# Patient Record
Sex: FEMALE | Race: WHITE | NOT HISPANIC OR LATINO | Employment: UNEMPLOYED | URBAN - NONMETROPOLITAN AREA
[De-identification: names, ages, dates, MRNs, and addresses within clinical notes are randomized per-mention and may not be internally consistent; named-entity substitution may affect disease eponyms.]

---

## 2021-01-08 ENCOUNTER — OFFICE VISIT (OUTPATIENT)
Dept: CARDIOLOGY | Facility: CLINIC | Age: 51
End: 2021-01-08

## 2021-01-08 VITALS
OXYGEN SATURATION: 97 % | TEMPERATURE: 96.9 F | DIASTOLIC BLOOD PRESSURE: 87 MMHG | SYSTOLIC BLOOD PRESSURE: 132 MMHG | HEIGHT: 55 IN | WEIGHT: 223 LBS | HEART RATE: 96 BPM | BODY MASS INDEX: 51.61 KG/M2

## 2021-01-08 DIAGNOSIS — I25.2 HISTORY OF MYOCARDIAL INFARCTION: ICD-10-CM

## 2021-01-08 DIAGNOSIS — R06.02 SOB (SHORTNESS OF BREATH): ICD-10-CM

## 2021-01-08 DIAGNOSIS — R07.2 PRECORDIAL PAIN: ICD-10-CM

## 2021-01-08 DIAGNOSIS — R06.02 SHORTNESS OF BREATH: ICD-10-CM

## 2021-01-08 DIAGNOSIS — I25.10 CORONARY ARTERY DISEASE DUE TO CALCIFIED CORONARY LESION: ICD-10-CM

## 2021-01-08 DIAGNOSIS — I25.84 CORONARY ARTERY DISEASE DUE TO CALCIFIED CORONARY LESION: ICD-10-CM

## 2021-01-08 PROCEDURE — 99204 OFFICE O/P NEW MOD 45 MIN: CPT | Performed by: PHYSICIAN ASSISTANT

## 2021-01-08 PROCEDURE — 93000 ELECTROCARDIOGRAM COMPLETE: CPT | Performed by: PHYSICIAN ASSISTANT

## 2021-01-08 RX ORDER — EMPAGLIFLOZIN 10 MG/1
TABLET, FILM COATED ORAL DAILY
COMMUNITY
Start: 2020-11-24 | End: 2022-04-13 | Stop reason: HOSPADM

## 2021-01-08 RX ORDER — ISOSORBIDE MONONITRATE 30 MG/1
30 TABLET, EXTENDED RELEASE ORAL DAILY
Qty: 90 TABLET | Refills: 3 | Status: SHIPPED | OUTPATIENT
Start: 2021-01-08 | End: 2022-01-12

## 2021-01-08 RX ORDER — CYCLOBENZAPRINE HCL 10 MG
TABLET ORAL
Status: ON HOLD | COMMUNITY
Start: 2020-11-24 | End: 2022-04-03

## 2021-01-08 RX ORDER — OMEPRAZOLE 20 MG/1
20 CAPSULE, DELAYED RELEASE ORAL DAILY
COMMUNITY
End: 2022-04-26 | Stop reason: SDUPTHER

## 2021-01-08 RX ORDER — ATORVASTATIN CALCIUM 80 MG/1
80 TABLET, FILM COATED ORAL NIGHTLY
COMMUNITY
Start: 2020-11-24 | End: 2022-04-26 | Stop reason: SDUPTHER

## 2021-01-08 RX ORDER — METOPROLOL TARTRATE 100 MG/1
100 TABLET ORAL
Status: ON HOLD | COMMUNITY
Start: 2020-11-24 | End: 2022-04-13 | Stop reason: SDUPTHER

## 2021-01-08 RX ORDER — PREDNISONE 20 MG/1
TABLET ORAL
Qty: 3 TABLET | Refills: 0 | Status: SHIPPED | OUTPATIENT
Start: 2021-01-08 | End: 2021-10-21

## 2021-01-08 RX ORDER — FAMOTIDINE 20 MG/1
TABLET, FILM COATED ORAL
Qty: 3 TABLET | Refills: 0 | Status: SHIPPED | OUTPATIENT
Start: 2021-01-08 | End: 2021-10-21

## 2021-01-08 RX ORDER — LISINOPRIL 2.5 MG/1
2.5 TABLET ORAL DAILY
COMMUNITY
Start: 2020-11-24 | End: 2022-04-13 | Stop reason: HOSPADM

## 2021-01-08 RX ORDER — DULOXETIN HYDROCHLORIDE 30 MG/1
30 CAPSULE, DELAYED RELEASE ORAL 2 TIMES DAILY
COMMUNITY
Start: 2020-11-24 | End: 2022-09-14 | Stop reason: SDUPTHER

## 2021-01-08 RX ORDER — SEMAGLUTIDE 1.34 MG/ML
INJECTION, SOLUTION SUBCUTANEOUS
COMMUNITY
Start: 2020-12-29 | End: 2021-10-21

## 2021-01-08 RX ORDER — ISOSORBIDE MONONITRATE 30 MG/1
30 TABLET, EXTENDED RELEASE ORAL DAILY
COMMUNITY
Start: 2020-11-24 | End: 2021-01-08 | Stop reason: SDUPTHER

## 2021-01-08 RX ORDER — ASPIRIN 81 MG/1
81 TABLET ORAL DAILY
COMMUNITY
End: 2022-06-23 | Stop reason: SDUPTHER

## 2021-01-08 RX ORDER — CETIRIZINE HYDROCHLORIDE 10 MG/1
10 TABLET ORAL 2 TIMES DAILY
Status: ON HOLD | COMMUNITY
Start: 2020-11-24 | End: 2022-04-13 | Stop reason: SDUPTHER

## 2021-01-08 RX ORDER — DIPHENHYDRAMINE HCL 25 MG
TABLET ORAL
Qty: 3 TABLET | Refills: 0 | Status: SHIPPED | OUTPATIENT
Start: 2021-01-08 | End: 2021-07-08 | Stop reason: SDUPTHER
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
## 2021-01-08 NOTE — PATIENT INSTRUCTIONS
"Fat and Cholesterol Restricted Eating Plan  Getting too much fat and cholesterol in your diet may cause health problems. Choosing the right foods helps keep your fat and cholesterol at normal levels. This can keep you from getting certain diseases.  Your doctor may recommend an eating plan that includes:  · Total fat: ______% or less of total calories a day.  · Saturated fat: ______% or less of total calories a day.  · Cholesterol: less than _________mg a day.  · Fiber: ______g a day.  What are tips for following this plan?  Meal planning  · At meals, divide your plate into four equal parts:  ? Fill one-half of your plate with vegetables and green salads.  ? Fill one-fourth of your plate with whole grains.  ? Fill one-fourth of your plate with low-fat (lean) protein foods.  · Eat fish that is high in omega-3 fats at least two times a week. This includes mackerel, tuna, sardines, and salmon.  · Eat foods that are high in fiber, such as whole grains, beans, apples, broccoli, carrots, peas, and barley.  General tips    · Work with your doctor to lose weight if you need to.  · Avoid:  ? Foods with added sugar.  ? Fried foods.  ? Foods with partially hydrogenated oils.  · Limit alcohol intake to no more than 1 drink a day for nonpregnant women and 2 drinks a day for men. One drink equals 12 oz of beer, 5 oz of wine, or 1½ oz of hard liquor.  Reading food labels  · Check food labels for:  ? Trans fats.  ? Partially hydrogenated oils.  ? Saturated fat (g) in each serving.  ? Cholesterol (mg) in each serving.  ? Fiber (g) in each serving.  · Choose foods with healthy fats, such as:  ? Monounsaturated fats.  ? Polyunsaturated fats.  ? Omega-3 fats.  · Choose grain products that have whole grains. Look for the word \"whole\" as the first word in the ingredient list.  Cooking  · Cook foods using low-fat methods. These include baking, boiling, grilling, and broiling.  · Eat more home-cooked foods. Eat at restaurants and buffets " less often.  · Avoid cooking using saturated fats, such as butter, cream, palm oil, palm kernel oil, and coconut oil.  Recommended foods    Fruits  · All fresh, canned (in natural juice), or frozen fruits.  Vegetables  · Fresh or frozen vegetables (raw, steamed, roasted, or grilled). Green salads.  Grains  · Whole grains, such as whole wheat or whole grain breads, crackers, cereals, and pasta. Unsweetened oatmeal, bulgur, barley, quinoa, or brown rice. Corn or whole wheat flour tortillas.  Meats and other protein foods  · Ground beef (85% or leaner), grass-fed beef, or beef trimmed of fat. Skinless chicken or turkey. Ground chicken or turkey. Pork trimmed of fat. All fish and seafood. Egg whites. Dried beans, peas, or lentils. Unsalted nuts or seeds. Unsalted canned beans. Nut butters without added sugar or oil.  Dairy  · Low-fat or nonfat dairy products, such as skim or 1% milk, 2% or reduced-fat cheeses, low-fat and fat-free ricotta or cottage cheese, or plain low-fat and nonfat yogurt.  Fats and oils  · Tub margarine without trans fats. Light or reduced-fat mayonnaise and salad dressings. Avocado. Olive, canola, sesame, or safflower oils.  The items listed above may not be a complete list of foods and beverages you can eat. Contact a dietitian for more information.  Foods to avoid  Fruits  · Canned fruit in heavy syrup. Fruit in cream or butter sauce. Fried fruit.  Vegetables  · Vegetables cooked in cheese, cream, or butter sauce. Fried vegetables.  Grains  · White bread. White pasta. White rice. Cornbread. Bagels, pastries, and croissants. Crackers and snack foods that contain trans fat and hydrogenated oils.  Meats and other protein foods  · Fatty cuts of meat. Ribs, chicken wings, cantu, sausage, bologna, salami, chitterlings, fatback, hot dogs, bratwurst, and packaged lunch meats. Liver and organ meats. Whole eggs and egg yolks. Chicken and turkey with skin. Fried meat.  Dairy  · Whole or 2% milk, cream,  half-and-half, and cream cheese. Whole milk cheeses. Whole-fat or sweetened yogurt. Full-fat cheeses. Nondairy creamers and whipped toppings. Processed cheese, cheese spreads, and cheese curds.  Beverages  · Alcohol. Sugar-sweetened drinks such as sodas, lemonade, and fruit drinks.  Fats and oils  · Butter, stick margarine, lard, shortening, ghee, or cantu fat. Coconut, palm kernel, and palm oils.  Sweets and desserts  · Corn syrup, sugars, honey, and molasses. Candy. Jam and jelly. Syrup. Sweetened cereals. Cookies, pies, cakes, donuts, muffins, and ice cream.  The items listed above may not be a complete list of foods and beverages you should avoid. Contact a dietitian for more information.  Summary  · Choosing the right foods helps keep your fat and cholesterol at normal levels. This can keep you from getting certain diseases.  · At meals, fill one-half of your plate with vegetables and green salads.  · Eat high-fiber foods, like whole grains, beans, apples, carrots, peas, and barley.  · Limit added sugar, saturated fats, alcohol, and fried foods.  This information is not intended to replace advice given to you by your health care provider. Make sure you discuss any questions you have with your health care provider.  Document Revised: 08/21/2019 Document Reviewed: 09/04/2018  [employer] Patient Education © 2020 [employer]  BMI for Adults  What is BMI?  Body mass index (BMI) is a number that is calculated from a person's weight and height. BMI can help estimate how much of a person's weight is composed of fat. BMI does not measure body fat directly. Rather, it is an alternative to procedures that directly measure body fat, which can be difficult and expensive.  BMI can help identify people who may be at higher risk for certain medical problems.  What are BMI measurements used for?  BMI is used as a screening tool to identify possible weight problems. It helps determine whether a person is obese, overweight, a  "healthy weight, or underweight.  BMI is useful for:  · Identifying a weight problem that may be related to a medical condition or may increase the risk for medical problems.  · Promoting changes, such as changes in diet and exercise, to help reach a healthy weight. BMI screening can be repeated to see if these changes are working.  How is BMI calculated?  BMI involves measuring your weight in relation to your height. Both height and weight are measured, and the BMI is calculated from those numbers. This can be done either in English ([place] or metric measurements. Note that charts and online BMI calculators are available to help you find your BMI quickly and easily without having to do these calculations yourself.  To calculate your BMI in English ([place] measurements:    1. Measure your weight in pounds (lb).  2. Multiply the number of pounds by 703.  ? For example, for a person who weighs 180 lb, multiply that number by 703, which equals 126,540.  3. Measure your height in inches. Then multiply that number by itself to get a measurement called \"inches squared.\"  ? For example, for a person who is 70 inches tall, the \"inches squared\" measurement is 70 inches x 70 inches, which equals 4,900 inches squared.  4. Divide the total from step 2 (number of lb x 703) by the total from step 3 (inches squared): 126,540 ÷ 4,900 = 25.8. This is your BMI.  To calculate your BMI in metric measurements:  1. Measure your weight in kilograms (kg).  2. Measure your height in meters (m). Then multiply that number by itself to get a measurement called \"meters squared.\"  ? For example, for a person who is 1.75 m tall, the \"meters squared\" measurement is 1.75 m x 1.75 m, which is equal to 3.1 meters squared.  3. Divide the number of kilograms (your weight) by the meters squared number. In this example: 70 ÷ 3.1 = 22.6. This is your BMI.  What do the results mean?  BMI charts are used to identify whether you are underweight, normal weight, " overweight, or obese. The following guidelines will be used:  · Underweight: BMI less than 18.5.  · Normal weight: BMI between 18.5 and 24.9.  · Overweight: BMI between 25 and 29.9.  · Obese: BMI of 30 or above.  Keep these notes in mind:  · Weight includes both fat and muscle, so someone with a muscular build, such as an athlete, may have a BMI that is higher than 24.9. In cases like these, BMI is not an accurate measure of body fat.  · To determine if excess body fat is the cause of a BMI of 25 or higher, further assessments may need to be done by a health care provider.  · BMI is usually interpreted in the same way for men and women.  Where to find more information  For more information about BMI, including tools to quickly calculate your BMI, go to these websites:  · Centers for Disease Control and Prevention: www.cdc.gov  · American Heart Association: www.heart.org  · National Heart, Lung, and [employer] [place]: www.nhlbi.nih.gov  Summary  · Body mass index (BMI) is a number that is calculated from a person's weight and height.  · BMI may help estimate how much of a person's weight is composed of fat. BMI can help identify those who may be at higher risk for certain medical problems.  · BMI can be measured using English measurements or metric measurements.  · BMI charts are used to identify whether you are underweight, normal weight, overweight, or obese.  This information is not intended to replace advice given to you by your health care provider. Make sure you discuss any questions you have with your health care provider.  Document Revised: 09/09/2020 Document Reviewed: 07/17/2020  [employer] Patient Education © 2020 [employer]    Coronary Angiogram With Stent  Coronary angiogram with stent placement is a procedure to widen or open a narrow blood vessel of the heart (coronary artery). Arteries may become blocked by cholesterol buildup (plaques) in the lining of the artery wall. When a coronary artery becomes  partially blocked, blood flow to that area decreases. This may lead to chest pain or a heart attack (myocardial infarction).  A stent is a small piece of metal that looks like mesh or spring. Stent placement may be done as treatment after a heart attack, or to prevent a heart attack if a blocked artery is found by a coronary angiogram.  Let your health care provider know about:  · Any allergies you have, including allergies to medicines or contrast dye.  · All medicines you are taking, including vitamins, herbs, eye drops, creams, and over-the-counter medicines.  · Any problems you or family members have had with anesthetic medicines.  · Any blood disorders you have.  · Any surgeries you have had.  · Any medical conditions you have, including kidney problems or kidney failure.  · Whether you are pregnant or may be pregnant.  · Whether you are breastfeeding.  What are the risks?  Generally, this is a safe procedure. However, serious problems may occur, including:  · Damage to nearby structures or organs, such as the heart, blood vessels, or kidneys.  · A return of blockage.  · Bleeding, infection, or bruising at the insertion site.  · A collection of blood under the skin (hematoma) at the insertion site.  · A blood clot in another part of the body.  · Allergic reaction to medicines or dyes.  · Bleeding into the abdomen (retroperitoneal bleeding).  · Stroke (rare).  · Heart attack (rare).  What happens before the procedure?  Staying hydrated  Follow instructions from your health care provider about hydration, which may include:  · Up to 2 hours before the procedure - you may continue to drink clear liquids, such as water, clear fruit juice, black coffee, and plain tea.    Eating and drinking restrictions  Follow instructions from your health care provider about eating and drinking, which may include:  · 8 hours before the procedure - stop eating heavy meals or foods, such as meat, fried foods, or fatty foods.  · 6  hours before the procedure - stop eating light meals or foods, such as toast or cereal.  · 2 hours before the procedure - stop drinking clear liquids.  Medicines  Ask your health care provider about:  · Changing or stopping your regular medicines. This is especially important if you are taking diabetes medicines or blood thinners.  · Taking medicines such as aspirin and ibuprofen. These medicines can thin your blood. Do not take these medicines unless your health care provider tells you to take them.  ? Generally, aspirin is recommended before a thin tube, called a catheter, is passed through a blood vessel and inserted into the heart (cardiac catheterization).  · Taking over-the-counter medicines, vitamins, herbs, and supplements.  General instructions  · Do not use any products that contain nicotine or tobacco for at least 4 weeks before the procedure. These products include cigarettes, e-cigarettes, and chewing tobacco. If you need help quitting, ask your health care provider.  · Plan to have someone take you home from the hospital or clinic.  · If you will be going home right after the procedure, plan to have someone with you for 24 hours.  · You may have tests and imaging procedures.  · Ask your health care provider:  ? How your insertion site will be marked. Ask which artery will be used for the procedure.  ? What steps will be taken to help prevent infection. These may include:  § Removing hair at the insertion site.  § Washing skin with a germ-killing soap.  § Taking antibiotic medicine.  What happens during the procedure?    · An IV will be inserted into one of your veins.  · Electrodes may be placed on your chest to monitor your heart rate during the procedure.  · You will be given one or more of the following:  ? A medicine to help you relax (sedative).  ? A medicine to numb the area (local anesthetic) for catheter insertion.  · A small incision will be made for catheter insertion.  · The catheter will be  inserted into an artery using a guide wire. The location may be in your groin, your wrist, or the fold of your arm (near your elbow).  · An X-ray procedure (fluoroscopy) will be used to help guide the catheter to the opening of the heart arteries.  · A dye will be injected into the catheter. X-rays will be taken. The dye helps to show where any narrowing or blockages are located in the arteries.  · Tell your health care provider if you have chest pain or trouble breathing.  · A tiny wire will be guided to the blocked spot, and a balloon will be inflated to make the artery wider.  · The stent will be expanded to crush the plaques into the wall of the vessel. The stent will hold the area open and improve the blood flow. Most stents have a drug coating to reduce the risk of the stent narrowing over time.  · The artery may be made wider using a drill, laser, or other tools that remove plaques.  · The catheter will be removed when the blood flow improves. The stent will stay where it was placed, and the lining of the artery will grow over it.  · A bandage (dressing) will be placed on the insertion site. Pressure will be applied to stop bleeding.  · The IV will be removed.  This procedure may vary among health care providers and hospitals.  What happens after the procedure?  · Your blood pressure, heart rate, breathing rate, and blood oxygen level will be monitored until you leave the hospital or clinic.  · If the procedure is done through the leg, you will lie flat in bed for a few hours or for as long as told by your health care provider. You will be instructed not to bend or cross your legs.  · The insertion site and the pulse in your foot or wrist will be checked often.  · You may have more blood tests, X-rays, and a test that records the electrical activity of your heart (electrocardiogram, or ECG).  · Do not drive for 24 hours if you were given a sedative during your procedure.  Summary  · Coronary angiogram with  stent placement is a procedure to widen or open a narrowed coronary artery. This is done to treat heart problems.  · Before the procedure, let your health care provider know about all the medical conditions and surgeries you have or have had.  · This is a safe procedure. However, some problems may occur, including damage to nearby structures or organs, bleeding, blood clots, or allergies.  · Follow your health care provider's instructions about eating, drinking, medicines, and other lifestyle changes, such as quitting tobacco use before the procedure.  This information is not intended to replace advice given to you by your health care provider. Make sure you discuss any questions you have with your health care provider.  Document Revised: 07/08/2020 Document Reviewed: 07/08/2020  Elsevier Patient Education © 2020 [employer]

## 2021-01-19 ENCOUNTER — LAB (OUTPATIENT)
Dept: LAB | Facility: HOSPITAL | Age: 51
End: 2021-01-19

## 2021-01-19 DIAGNOSIS — I25.10 CORONARY ARTERY DISEASE DUE TO CALCIFIED CORONARY LESION: Primary | ICD-10-CM

## 2021-01-19 DIAGNOSIS — R07.2 PRECORDIAL PAIN: ICD-10-CM

## 2021-01-19 DIAGNOSIS — R06.02 SOB (SHORTNESS OF BREATH): ICD-10-CM

## 2021-01-19 DIAGNOSIS — I25.84 CORONARY ARTERY DISEASE DUE TO CALCIFIED CORONARY LESION: ICD-10-CM

## 2021-01-19 DIAGNOSIS — I25.10 CORONARY ARTERY DISEASE DUE TO CALCIFIED CORONARY LESION: ICD-10-CM

## 2021-01-19 DIAGNOSIS — I25.84 CORONARY ARTERY DISEASE DUE TO CALCIFIED CORONARY LESION: Primary | ICD-10-CM

## 2021-01-19 DIAGNOSIS — I25.2 HISTORY OF MYOCARDIAL INFARCTION: ICD-10-CM

## 2021-01-19 LAB
ANION GAP SERPL CALCULATED.3IONS-SCNC: 16.4 MMOL/L (ref 5–15)
BASOPHILS # BLD AUTO: 0.05 10*3/MM3 (ref 0–0.2)
BASOPHILS NFR BLD AUTO: 0.7 % (ref 0–1.5)
BUN SERPL-MCNC: 19 MG/DL (ref 6–20)
BUN/CREAT SERPL: 20.9 (ref 7–25)
CALCIUM SPEC-SCNC: 9.7 MG/DL (ref 8.6–10.5)
CHLORIDE SERPL-SCNC: 94 MMOL/L (ref 98–107)
CO2 SERPL-SCNC: 20.6 MMOL/L (ref 22–29)
CREAT SERPL-MCNC: 0.91 MG/DL (ref 0.57–1)
DEPRECATED RDW RBC AUTO: 41.4 FL (ref 37–54)
EOSINOPHIL # BLD AUTO: 0.3 10*3/MM3 (ref 0–0.4)
EOSINOPHIL NFR BLD AUTO: 4 % (ref 0.3–6.2)
ERYTHROCYTE [DISTWIDTH] IN BLOOD BY AUTOMATED COUNT: 13.1 % (ref 12.3–15.4)
GFR SERPL CREATININE-BSD FRML MDRD: 65 ML/MIN/1.73
GLUCOSE SERPL-MCNC: 167 MG/DL (ref 65–99)
HCT VFR BLD AUTO: 47.4 % (ref 34–46.6)
HGB BLD-MCNC: 14.9 G/DL (ref 12–15.9)
IMM GRANULOCYTES # BLD AUTO: 0.02 10*3/MM3 (ref 0–0.05)
IMM GRANULOCYTES NFR BLD AUTO: 0.3 % (ref 0–0.5)
LYMPHOCYTES # BLD AUTO: 2.59 10*3/MM3 (ref 0.7–3.1)
LYMPHOCYTES NFR BLD AUTO: 34.4 % (ref 19.6–45.3)
MCH RBC QN AUTO: 27.3 PG (ref 26.6–33)
MCHC RBC AUTO-ENTMCNC: 31.4 G/DL (ref 31.5–35.7)
MCV RBC AUTO: 87 FL (ref 79–97)
MONOCYTES # BLD AUTO: 0.38 10*3/MM3 (ref 0.1–0.9)
MONOCYTES NFR BLD AUTO: 5.1 % (ref 5–12)
NEUTROPHILS NFR BLD AUTO: 4.18 10*3/MM3 (ref 1.7–7)
NEUTROPHILS NFR BLD AUTO: 55.5 % (ref 42.7–76)
NRBC BLD AUTO-RTO: 0 /100 WBC (ref 0–0.2)
PLATELET # BLD AUTO: 439 10*3/MM3 (ref 140–450)
PMV BLD AUTO: 9.6 FL (ref 6–12)
POTASSIUM SERPL-SCNC: 4.5 MMOL/L (ref 3.5–5.2)
RBC # BLD AUTO: 5.45 10*6/MM3 (ref 3.77–5.28)
SODIUM SERPL-SCNC: 131 MMOL/L (ref 136–145)
WBC # BLD AUTO: 7.52 10*3/MM3 (ref 3.4–10.8)

## 2021-01-19 PROCEDURE — 80048 BASIC METABOLIC PNL TOTAL CA: CPT

## 2021-01-19 PROCEDURE — 85025 COMPLETE CBC W/AUTO DIFF WBC: CPT

## 2021-01-19 PROCEDURE — 36415 COLL VENOUS BLD VENIPUNCTURE: CPT

## 2021-01-20 ENCOUNTER — TELEPHONE (OUTPATIENT)
Dept: CARDIOLOGY | Facility: CLINIC | Age: 51
End: 2021-01-20

## 2021-01-21 ENCOUNTER — OUTSIDE FACILITY SERVICE (OUTPATIENT)
Dept: CARDIOLOGY | Facility: CLINIC | Age: 51
End: 2021-01-21

## 2021-01-21 DIAGNOSIS — I25.84 CORONARY ARTERY DISEASE DUE TO CALCIFIED CORONARY LESION: ICD-10-CM

## 2021-01-21 DIAGNOSIS — I25.2 HISTORY OF MYOCARDIAL INFARCTION: ICD-10-CM

## 2021-01-21 DIAGNOSIS — R07.2 PRECORDIAL PAIN: ICD-10-CM

## 2021-01-21 DIAGNOSIS — R06.02 SOB (SHORTNESS OF BREATH): ICD-10-CM

## 2021-01-21 DIAGNOSIS — I25.10 CORONARY ARTERY DISEASE DUE TO CALCIFIED CORONARY LESION: ICD-10-CM

## 2021-01-21 PROCEDURE — 93459 L HRT ART/GRFT ANGIO: CPT | Performed by: INTERNAL MEDICINE

## 2021-01-21 PROCEDURE — 92978 ENDOLUMINL IVUS OCT C 1ST: CPT | Performed by: INTERNAL MEDICINE

## 2021-01-21 PROCEDURE — 92928 PRQ TCAT PLMT NTRAC ST 1 LES: CPT | Performed by: INTERNAL MEDICINE

## 2021-03-04 ENCOUNTER — CLINICAL SUPPORT (OUTPATIENT)
Dept: CARDIOLOGY | Facility: CLINIC | Age: 51
End: 2021-03-04

## 2021-03-04 ENCOUNTER — LAB (OUTPATIENT)
Dept: LAB | Facility: HOSPITAL | Age: 51
End: 2021-03-04

## 2021-03-04 VITALS
DIASTOLIC BLOOD PRESSURE: 75 MMHG | HEART RATE: 95 BPM | OXYGEN SATURATION: 99 % | WEIGHT: 227.4 LBS | BODY MASS INDEX: 40.29 KG/M2 | HEIGHT: 63 IN | SYSTOLIC BLOOD PRESSURE: 101 MMHG

## 2021-03-04 DIAGNOSIS — R06.02 SOB (SHORTNESS OF BREATH): ICD-10-CM

## 2021-03-04 DIAGNOSIS — I25.2 HISTORY OF MYOCARDIAL INFARCTION: Primary | ICD-10-CM

## 2021-03-04 DIAGNOSIS — I25.10 CORONARY ARTERY DISEASE DUE TO CALCIFIED CORONARY LESION: ICD-10-CM

## 2021-03-04 DIAGNOSIS — I25.2 HISTORY OF MYOCARDIAL INFARCTION: ICD-10-CM

## 2021-03-04 DIAGNOSIS — R07.89 OTHER CHEST PAIN: ICD-10-CM

## 2021-03-04 DIAGNOSIS — I25.84 CORONARY ARTERY DISEASE DUE TO CALCIFIED CORONARY LESION: ICD-10-CM

## 2021-03-04 LAB
ANION GAP SERPL CALCULATED.3IONS-SCNC: 16.8 MMOL/L (ref 5–15)
BASOPHILS # BLD AUTO: 0.05 10*3/MM3 (ref 0–0.2)
BASOPHILS NFR BLD AUTO: 0.5 % (ref 0–1.5)
BUN SERPL-MCNC: 16 MG/DL (ref 6–20)
BUN/CREAT SERPL: 19.3 (ref 7–25)
CALCIUM SPEC-SCNC: 10.6 MG/DL (ref 8.6–10.5)
CHLORIDE SERPL-SCNC: 102 MMOL/L (ref 98–107)
CO2 SERPL-SCNC: 22.2 MMOL/L (ref 22–29)
CREAT SERPL-MCNC: 0.83 MG/DL (ref 0.57–1)
DEPRECATED RDW RBC AUTO: 41.8 FL (ref 37–54)
EOSINOPHIL # BLD AUTO: 0.46 10*3/MM3 (ref 0–0.4)
EOSINOPHIL NFR BLD AUTO: 4.8 % (ref 0.3–6.2)
ERYTHROCYTE [DISTWIDTH] IN BLOOD BY AUTOMATED COUNT: 13.5 % (ref 12.3–15.4)
GFR SERPL CREATININE-BSD FRML MDRD: 72 ML/MIN/1.73
GLUCOSE SERPL-MCNC: 165 MG/DL (ref 65–99)
HCT VFR BLD AUTO: 47.1 % (ref 34–46.6)
HGB BLD-MCNC: 14.5 G/DL (ref 12–15.9)
IMM GRANULOCYTES # BLD AUTO: 0.04 10*3/MM3 (ref 0–0.05)
IMM GRANULOCYTES NFR BLD AUTO: 0.4 % (ref 0–0.5)
LYMPHOCYTES # BLD AUTO: 4.33 10*3/MM3 (ref 0.7–3.1)
LYMPHOCYTES NFR BLD AUTO: 45.4 % (ref 19.6–45.3)
MCH RBC QN AUTO: 25.9 PG (ref 26.6–33)
MCHC RBC AUTO-ENTMCNC: 30.8 G/DL (ref 31.5–35.7)
MCV RBC AUTO: 84.1 FL (ref 79–97)
MONOCYTES # BLD AUTO: 0.58 10*3/MM3 (ref 0.1–0.9)
MONOCYTES NFR BLD AUTO: 6.1 % (ref 5–12)
NEUTROPHILS NFR BLD AUTO: 4.08 10*3/MM3 (ref 1.7–7)
NEUTROPHILS NFR BLD AUTO: 42.8 % (ref 42.7–76)
NRBC BLD AUTO-RTO: 0 /100 WBC (ref 0–0.2)
PLATELET # BLD AUTO: 426 10*3/MM3 (ref 140–450)
PMV BLD AUTO: 10.1 FL (ref 6–12)
POTASSIUM SERPL-SCNC: 4.8 MMOL/L (ref 3.5–5.2)
RBC # BLD AUTO: 5.6 10*6/MM3 (ref 3.77–5.28)
SODIUM SERPL-SCNC: 141 MMOL/L (ref 136–145)
TROPONIN T SERPL-MCNC: <0.01 NG/ML (ref 0–0.03)
WBC # BLD AUTO: 9.54 10*3/MM3 (ref 3.4–10.8)

## 2021-03-04 PROCEDURE — 84484 ASSAY OF TROPONIN QUANT: CPT

## 2021-03-04 PROCEDURE — 93000 ELECTROCARDIOGRAM COMPLETE: CPT | Performed by: PHYSICIAN ASSISTANT

## 2021-03-04 PROCEDURE — 80048 BASIC METABOLIC PNL TOTAL CA: CPT

## 2021-03-04 PROCEDURE — 85025 COMPLETE CBC W/AUTO DIFF WBC: CPT

## 2021-03-04 PROCEDURE — 99211 OFF/OP EST MAY X REQ PHY/QHP: CPT | Performed by: PHYSICIAN ASSISTANT

## 2021-03-04 PROCEDURE — 36415 COLL VENOUS BLD VENIPUNCTURE: CPT

## 2021-03-04 NOTE — PROGRESS NOTES
Lissa Eisenberg  1970  3/4/2021   ?   Chief Complaint   Patient presents with   • Nurse visit     EKG      ?   HPI:   Patient called into the office complaining of chest pain that radiates into her neck. Stated her BP has been running low and HR was running high. Patient stated she had no complaints of jaw pain. Feels really weak today. 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 Had the episode 4 times yesterday and after the 4th time, she took two Nitroglycerins. Pain was relieved. States this is the same pain she felt before previous heart attack.   ?     Current Outpatient Medications:   •  aspirin (aspirin) 81 MG EC tablet, Take 81 mg by mouth Daily., Disp: , Rfl:   •  atorvastatin (LIPITOR) 80 MG tablet, Take 80 mg by mouth Every Night., Disp: , Rfl:   •  cetirizine (zyrTEC) 10 MG tablet, Take 10 mg by mouth 2 (two) times a day., Disp: , Rfl:   •  cyclobenzaprine (FLEXERIL) 10 MG tablet, , Disp: , Rfl:   •  diphenhydrAMINE (BENADRYL) 25 MG tablet, Take BID day before cath and the AM of cath., Disp: 3 tablet, Rfl: 0  •  DULoxetine (CYMBALTA) 30 MG capsule, Take 30 mg by mouth 2 (Two) Times a Day., Disp: , Rfl:   •  famotidine (PEPCID) 20 MG tablet, Take BID day before cath and the AM of cath., Disp: 3 tablet, Rfl: 0  •  Flaxseed, Linseed, (FLAXSEED OIL PO), Take  by mouth Daily., Disp: , Rfl:   •  isosorbide mononitrate (IMDUR) 30 MG 24 hr tablet, Take 1 tablet by mouth Daily., Disp: 90 tablet, Rfl: 3  •  Jardiance 10 MG tablet, Daily., Disp: , Rfl:   •  lisinopril (PRINIVIL,ZESTRIL) 2.5 MG tablet, Take 2.5 mg by mouth Daily., Disp: , Rfl:   •  metFORMIN (GLUCOPHAGE) 1000 MG tablet, Take 1,000 mg by mouth 2 (Two) Times a Day With Meals., Disp: , Rfl:   •  metoprolol tartrate (LOPRESSOR) 100 MG tablet, Take 100 mg by mouth. 1 1/2 tabs at hs, Disp: , Rfl:   •  omeprazole (priLOSEC) 20 MG capsule, Take 20 mg by mouth Daily., Disp: , Rfl:   •  Ozempic, 0.25 or 0.5 MG/DOSE, 2 MG/1.5ML  solution pen-injector, , Disp: , Rfl:   •  predniSONE (DELTASONE) 20 MG tablet, Take BID day before cath and the AM of cath., Disp: 3 tablet, Rfl: 0  •  ticagrelor (BRILINTA) 90 MG tablet tablet, Take 1 tablet by mouth 2 (Two) Times a Day., Disp: 180 tablet, Rfl: 3   ?   ?   Contrast dye, Ciprofloxacin, and Sulfa antibiotics         ECG 12 Lead    Date/Time: 3/4/2021 10:52 AM  Performed by: Max Márquez PA  Authorized by: Max Márquez PA   Comparison: compared with previous ECG from 1/8/2021               ?   Assessment/Plan    ?  1. Max presented to the office and spoke with patient extensively about previous grafting and occlusions.   2. Reviewed EKG. Changes noted since last EKG so we will proceed with further workup.  3. Patient will go to lab downstairs today to check troponin, CBC, and BMP.  4. URGENT lexiscan stress test ordered for patient.    Patient verbalized understanding and has no further questions at this time. Will call us with any new or worsening symptoms.   ?

## 2021-03-18 ENCOUNTER — CLINICAL SUPPORT (OUTPATIENT)
Dept: CARDIOLOGY | Facility: CLINIC | Age: 51
End: 2021-03-18

## 2021-03-18 VITALS
WEIGHT: 228.2 LBS | SYSTOLIC BLOOD PRESSURE: 109 MMHG | BODY MASS INDEX: 40.43 KG/M2 | OXYGEN SATURATION: 97 % | DIASTOLIC BLOOD PRESSURE: 76 MMHG | HEART RATE: 92 BPM | HEIGHT: 63 IN

## 2021-03-18 DIAGNOSIS — R00.2 PALPITATIONS: ICD-10-CM

## 2021-03-18 DIAGNOSIS — R06.02 SOB (SHORTNESS OF BREATH): Primary | ICD-10-CM

## 2021-03-18 DIAGNOSIS — R07.89 OTHER CHEST PAIN: ICD-10-CM

## 2021-03-18 DIAGNOSIS — R53.83 FATIGUE, UNSPECIFIED TYPE: ICD-10-CM

## 2021-03-18 DIAGNOSIS — I25.2 HISTORY OF MYOCARDIAL INFARCTION: ICD-10-CM

## 2021-03-18 DIAGNOSIS — R06.02 SHORTNESS OF BREATH: ICD-10-CM

## 2021-03-18 DIAGNOSIS — I25.84 CORONARY ARTERY DISEASE DUE TO CALCIFIED CORONARY LESION: ICD-10-CM

## 2021-03-18 DIAGNOSIS — R07.2 PRECORDIAL PAIN: ICD-10-CM

## 2021-03-18 DIAGNOSIS — I25.10 CORONARY ARTERY DISEASE DUE TO CALCIFIED CORONARY LESION: ICD-10-CM

## 2021-03-18 PROCEDURE — 93000 ELECTROCARDIOGRAM COMPLETE: CPT | Performed by: PHYSICIAN ASSISTANT

## 2021-03-18 RX ORDER — RANOLAZINE 500 MG/1
500 TABLET, EXTENDED RELEASE ORAL 2 TIMES DAILY
Qty: 60 TABLET | Refills: 11 | Status: SHIPPED | OUTPATIENT
Start: 2021-03-18 | End: 2021-10-21

## 2021-03-18 NOTE — PROGRESS NOTES
Lissa Eisenberg  1970  3/18/2021   ?   Chief Complaint   Patient presents with   • Chest Pain     presents as nurse visit for EKG   • Palpitations   • Shortness of Breath   • Fatigue      ?   HPI:   ?   ? Patient presents as nurse visit for EKG. She was last seen on 3/4/21 for chest pain with STAT stress test ordered. She states she still experiences chest pain, palpitations, shortness of breath and fatigue. EKG done as ordered and to be reviewed by Max Márquez PA-C. Vanita Bush MA    ?     Current Outpatient Medications:   •  aspirin (aspirin) 81 MG EC tablet, Take 81 mg by mouth Daily., Disp: , Rfl:   •  atorvastatin (LIPITOR) 80 MG tablet, Take 80 mg by mouth Every Night., Disp: , Rfl:   •  cetirizine (zyrTEC) 10 MG tablet, Take 10 mg by mouth 2 (two) times a day., Disp: , Rfl:   •  cyclobenzaprine (FLEXERIL) 10 MG tablet, , Disp: , Rfl:   •  diphenhydrAMINE (BENADRYL) 25 MG tablet, Take BID day before cath and the AM of cath., Disp: 3 tablet, Rfl: 0  •  DULoxetine (CYMBALTA) 30 MG capsule, Take 30 mg by mouth 2 (Two) Times a Day., Disp: , Rfl:   •  famotidine (PEPCID) 20 MG tablet, Take BID day before cath and the AM of cath., Disp: 3 tablet, Rfl: 0  •  Flaxseed, Linseed, (FLAXSEED OIL PO), Take  by mouth Daily., Disp: , Rfl:   •  isosorbide mononitrate (IMDUR) 30 MG 24 hr tablet, Take 1 tablet by mouth Daily., Disp: 90 tablet, Rfl: 3  •  Jardiance 10 MG tablet, Daily., Disp: , Rfl:   •  lisinopril (PRINIVIL,ZESTRIL) 2.5 MG tablet, Take 2.5 mg by mouth Daily., Disp: , Rfl:   •  metFORMIN (GLUCOPHAGE) 1000 MG tablet, Take 1,000 mg by mouth 2 (Two) Times a Day With Meals., Disp: , Rfl:   •  metoprolol tartrate (LOPRESSOR) 100 MG tablet, Take 100 mg by mouth. 1 1/2 tabs at hs, Disp: , Rfl:   •  omeprazole (priLOSEC) 20 MG capsule, Take 20 mg by mouth Daily., Disp: , Rfl:   •  Ozempic, 0.25 or 0.5 MG/DOSE, 2 MG/1.5ML solution pen-injector, , Disp: , Rfl:   •  predniSONE (DELTASONE) 20 MG tablet, Take  BID day before cath and the AM of cath., Disp: 3 tablet, Rfl: 0  •  ticagrelor (BRILINTA) 90 MG tablet tablet, Take 1 tablet by mouth 2 (Two) Times a Day., Disp: 180 tablet, Rfl: 3   ?   ?   Contrast dye, Ciprofloxacin, and Sulfa antibiotics         ECG 12 Lead    Date/Time: 3/18/2021 8:38 AM  Performed by: Max Márquez PA  Authorized by: Max Márquez PA   Comparison: compared with previous ECG from 3/4/2021               ?   Assessment/Plan    ?   ?   ?  1. Chest Pain   2. Palpitations   3. Shortness of Breath   4. Fatigue    EKG, vitals and symptoms reviewed by Max Márquez PA-C with verbal order for Ranexa 500 mg BID. EKG and stress test unremarkable. Patient to call in 1 week with symptoms and keep follow up as scheduled. Vanita Bush MA

## 2021-03-23 ENCOUNTER — BULK ORDERING (OUTPATIENT)
Dept: CASE MANAGEMENT | Facility: OTHER | Age: 51
End: 2021-03-23

## 2021-03-23 DIAGNOSIS — Z23 IMMUNIZATION DUE: ICD-10-CM

## 2021-07-08 ENCOUNTER — OFFICE VISIT (OUTPATIENT)
Dept: CARDIOLOGY | Facility: CLINIC | Age: 51
End: 2021-07-08

## 2021-07-08 VITALS
HEART RATE: 97 BPM | SYSTOLIC BLOOD PRESSURE: 128 MMHG | DIASTOLIC BLOOD PRESSURE: 90 MMHG | OXYGEN SATURATION: 99 % | HEIGHT: 63 IN | WEIGHT: 224.4 LBS | BODY MASS INDEX: 39.76 KG/M2

## 2021-07-08 DIAGNOSIS — R00.2 PALPITATIONS: ICD-10-CM

## 2021-07-08 DIAGNOSIS — R06.02 SHORTNESS OF BREATH: ICD-10-CM

## 2021-07-08 DIAGNOSIS — I25.10 CORONARY ARTERY DISEASE DUE TO CALCIFIED CORONARY LESION: ICD-10-CM

## 2021-07-08 DIAGNOSIS — I25.84 CORONARY ARTERY DISEASE DUE TO CALCIFIED CORONARY LESION: ICD-10-CM

## 2021-07-08 DIAGNOSIS — R07.2 PRECORDIAL PAIN: Primary | ICD-10-CM

## 2021-07-08 PROCEDURE — 99214 OFFICE O/P EST MOD 30 MIN: CPT | Performed by: PHYSICIAN ASSISTANT

## 2021-07-08 RX ORDER — PREDNISONE 20 MG/1
TABLET ORAL
Qty: 3 TABLET | Refills: 0 | Status: SHIPPED | OUTPATIENT
Start: 2021-07-08 | End: 2021-10-21

## 2021-07-08 RX ORDER — NAPROXEN 500 MG/1
500 TABLET ORAL 2 TIMES DAILY WITH MEALS
Status: ON HOLD | COMMUNITY
End: 2022-04-03

## 2021-07-08 RX ORDER — FAMOTIDINE 20 MG/1
TABLET, FILM COATED ORAL
Qty: 3 TABLET | Refills: 0 | Status: SHIPPED | OUTPATIENT
Start: 2021-07-08 | End: 2021-10-21

## 2021-07-08 RX ORDER — DIPHENHYDRAMINE HCL 25 MG
TABLET ORAL
Qty: 3 TABLET | Refills: 0 | Status: SHIPPED | OUTPATIENT
Start: 2021-07-08 | End: 2021-10-21

## 2021-07-08 NOTE — PATIENT INSTRUCTIONS

## 2021-07-08 NOTE — PROGRESS NOTES
Problem list     Subjective   Lissa Eisenberg is a 50 y.o. female     Chief Complaint   Patient presents with   • Follow-up     6 month    Problem list:  1.  Coronary artery disease with history of bypass per report, and multivessel stenting otherwise.  1.1.  Repeat catheterization for low level symptoms, 1/2021.  The patient had stenting of the distal RCA for high-grade stenosis.  Also noted was a patent LIMA to LAD, occluded vein graft, nonobstructive disease in the circumflex, and diffuse but nonobstructive disease otherwise.  2.  Preserved systolic function.  3.  Hypertension  4.  Dyslipidemia  5.  Diabetes mellitus.    HPI  The patient presents into the clinic today for follow-up.  She has not done well.  She did well initially post stenting and then within a couple of weeks to a month, started noticing recurrent angina.  I did do a stress test to evaluate the stented segment.  She had no evidence of ischemia.  I slowly augmented antianginal medications.  She was started on Ranexa in addition to all of her antianginal therapy otherwise.  Despite starting the Ranexa, she reports that she has remained symptomatic.  Symptoms have progressively worsened now and are very much problematic for the patient.  She reports that this is the exact angina that she has had prior to all stenting procedures and prior to bypass.  She feels that at times this could even be worse.  She reports ongoing dyspnea and fatigue which are now severe and marked.  Activity and exertion is very much limited by symptoms.  She presents today to discuss consideration of further evaluation.  The patient reports that she feels poorly enough at this time that she would prefer to proceed on with catheterization.  Current Outpatient Medications on File Prior to Visit   Medication Sig Dispense Refill   • aspirin (aspirin) 81 MG EC tablet Take 81 mg by mouth Daily.     • atorvastatin (LIPITOR) 80 MG tablet Take 80 mg by mouth Every Night.     •  cetirizine (zyrTEC) 10 MG tablet Take 10 mg by mouth 2 (two) times a day.     • cyclobenzaprine (FLEXERIL) 10 MG tablet      • diphenhydrAMINE (BENADRYL) 25 MG tablet Take BID day before cath and the AM of cath. 3 tablet 0   • Dulaglutide (TRULICITY SC) Inject  under the skin into the appropriate area as directed.     • DULoxetine (CYMBALTA) 30 MG capsule Take 30 mg by mouth 2 (Two) Times a Day.     • famotidine (PEPCID) 20 MG tablet Take BID day before cath and the AM of cath. 3 tablet 0   • Flaxseed, Linseed, (FLAXSEED OIL PO) Take  by mouth Daily.     • isosorbide mononitrate (IMDUR) 30 MG 24 hr tablet Take 1 tablet by mouth Daily. 90 tablet 3   • Jardiance 10 MG tablet Daily.     • lisinopril (PRINIVIL,ZESTRIL) 2.5 MG tablet Take 2.5 mg by mouth Daily.     • metFORMIN (GLUCOPHAGE) 1000 MG tablet Take 1,000 mg by mouth 2 (Two) Times a Day With Meals.     • metoprolol tartrate (LOPRESSOR) 100 MG tablet Take 100 mg by mouth. 1 1/2 tabs at hs     • naproxen (NAPROSYN) 500 MG tablet Take 500 mg by mouth 2 (Two) Times a Day With Meals.     • omeprazole (priLOSEC) 20 MG capsule Take 20 mg by mouth Daily.     • ranolazine (Ranexa) 500 MG 12 hr tablet Take 1 tablet by mouth 2 (Two) Times a Day. 60 tablet 11   • ticagrelor (BRILINTA) 90 MG tablet tablet Take 1 tablet by mouth 2 (Two) Times a Day. 180 tablet 3   • Ozempic, 0.25 or 0.5 MG/DOSE, 2 MG/1.5ML solution pen-injector      • predniSONE (DELTASONE) 20 MG tablet Take BID day before cath and the AM of cath. 3 tablet 0     No current facility-administered medications on file prior to visit.       Contrast dye, Ciprofloxacin, and Sulfa antibiotics    Past Medical History:   Diagnosis Date   • Diabetes mellitus (CMS/HCC)    • Hyperlipidemia    • Hypertension    • Myocardial infarct (CMS/HCC)    • PCOS (polycystic ovarian syndrome)        Social History     Socioeconomic History   • Marital status:      Spouse name: Not on file   • Number of children: Not on file  "  • Years of education: Not on file   • Highest education level: Not on file   Tobacco Use   • Smoking status: Never Smoker   • Smokeless tobacco: Never Used   Substance and Sexual Activity   • Alcohol use: Yes   • Drug use: Never   • Sexual activity: Defer       Family History   Problem Relation Age of Onset   • Lung cancer Father    • Thyroid cancer Brother    • Heart attack Maternal Grandfather    • Lung cancer Maternal Grandfather    • Heart attack Paternal Grandmother    • Emphysema Paternal Grandfather        Review of Systems   Constitutional: Positive for fatigue. Negative for chills and fever.   HENT: Negative for congestion, rhinorrhea and sore throat.    Eyes: Negative.  Negative for visual disturbance.   Respiratory: Positive for chest tightness and shortness of breath. Negative for wheezing.    Cardiovascular: Negative for chest pain and palpitations.   Gastrointestinal: Negative.    Endocrine: Negative.    Genitourinary: Negative.    Musculoskeletal: Positive for arthralgias. Negative for back pain and neck pain.   Skin: Negative.  Negative for rash and wound.   Allergic/Immunologic: Positive for environmental allergies.   Neurological: Positive for numbness (arm / face/ ear - related to headache ) and headaches. Negative for dizziness and weakness.   Hematological: Bruises/bleeds easily (bruises).   Psychiatric/Behavioral: Negative.  Negative for sleep disturbance.       Objective   Vitals:    07/08/21 1349   BP: 128/90   BP Location: Left arm   Patient Position: Sitting   Pulse: 97   SpO2: 99%   Weight: 102 kg (224 lb 6.4 oz)   Height: 160 cm (63\")      /90 (BP Location: Left arm, Patient Position: Sitting)   Pulse 97   Ht 160 cm (63\")   Wt 102 kg (224 lb 6.4 oz)   SpO2 99%   BMI 39.75 kg/m²    Lab Results (most recent)     None        Physical Exam  Vitals reviewed.   Constitutional:       General: She is not in acute distress.     Appearance: She is well-developed.   HENT:      Head: " Normocephalic and atraumatic.   Eyes:      Conjunctiva/sclera: Conjunctivae normal.      Pupils: Pupils are equal, round, and reactive to light.   Neck:      Vascular: No JVD.      Trachea: No tracheal deviation.   Cardiovascular:      Rate and Rhythm: Normal rate and regular rhythm.      Pulses:           Dorsalis pedis pulses are 1+ on the right side and 1+ on the left side.        Posterior tibial pulses are 1+ on the right side and 1+ on the left side.      Heart sounds: Normal heart sounds.   Pulmonary:      Effort: Pulmonary effort is normal.      Breath sounds: Normal breath sounds.   Abdominal:      General: Bowel sounds are normal. There is no distension.      Palpations: Abdomen is soft. There is no mass.      Tenderness: There is no abdominal tenderness. There is no guarding or rebound.   Musculoskeletal:         General: No tenderness or deformity. Normal range of motion.      Cervical back: Normal range of motion and neck supple.      Right lower le+ Edema present.      Left lower le+ Edema present.   Skin:     General: Skin is warm and dry.      Coloration: Skin is not pale.      Findings: No erythema or rash.   Neurological:      Mental Status: She is alert and oriented to person, place, and time.   Psychiatric:         Behavior: Behavior normal.         Thought Content: Thought content normal.         Judgment: Judgment normal.           Procedure   Procedures       Assessment/Plan      Diagnosis Plan   1. Precordial pain  [facility]    Basic Metabolic Panel    CBC & Differential   2. Shortness of breath  [facility]    Basic Metabolic Panel    CBC & Differential   3. Palpitations  [facility]    Basic Metabolic Panel    CBC & Differential   4. Coronary artery disease due to calcified coronary lesion  [facility]     1.  The patient has progressive low level symptoms compatible with angina.  Symptoms have become severe.  Previous stress test was without  evidence of ischemia and I have progressively titrated up antianginal therapies.  Despite increasing antianginal therapies including Ranexa, continuing long-acting nitrate therapy, and regimen otherwise as above, her symptoms like continued and now progressed.  This is exactly what she had prior to previous bypass and stenting procedures.  I do feel we need to repeat catheterization to ensure patency of her recent stenting RCA and stable anatomy otherwise.    2.  We can see her back as soon as we know results of catheterization and recommend her further at that time.    3.  The patient has an allergy to contrast and will be premedicated appropriately.    4.  I will continue dual antiplatelet therapy, statin therapy, and regimen otherwise without change.    5.  Further pending all the above.                 Electronically signed by:

## 2021-07-28 ENCOUNTER — LAB (OUTPATIENT)
Dept: LAB | Facility: HOSPITAL | Age: 51
End: 2021-07-28

## 2021-07-28 PROCEDURE — 85025 COMPLETE CBC W/AUTO DIFF WBC: CPT | Performed by: PHYSICIAN ASSISTANT

## 2021-07-28 PROCEDURE — 80048 BASIC METABOLIC PNL TOTAL CA: CPT | Performed by: PHYSICIAN ASSISTANT

## 2021-08-09 ENCOUNTER — TELEPHONE (OUTPATIENT)
Dept: CARDIOLOGY | Facility: CLINIC | Age: 51
End: 2021-08-09

## 2021-08-09 NOTE — TELEPHONE ENCOUNTER
Patient called to check on pre- medication for her heart cath.       Spoke with pharmacy they are getting medication ready.       Patient was notified about pharmacy working on prescription.       AT [facility]

## 2021-08-12 ENCOUNTER — OUTSIDE FACILITY SERVICE (OUTPATIENT)
Dept: CARDIOLOGY | Facility: CLINIC | Age: 51
End: 2021-08-12

## 2021-08-12 PROCEDURE — 93459 L HRT ART/GRFT ANGIO: CPT | Performed by: INTERNAL MEDICINE

## 2021-08-12 PROCEDURE — 92928 PRQ TCAT PLMT NTRAC ST 1 LES: CPT | Performed by: INTERNAL MEDICINE

## 2021-09-02 ENCOUNTER — OFFICE VISIT (OUTPATIENT)
Dept: CARDIOLOGY | Facility: CLINIC | Age: 51
End: 2021-09-02

## 2021-09-02 VITALS
BODY MASS INDEX: 40.26 KG/M2 | HEART RATE: 97 BPM | DIASTOLIC BLOOD PRESSURE: 61 MMHG | OXYGEN SATURATION: 98 % | WEIGHT: 227.2 LBS | SYSTOLIC BLOOD PRESSURE: 101 MMHG | HEIGHT: 63 IN

## 2021-09-02 DIAGNOSIS — I25.118 CORONARY ARTERY DISEASE OF NATIVE ARTERY OF NATIVE HEART WITH STABLE ANGINA PECTORIS (HCC): Primary | ICD-10-CM

## 2021-09-02 DIAGNOSIS — R06.02 SHORTNESS OF BREATH: ICD-10-CM

## 2021-09-02 DIAGNOSIS — I10 ESSENTIAL HYPERTENSION: ICD-10-CM

## 2021-09-02 PROCEDURE — 99213 OFFICE O/P EST LOW 20 MIN: CPT | Performed by: PHYSICIAN ASSISTANT

## 2021-09-02 NOTE — PROGRESS NOTES
Problem list     Subjective   Lissa Eisenberg is a 50 y.o. female     Chief Complaint   Patient presents with   • Follow-up     cath 8/12/21   Problem list:  1.  Coronary artery disease with history of bypass per report, and multivessel stenting otherwise.  1.1.  Repeat catheterization for low level symptoms, 1/2021.  The patient had stenting of the distal RCA for high-grade stenosis.  Also noted was a patent LIMA to LAD, occluded vein graft, nonobstructive disease in the circumflex, and diffuse but nonobstructive disease otherwise.  1.2.  Repeat left heart catheterization, 8/12/2021, in the setting of low level, progressive symptoms felt compatible with angina.  The patient had subsequent stenting of the RCA for progressive disease noted from time of previous catheterization as above.  She had stable anatomy otherwise.  Ongoing medical management was recommended otherwise.  2.  Preserved systolic function.  3.  Hypertension  4.  Dyslipidemia  5.  Diabetes mellitus.    HPI  The patient presents back for follow-up post catheterization.  She had catheterization performed on August 12, 2021.  Findings were felt mostly stable however there was progressive disease noted in the RCA when compared to her previous catheterization in January of this year.  As it was felt that that was the only the substrate for ischemia, she had stenting of the same.  She now feels improved.  She still has some degree of chest discomfort and dyspnea which she relates to anginal equivalent symptoms.  Symptoms have improved and have minimized post stenting.  She still reports exercise intolerance because of symptoms.  She feels that this could be a degree of deconditioning however.  She currently has no failure or dysrhythmic symptoms.  Blood pressures appear to be very well controlled.  She has no further complaints.    Current Outpatient Medications on File Prior to Visit   Medication Sig Dispense Refill   • aspirin (aspirin) 81 MG EC tablet  Take 81 mg by mouth Daily.     • atorvastatin (LIPITOR) 80 MG tablet Take 80 mg by mouth Every Night.     • cetirizine (zyrTEC) 10 MG tablet Take 10 mg by mouth 2 (two) times a day.     • cyclobenzaprine (FLEXERIL) 10 MG tablet      • Dulaglutide (TRULICITY SC) Inject  under the skin into the appropriate area as directed.     • DULoxetine (CYMBALTA) 30 MG capsule Take 30 mg by mouth 2 (Two) Times a Day.     • Flaxseed, Linseed, (FLAXSEED OIL PO) Take  by mouth Daily.     • isosorbide mononitrate (IMDUR) 30 MG 24 hr tablet Take 1 tablet by mouth Daily. 90 tablet 3   • Jardiance 10 MG tablet Daily.     • lisinopril (PRINIVIL,ZESTRIL) 2.5 MG tablet Take 2.5 mg by mouth Daily.     • metFORMIN (GLUCOPHAGE) 1000 MG tablet Take 1,000 mg by mouth 2 (Two) Times a Day With Meals.     • metoprolol tartrate (LOPRESSOR) 100 MG tablet Take 100 mg by mouth. 1 1/2 tabs at hs     • naproxen (NAPROSYN) 500 MG tablet Take 500 mg by mouth 2 (Two) Times a Day With Meals.     • omeprazole (priLOSEC) 20 MG capsule Take 20 mg by mouth Daily.     • Ozempic, 0.25 or 0.5 MG/DOSE, 2 MG/1.5ML solution pen-injector      • ranolazine (Ranexa) 500 MG 12 hr tablet Take 1 tablet by mouth 2 (Two) Times a Day. 60 tablet 11   • ticagrelor (BRILINTA) 90 MG tablet tablet Take 1 tablet by mouth 2 (Two) Times a Day. 180 tablet 3   • diphenhydrAMINE (BENADRYL) 25 MG tablet Take BID day before cath and the AM of cath. 3 tablet 0   • famotidine (PEPCID) 20 MG tablet Take BID day before cath and the AM of cath. 3 tablet 0   • famotidine (Pepcid) 20 MG tablet Take BID day before cath and the AM of cath. 3 tablet 0   • predniSONE (DELTASONE) 20 MG tablet Take BID day before cath and the AM of cath. 3 tablet 0   • predniSONE (DELTASONE) 20 MG tablet One tablet twice a day the day before cath. One tablet morning of cath. 3 tablet 0     No current facility-administered medications on file prior to visit.       Contrast dye, Ciprofloxacin, and Sulfa  "antibiotics    Past Medical History:   Diagnosis Date   • Diabetes mellitus (CMS/HCC)    • Hyperlipidemia    • Hypertension    • Myocardial infarct (CMS/HCC)    • PCOS (polycystic ovarian syndrome)        Social History     Socioeconomic History   • Marital status:      Spouse name: Not on file   • Number of children: Not on file   • Years of education: Not on file   • Highest education level: Not on file   Tobacco Use   • Smoking status: Never Smoker   • Smokeless tobacco: Never Used   Substance and Sexual Activity   • Alcohol use: Yes   • Drug use: Never   • Sexual activity: Defer       Family History   Problem Relation Age of Onset   • Lung cancer Father    • Thyroid cancer Brother    • Heart attack Maternal Grandfather    • Lung cancer Maternal Grandfather    • Heart attack Paternal Grandmother    • Emphysema Paternal Grandfather        Review of Systems   Constitutional: Positive for fatigue. Negative for chills and fever.   HENT: Positive for rhinorrhea. Negative for congestion and sore throat.    Eyes: Negative.  Negative for visual disturbance.   Respiratory: Positive for shortness of breath. Negative for chest tightness and wheezing.    Cardiovascular: Positive for chest pain. Negative for palpitations and leg swelling.   Gastrointestinal: Negative.    Musculoskeletal: Positive for arthralgias, back pain and neck pain.   Skin: Negative.  Negative for rash and wound.   Allergic/Immunologic: Positive for environmental allergies.   Neurological: Positive for dizziness, numbness (arms/ hands) and headaches. Negative for weakness.   Hematological: Bruises/bleeds easily (bruises/ bleeds).   Psychiatric/Behavioral: Positive for sleep disturbance (staying asleep ).       Objective   Vitals:    09/02/21 1559   BP: 101/61   BP Location: Left arm   Patient Position: Sitting   Pulse: 97   SpO2: 98%   Weight: 103 kg (227 lb 3.2 oz)   Height: 160 cm (63\")      /61 (BP Location: Left arm, Patient Position: " "Sitting)   Pulse 97   Ht 160 cm (63\")   Wt 103 kg (227 lb 3.2 oz)   SpO2 98%   BMI 40.25 kg/m²    Lab Results (most recent)     None        Physical Exam  Vitals and nursing note reviewed.   Constitutional:       General: She is not in acute distress.     Appearance: She is well-developed.   HENT:      Head: Normocephalic and atraumatic.   Eyes:      Conjunctiva/sclera: Conjunctivae normal.      Pupils: Pupils are equal, round, and reactive to light.   Neck:      Vascular: No JVD.      Trachea: No tracheal deviation.   Cardiovascular:      Rate and Rhythm: Normal rate and regular rhythm.      Pulses:           Dorsalis pedis pulses are 1+ on the right side and 1+ on the left side.        Posterior tibial pulses are 1+ on the right side and 1+ on the left side.      Heart sounds: Normal heart sounds.   Pulmonary:      Effort: Pulmonary effort is normal.      Breath sounds: Normal breath sounds.   Abdominal:      General: Bowel sounds are normal. There is no distension.      Palpations: Abdomen is soft. There is no mass.      Tenderness: There is no abdominal tenderness. There is no guarding or rebound.   Musculoskeletal:         General: No tenderness or deformity. Normal range of motion.      Cervical back: Normal range of motion and neck supple.      Right lower le+ Edema present.      Left lower le+ Edema present.   Skin:     General: Skin is warm and dry.      Coloration: Skin is not pale.      Findings: No erythema or rash.   Neurological:      Mental Status: She is alert and oriented to person, place, and time.   Psychiatric:         Behavior: Behavior normal.         Thought Content: Thought content normal.         Judgment: Judgment normal.           Procedure   Procedures       Assessment/Plan      Diagnosis Plan   1. Coronary artery disease of native artery of native heart with stable angina pectoris (CMS/HCC)     2. Shortness of breath     3. Essential hypertension       1.  The patient " overall is improved status post recent stenting of the RCA just a couple of weeks ago.  She has stable anatomy otherwise.  She still has stable angina and symptoms otherwise as above, but overall feels improved.  As the patient is improved clinically, we will continue to treat her medically at this time.  I would continue cardioprotective/antianginal medications for now.  We will follow her clinically.  For breakthrough symptoms, she will call immediately.    2.  Nothing further for now.  We will see her routinely through the clinic.  She will call immediately for any issues.    3.  She will continue routine follow-up with laboratories with her primary care provider.  We will await results of laboratories, in particular lipid parameters, and recommend further at that time as well.                    Electronically signed by:       Spine appears normal, range of motion is not limited, no muscle or joint tenderness

## 2021-09-02 NOTE — PATIENT INSTRUCTIONS

## 2021-09-13 ENCOUNTER — TELEPHONE (OUTPATIENT)
Dept: CARDIOLOGY | Facility: CLINIC | Age: 51
End: 2021-09-13

## 2021-09-13 NOTE — TELEPHONE ENCOUNTER
Pt LVM on the triage line reporting she has COVID and is experiencing tachy HR, chest pain, & SOA.  She wanted to clarify if she need to go to the ED.    The PM returned the pt's call.  She reports she is presumed COVID positive due to her  being positive and her displaying symptoms.  Due to the symptoms the pt reported and inability to bring her in office for a cardiac work-up, pt was instructed to go to the ED to be assessed.  Pt verbalized an understanding.

## 2021-10-19 ENCOUNTER — TELEPHONE (OUTPATIENT)
Dept: CARDIOLOGY | Facility: CLINIC | Age: 51
End: 2021-10-19

## 2021-10-19 ENCOUNTER — TRANSCRIBE ORDERS (OUTPATIENT)
Dept: ADMINISTRATIVE | Facility: HOSPITAL | Age: 51
End: 2021-10-19

## 2021-10-19 DIAGNOSIS — Z12.31 ENCOUNTER FOR SCREENING MAMMOGRAM FOR MALIGNANT NEOPLASM OF BREAST: Primary | ICD-10-CM

## 2021-10-19 NOTE — TELEPHONE ENCOUNTER
"Last pm- Tightness in chest/chest pain/sob/sweating/pn down lt arm  (Had to take x2 nitro before sx went away)    Chest pain x2-3x a day post covid September 2021-everyday (\"None besides last night has been severe enough for nitro\")    Wants to know if sx could be due to New Dx of Branch Bundle Blockage ( in [facility])  "

## 2021-10-21 ENCOUNTER — CLINICAL SUPPORT (OUTPATIENT)
Dept: CARDIOLOGY | Facility: CLINIC | Age: 51
End: 2021-10-21

## 2021-10-21 VITALS
SYSTOLIC BLOOD PRESSURE: 148 MMHG | HEART RATE: 102 BPM | BODY MASS INDEX: 38.97 KG/M2 | DIASTOLIC BLOOD PRESSURE: 92 MMHG | OXYGEN SATURATION: 97 % | WEIGHT: 220 LBS

## 2021-10-21 DIAGNOSIS — R07.2 PRECORDIAL PAIN: Primary | ICD-10-CM

## 2021-10-21 PROCEDURE — 93000 ELECTROCARDIOGRAM COMPLETE: CPT | Performed by: PHYSICIAN ASSISTANT

## 2021-10-21 NOTE — PROGRESS NOTES
Lissa Eisenberg  1970  10/21/2021   ?   Chief Complaint   Patient presents with   • nurse visit      ?   HPI:   ?   ? patient presented to the office today for an Ekg / symptom check . Upon arrival she complained of SOB / Dizziness and chest pain. She was brought back and BP was checked along with doing an Ekg. She stated since she has had COVID back in August she has had increase SOB / Chest pain and having to take up to 2 Nitro pills before pain would subside she stated  feeling like her quality of life has gone down.   ?     Current Outpatient Medications:   •  aspirin (aspirin) 81 MG EC tablet, Take 81 mg by mouth Daily., Disp: , Rfl:   •  atorvastatin (LIPITOR) 80 MG tablet, Take 80 mg by mouth Every Night., Disp: , Rfl:   •  cetirizine (zyrTEC) 10 MG tablet, Take 10 mg by mouth 2 (two) times a day., Disp: , Rfl:   •  cyclobenzaprine (FLEXERIL) 10 MG tablet, , Disp: , Rfl:   •  Dulaglutide (TRULICITY SC), Inject  under the skin into the appropriate area as directed., Disp: , Rfl:   •  DULoxetine (CYMBALTA) 30 MG capsule, Take 30 mg by mouth 2 (Two) Times a Day., Disp: , Rfl:   •  Flaxseed, Linseed, (FLAXSEED OIL PO), Take  by mouth Daily., Disp: , Rfl:   •  isosorbide mononitrate (IMDUR) 30 MG 24 hr tablet, Take 1 tablet by mouth Daily., Disp: 90 tablet, Rfl: 3  •  Jardiance 10 MG tablet, Daily., Disp: , Rfl:   •  lisinopril (PRINIVIL,ZESTRIL) 2.5 MG tablet, Take 2.5 mg by mouth Daily., Disp: , Rfl:   •  metFORMIN (GLUCOPHAGE) 1000 MG tablet, Take 1,000 mg by mouth 2 (Two) Times a Day With Meals., Disp: , Rfl:   •  metoprolol tartrate (LOPRESSOR) 100 MG tablet, Take 100 mg by mouth. 1 1/2 tabs at hs, Disp: , Rfl:   •  naproxen (NAPROSYN) 500 MG tablet, Take 500 mg by mouth 2 (Two) Times a Day With Meals., Disp: , Rfl:   •  omeprazole (priLOSEC) 20 MG capsule, Take 20 mg by mouth Daily., Disp: , Rfl:   •  ticagrelor (BRILINTA) 90 MG tablet tablet, Take 1 tablet by mouth 2 (Two) Times a Day., Disp: 180  tablet, Rfl: 3   ?   ?   Contrast dye, Ciprofloxacin, and Sulfa antibiotics         ECG 12 Lead    Date/Time: 10/21/2021 5:44 PM  Performed by: Max Márquez PA  Authorized by: Max Márquez PA   Comparison: compared with previous ECG from 3/18/2021               ?   Assessment/Plan    ?   ?   ?                1.Ekg was reviewed by Max HUYNH                       2. Patient was sent to the ER due to her symptoms and she can be better treated by having on the spot testing done while in the hospital.                        3. [facility] ER was notified that patient was on her way.     Patient verbalized understanding.     AT [facility]

## 2021-10-28 ENCOUNTER — TELEPHONE (OUTPATIENT)
Dept: CARDIOLOGY | Facility: CLINIC | Age: 51
End: 2021-10-28

## 2021-10-28 NOTE — TELEPHONE ENCOUNTER
Pt contacts office stating she was recently inpatient at [facility].  Was told her inflammatory markers were high along with her blood clotting factors leading her to be high risk for blood clots.  Also states was told she had blockages but was not candidate for stents.  Also states she was told to join online support group with others that were affected by covid-19.  Has questions regarding recent stress test.  Pt has a lot of questions regarding her hospital stay and expresses concern for test results.  Explained to pt the need to see the provider face-to-face for better understanding and guidance regarding her questions and concerns.  States she does not have gas or transportation to come in for an office visit.  She will call our office when she has transportation.  If any openings are available, she will come in to be seen.

## 2021-11-04 ENCOUNTER — OFFICE VISIT (OUTPATIENT)
Dept: CARDIOLOGY | Facility: CLINIC | Age: 51
End: 2021-11-04

## 2021-11-04 VITALS
BODY MASS INDEX: 38.45 KG/M2 | WEIGHT: 217 LBS | DIASTOLIC BLOOD PRESSURE: 72 MMHG | HEART RATE: 96 BPM | SYSTOLIC BLOOD PRESSURE: 108 MMHG | OXYGEN SATURATION: 95 % | HEIGHT: 63 IN

## 2021-11-04 DIAGNOSIS — R00.2 PALPITATIONS: ICD-10-CM

## 2021-11-04 DIAGNOSIS — R60.0 BILATERAL LEG EDEMA: ICD-10-CM

## 2021-11-04 DIAGNOSIS — R07.2 PRECORDIAL PAIN: ICD-10-CM

## 2021-11-04 DIAGNOSIS — I10 PRIMARY HYPERTENSION: ICD-10-CM

## 2021-11-04 DIAGNOSIS — R06.02 SHORTNESS OF BREATH: ICD-10-CM

## 2021-11-04 DIAGNOSIS — I25.118 CORONARY ARTERY DISEASE OF NATIVE ARTERY OF NATIVE HEART WITH STABLE ANGINA PECTORIS (HCC): Primary | ICD-10-CM

## 2021-11-04 PROCEDURE — 99213 OFFICE O/P EST LOW 20 MIN: CPT | Performed by: PHYSICIAN ASSISTANT

## 2021-11-04 RX ORDER — RANOLAZINE 1000 MG/1
1000 TABLET, EXTENDED RELEASE ORAL 2 TIMES DAILY
Status: ON HOLD | COMMUNITY
End: 2022-04-03

## 2021-11-04 RX ORDER — METHYLPREDNISOLONE 4 MG/1
TABLET ORAL
Qty: 21 EACH | Refills: 0 | Status: ON HOLD | OUTPATIENT
Start: 2021-11-04 | End: 2022-04-03

## 2021-11-04 NOTE — PROGRESS NOTES
Problem list     Subjective   Lissa Eisenberg is a 51 y.o. female     Chief Complaint   Patient presents with   • Follow-up     [facility] records in chart / cath / stress done    Problem list:  1.  Coronary artery disease with history of bypass per report, and multivessel stenting otherwise.  1.1.  Repeat catheterization for low level symptoms, 1/2021.  The patient had stenting of the distal RCA for high-grade stenosis.  Also noted was a patent LIMA to LAD, occluded vein graft, nonobstructive disease in the circumflex, and diffuse but nonobstructive disease otherwise.  1.2.  Repeat left heart catheterization, 8/12/2021, in the setting of low level, progressive symptoms felt compatible with angina.  The patient had subsequent stenting of the RCA for progressive disease noted from time of previous catheterization as above.  She had stable anatomy otherwise.  Ongoing medical management was recommended otherwise.  1.3.  Admission to the hospital locally for recurrent chest discomfort, 10/2021.  The patient had a follow-up stress test which questioned anterior wall ischemia.  A catheterization was performed because of recurrent symptoms and abnormal stress test findings.  She had patent WILKERSON to LAD, patent previously placed stents, and nonobstructive disease otherwise.  Medical management was recommended as she had stable anatomy.  2.  Preserved systolic function.  3.  Hypertension  4.  Dyslipidemia  5.  Diabetes mellitus.    HPI  The patient presents to the clinic today for follow-up.  She had recent admission because of recurrent chest discomfort.  Stress test was abnormal and she had follow-up catheterization.  Catheterization supported stable anatomy.  She had a D-dimer which was elevated and apparently a CT of the chest with PE protocol was performed which was unremarkable for PE.  The patient is very concerned because of elevated D-dimer.  She has lower extremity edema and pain.  She wants evaluation with venous duplex  to evaluate that further to ensure no thrombosis.  The patient still has chest discomfort.  She reports this is chronic.  She has ongoing dyspnea.  She just does not feel well at this time.  She has no further complaints at this time.    Current Outpatient Medications on File Prior to Visit   Medication Sig Dispense Refill   • aspirin (aspirin) 81 MG EC tablet Take 81 mg by mouth Daily.     • atorvastatin (LIPITOR) 80 MG tablet Take 80 mg by mouth Every Night.     • cetirizine (zyrTEC) 10 MG tablet Take 10 mg by mouth 2 (two) times a day.     • cyclobenzaprine (FLEXERIL) 10 MG tablet      • Dulaglutide (TRULICITY SC) Inject  under the skin into the appropriate area as directed.     • DULoxetine (CYMBALTA) 30 MG capsule Take 30 mg by mouth 2 (Two) Times a Day.     • Flaxseed, Linseed, (FLAXSEED OIL PO) Take  by mouth Daily.     • isosorbide mononitrate (IMDUR) 30 MG 24 hr tablet Take 1 tablet by mouth Daily. 90 tablet 3   • Jardiance 10 MG tablet Daily.     • lisinopril (PRINIVIL,ZESTRIL) 2.5 MG tablet Take 2.5 mg by mouth Daily.     • metFORMIN (GLUCOPHAGE) 1000 MG tablet Take 1,000 mg by mouth 2 (Two) Times a Day With Meals.     • metoprolol tartrate (LOPRESSOR) 100 MG tablet Take 100 mg by mouth. 1 1/2 tabs at hs     • naproxen (NAPROSYN) 500 MG tablet Take 500 mg by mouth 2 (Two) Times a Day With Meals.     • omeprazole (priLOSEC) 20 MG capsule Take 20 mg by mouth Daily.     • ranolazine (RANEXA) 1000 MG 12 hr tablet Take 1,000 mg by mouth 2 (Two) Times a Day.     • ticagrelor (BRILINTA) 90 MG tablet tablet Take 1 tablet by mouth 2 (Two) Times a Day. 180 tablet 3     No current facility-administered medications on file prior to visit.       Contrast dye, Ciprofloxacin, and Sulfa antibiotics    Past Medical History:   Diagnosis Date   • COVID-19    • Diabetes mellitus (HCC)    • Hyperlipidemia    • Hypertension    • Myocardial infarct (HCC)    • PCOS (polycystic ovarian syndrome)        Social History  "    Socioeconomic History   • Marital status:    Tobacco Use   • Smoking status: Never Smoker   • Smokeless tobacco: Never Used   Substance and Sexual Activity   • Alcohol use: Yes   • Drug use: Never   • Sexual activity: Defer       Family History   Problem Relation Age of Onset   • Lung cancer Father    • Thyroid cancer Brother    • Heart attack Maternal Grandfather    • Lung cancer Maternal Grandfather    • Heart attack Paternal Grandmother    • Emphysema Paternal Grandfather        Review of Systems   Constitutional: Negative.  Negative for chills, fatigue and fever.   HENT: Negative for congestion, rhinorrhea and sore throat.    Eyes: Positive for visual disturbance (glasses ).   Respiratory: Positive for chest tightness and shortness of breath. Negative for wheezing.    Cardiovascular: Positive for chest pain. Negative for palpitations and leg swelling.   Gastrointestinal: Negative.    Endocrine: Negative.    Genitourinary: Negative.    Musculoskeletal: Positive for neck pain. Negative for arthralgias and back pain.   Skin: Negative.  Negative for rash and wound.   Allergic/Immunologic: Positive for environmental allergies.   Neurological: Positive for dizziness and headaches. Negative for weakness and numbness.   Hematological: Bruises/bleeds easily (bruises/ bleeds).   Psychiatric/Behavioral: Negative.  Negative for sleep disturbance.       Objective   Vitals:    11/04/21 1141   BP: 108/72   BP Location: Left arm   Patient Position: Sitting   Pulse: 96   SpO2: 95%   Weight: 98.4 kg (217 lb)   Height: 160 cm (63\")      /72 (BP Location: Left arm, Patient Position: Sitting)   Pulse 96   Ht 160 cm (63\")   Wt 98.4 kg (217 lb)   SpO2 95%   BMI 38.44 kg/m²    Lab Results (most recent)     None        Physical Exam  Vitals and nursing note reviewed.   Constitutional:       General: She is not in acute distress.     Appearance: She is well-developed.   HENT:      Head: Normocephalic and atraumatic. "   Eyes:      Conjunctiva/sclera: Conjunctivae normal.      Pupils: Pupils are equal, round, and reactive to light.   Neck:      Vascular: No JVD.      Trachea: No tracheal deviation.   Cardiovascular:      Rate and Rhythm: Normal rate and regular rhythm.      Heart sounds: Normal heart sounds.   Pulmonary:      Effort: Pulmonary effort is normal.      Breath sounds: Normal breath sounds.   Abdominal:      General: Bowel sounds are normal. There is no distension.      Palpations: Abdomen is soft. There is no mass.      Tenderness: There is no abdominal tenderness. There is no guarding or rebound.   Musculoskeletal:         General: No tenderness or deformity. Normal range of motion.      Cervical back: Normal range of motion and neck supple.   Skin:     General: Skin is warm and dry.      Coloration: Skin is not pale.      Findings: No erythema or rash.   Neurological:      Mental Status: She is alert and oriented to person, place, and time.   Psychiatric:         Behavior: Behavior normal.         Thought Content: Thought content normal.         Judgment: Judgment normal.           Procedure   Procedures       Assessment/Plan      Diagnosis Plan   1. Coronary artery disease of native artery of native heart with stable angina pectoris (HCC)     2. Bilateral leg edema  Duplex Venous Lower Extremity - Bilateral CAR   3. Precordial pain     4. Palpitations     5. Primary hypertension  Blood Pressure Device     1.  The patient presents the clinic today to follow-up status post recent hospitalization.  Ultimately, the patient had an abnormal stress test and eventually a catheterization.  Her cath supported stable anatomy as above with no significant obstructive coronary artery disease noted.  Medical management was recommended.    2.  The patient is very concerned about the potential for lower extremity DVT process.  She has lower extremity edema bilaterally, pain, and even erythema at times.  She wants a duplex of the  same.  We will schedule for venous duplex of that at this time.    3.  She also requests a prescription for a home blood pressure monitor.  We will give her a prescription for the same, in hopes of insurance coverage.    4.  For now, I would continue medical regimen.  Nothing further from general cardiovascular standpoint.  She will call to us for any issues.  Nothing further for now and we will see her routinely through the clinic.                     Electronically signed by:

## 2021-11-04 NOTE — PATIENT INSTRUCTIONS

## 2021-11-05 ENCOUNTER — HOSPITAL ENCOUNTER (OUTPATIENT)
Dept: CARDIOLOGY | Facility: HOSPITAL | Age: 51
Discharge: HOME | End: 2021-11-05
Admitting: PHYSICIAN ASSISTANT

## 2021-11-05 DIAGNOSIS — R60.0 BILATERAL LEG EDEMA: ICD-10-CM

## 2021-11-05 PROCEDURE — 93970 EXTREMITY STUDY: CPT | Performed by: INTERNAL MEDICINE

## 2021-11-05 PROCEDURE — 93970 EXTREMITY STUDY: CPT

## 2021-11-10 ENCOUNTER — TELEPHONE (OUTPATIENT)
Dept: NEUROLOGY | Facility: CLINIC | Age: 51
End: 2021-11-10

## 2021-11-21 LAB
BH CV LOWER VASCULAR LEFT COMMON FEMORAL AUGMENT: NORMAL
BH CV LOWER VASCULAR LEFT COMMON FEMORAL COMPETENT: NORMAL
BH CV LOWER VASCULAR LEFT COMMON FEMORAL COMPRESS: NORMAL
BH CV LOWER VASCULAR LEFT COMMON FEMORAL PHASIC: NORMAL
BH CV LOWER VASCULAR LEFT COMMON FEMORAL SPONT: NORMAL
BH CV LOWER VASCULAR LEFT DISTAL FEMORAL COMPRESS: NORMAL
BH CV LOWER VASCULAR LEFT MID FEMORAL AUGMENT: NORMAL
BH CV LOWER VASCULAR LEFT MID FEMORAL COMPETENT: NORMAL
BH CV LOWER VASCULAR LEFT MID FEMORAL COMPRESS: NORMAL
BH CV LOWER VASCULAR LEFT MID FEMORAL PHASIC: NORMAL
BH CV LOWER VASCULAR LEFT MID FEMORAL SPONT: NORMAL
BH CV LOWER VASCULAR LEFT POPLITEAL AUGMENT: NORMAL
BH CV LOWER VASCULAR LEFT POPLITEAL COMPETENT: NORMAL
BH CV LOWER VASCULAR LEFT POPLITEAL COMPRESS: NORMAL
BH CV LOWER VASCULAR LEFT POPLITEAL PHASIC: NORMAL
BH CV LOWER VASCULAR LEFT POPLITEAL SPONT: NORMAL
BH CV LOWER VASCULAR LEFT PROFUNDA FEMORAL COMPRESS: NORMAL
BH CV LOWER VASCULAR LEFT PROXIMAL FEMORAL COMPRESS: NORMAL
BH CV LOWER VASCULAR LEFT SAPHENOFEMORAL JUNCTION COMPRESS: NORMAL
BH CV LOWER VASCULAR RIGHT COMMON FEMORAL AUGMENT: NORMAL
BH CV LOWER VASCULAR RIGHT COMMON FEMORAL COMPETENT: NORMAL
BH CV LOWER VASCULAR RIGHT COMMON FEMORAL COMPRESS: NORMAL
BH CV LOWER VASCULAR RIGHT COMMON FEMORAL PHASIC: NORMAL
BH CV LOWER VASCULAR RIGHT COMMON FEMORAL SPONT: NORMAL
BH CV LOWER VASCULAR RIGHT DISTAL FEMORAL COMPRESS: NORMAL
BH CV LOWER VASCULAR RIGHT MID FEMORAL AUGMENT: NORMAL
BH CV LOWER VASCULAR RIGHT MID FEMORAL COMPETENT: NORMAL
BH CV LOWER VASCULAR RIGHT MID FEMORAL COMPRESS: NORMAL
BH CV LOWER VASCULAR RIGHT MID FEMORAL PHASIC: NORMAL
BH CV LOWER VASCULAR RIGHT MID FEMORAL SPONT: NORMAL
BH CV LOWER VASCULAR RIGHT POPLITEAL AUGMENT: NORMAL
BH CV LOWER VASCULAR RIGHT POPLITEAL COMPETENT: NORMAL
BH CV LOWER VASCULAR RIGHT POPLITEAL COMPRESS: NORMAL
BH CV LOWER VASCULAR RIGHT POPLITEAL PHASIC: NORMAL
BH CV LOWER VASCULAR RIGHT POPLITEAL SPONT: NORMAL
BH CV LOWER VASCULAR RIGHT PROFUNDA FEMORAL COMPRESS: NORMAL
BH CV LOWER VASCULAR RIGHT PROXIMAL FEMORAL COMPRESS: NORMAL
BH CV LOWER VASCULAR RIGHT SAPHENOFEMORAL JUNCTION COMPRESS: NORMAL
MAXIMAL PREDICTED HEART RATE: 168 BPM
STRESS TARGET HR: 143 BPM

## 2021-11-22 ENCOUNTER — TELEPHONE (OUTPATIENT)
Dept: CARDIOLOGY | Facility: CLINIC | Age: 51
End: 2021-11-22

## 2021-11-22 NOTE — TELEPHONE ENCOUNTER
----- Message from Salena Holder MA sent at 11/22/2021  9:18 AM EST -----    ----- Message -----  From: Max Márquez PA  Sent: 11/22/2021   8:57 AM EST  To: Salena Holder MA    No DVT.  Routine follow-up.

## 2021-12-07 NOTE — TELEPHONE ENCOUNTER
CALLED PT TO SEE IF SHE STILL NEEDED THIS REFERRAL. UNABLE TO LEAVE MESSAGE AS TO VM IS NOT SET UP. PT CAN CALL THE  AT [facility].

## 2021-12-14 NOTE — TELEPHONE ENCOUNTER
Caller: Lissa Eisenberg    Relationship: Self    Best call back number: [phone]    What was the call regarding: PT RETURNING PHONE CALL TO SHWETHA Alonzo warm-transferred to: SHWETHA    DOCUMENTING PER HUB [employer]OTOCOL.

## 2022-01-12 RX ORDER — ISOSORBIDE MONONITRATE 30 MG/1
TABLET, EXTENDED RELEASE ORAL
Qty: 90 TABLET | Refills: 0 | Status: ON HOLD | OUTPATIENT
Start: 2022-01-12 | End: 2022-04-03

## 2022-03-30 ENCOUNTER — HOSPITAL ENCOUNTER (EMERGENCY)
Facility: HOSPITAL | Age: 52
Discharge: HOME | End: 2022-03-30
Attending: EMERGENCY MEDICINE | Admitting: STUDENT IN AN ORGANIZED HEALTH CARE EDUCATION/TRAINING PROGRAM

## 2022-03-30 ENCOUNTER — APPOINTMENT (OUTPATIENT)
Dept: CT IMAGING | Facility: HOSPITAL | Age: 52
End: 2022-03-30

## 2022-03-30 VITALS
OXYGEN SATURATION: 98 % | RESPIRATION RATE: 20 BRPM | HEART RATE: 87 BPM | HEIGHT: 64 IN | SYSTOLIC BLOOD PRESSURE: 117 MMHG | BODY MASS INDEX: 33.5 KG/M2 | DIASTOLIC BLOOD PRESSURE: 79 MMHG | WEIGHT: 196.21 LBS | TEMPERATURE: 97 F

## 2022-03-30 DIAGNOSIS — R11.2 NAUSEA AND VOMITING IN ADULT: ICD-10-CM

## 2022-03-30 DIAGNOSIS — N30.00 ACUTE CYSTITIS WITHOUT HEMATURIA: Primary | ICD-10-CM

## 2022-03-30 LAB
ALBUMIN SERPL-MCNC: 4.59 G/DL (ref 3.5–5.2)
ALBUMIN/GLOB SERPL: 1.5 G/DL
ALP SERPL-CCNC: 92 U/L (ref 39–117)
ALT SERPL W P-5'-P-CCNC: 17 U/L (ref 1–33)
ANION GAP SERPL CALCULATED.3IONS-SCNC: 16.7 MMOL/L (ref 5–15)
AST SERPL-CCNC: 19 U/L (ref 1–32)
BACTERIA UR QL AUTO: ABNORMAL /HPF
BASOPHILS # BLD AUTO: 0.04 10*3/MM3 (ref 0–0.2)
BASOPHILS NFR BLD AUTO: 0.4 % (ref 0–1.5)
BILIRUB SERPL-MCNC: 0.7 MG/DL (ref 0–1.2)
BILIRUB UR QL STRIP: NEGATIVE
BUN SERPL-MCNC: 13 MG/DL (ref 6–20)
BUN/CREAT SERPL: 16 (ref 7–25)
CALCIUM SPEC-SCNC: 9.6 MG/DL (ref 8.6–10.5)
CHLORIDE SERPL-SCNC: 93 MMOL/L (ref 98–107)
CLARITY UR: CLEAR
CO2 SERPL-SCNC: 18.3 MMOL/L (ref 22–29)
COLOR UR: YELLOW
CREAT SERPL-MCNC: 0.81 MG/DL (ref 0.57–1)
CRP SERPL-MCNC: <0.3 MG/DL (ref 0–0.5)
D-LACTATE SERPL-SCNC: 1.4 MMOL/L (ref 0.5–2)
DEPRECATED RDW RBC AUTO: 41.3 FL (ref 37–54)
EGFRCR SERPLBLD CKD-EPI 2021: 87.5 ML/MIN/1.73
EOSINOPHIL # BLD AUTO: 0.33 10*3/MM3 (ref 0–0.4)
EOSINOPHIL NFR BLD AUTO: 3.4 % (ref 0.3–6.2)
ERYTHROCYTE [DISTWIDTH] IN BLOOD BY AUTOMATED COUNT: 14.2 % (ref 12.3–15.4)
ERYTHROCYTE [SEDIMENTATION RATE] IN BLOOD: 13 MM/HR (ref 0–30)
GLOBULIN UR ELPH-MCNC: 3.1 GM/DL
GLUCOSE SERPL-MCNC: 88 MG/DL (ref 65–99)
GLUCOSE UR STRIP-MCNC: ABNORMAL MG/DL
HCT VFR BLD AUTO: 45.7 % (ref 34–46.6)
HGB BLD-MCNC: 15.3 G/DL (ref 12–15.9)
HGB UR QL STRIP.AUTO: NEGATIVE
HYALINE CASTS UR QL AUTO: ABNORMAL /LPF
IMM GRANULOCYTES # BLD AUTO: 0.06 10*3/MM3 (ref 0–0.05)
IMM GRANULOCYTES NFR BLD AUTO: 0.6 % (ref 0–0.5)
KETONES UR QL STRIP: ABNORMAL
LEUKOCYTE ESTERASE UR QL STRIP.AUTO: NEGATIVE
LIPASE SERPL-CCNC: 38 U/L (ref 13–60)
LYMPHOCYTES # BLD AUTO: 3.73 10*3/MM3 (ref 0.7–3.1)
LYMPHOCYTES NFR BLD AUTO: 38.7 % (ref 19.6–45.3)
MCH RBC QN AUTO: 26.9 PG (ref 26.6–33)
MCHC RBC AUTO-ENTMCNC: 33.5 G/DL (ref 31.5–35.7)
MCV RBC AUTO: 80.5 FL (ref 79–97)
MONOCYTES # BLD AUTO: 0.7 10*3/MM3 (ref 0.1–0.9)
MONOCYTES NFR BLD AUTO: 7.3 % (ref 5–12)
NEUTROPHILS NFR BLD AUTO: 4.77 10*3/MM3 (ref 1.7–7)
NEUTROPHILS NFR BLD AUTO: 49.6 % (ref 42.7–76)
NITRITE UR QL STRIP: POSITIVE
NRBC BLD AUTO-RTO: 0 /100 WBC (ref 0–0.2)
PH UR STRIP.AUTO: <=5 [PH] (ref 5–8)
PLATELET # BLD AUTO: 359 10*3/MM3 (ref 140–450)
PMV BLD AUTO: 8.9 FL (ref 6–12)
POTASSIUM SERPL-SCNC: 4.4 MMOL/L (ref 3.5–5.2)
PROT SERPL-MCNC: 7.7 G/DL (ref 6–8.5)
PROT UR QL STRIP: NEGATIVE
RBC # BLD AUTO: 5.68 10*6/MM3 (ref 3.77–5.28)
RBC # UR STRIP: ABNORMAL /HPF
REF LAB TEST METHOD: ABNORMAL
SODIUM SERPL-SCNC: 128 MMOL/L (ref 136–145)
SP GR UR STRIP: 1.01 (ref 1–1.03)
SQUAMOUS #/AREA URNS HPF: ABNORMAL /HPF
TROPONIN T SERPL-MCNC: <0.01 NG/ML (ref 0–0.03)
UROBILINOGEN UR QL STRIP: ABNORMAL
WBC # UR STRIP: ABNORMAL /HPF
WBC NRBC COR # BLD: 9.63 10*3/MM3 (ref 3.4–10.8)

## 2022-03-30 PROCEDURE — 81001 URINALYSIS AUTO W/SCOPE: CPT | Performed by: PHYSICIAN ASSISTANT

## 2022-03-30 PROCEDURE — 85652 RBC SED RATE AUTOMATED: CPT | Performed by: PHYSICIAN ASSISTANT

## 2022-03-30 PROCEDURE — 83605 ASSAY OF LACTIC ACID: CPT | Performed by: PHYSICIAN ASSISTANT

## 2022-03-30 PROCEDURE — 99283 EMERGENCY DEPT VISIT LOW MDM: CPT

## 2022-03-30 PROCEDURE — 87086 URINE CULTURE/COLONY COUNT: CPT | Performed by: PHYSICIAN ASSISTANT

## 2022-03-30 PROCEDURE — 84484 ASSAY OF TROPONIN QUANT: CPT | Performed by: PHYSICIAN ASSISTANT

## 2022-03-30 PROCEDURE — 96375 TX/PRO/DX INJ NEW DRUG ADDON: CPT

## 2022-03-30 PROCEDURE — 86140 C-REACTIVE PROTEIN: CPT | Performed by: PHYSICIAN ASSISTANT

## 2022-03-30 PROCEDURE — 80053 COMPREHEN METABOLIC PANEL: CPT | Performed by: PHYSICIAN ASSISTANT

## 2022-03-30 PROCEDURE — 74176 CT ABD & PELVIS W/O CONTRAST: CPT

## 2022-03-30 PROCEDURE — 25010000002 ONDANSETRON PER 1 MG: Performed by: PHYSICIAN ASSISTANT

## 2022-03-30 PROCEDURE — 25010000002 CEFTRIAXONE IN SWFI 1 GRAM/10ML IV PUSH SYRINGE (SIMPLE): Performed by: PHYSICIAN ASSISTANT

## 2022-03-30 PROCEDURE — 83690 ASSAY OF LIPASE: CPT | Performed by: PHYSICIAN ASSISTANT

## 2022-03-30 PROCEDURE — 85025 COMPLETE CBC W/AUTO DIFF WBC: CPT | Performed by: PHYSICIAN ASSISTANT

## 2022-03-30 PROCEDURE — 87186 SC STD MICRODIL/AGAR DIL: CPT | Performed by: PHYSICIAN ASSISTANT

## 2022-03-30 PROCEDURE — 96374 THER/PROPH/DIAG INJ IV PUSH: CPT

## 2022-03-30 RX ORDER — SODIUM CHLORIDE 0.9 % (FLUSH) 0.9 %
10 SYRINGE (ML) INJECTION AS NEEDED
Status: DISCONTINUED | OUTPATIENT
Start: 2022-03-30 | End: 2022-03-31 | Stop reason: HOSPADM

## 2022-03-30 RX ORDER — ONDANSETRON 4 MG/1
4 TABLET, ORALLY DISINTEGRATING ORAL EVERY 12 HOURS PRN
Qty: 12 TABLET | Refills: 0 | Status: ON HOLD | OUTPATIENT
Start: 2022-03-30 | End: 2022-04-03

## 2022-03-30 RX ORDER — CEFDINIR 300 MG/1
300 CAPSULE ORAL 2 TIMES DAILY
Qty: 14 CAPSULE | Refills: 0 | Status: SHIPPED | OUTPATIENT
Start: 2022-03-30 | End: 2022-04-13 | Stop reason: HOSPADM

## 2022-03-30 RX ORDER — ONDANSETRON 2 MG/ML
4 INJECTION INTRAMUSCULAR; INTRAVENOUS ONCE
Status: COMPLETED | OUTPATIENT
Start: 2022-03-30 | End: 2022-03-30

## 2022-03-30 RX ORDER — FLUCONAZOLE 100 MG/1
150 TABLET ORAL ONCE
Status: COMPLETED | OUTPATIENT
Start: 2022-03-30 | End: 2022-03-30

## 2022-03-30 RX ORDER — DICYCLOMINE HYDROCHLORIDE 10 MG/1
10 CAPSULE ORAL 3 TIMES DAILY PRN
Qty: 20 CAPSULE | Refills: 0 | Status: ON HOLD | OUTPATIENT
Start: 2022-03-30 | End: 2022-05-22

## 2022-03-30 RX ORDER — DICYCLOMINE HYDROCHLORIDE 10 MG/1
10 CAPSULE ORAL ONCE
Status: COMPLETED | OUTPATIENT
Start: 2022-03-30 | End: 2022-03-30

## 2022-03-30 RX ADMIN — SODIUM CHLORIDE 1000 ML: 9 INJECTION, SOLUTION INTRAVENOUS at 21:41

## 2022-03-30 RX ADMIN — SODIUM CHLORIDE 1000 ML: 9 INJECTION, SOLUTION INTRAVENOUS at 20:26

## 2022-03-30 RX ADMIN — CEFTRIAXONE SODIUM 1 G: 1 INJECTION, POWDER, FOR SOLUTION INTRAMUSCULAR; INTRAVENOUS at 21:28

## 2022-03-30 RX ADMIN — ONDANSETRON 4 MG: 2 INJECTION INTRAMUSCULAR; INTRAVENOUS at 20:28

## 2022-03-30 RX ADMIN — DICYCLOMINE HYDROCHLORIDE 10 MG: 10 CAPSULE ORAL at 22:08

## 2022-03-30 RX ADMIN — FLUCONAZOLE 150 MG: 100 TABLET ORAL at 22:08

## 2022-04-01 LAB — BACTERIA SPEC AEROBE CULT: ABNORMAL

## 2022-04-02 ENCOUNTER — APPOINTMENT (OUTPATIENT)
Dept: GENERAL RADIOLOGY | Facility: HOSPITAL | Age: 52
End: 2022-04-02

## 2022-04-02 ENCOUNTER — HOSPITAL ENCOUNTER (INPATIENT)
Facility: HOSPITAL | Age: 52
LOS: 10 days | Discharge: HOME | End: 2022-04-13
Attending: STUDENT IN AN ORGANIZED HEALTH CARE EDUCATION/TRAINING PROGRAM | Admitting: HOSPITALIST

## 2022-04-02 DIAGNOSIS — I21.4 NSTEMI (NON-ST ELEVATED MYOCARDIAL INFARCTION): Primary | ICD-10-CM
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
## 2022-04-02 DIAGNOSIS — R94.39 ABNORMAL NUCLEAR STRESS TEST: ICD-10-CM

## 2022-04-02 LAB
ALBUMIN SERPL-MCNC: 4.51 G/DL (ref 3.5–5.2)
ALBUMIN/GLOB SERPL: 1.4 G/DL
ALP SERPL-CCNC: 91 U/L (ref 39–117)
ALT SERPL W P-5'-P-CCNC: 23 U/L (ref 1–33)
AMYLASE SERPL-CCNC: 232 U/L (ref 28–100)
ANION GAP SERPL CALCULATED.3IONS-SCNC: 18.1 MMOL/L (ref 5–15)
AST SERPL-CCNC: 21 U/L (ref 1–32)
BASOPHILS # BLD AUTO: 0.06 10*3/MM3 (ref 0–0.2)
BASOPHILS NFR BLD AUTO: 0.7 % (ref 0–1.5)
BILIRUB SERPL-MCNC: 0.5 MG/DL (ref 0–1.2)
BUN SERPL-MCNC: 10 MG/DL (ref 6–20)
BUN/CREAT SERPL: 10.5 (ref 7–25)
CALCIUM SPEC-SCNC: 9.3 MG/DL (ref 8.6–10.5)
CHLORIDE SERPL-SCNC: 97 MMOL/L (ref 98–107)
CO2 SERPL-SCNC: 17.9 MMOL/L (ref 22–29)
CREAT SERPL-MCNC: 0.95 MG/DL (ref 0.57–1)
CRP SERPL-MCNC: <0.3 MG/DL (ref 0–0.5)
DEPRECATED RDW RBC AUTO: 40.8 FL (ref 37–54)
EGFRCR SERPLBLD CKD-EPI 2021: 72.2 ML/MIN/1.73
EOSINOPHIL # BLD AUTO: 0.32 10*3/MM3 (ref 0–0.4)
EOSINOPHIL NFR BLD AUTO: 3.5 % (ref 0.3–6.2)
ERYTHROCYTE [DISTWIDTH] IN BLOOD BY AUTOMATED COUNT: 14 % (ref 12.3–15.4)
ERYTHROCYTE [SEDIMENTATION RATE] IN BLOOD: 9 MM/HR (ref 0–30)
FLUAV RNA RESP QL NAA+PROBE: NOT DETECTED
FLUBV RNA RESP QL NAA+PROBE: NOT DETECTED
GLOBULIN UR ELPH-MCNC: 3.3 GM/DL
GLUCOSE SERPL-MCNC: 120 MG/DL (ref 65–99)
HCT VFR BLD AUTO: 45 % (ref 34–46.6)
HGB BLD-MCNC: 14.9 G/DL (ref 12–15.9)
IMM GRANULOCYTES # BLD AUTO: 0.06 10*3/MM3 (ref 0–0.05)
IMM GRANULOCYTES NFR BLD AUTO: 0.7 % (ref 0–0.5)
LIPASE SERPL-CCNC: 48 U/L (ref 13–60)
LYMPHOCYTES # BLD AUTO: 4.26 10*3/MM3 (ref 0.7–3.1)
LYMPHOCYTES NFR BLD AUTO: 46.9 % (ref 19.6–45.3)
MAGNESIUM SERPL-MCNC: 1.2 MG/DL (ref 1.6–2.6)
MCH RBC QN AUTO: 26.7 PG (ref 26.6–33)
MCHC RBC AUTO-ENTMCNC: 33.1 G/DL (ref 31.5–35.7)
MCV RBC AUTO: 80.5 FL (ref 79–97)
MONOCYTES # BLD AUTO: 0.55 10*3/MM3 (ref 0.1–0.9)
MONOCYTES NFR BLD AUTO: 6.1 % (ref 5–12)
NEUTROPHILS NFR BLD AUTO: 3.84 10*3/MM3 (ref 1.7–7)
NEUTROPHILS NFR BLD AUTO: 42.1 % (ref 42.7–76)
NRBC BLD AUTO-RTO: 0 /100 WBC (ref 0–0.2)
PLATELET # BLD AUTO: 327 10*3/MM3 (ref 140–450)
PMV BLD AUTO: 9.2 FL (ref 6–12)
POTASSIUM SERPL-SCNC: 3.8 MMOL/L (ref 3.5–5.2)
PROT SERPL-MCNC: 7.8 G/DL (ref 6–8.5)
RBC # BLD AUTO: 5.59 10*6/MM3 (ref 3.77–5.28)
SARS-COV-2 RNA RESP QL NAA+PROBE: NOT DETECTED
SODIUM SERPL-SCNC: 133 MMOL/L (ref 136–145)
TROPONIN T SERPL-MCNC: <0.01 NG/ML (ref 0–0.03)
TSH SERPL DL<=0.05 MIU/L-ACNC: 1.95 UIU/ML (ref 0.27–4.2)
WBC NRBC COR # BLD: 9.09 10*3/MM3 (ref 3.4–10.8)

## 2022-04-02 PROCEDURE — 84436 ASSAY OF TOTAL THYROXINE: CPT | Performed by: NURSE PRACTITIONER

## 2022-04-02 PROCEDURE — 84484 ASSAY OF TROPONIN QUANT: CPT | Performed by: NURSE PRACTITIONER

## 2022-04-02 PROCEDURE — 87636 SARSCOV2 & INF A&B AMP PRB: CPT | Performed by: NURSE PRACTITIONER

## 2022-04-02 PROCEDURE — 99284 EMERGENCY DEPT VISIT MOD MDM: CPT

## 2022-04-02 PROCEDURE — 71045 X-RAY EXAM CHEST 1 VIEW: CPT

## 2022-04-02 PROCEDURE — 86140 C-REACTIVE PROTEIN: CPT | Performed by: NURSE PRACTITIONER

## 2022-04-02 PROCEDURE — 85610 PROTHROMBIN TIME: CPT | Performed by: NURSE PRACTITIONER

## 2022-04-02 PROCEDURE — 82150 ASSAY OF AMYLASE: CPT | Performed by: NURSE PRACTITIONER

## 2022-04-02 PROCEDURE — 25010000002 MORPHINE PER 10 MG: Performed by: NURSE PRACTITIONER

## 2022-04-02 PROCEDURE — 83690 ASSAY OF LIPASE: CPT | Performed by: NURSE PRACTITIONER

## 2022-04-02 PROCEDURE — 80053 COMPREHEN METABOLIC PANEL: CPT | Performed by: NURSE PRACTITIONER

## 2022-04-02 PROCEDURE — 84443 ASSAY THYROID STIM HORMONE: CPT | Performed by: NURSE PRACTITIONER

## 2022-04-02 PROCEDURE — 85652 RBC SED RATE AUTOMATED: CPT | Performed by: NURSE PRACTITIONER

## 2022-04-02 PROCEDURE — 85025 COMPLETE CBC W/AUTO DIFF WBC: CPT | Performed by: NURSE PRACTITIONER

## 2022-04-02 PROCEDURE — 25010000002 PROCHLORPERAZINE 10 MG/2ML SOLUTION: Performed by: NURSE PRACTITIONER

## 2022-04-02 PROCEDURE — 85730 THROMBOPLASTIN TIME PARTIAL: CPT | Performed by: NURSE PRACTITIONER

## 2022-04-02 PROCEDURE — 93010 ELECTROCARDIOGRAM REPORT: CPT | Performed by: INTERNAL MEDICINE

## 2022-04-02 PROCEDURE — 93005 ELECTROCARDIOGRAM TRACING: CPT | Performed by: STUDENT IN AN ORGANIZED HEALTH CARE EDUCATION/TRAINING PROGRAM

## 2022-04-02 PROCEDURE — 83735 ASSAY OF MAGNESIUM: CPT | Performed by: NURSE PRACTITIONER

## 2022-04-02 PROCEDURE — 83036 HEMOGLOBIN GLYCOSYLATED A1C: CPT | Performed by: HOSPITALIST

## 2022-04-02 RX ORDER — ASPIRIN 81 MG/1
324 TABLET, CHEWABLE ORAL ONCE
Status: COMPLETED | OUTPATIENT
Start: 2022-04-02 | End: 2022-04-02

## 2022-04-02 RX ORDER — PROCHLORPERAZINE EDISYLATE 5 MG/ML
10 INJECTION INTRAMUSCULAR; INTRAVENOUS ONCE
Status: COMPLETED | OUTPATIENT
Start: 2022-04-02 | End: 2022-04-02

## 2022-04-02 RX ORDER — SODIUM CHLORIDE 0.9 % (FLUSH) 0.9 %
10 SYRINGE (ML) INJECTION AS NEEDED
Status: DISCONTINUED | OUTPATIENT
Start: 2022-04-02 | End: 2022-04-13 | Stop reason: HOSPADM

## 2022-04-02 RX ADMIN — MORPHINE SULFATE 4 MG: 4 INJECTION, SOLUTION INTRAMUSCULAR; INTRAVENOUS at 22:52

## 2022-04-02 RX ADMIN — ASPIRIN 324 MG: 81 TABLET, CHEWABLE ORAL at 22:53

## 2022-04-02 RX ADMIN — PROCHLORPERAZINE EDISYLATE 10 MG: 5 INJECTION INTRAMUSCULAR; INTRAVENOUS at 22:52

## 2022-04-02 RX ADMIN — SODIUM CHLORIDE 1000 ML: 9 INJECTION, SOLUTION INTRAVENOUS at 23:45

## 2022-04-03 ENCOUNTER — APPOINTMENT (OUTPATIENT)
Dept: CT IMAGING | Facility: HOSPITAL | Age: 52
End: 2022-04-03

## 2022-04-03 ENCOUNTER — APPOINTMENT (OUTPATIENT)
Dept: CARDIOLOGY | Facility: HOSPITAL | Age: 52
End: 2022-04-03

## 2022-04-03 PROBLEM — I21.4 NSTEMI (NON-ST ELEVATED MYOCARDIAL INFARCTION) (HCC): Status: ACTIVE | Noted: 2022-04-03

## 2022-04-03 LAB
ALBUMIN SERPL-MCNC: 3.75 G/DL (ref 3.5–5.2)
ALBUMIN/GLOB SERPL: 1.4 G/DL
ALP SERPL-CCNC: 78 U/L (ref 39–117)
ALT SERPL W P-5'-P-CCNC: 19 U/L (ref 1–33)
ANION GAP SERPL CALCULATED.3IONS-SCNC: 14 MMOL/L (ref 5–15)
APTT PPP: 23.2 SECONDS (ref 26.5–34.5)
APTT PPP: 27.5 SECONDS (ref 26.5–34.5)
APTT PPP: 42.1 SECONDS (ref 26.5–34.5)
APTT PPP: 59.1 SECONDS (ref 26.5–34.5)
APTT PPP: 85 SECONDS (ref 26.5–34.5)
AST SERPL-CCNC: 19 U/L (ref 1–32)
BASOPHILS # BLD AUTO: 0.03 10*3/MM3 (ref 0–0.2)
BASOPHILS NFR BLD AUTO: 0.4 % (ref 0–1.5)
BH CV ECHO MEAS - ACS: 2.2 CM
BH CV ECHO MEAS - AO MAX PG: 2.8 MMHG
BH CV ECHO MEAS - AO MEAN PG: 2 MMHG
BH CV ECHO MEAS - AO ROOT DIAM: 2.9 CM
BH CV ECHO MEAS - AO V2 MAX: 83.2 CM/SEC
BH CV ECHO MEAS - AO V2 VTI: 18.7 CM
BH CV ECHO MEAS - EDV(CUBED): 32.8 ML
BH CV ECHO MEAS - EDV(MOD-SP4): 35 ML
BH CV ECHO MEAS - EF(MOD-SP4): 60 %
BH CV ECHO MEAS - ESV(CUBED): 6.9 ML
BH CV ECHO MEAS - ESV(MOD-SP4): 14 ML
BH CV ECHO MEAS - FS: 40.6 %
BH CV ECHO MEAS - IVS/LVPW: 1 CM
BH CV ECHO MEAS - IVSD: 1.2 CM
BH CV ECHO MEAS - LA DIMENSION: 2.3 CM
BH CV ECHO MEAS - LAT PEAK E' VEL: 13.7 CM/SEC
BH CV ECHO MEAS - LV DIASTOLIC VOL/BSA (35-75): 17.8 CM2
BH CV ECHO MEAS - LV MASS(C)D: 119.4 GRAMS
BH CV ECHO MEAS - LV SYSTOLIC VOL/BSA (12-30): 7.1 CM2
BH CV ECHO MEAS - LVIDD: 3.2 CM
BH CV ECHO MEAS - LVIDS: 1.9 CM
BH CV ECHO MEAS - LVOT AREA: 2.8 CM2
BH CV ECHO MEAS - LVOT DIAM: 1.9 CM
BH CV ECHO MEAS - LVPWD: 1.2 CM
BH CV ECHO MEAS - MED PEAK E' VEL: 6.2 CM/SEC
BH CV ECHO MEAS - MV A MAX VEL: 73.7 CM/SEC
BH CV ECHO MEAS - MV E MAX VEL: 50.1 CM/SEC
BH CV ECHO MEAS - MV E/A: 0.68
BH CV ECHO MEAS - PA ACC TIME: 0.1 SEC
BH CV ECHO MEAS - PA PR(ACCEL): 33.1 MMHG
BH CV ECHO MEAS - SI(MOD-SP4): 10.7 ML/M2
BH CV ECHO MEAS - SV(MOD-SP4): 21 ML
BH CV ECHO MEAS - TAPSE (>1.6): 1.38 CM
BH CV ECHO MEASUREMENTS AVERAGE E/E' RATIO: 5.04
BILIRUB SERPL-MCNC: 0.3 MG/DL (ref 0–1.2)
BUN SERPL-MCNC: 8 MG/DL (ref 6–20)
BUN/CREAT SERPL: 10.7 (ref 7–25)
CALCIUM SPEC-SCNC: 8.4 MG/DL (ref 8.6–10.5)
CHLORIDE SERPL-SCNC: 107 MMOL/L (ref 98–107)
CHOLEST SERPL-MCNC: 85 MG/DL (ref 0–200)
CO2 SERPL-SCNC: 19 MMOL/L (ref 22–29)
CREAT SERPL-MCNC: 0.75 MG/DL (ref 0.57–1)
D-LACTATE SERPL-SCNC: 0.8 MMOL/L (ref 0.5–2)
DEPRECATED RDW RBC AUTO: 41.1 FL (ref 37–54)
EGFRCR SERPLBLD CKD-EPI 2021: 95.9 ML/MIN/1.73
EOSINOPHIL # BLD AUTO: 0.19 10*3/MM3 (ref 0–0.4)
EOSINOPHIL NFR BLD AUTO: 2.6 % (ref 0.3–6.2)
ERYTHROCYTE [DISTWIDTH] IN BLOOD BY AUTOMATED COUNT: 13.9 % (ref 12.3–15.4)
GLOBULIN UR ELPH-MCNC: 2.7 GM/DL
GLUCOSE BLDC GLUCOMTR-MCNC: 101 MG/DL (ref 70–130)
GLUCOSE BLDC GLUCOMTR-MCNC: 175 MG/DL (ref 70–130)
GLUCOSE BLDC GLUCOMTR-MCNC: 84 MG/DL (ref 70–130)
GLUCOSE BLDC GLUCOMTR-MCNC: 95 MG/DL (ref 70–130)
GLUCOSE SERPL-MCNC: 100 MG/DL (ref 65–99)
HBA1C MFR BLD: 5.9 % (ref 4.8–5.6)
HCT VFR BLD AUTO: 40 % (ref 34–46.6)
HDLC SERPL-MCNC: 32 MG/DL (ref 40–60)
HGB BLD-MCNC: 13.4 G/DL (ref 12–15.9)
HOLD SPECIMEN: NORMAL
HOLD SPECIMEN: NORMAL
IMM GRANULOCYTES # BLD AUTO: 0.04 10*3/MM3 (ref 0–0.05)
IMM GRANULOCYTES NFR BLD AUTO: 0.5 % (ref 0–0.5)
INR PPP: 0.91 (ref 0.9–1.1)
LDLC SERPL CALC-MCNC: 24 MG/DL (ref 0–100)
LDLC/HDLC SERPL: 0.56 {RATIO}
LEFT ATRIUM VOLUME INDEX: 14.5 ML/M2
LYMPHOCYTES # BLD AUTO: 2.94 10*3/MM3 (ref 0.7–3.1)
LYMPHOCYTES NFR BLD AUTO: 39.6 % (ref 19.6–45.3)
MAGNESIUM SERPL-MCNC: 1.8 MG/DL (ref 1.6–2.6)
MAXIMAL PREDICTED HEART RATE: 168 BPM
MCH RBC QN AUTO: 27.3 PG (ref 26.6–33)
MCHC RBC AUTO-ENTMCNC: 33.5 G/DL (ref 31.5–35.7)
MCV RBC AUTO: 81.5 FL (ref 79–97)
MONOCYTES # BLD AUTO: 0.49 10*3/MM3 (ref 0.1–0.9)
MONOCYTES NFR BLD AUTO: 6.6 % (ref 5–12)
NEUTROPHILS NFR BLD AUTO: 3.73 10*3/MM3 (ref 1.7–7)
NEUTROPHILS NFR BLD AUTO: 50.3 % (ref 42.7–76)
NRBC BLD AUTO-RTO: 0 /100 WBC (ref 0–0.2)
PLATELET # BLD AUTO: 241 10*3/MM3 (ref 140–450)
PMV BLD AUTO: 9.2 FL (ref 6–12)
POTASSIUM SERPL-SCNC: 3.8 MMOL/L (ref 3.5–5.2)
PROT SERPL-MCNC: 6.4 G/DL (ref 6–8.5)
PROTHROMBIN TIME: 12.5 SECONDS (ref 12.1–14.7)
QT INTERVAL: 390 MS
QT INTERVAL: 396 MS
QT INTERVAL: 404 MS
QTC INTERVAL: 487 MS
QTC INTERVAL: 525 MS
QTC INTERVAL: 533 MS
RBC # BLD AUTO: 4.91 10*6/MM3 (ref 3.77–5.28)
SODIUM SERPL-SCNC: 140 MMOL/L (ref 136–145)
STRESS TARGET HR: 143 BPM
T4 SERPL-MCNC: 8.78 MCG/DL (ref 4.5–11.7)
TRIGL SERPL-MCNC: 175 MG/DL (ref 0–150)
TROPONIN T SERPL-MCNC: 0.07 NG/ML (ref 0–0.03)
TROPONIN T SERPL-MCNC: 0.12 NG/ML (ref 0–0.03)
TROPONIN T SERPL-MCNC: 0.15 NG/ML (ref 0–0.03)
VLDLC SERPL-MCNC: 29 MG/DL (ref 5–40)
WBC NRBC COR # BLD: 7.42 10*3/MM3 (ref 3.4–10.8)
WHOLE BLOOD HOLD SPECIMEN: NORMAL
WHOLE BLOOD HOLD SPECIMEN: NORMAL

## 2022-04-03 PROCEDURE — 83735 ASSAY OF MAGNESIUM: CPT | Performed by: HOSPITALIST

## 2022-04-03 PROCEDURE — 85730 THROMBOPLASTIN TIME PARTIAL: CPT | Performed by: HOSPITALIST

## 2022-04-03 PROCEDURE — 93005 ELECTROCARDIOGRAM TRACING: CPT | Performed by: HOSPITALIST

## 2022-04-03 PROCEDURE — 80053 COMPREHEN METABOLIC PANEL: CPT | Performed by: HOSPITALIST

## 2022-04-03 PROCEDURE — 84484 ASSAY OF TROPONIN QUANT: CPT | Performed by: HOSPITALIST

## 2022-04-03 PROCEDURE — 74176 CT ABD & PELVIS W/O CONTRAST: CPT

## 2022-04-03 PROCEDURE — 84484 ASSAY OF TROPONIN QUANT: CPT | Performed by: NURSE PRACTITIONER

## 2022-04-03 PROCEDURE — 25010000002 HEPARIN (PORCINE) PER 1000 UNITS: Performed by: HOSPITALIST

## 2022-04-03 PROCEDURE — 80061 LIPID PANEL: CPT | Performed by: HOSPITALIST

## 2022-04-03 PROCEDURE — 83605 ASSAY OF LACTIC ACID: CPT | Performed by: NURSE PRACTITIONER

## 2022-04-03 PROCEDURE — 0 MAGNESIUM SULFATE 4 GM/100ML SOLUTION: Performed by: HOSPITALIST

## 2022-04-03 PROCEDURE — 93306 TTE W/DOPPLER COMPLETE: CPT | Performed by: INTERNAL MEDICINE

## 2022-04-03 PROCEDURE — 82962 GLUCOSE BLOOD TEST: CPT

## 2022-04-03 PROCEDURE — 93005 ELECTROCARDIOGRAM TRACING: CPT | Performed by: NURSE PRACTITIONER

## 2022-04-03 PROCEDURE — 93306 TTE W/DOPPLER COMPLETE: CPT

## 2022-04-03 PROCEDURE — 93010 ELECTROCARDIOGRAM REPORT: CPT | Performed by: INTERNAL MEDICINE

## 2022-04-03 PROCEDURE — 85025 COMPLETE CBC W/AUTO DIFF WBC: CPT | Performed by: HOSPITALIST

## 2022-04-03 PROCEDURE — 25010000002 MAGNESIUM SULFATE IN D5W 1G/100ML (PREMIX) 1-5 GM/100ML-% SOLUTION: Performed by: NURSE PRACTITIONER

## 2022-04-03 PROCEDURE — 71250 CT THORAX DX C-: CPT

## 2022-04-03 PROCEDURE — 99223 1ST HOSP IP/OBS HIGH 75: CPT | Performed by: HOSPITALIST

## 2022-04-03 PROCEDURE — 25010000002 HEPARIN (PORCINE) PER 1000 UNITS: Performed by: NURSE PRACTITIONER

## 2022-04-03 PROCEDURE — 85730 THROMBOPLASTIN TIME PARTIAL: CPT | Performed by: INTERNAL MEDICINE

## 2022-04-03 PROCEDURE — 99254 IP/OBS CNSLTJ NEW/EST MOD 60: CPT | Performed by: NURSE PRACTITIONER

## 2022-04-03 RX ORDER — SODIUM CHLORIDE 0.9 % (FLUSH) 0.9 %
10 SYRINGE (ML) INJECTION AS NEEDED
Status: DISCONTINUED | OUTPATIENT
Start: 2022-04-03 | End: 2022-04-13 | Stop reason: HOSPADM

## 2022-04-03 RX ORDER — HEPARIN SODIUM 5000 [USP'U]/ML
5000 INJECTION, SOLUTION INTRAVENOUS; SUBCUTANEOUS AS NEEDED
Status: DISCONTINUED | OUTPATIENT
Start: 2022-04-03 | End: 2022-04-13 | Stop reason: HOSPADM

## 2022-04-03 RX ORDER — ATORVASTATIN CALCIUM 40 MG/1
80 TABLET, FILM COATED ORAL NIGHTLY
Status: DISCONTINUED | OUTPATIENT
Start: 2022-04-03 | End: 2022-04-13 | Stop reason: HOSPADM

## 2022-04-03 RX ORDER — HEPARIN SODIUM 5000 [USP'U]/ML
2500 INJECTION, SOLUTION INTRAVENOUS; SUBCUTANEOUS AS NEEDED
Status: DISCONTINUED | OUTPATIENT
Start: 2022-04-03 | End: 2022-04-13 | Stop reason: HOSPADM

## 2022-04-03 RX ORDER — NITROGLYCERIN 0.4 MG/1
0.4 TABLET SUBLINGUAL
Status: DISCONTINUED | OUTPATIENT
Start: 2022-04-03 | End: 2022-04-13 | Stop reason: HOSPADM

## 2022-04-03 RX ORDER — DICYCLOMINE HYDROCHLORIDE 10 MG/1
10 CAPSULE ORAL 3 TIMES DAILY PRN
Status: DISCONTINUED | OUTPATIENT
Start: 2022-04-03 | End: 2022-04-13 | Stop reason: HOSPADM

## 2022-04-03 RX ORDER — MAGNESIUM SULFATE HEPTAHYDRATE 40 MG/ML
2 INJECTION, SOLUTION INTRAVENOUS AS NEEDED
Status: DISCONTINUED | OUTPATIENT
Start: 2022-04-03 | End: 2022-04-13 | Stop reason: HOSPADM

## 2022-04-03 RX ORDER — HEPARIN SODIUM 5000 [USP'U]/ML
5000 INJECTION, SOLUTION INTRAVENOUS; SUBCUTANEOUS ONCE
Status: COMPLETED | OUTPATIENT
Start: 2022-04-03 | End: 2022-04-03

## 2022-04-03 RX ORDER — SODIUM CHLORIDE 0.9 % (FLUSH) 0.9 %
10 SYRINGE (ML) INJECTION EVERY 12 HOURS SCHEDULED
Status: DISCONTINUED | OUTPATIENT
Start: 2022-04-03 | End: 2022-04-13 | Stop reason: HOSPADM

## 2022-04-03 RX ORDER — LISINOPRIL 2.5 MG/1
2.5 TABLET ORAL DAILY
Status: CANCELLED | OUTPATIENT
Start: 2022-04-03

## 2022-04-03 RX ORDER — METOPROLOL TARTRATE 100 MG/1
100 TABLET ORAL EVERY 12 HOURS SCHEDULED
Status: DISCONTINUED | OUTPATIENT
Start: 2022-04-03 | End: 2022-04-09

## 2022-04-03 RX ORDER — RANOLAZINE 500 MG/1
1000 TABLET, EXTENDED RELEASE ORAL 2 TIMES DAILY
Status: DISCONTINUED | OUTPATIENT
Start: 2022-04-03 | End: 2022-04-03

## 2022-04-03 RX ORDER — CETIRIZINE HYDROCHLORIDE 10 MG/1
10 TABLET ORAL DAILY
Status: DISCONTINUED | OUTPATIENT
Start: 2022-04-03 | End: 2022-04-13 | Stop reason: HOSPADM

## 2022-04-03 RX ORDER — PANTOPRAZOLE SODIUM 40 MG/1
40 TABLET, DELAYED RELEASE ORAL EVERY MORNING
Status: DISCONTINUED | OUTPATIENT
Start: 2022-04-03 | End: 2022-04-13 | Stop reason: HOSPADM

## 2022-04-03 RX ORDER — PANTOPRAZOLE SODIUM 40 MG/1
40 TABLET, DELAYED RELEASE ORAL EVERY MORNING
Status: DISCONTINUED | OUTPATIENT
Start: 2022-04-04 | End: 2022-04-03

## 2022-04-03 RX ORDER — DEXTROSE MONOHYDRATE 25 G/50ML
25 INJECTION, SOLUTION INTRAVENOUS
Status: DISCONTINUED | OUTPATIENT
Start: 2022-04-03 | End: 2022-04-13 | Stop reason: HOSPADM

## 2022-04-03 RX ORDER — MAGNESIUM SULFATE HEPTAHYDRATE 40 MG/ML
4 INJECTION, SOLUTION INTRAVENOUS AS NEEDED
Status: DISCONTINUED | OUTPATIENT
Start: 2022-04-03 | End: 2022-04-13 | Stop reason: HOSPADM

## 2022-04-03 RX ORDER — NICOTINE POLACRILEX 4 MG
15 LOZENGE BUCCAL
Status: DISCONTINUED | OUTPATIENT
Start: 2022-04-03 | End: 2022-04-13 | Stop reason: HOSPADM

## 2022-04-03 RX ORDER — ASPIRIN 81 MG/1
81 TABLET ORAL DAILY
Status: DISCONTINUED | OUTPATIENT
Start: 2022-04-03 | End: 2022-04-13 | Stop reason: HOSPADM

## 2022-04-03 RX ORDER — CEFDINIR 300 MG/1
300 CAPSULE ORAL 2 TIMES DAILY
Status: CANCELLED | OUTPATIENT
Start: 2022-04-03 | End: 2022-04-06

## 2022-04-03 RX ORDER — HEPARIN SOD,PORCINE/0.9 % NACL 25000/250
10.8 INTRAVENOUS SOLUTION INTRAVENOUS
Status: DISCONTINUED | OUTPATIENT
Start: 2022-04-03 | End: 2022-04-05

## 2022-04-03 RX ORDER — HEPARIN SOD,PORCINE/0.9 % NACL 25000/250
11.3 INTRAVENOUS SOLUTION INTRAVENOUS
Status: DISCONTINUED | OUTPATIENT
Start: 2022-04-03 | End: 2022-04-03

## 2022-04-03 RX ORDER — MAGNESIUM SULFATE 1 G/100ML
1 INJECTION INTRAVENOUS ONCE
Status: COMPLETED | OUTPATIENT
Start: 2022-04-03 | End: 2022-04-03

## 2022-04-03 RX ORDER — MAGNESIUM SULFATE HEPTAHYDRATE 40 MG/ML
4 INJECTION, SOLUTION INTRAVENOUS ONCE
Status: COMPLETED | OUTPATIENT
Start: 2022-04-03 | End: 2022-04-03

## 2022-04-03 RX ORDER — DULOXETIN HYDROCHLORIDE 30 MG/1
30 CAPSULE, DELAYED RELEASE ORAL 2 TIMES DAILY
Status: DISCONTINUED | OUTPATIENT
Start: 2022-04-03 | End: 2022-04-13 | Stop reason: HOSPADM

## 2022-04-03 RX ADMIN — HEPARIN SODIUM 11.3 UNITS/KG/HR: 5000 INJECTION INTRAVENOUS; SUBCUTANEOUS at 02:09

## 2022-04-03 RX ADMIN — ASPIRIN 81 MG: 81 TABLET, COATED ORAL at 08:41

## 2022-04-03 RX ADMIN — HEPARIN SODIUM 10.8 UNITS/KG/HR: 5000 INJECTION INTRAVENOUS; SUBCUTANEOUS at 03:17

## 2022-04-03 RX ADMIN — NITROGLYCERIN 1 INCH: 20 OINTMENT TOPICAL at 01:50

## 2022-04-03 RX ADMIN — HEPARIN SODIUM 5000 UNITS: 5000 INJECTION INTRAVENOUS; SUBCUTANEOUS at 09:35

## 2022-04-03 RX ADMIN — HEPARIN SODIUM 18.8 UNITS/KG/HR: 5000 INJECTION INTRAVENOUS; SUBCUTANEOUS at 22:00

## 2022-04-03 RX ADMIN — Medication 10 ML: at 08:40

## 2022-04-03 RX ADMIN — HEPARIN SODIUM 5000 UNITS: 5000 INJECTION INTRAVENOUS; SUBCUTANEOUS at 16:34

## 2022-04-03 RX ADMIN — HEPARIN SODIUM 5000 UNITS: 5000 INJECTION INTRAVENOUS; SUBCUTANEOUS at 02:09

## 2022-04-03 RX ADMIN — DULOXETINE HYDROCHLORIDE 30 MG: 30 CAPSULE, DELAYED RELEASE ORAL at 20:26

## 2022-04-03 RX ADMIN — TICAGRELOR 90 MG: 90 TABLET ORAL at 08:41

## 2022-04-03 RX ADMIN — MAGNESIUM SULFATE HEPTAHYDRATE 4 G: 40 INJECTION, SOLUTION INTRAVENOUS at 08:45

## 2022-04-03 RX ADMIN — Medication 10 ML: at 20:25

## 2022-04-03 RX ADMIN — PANTOPRAZOLE SODIUM 40 MG: 40 TABLET, DELAYED RELEASE ORAL at 22:31

## 2022-04-03 RX ADMIN — MAGNESIUM SULFATE HEPTAHYDRATE 1 G: 1 INJECTION, SOLUTION INTRAVENOUS at 00:41

## 2022-04-03 RX ADMIN — TICAGRELOR 90 MG: 90 TABLET ORAL at 20:27

## 2022-04-03 RX ADMIN — ATORVASTATIN CALCIUM 80 MG: 40 TABLET, FILM COATED ORAL at 20:27

## 2022-04-03 RX ADMIN — Medication 10 ML: at 08:43

## 2022-04-03 RX ADMIN — CETIRIZINE HYDROCHLORIDE 10 MG: 10 TABLET, FILM COATED ORAL at 20:27

## 2022-04-03 NOTE — CONSULTS
Date of Admit: 4/2/2022  Date of Consult: 04/03/22  No ref. provider found        NSTEMI (non-ST elevated myocardial infarction) ([facility])      Assessment      1. NSTEMI in setting of chest pain, troponin trending downward, EKG shows ST/T wave changes in the anterior leads, on IV heparin  2. ASCVD, s/p CABG x2 5 years ago with subsequent PCI in august 2021 due to medically refractory angina  3. Essential hypertension, controlled   4. Hyperlipidemia, on statin    Recommendations     1. NSTEMI/ Chest pain  · Echocardiogram showed normal LV function.Troponin trending downward. Denies any current chest pain. EKG showed some anterior changes. In the setting of chest pain and known CAD will proceed with stress testing in the morning. Keep NPO after midnight. If chest pain worsens or troponin increases will consider left heart catheterization.   · Continue IV heparin, aspirin, brilinta, metoprolol and statin.    Reason for consultation: NSTEMI and Chest pain    Subjective       Subjective     History of Present Illness     Lissa Eisenberg is a 51-year-old female with a past medical history significant for coronary artery disease status post CABG x2 5 years ago with PCI in January and August [facility] [place], diabetes mellitus type 2, essential hypertension and hyperlipidemia.  Patient presented to the ER with complaints of nausea and vomiting.  She states that yesterday she developed severe chest pain she describes as a pressure radiating to her neck, left arm and mid back with associated nausea, diaphoresis and shortness of breath. States the pain lasted around 1 hour and was relieved with IV morphine and nitroglycerin.  Patient has a significant history of coronary artery disease.  She underwent two-vessel CABG around 5 years ago.  In 2021 she underwent PCI x2 with most recent PCI to the RCA in August 2021.  She also underwent a repeat left heart catheterization in October 2021 which showed  nonobstructive coronary artery disease with patent stents and patent LIMA to LAD graft. In the ED EKG showed ST/ t wave changes in the anterior leads and troponin elevation. Initial troponin 0.154 trending downward to 0.073. Echocardiogram showed normal LV function.     Left heart cath 2021    Left heart cath 2021          Cardiac risk factors:arteriosclerotic heart disease, diabetes mellitus, hypercholesterolemia and hypertension      Past Medical History:   Diagnosis Date   • COVID-19    • Diabetes mellitus (HCC)    • Hyperlipidemia    • Hypertension    • Myocardial infarct (HCC)    • PCOS (polycystic ovarian syndrome)      Past Surgical History:   Procedure Laterality Date   • CARDIAC CATHETERIZATION     •  SECTION     • CHOLECYSTECTOMY     • CORONARY ARTERY BYPASS GRAFT      x 2   • CORONARY STENT PLACEMENT      x 6 per patient   • DILATATION AND CURETTAGE     • KNEE SURGERY Left    • TONSILLECTOMY       Family History   Problem Relation Age of Onset   • Lung cancer Father    • Thyroid cancer Brother    • Heart attack Maternal Grandfather    • Lung cancer Maternal Grandfather    • Heart attack Paternal Grandmother    • Emphysema Paternal Grandfather      Social History     Tobacco Use   • Smoking status: Never Smoker   • Smokeless tobacco: Never Used   Substance Use Topics   • Alcohol use: Yes   • Drug use: Never     Medications Prior to Admission   Medication Sig Dispense Refill Last Dose   • aspirin (aspirin) 81 MG EC tablet Take 81 mg by mouth Daily.      • atorvastatin (LIPITOR) 80 MG tablet Take 80 mg by mouth Every Night.      • cefdinir (OMNICEF) 300 MG capsule Take 1 capsule by mouth 2 (Two) Times a Day for 7 days. 14 capsule 0    • cetirizine (zyrTEC) 10 MG tablet Take 10 mg by mouth 2 (two) times a day.      • cyclobenzaprine (FLEXERIL) 10 MG tablet       • dicyclomine (BENTYL) 10 MG capsule Take 1 capsule by mouth 3 (Three) Times a Day As Needed (abdominal pain). 20 capsule 0     • Dulaglutide (TRULICITY SC) Inject  under the skin into the appropriate area as directed.      • DULoxetine (CYMBALTA) 30 MG capsule Take 30 mg by mouth 2 (Two) Times a Day.      • Flaxseed, Linseed, (FLAXSEED OIL PO) Take  by mouth Daily.      • isosorbide mononitrate (IMDUR) 30 MG 24 hr tablet Take 1 tablet by mouth once daily 90 tablet 0    • Jardiance 10 MG tablet Daily.      • lisinopril (PRINIVIL,ZESTRIL) 2.5 MG tablet Take 2.5 mg by mouth Daily.      • metFORMIN (GLUCOPHAGE) 1000 MG tablet Take 1,000 mg by mouth 2 (Two) Times a Day With Meals.      • methylPREDNISolone (MEDROL) 4 MG dose pack Take as directed on package instructions. 21 each 0    • metoprolol tartrate (LOPRESSOR) 100 MG tablet Take 100 mg by mouth. 1 1/2 tabs at hs      • naproxen (NAPROSYN) 500 MG tablet Take 500 mg by mouth 2 (Two) Times a Day With Meals.      • omeprazole (priLOSEC) 20 MG capsule Take 20 mg by mouth Daily.      • ondansetron ODT (ZOFRAN-ODT) 4 MG disintegrating tablet Place 1 tablet on the tongue Every 12 (Twelve) Hours As Needed for Nausea. 12 tablet 0    • ranolazine (RANEXA) 1000 MG 12 hr tablet Take 1,000 mg by mouth 2 (Two) Times a Day.      • ticagrelor (BRILINTA) 90 MG tablet tablet Take 1 tablet by mouth 2 (Two) Times a Day. 180 tablet 3      Allergies:  Contrast dye, Ciprofloxacin, and Sulfa antibiotics    Review of Systems   Constitutional: Negative for fatigue and fever.   HENT: Negative for congestion and trouble swallowing.    Eyes: Negative for photophobia and visual disturbance.   Respiratory: Positive for chest tightness and shortness of breath.    Cardiovascular: Positive for chest pain. Negative for palpitations.   Gastrointestinal: Positive for nausea and vomiting. Negative for abdominal pain.   Endocrine: Negative for polyphagia and polyuria.   Genitourinary: Negative for dysuria and hematuria.   Musculoskeletal: Negative for neck pain and neck stiffness.   Skin: Negative for rash and wound.    Allergic/Immunologic: Negative for food allergies and immunocompromised state.   Neurological: Negative for dizziness, syncope and weakness.   Hematological: Negative for adenopathy. Does not bruise/bleed easily.   Psychiatric/Behavioral: Negative for confusion and suicidal ideas.       Objective     Objective      Vital Signs  Temp:  [97.7 °F (36.5 °C)-98.5 °F (36.9 °C)] 97.7 °F (36.5 °C)  Heart Rate:  [] 87  Resp:  [18-24] 19  BP: ()/(60-92) 101/64     Vital Signs (last 72 hrs)       03/31 0700  04/01 0659 04/01 0700  04/02 0659 04/02 0700  04/03 0659 04/03 0700  04/03 0915   Most Recent      Temp (°F)     97.9 -  98.5      97.7     97.7 (36.5) 04/03 0800    Heart Rate     96 -  111      87     87 04/03 0700    Resp     18 -  24      19     19 04/03 0700    BP     88/63 -  135/92      101/64     101/64 04/03 0700    SpO2 (%)     92 -  100      90     90 04/03 0700        Body mass index is 34.91 kg/m².  Documented weights    04/02/22 2224 04/03/22 0307   Weight: 88.5 kg (195 lb) 92.3 kg (203 lb 8 oz)          No intake or output data in the 24 hours ending 04/03/22 0915  Physical Exam  Constitutional:       General: She is not in acute distress.     Appearance: Normal appearance. She is well-developed and normal weight.   HENT:      Head: Normocephalic and atraumatic.   Eyes:      General: Lids are normal.      Conjunctiva/sclera: Conjunctivae normal.      Pupils: Pupils are equal, round, and reactive to light.   Neck:      Vascular: No carotid bruit or JVD.   Cardiovascular:      Rate and Rhythm: Normal rate and regular rhythm.      Pulses: Normal pulses.           Radial pulses are 2+ on the right side and 2+ on the left side.        Dorsalis pedis pulses are 2+ on the right side and 2+ on the left side.        Posterior tibial pulses are 2+ on the right side and 2+ on the left side.      Heart sounds: Normal heart sounds, S1 normal and S2 normal. No murmur heard.  Pulmonary:      Effort: Pulmonary  effort is normal. No respiratory distress.      Breath sounds: Normal breath sounds. No wheezing.   Abdominal:      General: Bowel sounds are normal. There is no distension.      Palpations: Abdomen is soft. There is no hepatomegaly or splenomegaly.      Tenderness: There is no abdominal tenderness.   Musculoskeletal:         General: No swelling. Normal range of motion.      Cervical back: Normal range of motion and neck supple.      Right lower leg: No edema.      Left lower leg: No edema.   Skin:     General: Skin is warm and dry.      Coloration: Skin is not jaundiced.      Findings: No rash.   Neurological:      General: No focal deficit present.      Mental Status: She is alert and oriented to person, place, and time. Mental status is at baseline.   Psychiatric:         Mood and Affect: Mood normal.         Speech: Speech normal.         Behavior: Behavior normal.         Thought Content: Thought content normal.         Judgment: Judgment normal.       Results review     Results Review:    I reviewed the patient's new clinical results.  Results from last 7 days   Lab Units 04/03/22  0744 04/03/22  0428 04/03/22  0047 04/02/22 2250 03/30/22 2012   TROPONIN T ng/mL 0.073* 0.125* 0.154* <0.010 <0.010     Results from last 7 days   Lab Units 04/03/22  0428 04/02/22 2250 03/30/22 2012   WBC 10*3/mm3 7.42 9.09 9.63   HEMOGLOBIN g/dL 13.4 14.9 15.3   PLATELETS 10*3/mm3 241 327 359     Results from last 7 days   Lab Units 04/03/22  0428 04/02/22 2250 03/30/22 2012   SODIUM mmol/L 140 133* 128*   POTASSIUM mmol/L 3.8 3.8 4.4   CHLORIDE mmol/L 107 97* 93*   CO2 mmol/L 19.0* 17.9* 18.3*   BUN mg/dL 8 10 13   CREATININE mg/dL 0.75 0.95 0.81   CALCIUM mg/dL 8.4* 9.3 9.6   GLUCOSE mg/dL 100* 120* 88   ALT (SGPT) U/L 19 23 17   AST (SGOT) U/L 19 21 19     Lab Results   Component Value Date    INR 0.91 04/02/2022     Lab Results   Component Value Date    MG 1.8 04/03/2022    MG 1.2 (L) 04/02/2022     Lab Results    Component Value Date    TSH 1.950 04/02/2022    TRIG 175 (H) 04/03/2022    HDL 32 (L) 04/03/2022    LDL 24 04/03/2022      No results found for: PROBNP    ECG         ECG/EMG Results (last 24 hours)     Procedure Component Value Units Date/Time    ECG 12 Lead [[id number]] Collected: 04/02/22 2227     Updated: 04/02/22 2311    ECG 12 Lead [[id number]] Collected: 04/03/22 0155     Updated: 04/03/22 0155    ECG 12 Lead [[id number]] Collected: 04/03/22 0645     Updated: 04/03/22 0734     QT Interval 396 ms      QTC Interval 487 ms     Narrative:      Test Reason : reassess ST changes and QT prolongation  Blood Pressure :   */*   mmHG  Vent. Rate :  91 BPM     Atrial Rate :  91 BPM     P-R Int : 162 ms          QRS Dur :  78 ms      QT Int : 396 ms       P-R-T Axes :  42  40  49 degrees     QTc Int : 487 ms    Normal sinus rhythm  T wave abnormality, consider anterior ischemia  Prolonged QT  Abnormal ECG  When compared with ECG of 03-APR-2022 01:55, (Unconfirmed)  T wave inversion more evident in Anterior leads    Referred By: MAXIMINO           Confirmed By:           Imaging Results (Last 72 Hours)     Procedure Component Value Units Date/Time    CT Abdomen Pelvis Without Contrast [[id number]] Collected: 04/03/22 0131     Updated: 04/03/22 0133    Narrative:      CT Abdomen Pelvis WO    INDICATION:   Epigastric pain with nausea and vomiting.    TECHNIQUE:   CT of the abdomen and pelvis without IV contrast. Coronal and sagittal reconstructions were obtained.  Radiation dose reduction techniques included automated exposure control or exposure modulation based on body size. Count of known CT and cardiac nuc  med studies performed in previous 12 months: 1.     COMPARISON:   3/30/2022    FINDINGS:  Please see chest CT report dictated separately. There is atherosclerotic disease with no aortic aneurysm. Gallbladder is surgically absent. No biliary obstruction is seen. No renal or ureteral stones are seen. There is no  hydronephrosis. Unenhanced solid  abdominal organs are normal. Patient is status post tubal ligation. Solid pelvic organs are grossly normal. Urinary bladder is normal. There are multiple phleboliths in the pelvis.    The appendix is normal. There is no evidence of acute colitis. The stomach is normal. There are some prominent mid to distal small bowel loops which may reflect an ileus or developing small bowel obstruction. No focal obstructing lesion is identified. No  free fluid or bulky adenopathy is seen.      Impression:        1. Prominent mid to distal small bowel loops may reflect an ileus or developing small bowel obstruction.  2. No evidence of acute colitis. The appendix is normal.  3. Cholecystectomy and tubal ligation.  4. No renal or ureteral stones. No hydronephrosis.          Signer Name: Alexei Roman MD   Signed: 4/3/2022 1:31 AM   Workstation Name: [email or web address]    [facility]    CT Chest Without Contrast Diagnostic [[id number]] Collected: 04/03/22 0127     Updated: 04/03/22 0129    Narrative:      CT Chest WO    INDICATION:   Chest pain and nausea. Vomiting.    TECHNIQUE:   CT of the thorax without IV contrast. Coronal and sagittal reconstructions were obtained.  Radiation dose reduction techniques included automated exposure control or exposure modulation based on body size. Count of known CT and cardiac nuc med studies  performed in previous 12 months: 1.     COMPARISON:   None available.    FINDINGS:  No pleural or pericardial effusion is identified. Patient is status post CABG. There is no adenopathy. Heart size is normal. There is a 3 mm noncalcified left lower lobe nodule. The lungs are otherwise clear. No CT findings present to indicate pneumonia.  Note: CT may be negative in the earliest stages of COVID-19. No pneumothorax. Please see abdomen and pelvis CT report dictated separately.      Impression:        1. No acute findings in the chest.  2. 3 mm left lower lobe  nodule. In the absence of risk factors for malignancy, no follow-up is indicated. Otherwise, follow-up could be obtained in 12 months per Fleischner criteria.    Signer Name: Alexei Roman MD   Signed: 4/3/2022 1:27 AM   Workstation Name: [facility]    [facility]    XR Chest 1 View [[id number]] Collected: 04/02/22 2307     Updated: 04/02/22 2309    Narrative:      CR Chest 1 Vw    INDICATION:   Chest pain.     COMPARISON:    None available.    FINDINGS:  Portable AP view(s) of the chest.  Cardiac and mediastinal contours are normal. Patient is status post sternotomy. There is some minimal atelectasis in the lung bases. Lungs otherwise are clear and there is no pneumothorax. Pulmonary vascularity is  normal.      Impression:      Minimal bibasilar atelectasis, otherwise no active disease.    Signer Name: Alexei Roman MD   Signed: 4/2/2022 11:07 PM   Workstation Name: [facility]    Radiology [facility]          I have discussed my impression and recommendations with the patient and family.    Thank you very much for asking us to be involved in this patient's care.  We will follow along with you.      Electronically signed by JAVIER Bolanos, 04/03/22, 9:16 AM EDT.    Please note that portions of this note were completed with a voice recognition program.

## 2022-04-03 NOTE — PROGRESS NOTES
Patient was seen with Lisette TRIPP.  Patient is chest pain-free, history confirmed and I have reviewed her cath reports both from [place] and [facility].  She has had a bypass and multiple stents in the past most recent stent she says was in August although I do not have that cath report.  She was feeling good but then developed nausea vomiting and chest pain consistent with her anginal pain.  Emergency room showed ST and T changes in the anterior leads that could be consistent with ischemia.  She was placed on DAPT heparin and aspirin as well as statin and admitted.  Patient states she is pain-free this morning, echo pending, lungs are clear heart regular rate and rhythm.  Awaiting cardiology input.

## 2022-04-03 NOTE — ED PROVIDER NOTES
Subjective   Patient is a 51-year-old female with past medical history positive for MI, hyperlipidemia, hypertension, diabetes type 2, history of COVID-19 reporting to the ER complaints of left-sided chest pain radiating to left shoulder.  Patient states that she had a stomach virus since Monday and was seen at this facility on Wednesday diagnosed with a UTI and stomach virus felt better today and went out to eat with her  for their anniversary.  Patient states that she had an episode of vomiting and then started having acute chest pain.  Patient states that this was about 45 minutes ago.  Patient states she took 2 nitro sublingual with no relief of chest pain.  Patient has not had any additional vomiting.  Patient denies shortness of breath, cough, fever, abdominal pain, diarrhea, constipation or any additional symptoms today.  Patient states she had COVID-19 back before her last cardiac stent sometime in 2021.  Her last stent was in August 2021.  Patient had cardiac bypass surgery several years ago at a hospital in [place] where she lived prior.        History provided by:  Patient   used: No        Review of Systems   Constitutional: Negative.  Negative for fever.   HENT: Negative.    Respiratory: Negative.    Cardiovascular: Positive for chest pain.   Gastrointestinal: Positive for vomiting. Negative for abdominal pain.   Endocrine: Negative.    Genitourinary: Negative.  Negative for dysuria.   Skin: Negative.    Neurological: Negative.    Psychiatric/Behavioral: Negative.    All other systems reviewed and are negative.      Past Medical History:   Diagnosis Date   • COVID-19    • Diabetes mellitus (HCC)    • Hyperlipidemia    • Hypertension    • Myocardial infarct (HCC)    • PCOS (polycystic ovarian syndrome)        Allergies   Allergen Reactions   • Contrast Dye Anaphylaxis   • Ciprofloxacin Other (See Comments)     Insomnia   • Sulfa Antibiotics Nausea And Vomiting       Past  Surgical History:   Procedure Laterality Date   • CARDIAC CATHETERIZATION     •  SECTION     • CHOLECYSTECTOMY     • CORONARY ARTERY BYPASS GRAFT      x 2   • CORONARY STENT PLACEMENT      x 6 per patient   • DILATATION AND CURETTAGE     • KNEE SURGERY Left    • TONSILLECTOMY         Family History   Problem Relation Age of Onset   • Lung cancer Father    • Thyroid cancer Brother    • Heart attack Maternal Grandfather    • Lung cancer Maternal Grandfather    • Heart attack Paternal Grandmother    • Emphysema Paternal Grandfather        Social History     Socioeconomic History   • Marital status:    Tobacco Use   • Smoking status: Never Smoker   • Smokeless tobacco: Never Used   Substance and Sexual Activity   • Alcohol use: Yes   • Drug use: Never   • Sexual activity: Defer           Objective   Physical Exam  Vitals and nursing note reviewed.   Constitutional:       General: She is not in acute distress.     Appearance: She is well-developed. She is obese. She is ill-appearing. She is not diaphoretic.   HENT:      Head: Normocephalic and atraumatic.      Right Ear: External ear normal.      Left Ear: External ear normal.      Nose: Nose normal.   Eyes:      Conjunctiva/sclera: Conjunctivae normal.      Pupils: Pupils are equal, round, and reactive to light.   Neck:      Vascular: No JVD.      Trachea: No tracheal deviation.   Cardiovascular:      Rate and Rhythm: Normal rate and regular rhythm.      Heart sounds: Normal heart sounds. No murmur heard.  Pulmonary:      Effort: Pulmonary effort is normal. No respiratory distress.      Breath sounds: Normal breath sounds. No decreased breath sounds or wheezing.   Abdominal:      General: Bowel sounds are normal.      Palpations: Abdomen is soft.      Tenderness: There is no abdominal tenderness.   Musculoskeletal:         General: No deformity. Normal range of motion.      Cervical back: Normal range of motion and neck supple.      Right lower leg: No  edema.      Left lower leg: No edema.   Skin:     General: Skin is warm and dry.      Capillary Refill: Capillary refill takes less than 2 seconds.      Coloration: Skin is not pale.      Findings: No erythema or rash.   Neurological:      General: No focal deficit present.      Mental Status: She is alert and oriented to person, place, and time.      Cranial Nerves: No cranial nerve deficit.   Psychiatric:         Mood and Affect: Mood is anxious.         Behavior: Behavior normal.         Thought Content: Thought content normal.         Procedures           ED Course  ED Course as of 04/03/22 0237   Sat Apr 02, 2022   2236 EKG noted sinus tachycardia.  105 bpm.  .  .  QTc 533.  No acute ST elevation. [SF]   2314 XR Chest 1 View  IMPRESSION:  Minimal bibasilar atelectasis, otherwise no active disease.     Signer Name: Alexei Roman MD   Signed: 4/2/2022 11:07 PM   Workstation Name: [facility]    Radiology [facility] []   Sun Apr 03, 2022   0150 CT Chest Without Contrast Diagnostic  IMPRESSION:     1. No acute findings in the chest.  2. 3 mm left lower lobe nodule. In the absence of risk factors for malignancy, no follow-up is indicated. Otherwise, follow-up could be obtained in 12 months per Fleischner criteria.     Signer Name: Alexei Roman MD   Signed: 4/3/2022 1:27 AM   Workstation Name: [facility]    Radiology [facility] []   0150 CT Abdomen Pelvis Without Contrast  IMPRESSION:     1. Prominent mid to distal small bowel loops may reflect an ileus or developing small bowel obstruction.  2. No evidence of acute colitis. The appendix is normal.  3. Cholecystectomy and tubal ligation.  4. No renal or ureteral stones. No hydronephrosis.              Signer Name: Alexei Roman MD   Signed: 4/3/2022 1:31 AM   Workstation Name: [facility]    Radiology [facility] []   0156 EKG noted sinus tachycardia.  109 bpm.  .  QRS 86.  QTc 525.  Prolonged  QT noted.  No acute ST elevation []   0201 Discussed with Dr. Barry at this time agrees to consult patient inpatient advised to start heparin and Nitropaste and keep n.p.o. []   0201 Paged Dr. Erickson at this time. []   0235 Discussed case with Dr. Erickson agrees to admit to hospitalist service at this time. [SM]      ED Course User Index  [SF] Sergo Iglesias DO  [SM] Archana Moore, JAVIER                                               HEART Score (for prediction of 6-week risk of major adverse cardiac event) reviewed and/or performed as part of the patient evaluation and treatment planning process.  The result associated with this review/performance is: 7       MDM    Final diagnoses:   NSTEMI (non-ST elevated myocardial infarction) (HCC)       ED Disposition  ED Disposition     ED Disposition   Decision to Admit    Condition   --    Comment   Level of Care: Progressive Care [20]   Diagnosis: NSTEMI (non-ST elevated myocardial infarction) (HCC) [[id number]]   Admitting Physician: SHAUNA ERICKSON [[id number]]   Attending Physician: SHAUNA ERICKSON [[id number]]   Certification: I Certify That Inpatient Hospital Services Are Medically Necessary For Greater Than 2 Midnights               No follow-up provider specified.       Medication List      No changes were made to your prescriptions during this visit.          Archana Moore APRN  04/03/22 0238

## 2022-04-03 NOTE — PLAN OF CARE
Goal Outcome Evaluation:  Plan of Care Reviewed With: patient           Outcome Evaluation: Oriented to room and call light . NPO status explained . Says chest pain relieved in ER . V/s stable . Will monitor

## 2022-04-03 NOTE — H&P
Hospitalist History and Physical        Patient Identification  Name: Lissa Eisenberg  Age/Sex: 51 y.o. female  :  1970        MRN: [id number]  Visit Number: [id number]  Admit Date: 2022   PCP: Yolanda Velasco DO          Chief complaint chest pain    History of Present Illness:  Patient is a 51 y.o. female with history of CAD s/p CABG x2 5 years ago and s/p 6-7 stents total, the last 2 in  and Aug 2021 at [facility] in [place] (along with a third heart cath in Oct 2021 at which time she did not receive a stent), along with type II DM previously insulin dependent but no longer, HTN, HLD, who initially was seen in our ED with nausea and vomiting of 3 days duration on 3/30/2021. She was diagnosed with viral gastroenteritis and UTI and sent home with a prescription for cefdinir. Urine culture has since resulted, growing 50,000 CFU E coli. Patient denies any dysuria or foul smelling urine. She reports her nausea and vomiting had completely resolved. She was doing well until earlier this evening while walking around after dinner, at which time she acutely developed chest pressure radiating to her neck, left arm and mid back, associated with nausea, dyspnea and diaphoresis. She ran to the restroom and vomited, but her symptoms persisted thereafter, so she came to the ED for further evaluation. In the ED, EKG showed a nonspecific IV block and q waves anterolaterally concerning for possible old infarct. Repeat EKG showed only sinus tachycardia with resolution of IV block. Initial troponin was negative. She was given morphine and ASA and her pain resolved. Second troponin a few hours later was positive, at which time she nitropaste was applied. She has had no recurrent chest pain. She has been admitted to the PCU for further management.     Review of Systems  Review of Systems   Constitutional: Positive for diaphoresis. Negative for activity change, appetite change, chills, fatigue,  fever and unexpected weight change.   HENT: Negative for congestion, postnasal drip, rhinorrhea, sinus pressure, sinus pain and sore throat.    Eyes: Negative for photophobia, pain, discharge, redness, itching and visual disturbance.   Respiratory: Positive for shortness of breath. Negative for cough and wheezing.    Cardiovascular: Positive for chest pain. Negative for palpitations and leg swelling.   Gastrointestinal: Positive for nausea and vomiting. Negative for abdominal distention, abdominal pain, constipation and diarrhea.   Endocrine: Negative for cold intolerance, heat intolerance, polydipsia, polyphagia and polyuria.   Genitourinary: Negative for difficulty urinating, dysuria, flank pain, frequency and hematuria.   Musculoskeletal: Negative for arthralgias, back pain, joint swelling, myalgias, neck pain and neck stiffness.   Skin: Negative for color change, pallor, rash and wound.   Neurological: Negative for dizziness, tremors, seizures, syncope, weakness, light-headedness, numbness and headaches.   Hematological: Negative for adenopathy. Does not bruise/bleed easily.   Psychiatric/Behavioral: Negative for agitation, behavioral problems and confusion.       History  Past Medical History:   Diagnosis Date   • COVID-19    • Diabetes mellitus (HCC)    • Hyperlipidemia    • Hypertension    • Myocardial infarct (HCC)    • PCOS (polycystic ovarian syndrome)      Past Surgical History:   Procedure Laterality Date   • CARDIAC CATHETERIZATION     •  SECTION     • CHOLECYSTECTOMY     • CORONARY ARTERY BYPASS GRAFT      x 2   • CORONARY STENT PLACEMENT      x 6 per patient   • DILATATION AND CURETTAGE     • KNEE SURGERY Left    • TONSILLECTOMY       Family History   Problem Relation Age of Onset   • Lung cancer Father    • Thyroid cancer Brother    • Heart attack Maternal Grandfather    • Lung cancer Maternal Grandfather    • Heart attack Paternal Grandmother    • Emphysema Paternal Grandfather      Social  History     Tobacco Use   • Smoking status: Never Smoker   • Smokeless tobacco: Never Used   Substance Use Topics   • Alcohol use: Yes   • Drug use: Never     Medications Prior to Admission   Medication Sig Dispense Refill Last Dose   • aspirin (aspirin) 81 MG EC tablet Take 81 mg by mouth Daily.      • atorvastatin (LIPITOR) 80 MG tablet Take 80 mg by mouth Every Night.      • cefdinir (OMNICEF) 300 MG capsule Take 1 capsule by mouth 2 (Two) Times a Day for 7 days. 14 capsule 0    • cetirizine (zyrTEC) 10 MG tablet Take 10 mg by mouth 2 (two) times a day.      • cyclobenzaprine (FLEXERIL) 10 MG tablet       • dicyclomine (BENTYL) 10 MG capsule Take 1 capsule by mouth 3 (Three) Times a Day As Needed (abdominal pain). 20 capsule 0    • Dulaglutide (TRULICITY SC) Inject  under the skin into the appropriate area as directed.      • DULoxetine (CYMBALTA) 30 MG capsule Take 30 mg by mouth 2 (Two) Times a Day.      • Flaxseed, Linseed, (FLAXSEED OIL PO) Take  by mouth Daily.      • isosorbide mononitrate (IMDUR) 30 MG 24 hr tablet Take 1 tablet by mouth once daily 90 tablet 0    • Jardiance 10 MG tablet Daily.      • lisinopril (PRINIVIL,ZESTRIL) 2.5 MG tablet Take 2.5 mg by mouth Daily.      • metFORMIN (GLUCOPHAGE) 1000 MG tablet Take 1,000 mg by mouth 2 (Two) Times a Day With Meals.      • methylPREDNISolone (MEDROL) 4 MG dose pack Take as directed on package instructions. 21 each 0    • metoprolol tartrate (LOPRESSOR) 100 MG tablet Take 100 mg by mouth. 1 1/2 tabs at hs      • naproxen (NAPROSYN) 500 MG tablet Take 500 mg by mouth 2 (Two) Times a Day With Meals.      • omeprazole (priLOSEC) 20 MG capsule Take 20 mg by mouth Daily.      • ondansetron ODT (ZOFRAN-ODT) 4 MG disintegrating tablet Place 1 tablet on the tongue Every 12 (Twelve) Hours As Needed for Nausea. 12 tablet 0    • ranolazine (RANEXA) 1000 MG 12 hr tablet Take 1,000 mg by mouth 2 (Two) Times a Day.      • ticagrelor (BRILINTA) 90 MG tablet tablet  Take 1 tablet by mouth 2 (Two) Times a Day. 180 tablet 3      Allergies:  Contrast dye, Ciprofloxacin, and Sulfa antibiotics    Objective     Vital Signs  Temp:  [97.9 °F (36.6 °C)-98.5 °F (36.9 °C)] 97.9 °F (36.6 °C)  Heart Rate:  [] 106  Resp:  [22-24] 22  BP: (100-135)/(64-92) 101/71  Body mass index is 34.91 kg/m².    Physical Exam:  Physical Exam  Constitutional:       General: She is not in acute distress.     Appearance: Normal appearance. She is not ill-appearing.   HENT:      Head: Normocephalic and atraumatic.      Right Ear: External ear normal.      Left Ear: External ear normal.      Nose: Nose normal.      Mouth/Throat:      Mouth: Mucous membranes are moist.      Pharynx: Oropharynx is clear.   Eyes:      Extraocular Movements: Extraocular movements intact.      Conjunctiva/sclera: Conjunctivae normal.      Pupils: Pupils are equal, round, and reactive to light.   Cardiovascular:      Rate and Rhythm: Regular rhythm. Tachycardia present.      Pulses: Normal pulses.      Heart sounds: Normal heart sounds. No murmur heard.  Pulmonary:      Effort: Pulmonary effort is normal. No respiratory distress.      Breath sounds: Normal breath sounds. No wheezing or rales.   Chest:      Chest wall: No tenderness.   Abdominal:      General: Abdomen is flat. Bowel sounds are normal. There is no distension.      Palpations: Abdomen is soft.      Tenderness: There is no abdominal tenderness.   Musculoskeletal:         General: No swelling or tenderness. Normal range of motion.      Cervical back: Normal range of motion and neck supple. No tenderness.      Right lower leg: No edema.      Left lower leg: No edema.   Lymphadenopathy:      Cervical: No cervical adenopathy.   Skin:     General: Skin is warm and dry.      Capillary Refill: Capillary refill takes less than 2 seconds.      Coloration: Skin is not jaundiced.      Findings: No bruising or lesion.   Neurological:      General: No focal deficit present.       Mental Status: She is alert and oriented to person, place, and time.   Psychiatric:         Mood and Affect: Mood normal.         Behavior: Behavior normal.         Thought Content: Thought content normal.         Judgment: Judgment normal.           Results Review:       Lab Results:  Results from last 7 days   Lab Units 04/02/22 2250 03/30/22 2012   WBC 10*3/mm3 9.09 9.63   HEMOGLOBIN g/dL 14.9 15.3   PLATELETS 10*3/mm3 327 359     Results from last 7 days   Lab Units 04/02/22 2250 03/30/22 2012   CRP mg/dL <0.30 <0.30     Results from last 7 days   Lab Units 04/02/22 2250 03/30/22 2012   SODIUM mmol/L 133* 128*   POTASSIUM mmol/L 3.8 4.4   CHLORIDE mmol/L 97* 93*   CO2 mmol/L 17.9* 18.3*   BUN mg/dL 10 13   CREATININE mg/dL 0.95 0.81   CALCIUM mg/dL 9.3 9.6   GLUCOSE mg/dL 120* 88     Results from last 7 days   Lab Units 04/02/22 2250   MAGNESIUM mg/dL 1.2*     Hemoglobin A1C   Date Value Ref Range Status   04/02/2022 5.90 (H) 4.80 - 5.60 % Final     Results from last 7 days   Lab Units 04/02/22 2250 03/30/22 2012   BILIRUBIN mg/dL 0.5 0.7   ALK PHOS U/L 91 92   AST (SGOT) U/L 21 19   ALT (SGPT) U/L 23 17     Results from last 7 days   Lab Units 04/03/22  0047 04/02/22 2250 03/30/22 2012   TROPONIN T ng/mL 0.154* <0.010 <0.010         Results from last 7 days   Lab Units 04/02/22 2250   INR  0.91           I have reviewed the patient's laboratory results.    Imaging:  Imaging Results (Last 72 Hours)     Procedure Component Value Units Date/Time    CT Abdomen Pelvis Without Contrast [[id number]] Collected: 04/03/22 0131     Updated: 04/03/22 0133    Narrative:      CT Abdomen Pelvis WO    INDICATION:   Epigastric pain with nausea and vomiting.    TECHNIQUE:   CT of the abdomen and pelvis without IV contrast. Coronal and sagittal reconstructions were obtained.  Radiation dose reduction techniques included automated exposure control or exposure modulation based on body size. Count of known CT and cardiac  nuc  med studies performed in previous 12 months: 1.     COMPARISON:   3/30/2022    FINDINGS:  Please see chest CT report dictated separately. There is atherosclerotic disease with no aortic aneurysm. Gallbladder is surgically absent. No biliary obstruction is seen. No renal or ureteral stones are seen. There is no hydronephrosis. Unenhanced solid  abdominal organs are normal. Patient is status post tubal ligation. Solid pelvic organs are grossly normal. Urinary bladder is normal. There are multiple phleboliths in the pelvis.    The appendix is normal. There is no evidence of acute colitis. The stomach is normal. There are some prominent mid to distal small bowel loops which may reflect an ileus or developing small bowel obstruction. No focal obstructing lesion is identified. No  free fluid or bulky adenopathy is seen.      Impression:        1. Prominent mid to distal small bowel loops may reflect an ileus or developing small bowel obstruction.  2. No evidence of acute colitis. The appendix is normal.  3. Cholecystectomy and tubal ligation.  4. No renal or ureteral stones. No hydronephrosis.          Signer Name: Alexei Roman MD   Signed: 4/3/2022 1:31 AM   Workstation Name: [email or web address]    [facility]    CT Chest Without Contrast Diagnostic [[id number]] Collected: 04/03/22 0127     Updated: 04/03/22 0129    Narrative:      CT Chest WO    INDICATION:   Chest pain and nausea. Vomiting.    TECHNIQUE:   CT of the thorax without IV contrast. Coronal and sagittal reconstructions were obtained.  Radiation dose reduction techniques included automated exposure control or exposure modulation based on body size. Count of known CT and cardiac nuc med studies  performed in previous 12 months: 1.     COMPARISON:   None available.    FINDINGS:  No pleural or pericardial effusion is identified. Patient is status post CABG. There is no adenopathy. Heart size is normal. There is a 3 mm noncalcified left  lower lobe nodule. The lungs are otherwise clear. No CT findings present to indicate pneumonia.  Note: CT may be negative in the earliest stages of COVID-19. No pneumothorax. Please see abdomen and pelvis CT report dictated separately.      Impression:        1. No acute findings in the chest.  2. 3 mm left lower lobe nodule. In the absence of risk factors for malignancy, no follow-up is indicated. Otherwise, follow-up could be obtained in 12 months per Fleischner criteria.    Signer Name: Alexei Roman MD   Signed: 4/3/2022 1:27 AM   Workstation Name: [facility]    [facility]    XR Chest 1 View [[id number]] Collected: 22     Updated: 22    Narrative:      CR Chest 1 Vw    INDICATION:   Chest pain.     COMPARISON:    None available.    FINDINGS:  Portable AP view(s) of the chest.  Cardiac and mediastinal contours are normal. Patient is status post sternotomy. There is some minimal atelectasis in the lung bases. Lungs otherwise are clear and there is no pneumothorax. Pulmonary vascularity is  normal.      Impression:      Minimal bibasilar atelectasis, otherwise no active disease.    Signer Name: Alexei Roman MD   Signed: 2022 11:07 PM   Workstation Name: [facility]    [facility]iology [facility]          I have personally reviewed the patient's radiologic imaging.        EKst-  Sinus tachycardia, , QTc 533  Left axis deviation  Nonspecific intraventricular block  Anterolateral infarct , age undetermined  Abnormal ECG  No previous ECGs available    2nd-  Sinus tachycardia, , QTc 525  Low voltage QRS  Prolonged QT  Abnormal ECG  When compared with ECG of 2022 22:27, (Unconfirmed)  Questionable change in QRS duration  Criteria for Anterior infarct are no longer present  Criteria for Anterolateral infarct are no longer present    I have personally reviewed the patient's EKG.        Assessment/Plan     - NSTEMI (non-ST elevated  myocardial infarction) (HCC) in setting of extensive CAD history: received full dose ASA and nitropaste in the ED. Continue home baby ASA and brilinta, along with lipitor and metoprolol. Heparin drip initiated, continue for now. Continue to trend troponin. Obtain ECHO in the morning. Await further recommendations from cardiology, but keep NPO except for sips with meds in anticipation of heart cath. Request records from [facility] regarding recent stents there.  - Low bicarb, elevated anion gap, suggestive of high anion gap metabolic acidosis, likely secondary to ketoacidosis from jardiance. Not in DKA. Not in KEVEN. Lactic acid normal. No other source of metabolic acidosis identified. Hold jardiance for now, along with home metformin, and continue to monitor closely.   - Abnormal UA with urine culture positive for E coli but <100,000 CFU/ml, suggesting possible asymptomatic bacteruria. Hold further antibiotics for now.   - Ileus vs early SBO on CT abdomen: suspect findings are sequelae from viral enteritis patient suffered earlier in the week. She has no abdominal pain on exam and reports her nausea, vomiting, abdominal pain and diarrhea from earlier this week had all resolved a few days ago.   - Type II DM, non insulin dependent: hold jardiance and metformin as noted above. Monitor accuchecks, cover with SSI if needed.  - HTN, HLD: resume metoprolol and statin as noted above. BP currently 101/71. Check fastin lipid panel in the morning.  - QT prolongation: K+ low normal, but mag is only 1.2 so will replace per protocol. Avoid or limit QT prolonging agents. Continue to monitor. Repeat EKG in the morning.   - Hypomagnesemia: replace as noted above.    DVT Prophylaxis: IV heparin drip; home PPI once home meds reconciled    Estimated Length of Stay >2 midnights    I discussed the patient's findings, assessment and plan with the patient and lead JOSEE Bhakta who was present during my interview and exam in  the PCU.     * patient is high risk due to NSTEMI, extensive coronary artery disease history    Nikhil Erickson MD  04/03/22  03:50 EDT

## 2022-04-03 NOTE — PHARMACY PATIENT ASSISTANCE
Pharmacy checked on the price of Brilinta for the patient. It is covered on her insurance with a co-pay of $0 for a 90 day supply per [facility] in [place] (last dispensed on 3/10/22).    Thank you,   Olga Chavira, PharmD 04/03/22 17:26 EDT

## 2022-04-03 NOTE — PLAN OF CARE
Goal Outcome Evaluation:  Plan of Care Reviewed With: (P) patient        Progress: (P) improving  Outcome Evaluation: (P) VSS. Patient is A/Ox4. Patient has had no complaints of pain during this shift. Diet was changed from NPO to regular CCC. Pt ambulates independently to and from bathroom. Pt remains on heparin gtt at 18.8. Pt will be NPO again at midnight for stress test tomorrow. Call light is within reach, will continue to monitor for the rest of this shift.

## 2022-04-03 NOTE — PAYOR COMM NOTE
"[facility]  NEYMAR GRIFFIN  PHONE  [phone]  FAX  [phone]  NPI:  [id number]    PENDING REF#[id number]    Carlos Eisenberg (51 y.o. Female)             Date of Birth   1970    Social Security Number       Address   [address] [facility] [address]    Home Phone   [phone]    MRN   [id number]       Voodoo   Unknown    Marital Status                               Admission Date   22    Admission Type   Emergency    Admitting Provider   Nikhil Erickson MD    Attending Provider   Nikhil Erickson MD    Department, Room/Bed   [facility] PROGRESS CARE, P214/S2       Discharge Date       Discharge Disposition       Discharge Destination                               Attending Provider: Nikhil Erickson MD    Allergies: Contrast Dye, Ciprofloxacin, Sulfa Antibiotics    Isolation: None   Infection: COVID (rule out) (22)   Code Status: CPR   Advance Care Planning Activity    Ht: 162.6 cm (64.02\")   Wt: 92.3 kg (203 lb 8 oz)    Admission Cmt: None   Principal Problem: None                Active Insurance as of 2022     Primary Coverage     Payor Plan Insurance Group Employer/Plan Group    [place] [id number]     Payor Plan Address Payor Plan Phone Number Payor Plan Fax Number Effective Dates    [employer] [address] [address] [phone]  2021 - None Entered    [facility] [address]       Subscriber Name Subscriber Birth Date Member ID       CARLOS EISENBERG 1970 [id number]                 Emergency Contacts      (Rel.) Home Phone Work Phone Mobile Phone    ZAC EISENBERG (Spouse) [phone] -- --               History & Physical      iNkhil Erickson MD at 22 0349          Hospitalist History and Physical        Patient Identification  Name: Carlos Eisenberg  Age/Sex: 51 y.o. female  :  1970        MRN: [id number]  Visit Number: [id number]  Admit Date: 2022   PCP: " Yolanda Velasco DO          Chief complaint chest pain    History of Present Illness:  Patient is a 51 y.o. female with history of CAD s/p CABG x2 5 years ago and s/p 6-7 stents total, the last 2 in Jan and Aug 2021 at [facility] in [place] (along with a third heart cath in Oct 2021 at which time she did not receive a stent), along with type II DM previously insulin dependent but no longer, HTN, HLD, who initially was seen in our ED with nausea and vomiting of 3 days duration on 3/30/2021. She was diagnosed with viral gastroenteritis and UTI and sent home with a prescription for cefdinir. Urine culture has since resulted, growing 50,000 CFU E coli. Patient denies any dysuria or foul smelling urine. She reports her nausea and vomiting had completely resolved. She was doing well until earlier this evening while walking around after dinner, at which time she acutely developed chest pressure radiating to her neck, left arm and mid back, associated with nausea, dyspnea and diaphoresis. She ran to the restroom and vomited, but her symptoms persisted thereafter, so she came to the ED for further evaluation. In the ED, EKG showed a nonspecific IV block and q waves anterolaterally concerning for possible old infarct. Repeat EKG showed only sinus tachycardia with resolution of IV block. Initial troponin was negative. She was given morphine and ASA and her pain resolved. Second troponin a few hours later was positive, at which time she nitropaste was applied. She has had no recurrent chest pain. She has been admitted to the PCU for further management.     Review of Systems  Review of Systems   Constitutional: Positive for diaphoresis. Negative for activity change, appetite change, chills, fatigue, fever and unexpected weight change.   HENT: Negative for congestion, postnasal drip, rhinorrhea, sinus pressure, sinus pain and sore throat.    Eyes: Negative for photophobia, pain, discharge, redness, itching  and visual disturbance.   Respiratory: Positive for shortness of breath. Negative for cough and wheezing.    Cardiovascular: Positive for chest pain. Negative for palpitations and leg swelling.   Gastrointestinal: Positive for nausea and vomiting. Negative for abdominal distention, abdominal pain, constipation and diarrhea.   Endocrine: Negative for cold intolerance, heat intolerance, polydipsia, polyphagia and polyuria.   Genitourinary: Negative for difficulty urinating, dysuria, flank pain, frequency and hematuria.   Musculoskeletal: Negative for arthralgias, back pain, joint swelling, myalgias, neck pain and neck stiffness.   Skin: Negative for color change, pallor, rash and wound.   Neurological: Negative for dizziness, tremors, seizures, syncope, weakness, light-headedness, numbness and headaches.   Hematological: Negative for adenopathy. Does not bruise/bleed easily.   Psychiatric/Behavioral: Negative for agitation, behavioral problems and confusion.       History  Past Medical History:   Diagnosis Date   • COVID-19    • Diabetes mellitus (HCC)    • Hyperlipidemia    • Hypertension    • Myocardial infarct (HCC)    • PCOS (polycystic ovarian syndrome)      Past Surgical History:   Procedure Laterality Date   • CARDIAC CATHETERIZATION     •  SECTION     • CHOLECYSTECTOMY     • CORONARY ARTERY BYPASS GRAFT      x 2   • CORONARY STENT PLACEMENT      x 6 per patient   • DILATATION AND CURETTAGE     • KNEE SURGERY Left    • TONSILLECTOMY       Family History   Problem Relation Age of Onset   • Lung cancer Father    • Thyroid cancer Brother    • Heart attack Maternal Grandfather    • Lung cancer Maternal Grandfather    • Heart attack Paternal Grandmother    • Emphysema Paternal Grandfather      Social History     Tobacco Use   • Smoking status: Never Smoker   • Smokeless tobacco: Never Used   Substance Use Topics   • Alcohol use: Yes   • Drug use: Never     Medications Prior to Admission   Medication Sig  Dispense Refill Last Dose   • aspirin (aspirin) 81 MG EC tablet Take 81 mg by mouth Daily.      • atorvastatin (LIPITOR) 80 MG tablet Take 80 mg by mouth Every Night.      • cefdinir (OMNICEF) 300 MG capsule Take 1 capsule by mouth 2 (Two) Times a Day for 7 days. 14 capsule 0    • cetirizine (zyrTEC) 10 MG tablet Take 10 mg by mouth 2 (two) times a day.      • cyclobenzaprine (FLEXERIL) 10 MG tablet       • dicyclomine (BENTYL) 10 MG capsule Take 1 capsule by mouth 3 (Three) Times a Day As Needed (abdominal pain). 20 capsule 0    • Dulaglutide (TRULICITY SC) Inject  under the skin into the appropriate area as directed.      • DULoxetine (CYMBALTA) 30 MG capsule Take 30 mg by mouth 2 (Two) Times a Day.      • Flaxseed, Linseed, (FLAXSEED OIL PO) Take  by mouth Daily.      • isosorbide mononitrate (IMDUR) 30 MG 24 hr tablet Take 1 tablet by mouth once daily 90 tablet 0    • Jardiance 10 MG tablet Daily.      • lisinopril (PRINIVIL,ZESTRIL) 2.5 MG tablet Take 2.5 mg by mouth Daily.      • metFORMIN (GLUCOPHAGE) 1000 MG tablet Take 1,000 mg by mouth 2 (Two) Times a Day With Meals.      • methylPREDNISolone (MEDROL) 4 MG dose pack Take as directed on package instructions. 21 each 0    • metoprolol tartrate (LOPRESSOR) 100 MG tablet Take 100 mg by mouth. 1 1/2 tabs at hs      • naproxen (NAPROSYN) 500 MG tablet Take 500 mg by mouth 2 (Two) Times a Day With Meals.      • omeprazole (priLOSEC) 20 MG capsule Take 20 mg by mouth Daily.      • ondansetron ODT (ZOFRAN-ODT) 4 MG disintegrating tablet Place 1 tablet on the tongue Every 12 (Twelve) Hours As Needed for Nausea. 12 tablet 0    • ranolazine (RANEXA) 1000 MG 12 hr tablet Take 1,000 mg by mouth 2 (Two) Times a Day.      • ticagrelor (BRILINTA) 90 MG tablet tablet Take 1 tablet by mouth 2 (Two) Times a Day. 180 tablet 3      Allergies:  Contrast dye, Ciprofloxacin, and Sulfa antibiotics    Objective     Vital Signs  Temp:  [97.9 °F (36.6 °C)-98.5 °F (36.9 °C)] 97.9 °F  (36.6 °C)  Heart Rate:  [] 106  Resp:  [22-24] 22  BP: (100-135)/(64-92) 101/71  Body mass index is 34.91 kg/m².    Physical Exam:  Physical Exam  Constitutional:       General: She is not in acute distress.     Appearance: Normal appearance. She is not ill-appearing.   HENT:      Head: Normocephalic and atraumatic.      Right Ear: External ear normal.      Left Ear: External ear normal.      Nose: Nose normal.      Mouth/Throat:      Mouth: Mucous membranes are moist.      Pharynx: Oropharynx is clear.   Eyes:      Extraocular Movements: Extraocular movements intact.      Conjunctiva/sclera: Conjunctivae normal.      Pupils: Pupils are equal, round, and reactive to light.   Cardiovascular:      Rate and Rhythm: Regular rhythm. Tachycardia present.      Pulses: Normal pulses.      Heart sounds: Normal heart sounds. No murmur heard.  Pulmonary:      Effort: Pulmonary effort is normal. No respiratory distress.      Breath sounds: Normal breath sounds. No wheezing or rales.   Chest:      Chest wall: No tenderness.   Abdominal:      General: Abdomen is flat. Bowel sounds are normal. There is no distension.      Palpations: Abdomen is soft.      Tenderness: There is no abdominal tenderness.   Musculoskeletal:         General: No swelling or tenderness. Normal range of motion.      Cervical back: Normal range of motion and neck supple. No tenderness.      Right lower leg: No edema.      Left lower leg: No edema.   Lymphadenopathy:      Cervical: No cervical adenopathy.   Skin:     General: Skin is warm and dry.      Capillary Refill: Capillary refill takes less than 2 seconds.      Coloration: Skin is not jaundiced.      Findings: No bruising or lesion.   Neurological:      General: No focal deficit present.      Mental Status: She is alert and oriented to person, place, and time.   Psychiatric:         Mood and Affect: Mood normal.         Behavior: Behavior normal.         Thought Content: Thought content normal.          Judgment: Judgment normal.           Results Review:       Lab Results:  Results from last 7 days   Lab Units 04/02/22 2250 03/30/22 2012   WBC 10*3/mm3 9.09 9.63   HEMOGLOBIN g/dL 14.9 15.3   PLATELETS 10*3/mm3 327 359     Results from last 7 days   Lab Units 04/02/22 2250 03/30/22 2012   CRP mg/dL <0.30 <0.30     Results from last 7 days   Lab Units 04/02/22 2250 03/30/22 2012   SODIUM mmol/L 133* 128*   POTASSIUM mmol/L 3.8 4.4   CHLORIDE mmol/L 97* 93*   CO2 mmol/L 17.9* 18.3*   BUN mg/dL 10 13   CREATININE mg/dL 0.95 0.81   CALCIUM mg/dL 9.3 9.6   GLUCOSE mg/dL 120* 88     Results from last 7 days   Lab Units 04/02/22 2250   MAGNESIUM mg/dL 1.2*     Hemoglobin A1C   Date Value Ref Range Status   04/02/2022 5.90 (H) 4.80 - 5.60 % Final     Results from last 7 days   Lab Units 04/02/22 2250 03/30/22 2012   BILIRUBIN mg/dL 0.5 0.7   ALK PHOS U/L 91 92   AST (SGOT) U/L 21 19   ALT (SGPT) U/L 23 17     Results from last 7 days   Lab Units 04/03/22  0047 04/02/22 2250 03/30/22 2012   TROPONIN T ng/mL 0.154* <0.010 <0.010         Results from last 7 days   Lab Units 04/02/22 2250   INR  0.91           I have reviewed the patient's laboratory results.    Imaging:  Imaging Results (Last 72 Hours)     Procedure Component Value Units Date/Time    CT Abdomen Pelvis Without Contrast [[id number]] Collected: 04/03/22 0131     Updated: 04/03/22 0133    Narrative:      CT Abdomen Pelvis WO    INDICATION:   Epigastric pain with nausea and vomiting.    TECHNIQUE:   CT of the abdomen and pelvis without IV contrast. Coronal and sagittal reconstructions were obtained.  Radiation dose reduction techniques included automated exposure control or exposure modulation based on body size. Count of known CT and cardiac nuc  med studies performed in previous 12 months: 1.     COMPARISON:   3/30/2022    FINDINGS:  Please see chest CT report dictated separately. There is atherosclerotic disease with no aortic aneurysm.  Gallbladder is surgically absent. No biliary obstruction is seen. No renal or ureteral stones are seen. There is no hydronephrosis. Unenhanced solid  abdominal organs are normal. Patient is status post tubal ligation. Solid pelvic organs are grossly normal. Urinary bladder is normal. There are multiple phleboliths in the pelvis.    The appendix is normal. There is no evidence of acute colitis. The stomach is normal. There are some prominent mid to distal small bowel loops which may reflect an ileus or developing small bowel obstruction. No focal obstructing lesion is identified. No  free fluid or bulky adenopathy is seen.      Impression:        1. Prominent mid to distal small bowel loops may reflect an ileus or developing small bowel obstruction.  2. No evidence of acute colitis. The appendix is normal.  3. Cholecystectomy and tubal ligation.  4. No renal or ureteral stones. No hydronephrosis.          Signer Name: Alexei Roman MD   Signed: 4/3/2022 1:31 AM   Workstation Name: [facility]    [facility]    CT Chest Without Contrast Diagnostic [[id number]] Collected: 04/03/22 0127     Updated: 04/03/22 0129    Narrative:      CT Chest WO    INDICATION:   Chest pain and nausea. Vomiting.    TECHNIQUE:   CT of the thorax without IV contrast. Coronal and sagittal reconstructions were obtained.  Radiation dose reduction techniques included automated exposure control or exposure modulation based on body size. Count of known CT and cardiac nuc med studies  performed in previous 12 months: 1.     COMPARISON:   None available.    FINDINGS:  No pleural or pericardial effusion is identified. Patient is status post CABG. There is no adenopathy. Heart size is normal. There is a 3 mm noncalcified left lower lobe nodule. The lungs are otherwise clear. No CT findings present to indicate pneumonia.  Note: CT may be negative in the earliest stages of COVID-19. No pneumothorax. Please see abdomen and pelvis  CT report dictated separately.      Impression:        1. No acute findings in the chest.  2. 3 mm left lower lobe nodule. In the absence of risk factors for malignancy, no follow-up is indicated. Otherwise, follow-up could be obtained in 12 months per Fleischner criteria.    Signer Name: Alexei Roman MD   Signed: 4/3/2022 1:27 AM   Workstation Name: [facility]    [facility]    XR Chest 1 View [[id number]] Collected: 22     Updated: 22    Narrative:      CR Chest 1 Vw    INDICATION:   Chest pain.     COMPARISON:    None available.    FINDINGS:  Portable AP view(s) of the chest.  Cardiac and mediastinal contours are normal. Patient is status post sternotomy. There is some minimal atelectasis in the lung bases. Lungs otherwise are clear and there is no pneumothorax. Pulmonary vascularity is  normal.      Impression:      Minimal bibasilar atelectasis, otherwise no active disease.    Signer Name: Alexei Roman MD   Signed: 2022 11:07 PM   Workstation Name: [facility]    [facility]          I have personally reviewed the patient's radiologic imaging.        EKst-  Sinus tachycardia, , QTc 533  Left axis deviation  Nonspecific intraventricular block  Anterolateral infarct , age undetermined  Abnormal ECG  No previous ECGs available    2nd-  Sinus tachycardia, , QTc 525  Low voltage QRS  Prolonged QT  Abnormal ECG  When compared with ECG of 2022 22:27, (Unconfirmed)  Questionable change in QRS duration  Criteria for Anterior infarct are no longer present  Criteria for Anterolateral infarct are no longer present    I have personally reviewed the patient's EKG.        Assessment/Plan     - NSTEMI (non-ST elevated myocardial infarction) (HCC) in setting of extensive CAD history: received full dose ASA and nitropaste in the ED. Continue home baby ASA and brilinta, along with lipitor and metoprolol. Heparin drip initiated,  continue for now. Continue to trend troponin. Obtain ECHO in the morning. Await further recommendations from cardiology, but keep NPO except for sips with meds in anticipation of heart cath. Request records from [facility] regarding recent stents there.  - Low bicarb, elevated anion gap, suggestive of high anion gap metabolic acidosis, likely secondary to ketoacidosis from jardiance. Not in DKA. Not in KEVEN. Lactic acid normal. No other source of metabolic acidosis identified. Hold jardiance for now, along with home metformin, and continue to monitor closely.   - Abnormal UA with urine culture positive for E coli but <100,000 CFU/ml, suggesting possible asymptomatic bacteruria. Hold further antibiotics for now.   - Ileus vs early SBO on CT abdomen: suspect findings are sequelae from viral enteritis patient suffered earlier in the week. She has no abdominal pain on exam and reports her nausea, vomiting, abdominal pain and diarrhea from earlier this week had all resolved a few days ago.   - Type II DM, non insulin dependent: hold jardiance and metformin as noted above. Monitor accuchecks, cover with SSI if needed.  - HTN, HLD: resume metoprolol and statin as noted above. BP currently 101/71. Check fastin lipid panel in the morning.  - QT prolongation: K+ low normal, but mag is only 1.2 so will replace per protocol. Avoid or limit QT prolonging agents. Continue to monitor. Repeat EKG in the morning.   - Hypomagnesemia: replace as noted above.    DVT Prophylaxis: IV heparin drip; home PPI once home meds reconciled    Estimated Length of Stay >2 midnights    I discussed the patient's findings, assessment and plan with the patient and lead JOSEE Bhakta who was present during my interview and exam in the PCU.     * patient is high risk due to NSTEMI, extensive coronary artery disease history    Nikhil Erickson MD  04/03/22  03:50 EDT      Electronically signed by Nikhil Erickson MD at 04/03/22  0417          Emergency Department Notes      Augie Brewster at 04/02/22 2227        Performed by tech @ 2227. Given to Dr. Iglesias.     Electronically signed by Augie Brewster at 04/02/22 2245     Symes, Heather at 04/03/22 0200        NADIA Moore NP is on the line with Dr. Barry.     Electronically signed by Symes, Heather at 04/03/22 0201     Symes, Heather at 04/03/22 0202        Paged Dr. Erickson for NADIA Moore NP.     Electronically signed by Symes, Heather at 04/03/22 0202     Symes, Heather at 04/03/22 0224        NADIA Moore is on the line with Dr. Erickson.     Electronically signed by Symes, Heather at 04/03/22 0224     Archana Moore APRN at 04/03/22 0230          Subjective   Patient is a 51-year-old female with past medical history positive for MI, hyperlipidemia, hypertension, diabetes type 2, history of COVID-19 reporting to the ER complaints of left-sided chest pain radiating to left shoulder.  Patient states that she had a stomach virus since Monday and was seen at this facility on Wednesday diagnosed with a UTI and stomach virus felt better today and went out to eat with her  for their anniversary.  Patient states that she had an episode of vomiting and then started having acute chest pain.  Patient states that this was about 45 minutes ago.  Patient states she took 2 nitro sublingual with no relief of chest pain.  Patient has not had any additional vomiting.  Patient denies shortness of breath, cough, fever, abdominal pain, diarrhea, constipation or any additional symptoms today.  Patient states she had COVID-19 back before her last cardiac stent sometime in 2021.  Her last stent was in August 2021.  Patient had cardiac bypass surgery several years ago at a hospital in [place] where she lived prior.        History provided by:  Patient   used: No        Review of Systems   Constitutional: Negative.  Negative for fever.   HENT: Negative.    Respiratory: Negative.     Cardiovascular: Positive for chest pain.   Gastrointestinal: Positive for vomiting. Negative for abdominal pain.   Endocrine: Negative.    Genitourinary: Negative.  Negative for dysuria.   Skin: Negative.    Neurological: Negative.    Psychiatric/Behavioral: Negative.    All other systems reviewed and are negative.      Past Medical History:   Diagnosis Date   • COVID-19    • Diabetes mellitus (HCC)    • Hyperlipidemia    • Hypertension    • Myocardial infarct (HCC)    • PCOS (polycystic ovarian syndrome)        Allergies   Allergen Reactions   • Contrast Dye Anaphylaxis   • Ciprofloxacin Other (See Comments)     Insomnia   • Sulfa Antibiotics Nausea And Vomiting       Past Surgical History:   Procedure Laterality Date   • CARDIAC CATHETERIZATION     •  SECTION     • CHOLECYSTECTOMY     • CORONARY ARTERY BYPASS GRAFT      x 2   • CORONARY STENT PLACEMENT      x 6 per patient   • DILATATION AND CURETTAGE     • KNEE SURGERY Left    • TONSILLECTOMY         Family History   Problem Relation Age of Onset   • Lung cancer Father    • Thyroid cancer Brother    • Heart attack Maternal Grandfather    • Lung cancer Maternal Grandfather    • Heart attack Paternal Grandmother    • Emphysema Paternal Grandfather        Social History     Socioeconomic History   • Marital status:    Tobacco Use   • Smoking status: Never Smoker   • Smokeless tobacco: Never Used   Substance and Sexual Activity   • Alcohol use: Yes   • Drug use: Never   • Sexual activity: Defer           Objective   Physical Exam  Vitals and nursing note reviewed.   Constitutional:       General: She is not in acute distress.     Appearance: She is well-developed. She is obese. She is ill-appearing. She is not diaphoretic.   HENT:      Head: Normocephalic and atraumatic.      Right Ear: External ear normal.      Left Ear: External ear normal.      Nose: Nose normal.   Eyes:      Conjunctiva/sclera: Conjunctivae normal.      Pupils: Pupils are equal,  round, and reactive to light.   Neck:      Vascular: No JVD.      Trachea: No tracheal deviation.   Cardiovascular:      Rate and Rhythm: Normal rate and regular rhythm.      Heart sounds: Normal heart sounds. No murmur heard.  Pulmonary:      Effort: Pulmonary effort is normal. No respiratory distress.      Breath sounds: Normal breath sounds. No decreased breath sounds or wheezing.   Abdominal:      General: Bowel sounds are normal.      Palpations: Abdomen is soft.      Tenderness: There is no abdominal tenderness.   Musculoskeletal:         General: No deformity. Normal range of motion.      Cervical back: Normal range of motion and neck supple.      Right lower leg: No edema.      Left lower leg: No edema.   Skin:     General: Skin is warm and dry.      Capillary Refill: Capillary refill takes less than 2 seconds.      Coloration: Skin is not pale.      Findings: No erythema or rash.   Neurological:      General: No focal deficit present.      Mental Status: She is alert and oriented to person, place, and time.      Cranial Nerves: No cranial nerve deficit.   Psychiatric:         Mood and Affect: Mood is anxious.         Behavior: Behavior normal.         Thought Content: Thought content normal.         Procedures          ED Course  ED Course as of 04/03/22 0237   Sat Apr 02, 2022   2236 EKG noted sinus tachycardia.  105 bpm.  .  .  QTc 533.  No acute ST elevation. [SF]   2314 XR Chest 1 View  IMPRESSION:  Minimal bibasilar atelectasis, otherwise no active disease.     Signer Name: Alexei Roman MD   Signed: 4/2/2022 11:07 PM   Workstation Name: [facility]    [facility] []   Sun Apr 03, 2022   0150 CT Chest Without Contrast Diagnostic  IMPRESSION:     1. No acute findings in the chest.  2. 3 mm left lower lobe nodule. In the absence of risk factors for malignancy, no follow-up is indicated. Otherwise, follow-up could be obtained in 12 months per Lydia  criteria.     Signer Name: Alexei Roman MD   Signed: 4/3/2022 1:27 AM   Workstation Name: [facility]    [facility] []   0150 CT Abdomen Pelvis Without Contrast  IMPRESSION:     1. Prominent mid to distal small bowel loops may reflect an ileus or developing small bowel obstruction.  2. No evidence of acute colitis. The appendix is normal.  3. Cholecystectomy and tubal ligation.  4. No renal or ureteral stones. No hydronephrosis.              Signer Name: Alexei Roman MD   Signed: 4/3/2022 1:31 AM   Workstation Name: [facility]    [facility] []   0156 EKG noted sinus tachycardia.  109 bpm.  .  QRS 86.  QTc 525.  Prolonged QT noted.  No acute ST elevation []   0201 Discussed with Dr. Barry at this time agrees to consult patient inpatient advised to start heparin and Nitropaste and keep n.p.o. [SM]   0201 Paged Dr. Erickson at this time. []   0235 Discussed case with Dr. Erickson agrees to admit to hospitalist service at this time. [SM]      ED Course User Index  [SF] Sergo Iglesias,   [SM] Archana Moore, APRKATELYNN                                               HEART Score (for prediction of 6-week risk of major adverse cardiac event) reviewed and/or performed as part of the patient evaluation and treatment planning process.  The result associated with this review/performance is: 7       MDM    Final diagnoses:   NSTEMI (non-ST elevated myocardial infarction) ([facility])       ED Disposition  ED Disposition     ED Disposition   Decision to Admit    Condition   --    Comment   Level of Care: Progressive Care [20]   Diagnosis: NSTEMI (non-ST elevated myocardial infarction) (HCC) [010158]   Admitting Physician: SHAUNA ERICKSON [[id number]]   Attending Physician: SHAUNA ERICKSON [[id number]]   Certification: I Certify That Inpatient Hospital Services Are Medically Necessary For Greater Than 2 Midnights               No follow-up provider specified.        Medication List      No changes were made to your prescriptions during this visit.          Archana Moore APRN  04/03/22 0237      Electronically signed by Archana Moore APRN at 04/03/22 0237     Jammie Morley, RN at 04/03/22 0251        Called report to Josie TRIPP at this time    Electronically signed by Jammie Morley RN at 04/03/22 0252         Current Facility-Administered Medications   Medication Dose Route Frequency Provider Last Rate Last Admin   • aspirin EC tablet 81 mg  81 mg Oral Daily Nikhil Erickson MD       • atorvastatin (LIPITOR) tablet 80 mg  80 mg Oral Nightly Nikhil Erickson MD       • dextrose (D50W) (25 g/50 mL) IV injection 25 g  25 g Intravenous Q15 Min PRN Nikhil Erickson MD       • dextrose (GLUTOSE) oral gel 15 g  15 g Oral Q15 Min PRN Nikhil Erickson MD       • glucagon (human recombinant) (GLUCAGEN DIAGNOSTIC) injection 1 mg  1 mg Intramuscular Q15 Min PRN Nikhil Erickson MD       • heparin (porcine) 5000 UNIT/ML injection 2,500 Units  2,500 Units Intravenous PRN Nikhil Erickson MD       • heparin (porcine) 5000 UNIT/ML injection 5,000 Units  5,000 Units Intravenous PRN Nikhil Erickson MD       • heparin 66093 units/250 mL (100 units/mL) in 0.9% NaCl infusion  10.8 Units/kg/hr Intravenous Titrated Nikhil Erickson MD 9.96 mL/hr at 04/03/22 0317 10.8 Units/kg/hr at 04/03/22 0317   • insulin aspart (novoLOG) injection 0-7 Units  0-7 Units Subcutaneous TID AC Nikhil Erickson MD       • Magnesium Sulfate 2 gram Bolus, followed by 8 gram infusion (total Mg dose 10 grams)- Mg less than or equal to 1mg/dL  2 g Intravenous PRN Nikhil Erickson MD        Or   • Magnesium Sulfate 2 gram / 50mL Infusion (GIVE X 3 BAGS TO EQUAL 6GM TOTAL DOSE) - Mg 1.1 - 1.5 mg/dl  2 g Intravenous PRN Nikhil Erickson MD        Or   • Magnesium Sulfate 4 gram infusion- Mg 1.6-1.9 mg/dL  4 g Intravenous PRN Dre  Nikhil Serrano MD       • metoprolol tartrate (LOPRESSOR) tablet 100 mg  100 mg Oral Q12H Nikhil Erickson MD       • nitroglycerin (NITROSTAT) SL tablet 0.4 mg  0.4 mg Sublingual Q5 Min PRN Nikhil Erickson MD       • sodium chloride 0.9 % flush 10 mL  10 mL Intravenous PRN Nikhil Erickson MD       • sodium chloride 0.9 % flush 10 mL  10 mL Intravenous Q12H Nikhil Erickson MD       • sodium chloride 0.9 % flush 10 mL  10 mL Intravenous PRN Nikhil Erickson MD       • ticagrelor (BRILINTA) tablet 90 mg  90 mg Oral BID Nikhil Erickson MD         Orders (last 24 hrs)      Start     Ordered    04/03/22 2100  atorvastatin (LIPITOR) tablet 80 mg  Nightly         04/03/22 0330    04/03/22 1049  aPTT  Timed         04/03/22 0533    04/03/22 0900  aspirin EC tablet 81 mg  Daily         04/03/22 0330    04/03/22 0900  metoprolol tartrate (LOPRESSOR) tablet 100 mg  Every 12 Hours Scheduled         04/03/22 0330    04/03/22 0900  ranolazine (RANEXA) 12 hr tablet 1,000 mg  2 Times Daily,   Status:  Discontinued         04/03/22 0330    04/03/22 0900  ticagrelor (BRILINTA) tablet 90 mg  2 Times Daily         04/03/22 0330    04/03/22 0900  sodium chloride 0.9 % flush 10 mL  Every 12 Hours Scheduled         04/03/22 0330    04/03/22 0800  Vital Signs  Every 8 Hours        Comments: Per per hospital policy    04/03/22 0330 04/03/22 0730  insulin aspart (novoLOG) injection 0-7 Units  3 Times Daily Before Meals         04/03/22 0233    04/03/22 0700  Adult Transthoracic Echo Complete w/ Color, Spectral and Contrast if necessary per protocol  Once         04/03/22 0330    04/03/22 0700  POC Glucose 4x Daily AC & at Bedtime  4 Times Daily Before Meals & at Bedtime       04/03/22 0233    04/03/22 0600  CBC Auto Differential  Morning Draw         04/03/22 0330    04/03/22 0600  Comprehensive Metabolic Panel  Morning Draw         04/03/22 0330    04/03/22 0600  Lipid Panel  Morning Draw          "04/03/22 0330    04/03/22 0600  Magnesium  Once         04/03/22 0441    04/03/22 0531  POC Glucose Once  PROCEDURE ONCE         04/03/22 0524    04/03/22 0500  ECG 12 Lead  Once         04/03/22 0413    04/03/22 0432  aPTT  STAT         04/03/22 0432    04/03/22 0415  heparin 87707 units/250 mL (100 units/mL) in 0.9% NaCl infusion  Titrated         04/03/22 0315    04/03/22 0413  Initiate Electrolyte Replacement Protocol  Until Discontinued         04/03/22 0412 04/03/22 0413  Patient Currently On Electrolyte Replacement Protocol - Please Refer to MAR for Protocol Details  Misc Nursing Order (Specify)  Daily      Comments: Patient Currently On Electrolyte Replacement Protocol - Please Refer to MAR for Protocol Details    04/03/22 0412 04/03/22 0412  Magnesium Sulfate 2 gram Bolus, followed by 8 gram infusion (total Mg dose 10 grams)- Mg less than or equal to 1mg/dL  As Needed        \"Or\" Linked Group Details    04/03/22 0412    04/03/22 0412  Magnesium Sulfate 2 gram / 50mL Infusion (GIVE X 3 BAGS TO EQUAL 6GM TOTAL DOSE) - Mg 1.1 - 1.5 mg/dl  As Needed        \"Or\" Linked Group Details    04/03/22 0412    04/03/22 0412  Magnesium Sulfate 4 gram infusion- Mg 1.6-1.9 mg/dL  As Needed        \"Or\" Linked Group Details    04/03/22 0412    04/03/22 0400  Strict Intake & Output  Every Hour       04/03/22 0330    04/03/22 0352  Obtain Medical Records  Until Discontinued        Comments: From [facility], regarding hospitalizations and cath reports since Jan 2021 04/03/22 0351    04/03/22 0350  Troponin  Troponin Now Then Every 3 Hours       04/03/22 0349    04/03/22 0332  Inpatient Case Management  Consult  Once        Provider:  (Not yet assigned)    04/03/22 0332    04/03/22 0331  Daily Weights  Daily       04/03/22 0330    04/03/22 0330  Notify Physician (with Parameters)  Until Discontinued         04/03/22 0330    04/03/22 0330  Oxygen Therapy- Nasal Cannula; Titrate for " SPO2: 90% - 95%  Continuous         04/03/22 0330    04/03/22 0330  Insert Peripheral IV  Once         04/03/22 0330    04/03/22 0330  Saline Lock & Maintain IV Access  Continuous,   Status:  Canceled         04/03/22 0330    04/03/22 0330  VTE Prophylaxis Not Indicated: Other: Patient Currently Anticoagulated / Receiving Prophylaxis  Once         04/03/22 0330    04/03/22 0330  Telemetry - Maintain IV Access  Continuous         04/03/22 0330    04/03/22 0330  Continuous Cardiac Monitoring  Continuous,   Status:  Canceled         04/03/22 0330    04/03/22 0330  May Be Off Telemetry for Tests  Continuous         04/03/22 0330    04/03/22 0330  ACLS Protocol For Life Threatening Dysrhythmias (Unless Code Status Indicates Otherwise)  Continuous         04/03/22 0330    04/03/22 0330  Notify Provider if ACLS Protocol Activated  Until Discontinued         04/03/22 0330    04/03/22 0330  Pulse Oximetry, Continuous  Continuous         04/03/22 0330    04/03/22 0330  Cardiac Monitoring  Continuous         04/03/22 0330    04/03/22 0330  Activity - Bed Rest With Exceptions (Specify)  Until Discontinued         04/03/22 0330    04/03/22 0330  Fall Precautions  Continuous         04/03/22 0330    04/03/22 0330  Inpatient Cardiology Consult  Once        Specialty:  Cardiology  Provider:  (Not yet assigned)    04/03/22 0330    04/03/22 0329  sodium chloride 0.9 % flush 10 mL  As Needed         04/03/22 0330    04/03/22 0329  Telemetry - Pulse Oximetry  Continuous PRN,   Status:  Canceled      Comments: If Patient Develops Unresponsiveness, Acute Dyspnea, Cyanosis or Suspected Hypoxemia Start Continuous Pulse Ox Monitoring, Apply Oxygen & Notify Provider    04/03/22 0330    04/03/22 0329  nitroglycerin (NITROSTAT) SL tablet 0.4 mg  Every 5 Minutes PRN         04/03/22 0330    04/03/22 0234  Do NOT Hold Basal or Correction Scale Insulin When Patient is NPO, Hold Scheduled Mealtime (Bolus) Insulin if NPO  Continuous          04/03/22 0233    04/03/22 0234  Follow [facility] Hypoglycemia Standing Orders For Blood Glucose Less Than 70 mg/dL  Until Discontinued        Comments: ALERT PATIENT - NOT NPO & CAN SAFELY SWALLOW  Administer 4 oz Fruit Juice OR 4 oz Regular Soda OR 8 oz Milk OR 15-30 grams (1 tube) of Glucose Gel.  Recheck Blood Glucose Approximately 15 Minutes After Ingestion, Repeat Treatment & Continue to Recheck Blood Sugar Approximately Every 15 Minutes Until Blood Glucose is 70 or Higher.  Once Blood Glucose is 70 or Higher & if It Will Be More Than 60 Minutes Until Next Meal, Provide Appropriate Snack (Including Carbohydrate Food) Based on Meal Plan Orde herman. Give Meal Tray As Soon As Possible.    PATIENT HAS IV ACCESS - UNRESPONSIVE, NPO OR UNABLE TO SAFELY SWALLOW  Administer 25g (50ml) D50W IV Push.  Recheck Blood Glucose Approximately 15 Minutes After Administration, if Blood Glucose Remains Less Than 70, Repeat Treatment   Recheck Blood Glucose Approximately 15 Minutes After 2nd Administration, if Blood Glucose Remains Less Than 70 After 2nd Dose of D50W, Contact Provider for Further Treatment Orders & [facility]    PATIENT WITHOUT IV ACCESS - UNRESPONSIVE, NPO OR UNABLE TO SAFELY SWALLOW  Administer 1mg Glucagon SQ & Establish IV Access.  Turn Patient on Side - Nausea / Vomiting May Occur.  Recheck Blood Glucose Approximately 15 Minutes After Administration.  If Blood Glucose Remains Less Than 70, Administer 25g D50W IV Push (50ml).  Recheck Blood Glucose Approximately 15 Minutes After Administration of D50W, if Blood Glucose Remains Less Than 70, Contact Provider for Further Treatment Orders & C  Consider Adding IVF With D5 for Maintenance    Document Event & Patient Response to Interventions in EMR, Document Medications on MAR  Notify Provider if Hypoglycemia Treatment Needed    04/03/22 0233    04/03/22 0234  Hemoglobin A1c  STAT         04/03/22 0233    04/03/22 0233  dextrose  (GLUTOSE) oral gel 15 g  Every 15 Minutes PRN         04/03/22 0233    04/03/22 0233  dextrose (D50W) (25 g/50 mL) IV injection 25 g  Every 15 Minutes PRN         04/03/22 0233    04/03/22 0233  glucagon (human recombinant) (GLUCAGEN DIAGNOSTIC) injection 1 mg  Every 15 Minutes PRN         04/03/22 0233    04/03/22 0233  Code Status and Medical Interventions:  Continuous         04/03/22 0233    04/03/22 0232  Inpatient Admission  Once         04/03/22 0233    04/03/22 0229  Lactic Acid, Plasma  Once         04/03/22 0228    04/03/22 0203  NPO Diet  Diet Effective Now         04/03/22 0202    04/03/22 0201  heparin (porcine) 5000 UNIT/ML injection 5,000 Units  Once         04/03/22 0159    04/03/22 0201  heparin 84559 units/250 mL (100 units/mL) in 0.9% NaCl infusion  Titrated,   Status:  Discontinued         04/03/22 0159    04/03/22 0157  Initiate Heparin Protocol  Until Discontinued         04/03/22 0159    04/03/22 0157  Heparin Nomogram / Protocol Adjustment  Once         04/03/22 0159    04/03/22 0157  Notify Provider Platelet Count Less Than 28920  Until Discontinued         04/03/22 0159    04/03/22 0157  Notify Provider if PTT Not in Therapeutic Range After 24 Hours  Until Discontinued         04/03/22 0159    04/03/22 0157  Stop Infusion & Notify Provider if Bleeding Occurs  Until Discontinued         04/03/22 0159    04/03/22 0157  Protime-INR  Once        Comments: Prior to Initial Heparin Bolus      04/03/22 0159    04/03/22 0157  aPTT  Once        Comments: Prior to Initial Heparin Bolus      04/03/22 0159    04/03/22 0157  heparin (porcine) 5000 UNIT/ML injection 5,000 Units  As Needed         04/03/22 0159    04/03/22 0157  heparin (porcine) 5000 UNIT/ML injection 2,500 Units  As Needed         04/03/22 0159    04/03/22 0131  nitroglycerin (NITROSTAT) ointment 1 inch  Once         04/03/22 0129    04/03/22 0129  ECG 12 Lead  Once         04/03/22 0129    04/03/22 0031  CT Abdomen Pelvis Without  Contrast  1 Time Imaging         04/03/22 0030    04/03/22 0031  CT Chest Without Contrast Diagnostic  1 Time Imaging         04/03/22 0030    04/03/22 0026  magnesium sulfate in D5W 1g/100mL (PREMIX)  Once         04/03/22 0024    04/02/22 2339  sodium chloride 0.9 % bolus 1,000 mL  Once         04/02/22 2337    04/02/22 2247  prochlorperazine (COMPAZINE) injection 10 mg  Once         04/02/22 2245    04/02/22 2246  COVID-19 and FLU A/B PCR - Swab, Nasopharynx  Once         04/02/22 2245    04/02/22 2243  aspirin chewable tablet 324 mg  Once         04/02/22 2241 04/02/22 2243  morphine injection 4 mg  Once         04/02/22 2241 04/02/22 2242  Cardiac monitoring  Per Hospital Policy,   Status:  Canceled         04/02/22 2241    04/02/22 2242  Continuous Pulse Oximetry  Continuous,   Status:  Canceled         04/02/22 2241    04/02/22 2242  Vital Signs  Per Hospital Policy         04/02/22 2241    04/02/22 2242  ECG 12 Lead  Once,   Status:  Canceled         04/02/22 2241    04/02/22 2242  XR Chest 1 View  1 Time Imaging         04/02/22 2241 04/02/22 2242  Insert peripheral IV  Once         04/02/22 2241 04/02/22 2242  Woodman Draw  Once         04/02/22 2241 04/02/22 2242  CBC & Differential  Once         04/02/22 2241 04/02/22 2242  Comprehensive Metabolic Panel  Once         04/02/22 2241 04/02/22 2242  Troponin  Now Then Every 2 Hours       04/02/22 2241 04/02/22 2242  Lipase  Once         04/02/22 2241    04/02/22 2242  Amylase  Once         04/02/22 2241 04/02/22 2242  C-reactive Protein  Once         04/02/22 2241 04/02/22 2242  Sedimentation Rate  Once         04/02/22 2241 04/02/22 2242  TSH  Once         04/02/22 2241    04/02/22 2242  T4  Once         04/02/22 2241 04/02/22 2242  Magnesium  Once         04/02/22 2241 04/02/22 2242  Green Top (Gel)  PROCEDURE ONCE         04/02/22 2241 04/02/22 2242  Lavender Top  PROCEDURE ONCE         04/02/22 2241 04/02/22  2242  Gold Top - SST  PROCEDURE ONCE         04/02/22 2241 04/02/22 2242  Light Blue Top  PROCEDURE ONCE         04/02/22 2241    04/02/22 2242  CBC Auto Differential  PROCEDURE ONCE         04/02/22 2241 04/02/22 2241  Oxygen Therapy- Nasal Cannula; 2 LPM; Titrate for SPO2: equal to or greater than, 92%  Continuous PRN,   Status:  Canceled       04/02/22 2241 04/02/22 2241  sodium chloride 0.9 % flush 10 mL  As Needed         04/02/22 2241 04/02/22 2227  ECG 12 Lead  Once         04/02/22 2226    Unscheduled  ECG 12 Lead  As Needed       04/02/22 2241    Unscheduled  aPTT  As Needed       04/03/22 0159    Unscheduled  RN To Release aPTT Order 6 Hours After Heparin Bolus & 6 Hours After Any Heparin Rate Change  As Needed       04/03/22 0159    Unscheduled  After 2 Consecutive Therapeutic aPTTs, Obtain aPTT Daily.  If a Rate Adjustment is Necessary, Resume Every 6 Hour aPTT Draws  As Needed       04/03/22 0159    Unscheduled  Oxygen Therapy- Nasal Cannula; Titrate for SPO2: 90% - 95%  Continuous PRN      Comments: If Patient Develops Unresponsiveness, Acute Dyspnea, Cyanosis or Suspected Hypoxemia Start Continuous Pulse Ox Monitoring, Apply Oxygen & Notify Provider    04/03/22 0330    Unscheduled  ECG 12 Lead  As Needed      Comments: Nurse to Release if Patient Expericences Acute Chest Pain or Dysrhythmias    04/03/22 0330    Unscheduled  Potassium  As Needed      Comments: For Ventricular Arrhythmias      04/03/22 0330    Unscheduled  Magnesium  As Needed      Comments: For Ventricular Arrhythmias      04/03/22 0330    Unscheduled  Troponin  As Needed      Comments: For Chest Pain      04/03/22 0330    Unscheduled  Blood Gas, Arterial -With Co-Ox Panel: Yes  As Needed      Comments: Per O2 PolicyNotify Physician      04/03/22 0330    Unscheduled  Up With Assistance  As Needed       04/03/22 0330    Unscheduled  Magnesium  As Needed       04/03/22 0412    Unscheduled  Potassium  As Needed       04/03/22  0412                Physician Progress Notes (last 24 hours)  Notes from 04/02/22 0631 through 04/03/22 0631   No notes of this type exist for this encounter.         Consult Notes (last 24 hours)  Notes from 04/02/22 0631 through 04/03/22 0631   No notes of this type exist for this encounter.

## 2022-04-04 ENCOUNTER — APPOINTMENT (OUTPATIENT)
Dept: NUCLEAR MEDICINE | Facility: HOSPITAL | Age: 52
End: 2022-04-04

## 2022-04-04 ENCOUNTER — APPOINTMENT (OUTPATIENT)
Dept: CARDIOLOGY | Facility: HOSPITAL | Age: 52
End: 2022-04-04

## 2022-04-04 ENCOUNTER — APPOINTMENT (OUTPATIENT)
Dept: CT IMAGING | Facility: HOSPITAL | Age: 52
End: 2022-04-04

## 2022-04-04 LAB
ANION GAP SERPL CALCULATED.3IONS-SCNC: 12.7 MMOL/L (ref 5–15)
APTT PPP: 62 SECONDS (ref 26.5–34.5)
APTT PPP: 73.6 SECONDS (ref 26.5–34.5)
BASOPHILS # BLD AUTO: 0.04 10*3/MM3 (ref 0–0.2)
BASOPHILS NFR BLD AUTO: 0.6 % (ref 0–1.5)
BH CV NUCLEAR PRIOR STUDY: 3
BH CV REST NUCLEAR ISOTOPE DOSE: 9.7 MCI
BH CV STRESS BP STAGE 1: NORMAL
BH CV STRESS BP STAGE 2: NORMAL
BH CV STRESS COMMENTS STAGE 1: NORMAL
BH CV STRESS COMMENTS STAGE 2: NORMAL
BH CV STRESS DOSE REGADENOSON STAGE 1: 0.4
BH CV STRESS DURATION MIN STAGE 1: 0
BH CV STRESS DURATION MIN STAGE 2: 4
BH CV STRESS DURATION SEC STAGE 1: 10
BH CV STRESS DURATION SEC STAGE 2: 0
BH CV STRESS HR STAGE 1: 105
BH CV STRESS HR STAGE 2: 112
BH CV STRESS NUCLEAR ISOTOPE DOSE: 29.1 MCI
BH CV STRESS PROTOCOL 1: NORMAL
BH CV STRESS RECOVERY BP: NORMAL MMHG
BH CV STRESS RECOVERY HR: 98 BPM
BH CV STRESS STAGE 1: 1
BH CV STRESS STAGE 2: 2
BUN SERPL-MCNC: 6 MG/DL (ref 6–20)
BUN/CREAT SERPL: 8.2 (ref 7–25)
CALCIUM SPEC-SCNC: 8.7 MG/DL (ref 8.6–10.5)
CHLORIDE SERPL-SCNC: 106 MMOL/L (ref 98–107)
CO2 SERPL-SCNC: 19.3 MMOL/L (ref 22–29)
CREAT SERPL-MCNC: 0.73 MG/DL (ref 0.57–1)
DEPRECATED RDW RBC AUTO: 43.3 FL (ref 37–54)
EGFRCR SERPLBLD CKD-EPI 2021: 99.1 ML/MIN/1.73
EOSINOPHIL # BLD AUTO: 0.31 10*3/MM3 (ref 0–0.4)
EOSINOPHIL NFR BLD AUTO: 4.9 % (ref 0.3–6.2)
ERYTHROCYTE [DISTWIDTH] IN BLOOD BY AUTOMATED COUNT: 14.4 % (ref 12.3–15.4)
GLUCOSE BLDC GLUCOMTR-MCNC: 104 MG/DL (ref 70–130)
GLUCOSE BLDC GLUCOMTR-MCNC: 109 MG/DL (ref 70–130)
GLUCOSE BLDC GLUCOMTR-MCNC: 129 MG/DL (ref 70–130)
GLUCOSE BLDC GLUCOMTR-MCNC: 135 MG/DL (ref 70–130)
GLUCOSE SERPL-MCNC: 111 MG/DL (ref 65–99)
HCT VFR BLD AUTO: 40.5 % (ref 34–46.6)
HGB BLD-MCNC: 13.7 G/DL (ref 12–15.9)
IMM GRANULOCYTES # BLD AUTO: 0.03 10*3/MM3 (ref 0–0.05)
IMM GRANULOCYTES NFR BLD AUTO: 0.5 % (ref 0–0.5)
LV EF NUC BP: 63 %
LYMPHOCYTES # BLD AUTO: 3 10*3/MM3 (ref 0.7–3.1)
LYMPHOCYTES NFR BLD AUTO: 46.9 % (ref 19.6–45.3)
MAGNESIUM SERPL-MCNC: 2.3 MG/DL (ref 1.6–2.6)
MAXIMAL PREDICTED HEART RATE: 168 BPM
MCH RBC QN AUTO: 28 PG (ref 26.6–33)
MCHC RBC AUTO-ENTMCNC: 33.8 G/DL (ref 31.5–35.7)
MCV RBC AUTO: 82.7 FL (ref 79–97)
MONOCYTES # BLD AUTO: 0.45 10*3/MM3 (ref 0.1–0.9)
MONOCYTES NFR BLD AUTO: 7 % (ref 5–12)
NEUTROPHILS NFR BLD AUTO: 2.56 10*3/MM3 (ref 1.7–7)
NEUTROPHILS NFR BLD AUTO: 40.1 % (ref 42.7–76)
NRBC BLD AUTO-RTO: 0 /100 WBC (ref 0–0.2)
PERCENT MAX PREDICTED HR: 66.67 %
PLATELET # BLD AUTO: 225 10*3/MM3 (ref 140–450)
PMV BLD AUTO: 9.1 FL (ref 6–12)
POTASSIUM SERPL-SCNC: 3.7 MMOL/L (ref 3.5–5.2)
RBC # BLD AUTO: 4.9 10*6/MM3 (ref 3.77–5.28)
SODIUM SERPL-SCNC: 138 MMOL/L (ref 136–145)
STRESS BASELINE BP: NORMAL MMHG
STRESS BASELINE HR: 93 BPM
STRESS PERCENT HR: 78 %
STRESS POST PEAK BP: NORMAL MMHG
STRESS POST PEAK HR: 112 BPM
STRESS TARGET HR: 143 BPM
WBC NRBC COR # BLD: 6.39 10*3/MM3 (ref 3.4–10.8)

## 2022-04-04 PROCEDURE — 78452 HT MUSCLE IMAGE SPECT MULT: CPT

## 2022-04-04 PROCEDURE — 93017 CV STRESS TEST TRACING ONLY: CPT

## 2022-04-04 PROCEDURE — 63710000001 METHYLPREDNISOLONE PER 4 MG: Performed by: NURSE PRACTITIONER

## 2022-04-04 PROCEDURE — 99233 SBSQ HOSP IP/OBS HIGH 50: CPT | Performed by: INTERNAL MEDICINE

## 2022-04-04 PROCEDURE — 78452 HT MUSCLE IMAGE SPECT MULT: CPT | Performed by: NURSE PRACTITIONER

## 2022-04-04 PROCEDURE — 63710000001 DIPHENHYDRAMINE PER 50 MG: Performed by: NURSE PRACTITIONER

## 2022-04-04 PROCEDURE — 0 TECHNETIUM SESTAMIBI: Performed by: INTERNAL MEDICINE

## 2022-04-04 PROCEDURE — 83735 ASSAY OF MAGNESIUM: CPT | Performed by: INTERNAL MEDICINE

## 2022-04-04 PROCEDURE — 25010000002 REGADENOSON 0.4 MG/5ML SOLUTION: Performed by: INTERNAL MEDICINE

## 2022-04-04 PROCEDURE — 85730 THROMBOPLASTIN TIME PARTIAL: CPT | Performed by: INTERNAL MEDICINE

## 2022-04-04 PROCEDURE — 80048 BASIC METABOLIC PNL TOTAL CA: CPT | Performed by: INTERNAL MEDICINE

## 2022-04-04 PROCEDURE — 82962 GLUCOSE BLOOD TEST: CPT

## 2022-04-04 PROCEDURE — 25010000002 HEPARIN (PORCINE) PER 1000 UNITS

## 2022-04-04 PROCEDURE — 74176 CT ABD & PELVIS W/O CONTRAST: CPT

## 2022-04-04 PROCEDURE — 74176 CT ABD & PELVIS W/O CONTRAST: CPT | Performed by: RADIOLOGY

## 2022-04-04 PROCEDURE — A9500 TC99M SESTAMIBI: HCPCS | Performed by: INTERNAL MEDICINE

## 2022-04-04 PROCEDURE — 93018 CV STRESS TEST I&R ONLY: CPT | Performed by: NURSE PRACTITIONER

## 2022-04-04 PROCEDURE — 85025 COMPLETE CBC W/AUTO DIFF WBC: CPT | Performed by: INTERNAL MEDICINE

## 2022-04-04 RX ORDER — METHYLPREDNISOLONE 16 MG/1
32 TABLET ORAL 2 TIMES DAILY
Status: COMPLETED | OUTPATIENT
Start: 2022-04-04 | End: 2022-04-05

## 2022-04-04 RX ORDER — LACTULOSE 10 G/15ML
30 SOLUTION ORAL ONCE
Status: COMPLETED | OUTPATIENT
Start: 2022-04-04 | End: 2022-04-04

## 2022-04-04 RX ORDER — DIPHENHYDRAMINE HCL 50 MG
50 CAPSULE ORAL EVERY 6 HOURS PRN
Status: DISCONTINUED | OUTPATIENT
Start: 2022-04-04 | End: 2022-04-13 | Stop reason: HOSPADM

## 2022-04-04 RX ORDER — DIPHENHYDRAMINE HCL 50 MG
50 CAPSULE ORAL ONCE
Status: COMPLETED | OUTPATIENT
Start: 2022-04-04 | End: 2022-04-04

## 2022-04-04 RX ADMIN — METOPROLOL TARTRATE 100 MG: 100 TABLET, FILM COATED ORAL at 08:22

## 2022-04-04 RX ADMIN — METHYLPREDNISOLONE 32 MG: 16 TABLET ORAL at 20:01

## 2022-04-04 RX ADMIN — ASPIRIN 81 MG: 81 TABLET, COATED ORAL at 08:22

## 2022-04-04 RX ADMIN — Medication 10 ML: at 20:02

## 2022-04-04 RX ADMIN — PANTOPRAZOLE SODIUM 40 MG: 40 TABLET, DELAYED RELEASE ORAL at 06:15

## 2022-04-04 RX ADMIN — TICAGRELOR 90 MG: 90 TABLET ORAL at 20:00

## 2022-04-04 RX ADMIN — TECHNETIUM TC 99M SESTAMIBI 1 DOSE: 1 INJECTION INTRAVENOUS at 11:57

## 2022-04-04 RX ADMIN — DIPHENHYDRAMINE HCL 50 MG: 50 CAPSULE ORAL at 17:40

## 2022-04-04 RX ADMIN — Medication 10 ML: at 08:23

## 2022-04-04 RX ADMIN — HEPARIN SODIUM 16.8 UNITS/KG/HR: 5000 INJECTION INTRAVENOUS; SUBCUTANEOUS at 15:13

## 2022-04-04 RX ADMIN — ATORVASTATIN CALCIUM 80 MG: 40 TABLET, FILM COATED ORAL at 20:00

## 2022-04-04 RX ADMIN — LACTULOSE 30 G: 10 SOLUTION ORAL at 19:58

## 2022-04-04 RX ADMIN — REGADENOSON 0.4 MG: 0.08 INJECTION, SOLUTION INTRAVENOUS at 11:57

## 2022-04-04 RX ADMIN — TICAGRELOR 90 MG: 90 TABLET ORAL at 08:22

## 2022-04-04 RX ADMIN — DULOXETINE HYDROCHLORIDE 30 MG: 30 CAPSULE, DELAYED RELEASE ORAL at 08:22

## 2022-04-04 RX ADMIN — Medication 10 ML: at 20:01

## 2022-04-04 RX ADMIN — DULOXETINE HYDROCHLORIDE 30 MG: 30 CAPSULE, DELAYED RELEASE ORAL at 20:01

## 2022-04-04 RX ADMIN — METOPROLOL TARTRATE 100 MG: 100 TABLET, FILM COATED ORAL at 20:00

## 2022-04-04 RX ADMIN — TECHNETIUM TC 99M SESTAMIBI 1 DOSE: 1 INJECTION INTRAVENOUS at 11:30

## 2022-04-04 RX ADMIN — CETIRIZINE HYDROCHLORIDE 10 MG: 10 TABLET, FILM COATED ORAL at 08:22

## 2022-04-04 NOTE — PLAN OF CARE
Goal Outcome Evaluation:              Outcome Evaluation: VSS and pt remains on rooma ir this shift. Pt was made NPO at midnight pending stress test in the AM. Pt Heparin ggts in now infusing at 16.8 per the latest pTT. Bed in locked low position, call light in reach.

## 2022-04-04 NOTE — PROGRESS NOTES
[facility] HOSPITALIST PROGRESS NOTE     Patient Identification:  Name:  Lissa Eisenberg  Age:  51 y.o.  Sex:  female  :  1970  MRN:  [id number]  Visit Number:  [id number]  ROOM: [place] [address]     Primary Care Provider:  Yolanda Velasco DO    Length of stay in inpatient status:  1    Subjective     Chief Compliant:    Chief Complaint   Patient presents with   • Chest Pain       History of Presenting Illness:    Patient continues to report abdominal pain. She notes diarrhea last week but no BM since Friday. She had short episodes of less severe substernal chest discomfort today lasting around 30 seconds. Friend supportive bedside.     ROS:  Otherwise 10 point ROS negative other than documented above in HPI.     Objective     Current Hospital Meds:aspirin, 81 mg, Oral, Daily  atorvastatin, 80 mg, Oral, Nightly  cetirizine, 10 mg, Oral, Daily  DULoxetine, 30 mg, Oral, BID  insulin aspart, 0-7 Units, Subcutaneous, TID AC  metoprolol tartrate, 100 mg, Oral, Q12H  pantoprazole, 40 mg, Oral, QAM  sodium chloride, 10 mL, Intravenous, Q12H  sodium chloride, 10 mL, Intravenous, Q12H  ticagrelor, 90 mg, Oral, BID    heparin, 10.8 Units/kg/hr, Last Rate: 16.8 Units/kg/hr (22 8154)        Current Antimicrobial Therapy:  Anti-Infectives (From admission, onward)    None        Current Diuretic Therapy:  Diuretics (From admission, onward)    None        ----------------------------------------------------------------------------------------------------------------------  Vital Signs:  Temp:  [97.5 °F (36.4 °C)-98 °F (36.7 °C)] 97.5 °F (36.4 °C)  Heart Rate:  [] 87  Resp:  [8-33] 19  BP: ()/(51-87) 104/83  SpO2:  [92 %-100 %] 92 %  on   ;   Device (Oxygen Therapy): room air  Body mass index is 34.57 kg/m².    Wt Readings from Last 3 Encounters:   22 91.4 kg (201 lb 8 oz)   22 89 kg (196 lb 3.4 oz)   21 98.4 kg (217 lb)     Intake & Output (last 3 days)        07  0700 04/02 0701  04/03 0700 04/03 0701  04/04 0700 04/04 0701 04/05 0700    P.O.   486     I.V. (mL/kg)   440.1 (4.8)     Total Intake(mL/kg)   926.1 (10.1)     Net   +926.1             Urine Unmeasured Occurrence  1 x 4 x     Stool Unmeasured Occurrence   1 x         Diet Regular; Cardiac  NPO Diet  ----------------------------------------------------------------------------------------------------------------------  Physical exam:  Constitutional:  Well-developed and well-nourished.  No respiratory distress.      HENT:  Head:  Normocephalic and atraumatic.  Mouth:  Moist mucous membranes.    Eyes:  Conjunctivae and EOM are normal. No scleral icterus.    Neck:  Neck supple.  No JVD present.    Cardiovascular:  Normal rate, regular rhythm and normal heart sounds with no murmur.  Pulmonary/Chest:  No respiratory distress, no wheezes, no crackles, with normal breath sounds and good air movement.  Abdominal:  Mild tenderness in lower quadrants bilaterally, no rebound or guarding.   Musculoskeletal:  No edema, no tenderness, and no deformity.  No red or swollen joints anywhere.    Neurological:  Alert and oriented to person, place, and time.  No cranial nerve deficit.  No tongue deviation.  No facial droop.  No slurred speech.   Skin:  Skin is warm and dry. No rash noted. No pallor.   Peripheral vascular:  Pulses in all 4 extremities with no clubbing, no cyanosis, no edema.  ----------------------------------------------------------------------------------------------------------------------  Tele:    ----------------------------------------------------------------------------------------------------------------------  Results from last 7 days   Lab Units 04/04/22  0129 04/03/22  0428 04/03/22  0238 04/02/22  0350 03/30/22 2012   CRP mg/dL  --   --   --  <0.30 <0.30   LACTATE mmol/L  --   --  0.8  --  1.4   WBC 10*3/mm3 6.39 7.42  --  9.09 9.63   HEMOGLOBIN g/dL 13.7 13.4  --  14.9 15.3   HEMATOCRIT % 40.5 40.0  --   45.0 45.7   MCV fL 82.7 81.5  --  80.5 80.5   MCHC g/dL 33.8 33.5  --  33.1 33.5   PLATELETS 10*3/mm3 225 241  --  327 359   INR   --   --   --  0.91  --          Results from last 7 days   Lab Units 04/04/22  0129 04/03/22  0450 04/03/22 0428 04/02/22 2250 03/30/22 2012   SODIUM mmol/L 138  --  140 133* 128*   POTASSIUM mmol/L 3.7  --  3.8 3.8 4.4   MAGNESIUM mg/dL 2.3 1.8  --  1.2*  --    CHLORIDE mmol/L 106  --  107 97* 93*   CO2 mmol/L 19.3*  --  19.0* 17.9* 18.3*   BUN mg/dL 6  --  8 10 13   CREATININE mg/dL 0.73  --  0.75 0.95 0.81   CALCIUM mg/dL 8.7  --  8.4* 9.3 9.6   GLUCOSE mg/dL 111*  --  100* 120* 88   ALBUMIN g/dL  --   --  3.75 4.51 4.59   BILIRUBIN mg/dL  --   --  0.3 0.5 0.7   ALK PHOS U/L  --   --  78 91 92   AST (SGOT) U/L  --   --  19 21 19   ALT (SGPT) U/L  --   --  19 23 17   Estimated Creatinine Clearance: 98.8 mL/min (by C-G formula based on SCr of 0.73 mg/dL).  No results found for: AMMONIA  Results from last 7 days   Lab Units 04/03/22  0744 04/03/22 0428 04/03/22  0047   TROPONIN T ng/mL 0.073* 0.125* 0.154*         Results from last 7 days   Lab Units 04/03/22 0428   CHOLESTEROL mg/dL 85   TRIGLYCERIDES mg/dL 175*   HDL CHOL mg/dL 32*   LDL CHOL mg/dL 24     Hemoglobin A1C   Date/Time Value Ref Range Status   04/02/2022 2250 5.90 (H) 4.80 - 5.60 % Final     Glucose   Date/Time Value Ref Range Status   04/04/2022 1112 109 70 - 130 mg/dL Final     Comment:     Meter: [id number] : [id number] Lisette Charlotte   04/04/2022 0615 135 (H) 70 - 130 mg/dL Final     Comment:     Meter: [id number] : [id number] ZOË REESE   04/03/2022 2213 175 (H) 70 - 130 mg/dL Final     Comment:     Meter: [id number] : [id number] ZOË REESE   04/03/2022 1633 101 70 - 130 mg/dL Final     Comment:     Meter: [id number] : [id number] HOWIE BAR   04/03/2022 1108 95 70 - 130 mg/dL Final     Comment:     Meter: [id number] : [id number] niall basurto   04/03/2022 0524 84 70 - 130 mg/dL  Final     Comment:     Meter: [id number] : [id number] NAYELI BEAUCHAMP     Lab Results   Component Value Date    TSH 1.950 04/02/2022     No results found for: PREGTESTUR, PREGSERUM, HCG, HCGQUANT  Pain Management Panel    There is no flowsheet data to display.       Brief Urine Lab Results  (Last result in the past 365 days)      Color   Clarity   Blood   Leuk Est   Nitrite   Protein   CREAT   Urine HCG        03/30/22 2013 Yellow   Clear   Negative   Negative   Positive   Negative               No results found for: BLOODCX  Urine Culture   Date Value Ref Range Status   03/30/2022 50,000 CFU/mL Escherichia coli (A)  Final     No results found for: WOUNDCX  No results found for: STOOLCX  No results found for: RESPCX  No results found for: AFBCX  Results from last 7 days   Lab Units 04/03/22  0238 04/02/22  2250 03/30/22 2012   LACTATE mmol/L 0.8  --  1.4   SED RATE mm/hr  --  9 13   CRP mg/dL  --  <0.30 <0.30       I have personally looked at the labs and they are summarized above.  ----------------------------------------------------------------------------------------------------------------------  Detailed radiology reports for the last 24 hours:    Imaging Results (Last 24 Hours)     ** No results found for the last 24 hours. **        Assessment & Plan    #NSTEMI  #Hx of CABG  - Stress test pending. Cardiology following.   - Recurrent pain while inpatient atypical only lasting 30 seconds per episode.     #Ileus vs. Early SBO  - Patient has not had BM since 4/1 without improvement in abdominal discomfort   - Will repeat CT abdomen/pelvis    #GERD  #Suspected recent episode of gastroenteritis   - Patient had N/V with some blood with emesis   - Possible partial esophageal rupture that could explain chest pain and support type II event.   - No more emesis and nausea improved     #DM II  - A1C 5.9%  - SSI while inpatient and continue home regimen at discharge.     #HTN  - Restart home regimen     #QTC prolongation    #Hypomagnesemia  - Avoid QT prolongation as able and replace magnesium per protocol     F: PO  E: Replace as needed   N: CC    Code status: Full     Dispo: Pending clinical improvement and ischemic evaluation.       VTE Prophylaxis:   Mechanical Order History:     None      Pharmalogical Order History:      Ordered     Dose Route Frequency Stop    04/03/22 0315  heparin 23372 units/250 mL (100 units/mL) in 0.9% NaCl infusion  9.96 mL/hr         10.8 Units/kg/hr IV Titrated --    04/03/22 0159  heparin (porcine) 5000 UNIT/ML injection 5,000 Units         5,000 Units IV Once 04/03/22 0209    04/03/22 0159  heparin 36497 units/250 mL (100 units/mL) in 0.9% NaCl infusion  10 mL/hr,   Status:  Discontinued         11.3 Units/kg/hr IV Titrated 04/03/22 0315    04/03/22 0159  heparin (porcine) 5000 UNIT/ML injection 5,000 Units         5,000 Units IV As Needed --    04/03/22 0159  heparin (porcine) 5000 UNIT/ML injection 2,500 Units         2,500 Units IV As Needed --                  Aaron Wiley MD  [facility]  04/04/22  15:22 EDT

## 2022-04-04 NOTE — PROGRESS NOTES
Progress note     Patient Identification:  Name:  Lissa Eisenberg  Age:  51 y.o.  Sex:  female  :  1970  MRN:  [id number]  Visit Number:  [id number]    NSTEMI (non-ST elevated myocardial infarction) (HCC)        Assessment       1. NSTEMI in setting of chest pain, troponin trending downward, EKG shows ST/T wave changes in the anterior leads, on IV heparin  2. ASCVD, s/p CABG x2 5 years ago with subsequent PCI in 2021 due to medically refractory angina  3. Essential hypertension, controlled   4. Hyperlipidemia, on statin     Recommendations      1. NSTEMI/ Chest pain  · Echocardiogram showed normal LV function.Troponin trending downward. Denies any current chest pain. EKG showed some anterior changes. In the setting of chest pain and known CAD will proceed with stress testing today. Keep NPO after midnight. If chest pain worsens or troponin increases will consider left heart catheterization.   · Continue IV heparin, aspirin, brilinta, metoprolol and statin.     Reason for consultation: NSTEMI and Chest pain    Subjective:   Still c/o chest pain  ----------------------------------------------------------------------------------------------------------------------  Current Hospital Meds:  aspirin, 81 mg, Oral, Daily  atorvastatin, 80 mg, Oral, Nightly  cetirizine, 10 mg, Oral, Daily  DULoxetine, 30 mg, Oral, BID  insulin aspart, 0-7 Units, Subcutaneous, TID AC  metoprolol tartrate, 100 mg, Oral, Q12H  pantoprazole, 40 mg, Oral, QAM  sodium chloride, 10 mL, Intravenous, Q12H  sodium chloride, 10 mL, Intravenous, Q12H  ticagrelor, 90 mg, Oral, BID      heparin, 10.8 Units/kg/hr, Last Rate: 16.8 Units/kg/hr (22 7280)      ----------------------------------------------------------------------------------------------------------------------  Vital Signs:  Temp:  [97.8 °F (36.6 °C)-98 °F (36.7 °C)] 98 °F (36.7 °C)  Heart Rate:  [] 86  Resp:  [8-33] 25  BP: ()/(51-87) 102/75       04/02/22  2224 04/03/22  0307 04/04/22  0500   Weight: 88.5 kg (195 lb) 92.3 kg (203 lb 8 oz) 91.4 kg (201 lb 8 oz)     Body mass index is 34.57 kg/m².    Intake/Output Summary (Last 24 hours) at 4/4/2022 1352  Last data filed at 4/4/2022 0600  Gross per 24 hour   Intake 440.14 ml   Output --   Net 440.14 ml     Diet Regular; Cardiac  ----------------------------------------------------------------------------------------------------------------------  Physical exam:  Constitutional:  Well-developed and well-nourished.  No respiratory distress.      HENT:  Head:  Normocephalic and atraumatic.  Mouth:  Moist mucous membranes.    Eyes:  Conjunctivae and EOM are normal.  Pupils are equal, round, and reactive to light.  No scleral icterus.    Neck:  Neck supple.  No JVD present.    Cardiovascular:  Normal rate, regular rhythm and normal heart sounds with no murmur.  Pulmonary/Chest:  No respiratory distress, no wheezes, no crackles, with normal breath sounds and good air movement.  Abdominal:  Soft.  Bowel sounds are normal.  No distension and no tenderness.   Musculoskeletal:  No edema, no tenderness, and no deformity.  No red or swollen joints anywhere.    Neurological:  Alert and oriented to person, place, and time.  No cranial nerve deficit.  No tongue deviation.  No facial droop.  No slurred speech.   Skin:  Skin is warm and dry. No rash noted. No pallor.   Peripheral vascular:  Strong pulses in all 4 extremities with no clubbing, no cyanosis, no edema.  ----------------------------------------------------------------------------------------------------------------------  Tele:   ----------------------------------------------------------------------------------------------------------------------  Results from last 7 days   Lab Units 04/03/22  0744 04/03/22  0428 04/03/22  0047   TROPONIN T ng/mL 0.073* 0.125* 0.154*     Results from last 7 days   Lab Units 04/04/22  0129 04/03/22 0428 04/03/22  0238  04/02/22 2250 03/30/22 2012   CRP mg/dL  --   --   --  <0.30 <0.30   LACTATE mmol/L  --   --  0.8  --  1.4   WBC 10*3/mm3 6.39 7.42  --  9.09 9.63   HEMOGLOBIN g/dL 13.7 13.4  --  14.9 15.3   HEMATOCRIT % 40.5 40.0  --  45.0 45.7   MCV fL 82.7 81.5  --  80.5 80.5   MCHC g/dL 33.8 33.5  --  33.1 33.5   PLATELETS 10*3/mm3 225 241  --  327 359   INR   --   --   --  0.91  --          Results from last 7 days   Lab Units 04/04/22  0129 04/03/22 0450 04/03/22 0428 04/02/22 2250 03/30/22 2012   SODIUM mmol/L 138  --  140 133* 128*   POTASSIUM mmol/L 3.7  --  3.8 3.8 4.4   MAGNESIUM mg/dL 2.3 1.8  --  1.2*  --    CHLORIDE mmol/L 106  --  107 97* 93*   CO2 mmol/L 19.3*  --  19.0* 17.9* 18.3*   BUN mg/dL 6  --  8 10 13   CREATININE mg/dL 0.73  --  0.75 0.95 0.81   CALCIUM mg/dL 8.7  --  8.4* 9.3 9.6   GLUCOSE mg/dL 111*  --  100* 120* 88   ALBUMIN g/dL  --   --  3.75 4.51 4.59   BILIRUBIN mg/dL  --   --  0.3 0.5 0.7   ALK PHOS U/L  --   --  78 91 92   AST (SGOT) U/L  --   --  19 21 19   ALT (SGPT) U/L  --   --  19 23 17   Estimated Creatinine Clearance: 98.8 mL/min (by C-G formula based on SCr of 0.73 mg/dL).    No results found for: AMMONIA  Results from last 7 days   Lab Units 04/03/22 0428   CHOLESTEROL mg/dL 85   TRIGLYCERIDES mg/dL 175*   HDL CHOL mg/dL 32*   LDL CHOL mg/dL 24     No results found for: BLOODCX  Urine Culture   Date Value Ref Range Status   03/30/2022 50,000 CFU/mL Escherichia coli (A)  Final     No results found for: WOUNDCX  No results found for: STOOLCX    I have personally looked at the labs and they are summarized above.  ----------------------------------------------------------------------------------------------------------------------  Imaging Results (Last 24 Hours)     ** No results found for the last 24 hours. **        ----------------------------------------------------------------------------------------------------------------------      Alexandr Marquez MD  04/04/22  13:52 EDT

## 2022-04-04 NOTE — CASE MANAGEMENT/SOCIAL WORK
Discharge Planning Assessment   [facility]     Patient Name: Lissa iEsenberg  MRN: [id number]  Today's Date: 4/4/2022    Admit Date: 4/2/2022     Discharge Needs Assessment     Row Name 04/04/22 1548       Living Environment    People in Home child(risa), dependent;spouse    Name(s) of People in Home spouse Rob and their 13 y/o son    Current Living Arrangements other (see comments)    Duration at Residence camper    Primary Care Provided by self    Provides Primary Care For child(risa)    Family Caregiver if Needed spouse    Family Caregiver Names Rob    Quality of Family Relationships helpful;involved;unable to assess    Able to Return to Prior Arrangements yes       Resource/Environmental Concerns    Resource/Environmental Concerns none    Transportation Concerns no car       Transition Planning    Patient/Family Anticipates Transition to home    Patient/Family Anticipated Services at Transition none    Transportation Anticipated family or friend will provide       Discharge Needs Assessment    Equipment Currently Used at Home none    Concerns to be Addressed no discharge needs identified    Anticipated Changes Related to Illness none    Equipment Needed After Discharge none               Discharge Plan     Row Name 04/04/22 7641       Plan    Plan Pt lives in a camper with her spouse Rob and their 13 y/o son. Pt does not utilize home health services or DME at this time. Pt does not have a current PCP and plans to follow up at clinic in [place] post discharge. Pt does not have a POA or living will on file. Pt's friend to provide transportation home at discharge. SS to follow and assist.    Patient/Family in Agreement with Plan yes               Demographic Summary     Row Name 04/04/22 3901       General Information    Referral Source nursing    Reason for Consult --  patient is homeless-[place]-need cardiac rehab              GUADALUPE Sharp

## 2022-04-04 NOTE — PLAN OF CARE
Goal Outcome Evaluation:              Outcome Evaluation: VSS, remains on heparin drip at 16.8 with two therapeutic ptts so next redraw is scheduled for 4/5/22 at 0600. Had cardiac stress test today, will be NPO at  midnight for heart cath tomorrow.

## 2022-04-05 ENCOUNTER — APPOINTMENT (OUTPATIENT)
Dept: ULTRASOUND IMAGING | Facility: HOSPITAL | Age: 52
End: 2022-04-05

## 2022-04-05 PROBLEM — R94.39 ABNORMAL NUCLEAR STRESS TEST: Status: ACTIVE | Noted: 2022-04-02

## 2022-04-05 LAB
ACT BLD: 196 SECONDS (ref 82–152)
ACT BLD: 357 SECONDS (ref 82–152)
ANION GAP SERPL CALCULATED.3IONS-SCNC: 13.3 MMOL/L (ref 5–15)
APTT PPP: 56.4 SECONDS (ref 26.5–34.5)
BASOPHILS # BLD AUTO: 0.03 10*3/MM3 (ref 0–0.2)
BASOPHILS NFR BLD AUTO: 0.6 % (ref 0–1.5)
BUN SERPL-MCNC: 7 MG/DL (ref 6–20)
BUN/CREAT SERPL: 9.1 (ref 7–25)
CALCIUM SPEC-SCNC: 9.4 MG/DL (ref 8.6–10.5)
CHLORIDE SERPL-SCNC: 100 MMOL/L (ref 98–107)
CO2 SERPL-SCNC: 18.7 MMOL/L (ref 22–29)
CREAT SERPL-MCNC: 0.77 MG/DL (ref 0.57–1)
DEPRECATED RDW RBC AUTO: 44.1 FL (ref 37–54)
EGFRCR SERPLBLD CKD-EPI 2021: 92.9 ML/MIN/1.73
EOSINOPHIL # BLD AUTO: 0.1 10*3/MM3 (ref 0–0.4)
EOSINOPHIL NFR BLD AUTO: 2 % (ref 0.3–6.2)
ERYTHROCYTE [DISTWIDTH] IN BLOOD BY AUTOMATED COUNT: 14.5 % (ref 12.3–15.4)
GLUCOSE BLDC GLUCOMTR-MCNC: 172 MG/DL (ref 70–130)
GLUCOSE BLDC GLUCOMTR-MCNC: 212 MG/DL (ref 70–130)
GLUCOSE BLDC GLUCOMTR-MCNC: 245 MG/DL (ref 70–130)
GLUCOSE SERPL-MCNC: 195 MG/DL (ref 65–99)
HCT VFR BLD AUTO: 43.3 % (ref 34–46.6)
HGB BLD-MCNC: 14.1 G/DL (ref 12–15.9)
IMM GRANULOCYTES # BLD AUTO: 0.05 10*3/MM3 (ref 0–0.05)
IMM GRANULOCYTES NFR BLD AUTO: 1 % (ref 0–0.5)
LYMPHOCYTES # BLD AUTO: 1.24 10*3/MM3 (ref 0.7–3.1)
LYMPHOCYTES NFR BLD AUTO: 24.8 % (ref 19.6–45.3)
MCH RBC QN AUTO: 27.2 PG (ref 26.6–33)
MCHC RBC AUTO-ENTMCNC: 32.6 G/DL (ref 31.5–35.7)
MCV RBC AUTO: 83.6 FL (ref 79–97)
MONOCYTES # BLD AUTO: 0.17 10*3/MM3 (ref 0.1–0.9)
MONOCYTES NFR BLD AUTO: 3.4 % (ref 5–12)
NEUTROPHILS NFR BLD AUTO: 3.41 10*3/MM3 (ref 1.7–7)
NEUTROPHILS NFR BLD AUTO: 68.2 % (ref 42.7–76)
NRBC BLD AUTO-RTO: 0 /100 WBC (ref 0–0.2)
PLATELET # BLD AUTO: 280 10*3/MM3 (ref 140–450)
PMV BLD AUTO: 9.7 FL (ref 6–12)
POTASSIUM SERPL-SCNC: 4.1 MMOL/L (ref 3.5–5.2)
RBC # BLD AUTO: 5.18 10*6/MM3 (ref 3.77–5.28)
SODIUM SERPL-SCNC: 132 MMOL/L (ref 136–145)
WBC NRBC COR # BLD: 5 10*3/MM3 (ref 3.4–10.8)

## 2022-04-05 PROCEDURE — 25010000002 MIDAZOLAM PER 1 MG: Performed by: INTERNAL MEDICINE

## 2022-04-05 PROCEDURE — 63710000001 METHYLPREDNISOLONE PER 4 MG: Performed by: NURSE PRACTITIONER

## 2022-04-05 PROCEDURE — C1887 CATHETER, GUIDING: HCPCS | Performed by: INTERNAL MEDICINE

## 2022-04-05 PROCEDURE — 85025 COMPLETE CBC W/AUTO DIFF WBC: CPT | Performed by: INTERNAL MEDICINE

## 2022-04-05 PROCEDURE — 63710000001 DIPHENHYDRAMINE PER 50 MG: Performed by: INTERNAL MEDICINE

## 2022-04-05 PROCEDURE — 93010 ELECTROCARDIOGRAM REPORT: CPT | Performed by: INTERNAL MEDICINE

## 2022-04-05 PROCEDURE — C9600 PERC DRUG-EL COR STENT SING: HCPCS | Performed by: INTERNAL MEDICINE

## 2022-04-05 PROCEDURE — C1894 INTRO/SHEATH, NON-LASER: HCPCS | Performed by: INTERNAL MEDICINE

## 2022-04-05 PROCEDURE — 92978 ENDOLUMINL IVUS OCT C 1ST: CPT | Performed by: INTERNAL MEDICINE

## 2022-04-05 PROCEDURE — 0 IOPAMIDOL PER 1 ML: Performed by: INTERNAL MEDICINE

## 2022-04-05 PROCEDURE — B240ZZ3 ULTRASONOGRAPHY OF SINGLE CORONARY ARTERY, INTRAVASCULAR: ICD-10-PCS | Performed by: INTERNAL MEDICINE

## 2022-04-05 PROCEDURE — C1769 GUIDE WIRE: HCPCS | Performed by: INTERNAL MEDICINE

## 2022-04-05 PROCEDURE — C1753 CATH, INTRAVAS ULTRASOUND: HCPCS | Performed by: INTERNAL MEDICINE

## 2022-04-05 PROCEDURE — C1725 CATH, TRANSLUMIN NON-LASER: HCPCS | Performed by: INTERNAL MEDICINE

## 2022-04-05 PROCEDURE — 93005 ELECTROCARDIOGRAM TRACING: CPT | Performed by: INTERNAL MEDICINE

## 2022-04-05 PROCEDURE — B2151ZZ FLUOROSCOPY OF LEFT HEART USING LOW OSMOLAR CONTRAST: ICD-10-PCS | Performed by: INTERNAL MEDICINE

## 2022-04-05 PROCEDURE — 92928 PRQ TCAT PLMT NTRAC ST 1 LES: CPT | Performed by: INTERNAL MEDICINE

## 2022-04-05 PROCEDURE — C1760 CLOSURE DEV, VASC: HCPCS | Performed by: INTERNAL MEDICINE

## 2022-04-05 PROCEDURE — 93926 LOWER EXTREMITY STUDY: CPT

## 2022-04-05 PROCEDURE — 25010000002 HEPARIN (PORCINE) PER 1000 UNITS: Performed by: INTERNAL MEDICINE

## 2022-04-05 PROCEDURE — 93459 L HRT ART/GRFT ANGIO: CPT | Performed by: INTERNAL MEDICINE

## 2022-04-05 PROCEDURE — 25010000002 MORPHINE PER 10 MG: Performed by: INTERNAL MEDICINE

## 2022-04-05 PROCEDURE — 027034Z DILATION OF CORONARY ARTERY, ONE ARTERY WITH DRUG-ELUTING INTRALUMINAL DEVICE, PERCUTANEOUS APPROACH: ICD-10-PCS | Performed by: INTERNAL MEDICINE

## 2022-04-05 PROCEDURE — 85347 COAGULATION TIME ACTIVATED: CPT

## 2022-04-05 PROCEDURE — C1874 STENT, COATED/COV W/DEL SYS: HCPCS | Performed by: INTERNAL MEDICINE

## 2022-04-05 PROCEDURE — 63710000001 DIPHENHYDRAMINE PER 50 MG: Performed by: NURSE PRACTITIONER

## 2022-04-05 PROCEDURE — B2111ZZ FLUOROSCOPY OF MULTIPLE CORONARY ARTERIES USING LOW OSMOLAR CONTRAST: ICD-10-PCS | Performed by: INTERNAL MEDICINE

## 2022-04-05 PROCEDURE — 85730 THROMBOPLASTIN TIME PARTIAL: CPT | Performed by: INTERNAL MEDICINE

## 2022-04-05 PROCEDURE — B2181ZZ FLUOROSCOPY OF LEFT INTERNAL MAMMARY BYPASS GRAFT USING LOW OSMOLAR CONTRAST: ICD-10-PCS | Performed by: INTERNAL MEDICINE

## 2022-04-05 PROCEDURE — 4A023N7 MEASUREMENT OF CARDIAC SAMPLING AND PRESSURE, LEFT HEART, PERCUTANEOUS APPROACH: ICD-10-PCS | Performed by: INTERNAL MEDICINE

## 2022-04-05 PROCEDURE — 80048 BASIC METABOLIC PNL TOTAL CA: CPT | Performed by: INTERNAL MEDICINE

## 2022-04-05 PROCEDURE — 82962 GLUCOSE BLOOD TEST: CPT

## 2022-04-05 PROCEDURE — 99233 SBSQ HOSP IP/OBS HIGH 50: CPT | Performed by: INTERNAL MEDICINE

## 2022-04-05 PROCEDURE — 25010000002 FENTANYL CITRATE (PF) 50 MCG/ML SOLUTION: Performed by: INTERNAL MEDICINE

## 2022-04-05 DEVICE — XIENCE SKYPOINT™ EVEROLIMUS ELUTING CORONARY STENT SYSTEM 3.00 MM X 38 MM / RAPID-EXCHANGE
Type: IMPLANTABLE DEVICE | Site: HEART | Status: FUNCTIONAL
Brand: XIENCE SKYPOINT™

## 2022-04-05 RX ORDER — ACETAMINOPHEN AND CODEINE PHOSPHATE 300; 30 MG/1; MG/1
1 TABLET ORAL EVERY 6 HOURS PRN
Status: DISCONTINUED | OUTPATIENT
Start: 2022-04-05 | End: 2022-04-10

## 2022-04-05 RX ORDER — MORPHINE SULFATE 2 MG/ML
2 INJECTION, SOLUTION INTRAMUSCULAR; INTRAVENOUS ONCE
Status: COMPLETED | OUTPATIENT
Start: 2022-04-05 | End: 2022-04-05

## 2022-04-05 RX ORDER — ACETAMINOPHEN 325 MG/1
650 TABLET ORAL EVERY 4 HOURS PRN
Status: DISCONTINUED | OUTPATIENT
Start: 2022-04-05 | End: 2022-04-13 | Stop reason: HOSPADM

## 2022-04-05 RX ORDER — SODIUM CHLORIDE 9 MG/ML
INJECTION, SOLUTION INTRAVENOUS CONTINUOUS PRN
Status: COMPLETED | OUTPATIENT
Start: 2022-04-05 | End: 2022-04-05

## 2022-04-05 RX ORDER — HEPARIN SODIUM 1000 [USP'U]/ML
INJECTION, SOLUTION INTRAVENOUS; SUBCUTANEOUS AS NEEDED
Status: DISCONTINUED | OUTPATIENT
Start: 2022-04-05 | End: 2022-04-05 | Stop reason: HOSPADM

## 2022-04-05 RX ORDER — LACTULOSE 10 G/15ML
30 SOLUTION ORAL ONCE
Status: COMPLETED | OUTPATIENT
Start: 2022-04-05 | End: 2022-04-05

## 2022-04-05 RX ORDER — ISOSORBIDE MONONITRATE 30 MG/1
30 TABLET, EXTENDED RELEASE ORAL
Status: DISCONTINUED | OUTPATIENT
Start: 2022-04-05 | End: 2022-04-07

## 2022-04-05 RX ORDER — CHOLECALCIFEROL (VITAMIN D3) 125 MCG
5 CAPSULE ORAL NIGHTLY PRN
Status: DISCONTINUED | OUTPATIENT
Start: 2022-04-05 | End: 2022-04-13 | Stop reason: HOSPADM

## 2022-04-05 RX ORDER — SODIUM CHLORIDE 9 MG/ML
5 INJECTION, SOLUTION INTRAVENOUS CONTINUOUS
Status: ACTIVE | OUTPATIENT
Start: 2022-04-05 | End: 2022-04-05

## 2022-04-05 RX ORDER — DIPHENHYDRAMINE HCL 50 MG
50 CAPSULE ORAL NIGHTLY PRN
Status: DISCONTINUED | OUTPATIENT
Start: 2022-04-05 | End: 2022-04-13 | Stop reason: HOSPADM

## 2022-04-05 RX ORDER — MIDAZOLAM HYDROCHLORIDE 1 MG/ML
INJECTION INTRAMUSCULAR; INTRAVENOUS AS NEEDED
Status: DISCONTINUED | OUTPATIENT
Start: 2022-04-05 | End: 2022-04-05 | Stop reason: HOSPADM

## 2022-04-05 RX ORDER — COLCHICINE 0.6 MG/1
0.6 TABLET ORAL EVERY 12 HOURS SCHEDULED
Status: DISCONTINUED | OUTPATIENT
Start: 2022-04-05 | End: 2022-04-09

## 2022-04-05 RX ORDER — NITROGLYCERIN 0.4 MG/1
TABLET SUBLINGUAL AS NEEDED
Status: DISCONTINUED | OUTPATIENT
Start: 2022-04-05 | End: 2022-04-05 | Stop reason: HOSPADM

## 2022-04-05 RX ORDER — LIDOCAINE HYDROCHLORIDE 20 MG/ML
INJECTION, SOLUTION INFILTRATION; PERINEURAL AS NEEDED
Status: DISCONTINUED | OUTPATIENT
Start: 2022-04-05 | End: 2022-04-05 | Stop reason: HOSPADM

## 2022-04-05 RX ORDER — FENTANYL CITRATE 50 UG/ML
INJECTION, SOLUTION INTRAMUSCULAR; INTRAVENOUS AS NEEDED
Status: DISCONTINUED | OUTPATIENT
Start: 2022-04-05 | End: 2022-04-05 | Stop reason: HOSPADM

## 2022-04-05 RX ORDER — CEFDINIR 300 MG/1
300 CAPSULE ORAL EVERY 12 HOURS SCHEDULED
Status: COMPLETED | OUTPATIENT
Start: 2022-04-05 | End: 2022-04-08

## 2022-04-05 RX ADMIN — Medication 10 ML: at 08:17

## 2022-04-05 RX ADMIN — DULOXETINE HYDROCHLORIDE 30 MG: 30 CAPSULE, DELAYED RELEASE ORAL at 21:19

## 2022-04-05 RX ADMIN — CETIRIZINE HYDROCHLORIDE 10 MG: 10 TABLET, FILM COATED ORAL at 08:15

## 2022-04-05 RX ADMIN — LACTULOSE 30 G: 10 SOLUTION ORAL at 17:41

## 2022-04-05 RX ADMIN — MORPHINE SULFATE 2 MG: 2 INJECTION, SOLUTION INTRAMUSCULAR; INTRAVENOUS at 13:01

## 2022-04-05 RX ADMIN — COLCHICINE 0.6 MG: 0.6 TABLET, FILM COATED ORAL at 21:20

## 2022-04-05 RX ADMIN — DULOXETINE HYDROCHLORIDE 30 MG: 30 CAPSULE, DELAYED RELEASE ORAL at 08:14

## 2022-04-05 RX ADMIN — Medication 10 ML: at 21:20

## 2022-04-05 RX ADMIN — ISOSORBIDE MONONITRATE 30 MG: 30 TABLET, EXTENDED RELEASE ORAL at 14:59

## 2022-04-05 RX ADMIN — ATORVASTATIN CALCIUM 80 MG: 40 TABLET, FILM COATED ORAL at 21:20

## 2022-04-05 RX ADMIN — ASPIRIN 81 MG: 81 TABLET, COATED ORAL at 08:15

## 2022-04-05 RX ADMIN — METOPROLOL TARTRATE 100 MG: 100 TABLET, FILM COATED ORAL at 08:14

## 2022-04-05 RX ADMIN — METOPROLOL TARTRATE 100 MG: 100 TABLET, FILM COATED ORAL at 21:20

## 2022-04-05 RX ADMIN — ACETAMINOPHEN AND CODEINE PHOSPHATE 1 TABLET: 300; 30 TABLET ORAL at 21:19

## 2022-04-05 RX ADMIN — COLCHICINE 0.6 MG: 0.6 TABLET, FILM COATED ORAL at 14:59

## 2022-04-05 RX ADMIN — Medication 5 MG: at 22:51

## 2022-04-05 RX ADMIN — CEFDINIR 300 MG: 300 CAPSULE ORAL at 21:20

## 2022-04-05 RX ADMIN — SODIUM CHLORIDE 5 ML/KG/HR: 9 INJECTION, SOLUTION INTRAVENOUS at 13:04

## 2022-04-05 RX ADMIN — DIPHENHYDRAMINE HYDROCHLORIDE 50 MG: 25 CAPSULE ORAL at 22:51

## 2022-04-05 RX ADMIN — Medication 10 ML: at 08:15

## 2022-04-05 RX ADMIN — TICAGRELOR 90 MG: 90 TABLET ORAL at 21:19

## 2022-04-05 RX ADMIN — METHYLPREDNISOLONE 32 MG: 16 TABLET ORAL at 08:14

## 2022-04-05 RX ADMIN — PANTOPRAZOLE SODIUM 40 MG: 40 TABLET, DELAYED RELEASE ORAL at 06:00

## 2022-04-05 RX ADMIN — SODIUM CHLORIDE 5 ML/KG/HR: 9 INJECTION, SOLUTION INTRAVENOUS at 15:22

## 2022-04-05 RX ADMIN — DIPHENHYDRAMINE HCL 50 MG: 25 CAPSULE ORAL at 08:14

## 2022-04-05 NOTE — PLAN OF CARE
Goal Outcome Evaluation:  Plan of Care Reviewed With: patient        Progress: improving  Outcome Evaluation: Pleasant with no c/o chest pain so far this shift. Recieved Lactulose as ordered , will monitor for BM. Heparin gtt continues . Snack given , will be NPO after midnight for Heart Cath in am

## 2022-04-05 NOTE — PROGRESS NOTES
[facility] HOSPITALIST PROGRESS NOTE     Patient Identification:  Name:  Lissa Eisenberg  Age:  51 y.o.  Sex:  female  :  1970  MRN:  [id number]  Visit Number:  [id number]  ROOM: [place] [address]     Primary Care Provider:  Yolanda Velasco DO    Length of stay in inpatient status:  2    Subjective     Chief Compliant:    Chief Complaint   Patient presents with   • Chest Pain       History of Presenting Illness:    Patient notes still feeling unwell since heart cath. She noted pain in legs and present less severe chest discomfort that has been there since the cath. She notes she told the cath lab about this and they got repeat EKGs to evaluate. Patient notes blood tinged emesis overnight. No N/V today. BM dark but not black. She reports BM with lactulose and was requesting another dose.     ROS:  Otherwise 10 point ROS negative other than documented above in HPI.     Objective     Current Hospital Meds:aspirin, 81 mg, Oral, Daily  atorvastatin, 80 mg, Oral, Nightly  cefdinir, 300 mg, Oral, Q12H  cetirizine, 10 mg, Oral, Daily  colchicine, 0.6 mg, Oral, Q12H  DULoxetine, 30 mg, Oral, BID  insulin aspart, 0-7 Units, Subcutaneous, TID AC  isosorbide mononitrate, 30 mg, Oral, Q24H  lactulose, 30 g, Oral, Once  metoprolol tartrate, 100 mg, Oral, Q12H  pantoprazole, 40 mg, Oral, QAM  sodium chloride, 10 mL, Intravenous, Q12H  sodium chloride, 10 mL, Intravenous, Q12H  ticagrelor, 90 mg, Oral, BID    heparin, 10.8 Units/kg/hr, Last Rate: 16.8 Units/kg/hr (22 1513)  sodium chloride, 5 mL/kg/hr, Last Rate: 5 mL/kg/hr (22 1522)        Current Antimicrobial Therapy:  Anti-Infectives (From admission, onward)    Ordered     Dose/Rate Route Frequency Start Stop    22 1428  cefdinir (OMNICEF) capsule 300 mg        Ordering Provider: Aaron Wiley MD    300 mg Oral Every 12 Hours Scheduled 22 2100 22        Current Diuretic Therapy:  Diuretics (From admission, onward)    None         ----------------------------------------------------------------------------------------------------------------------  Vital Signs:  Temp:  [97.3 °F (36.3 °C)-98.1 °F (36.7 °C)] 98.1 °F (36.7 °C)  Heart Rate:  [78-98] 92  Resp:  [9-28] 12  BP: ()/(62-92) 130/91  SpO2:  [90 %-99 %] 92 %  on  Flow (L/min):  [2] 2;   Device (Oxygen Therapy): room air  Body mass index is 34.6 kg/m².    Wt Readings from Last 3 Encounters:   04/05/22 91.5 kg (201 lb 11.2 oz)   03/30/22 89 kg (196 lb 3.4 oz)   11/04/21 98.4 kg (217 lb)     Intake & Output (last 3 days)       04/02 0701 04/03 0700 04/03 0701  04/04 0700 04/04 0701  04/05 0700 04/05 0701  04/06 0700    P.O.  486 600 120    I.V. (mL/kg)  440.1 (4.8) 170.4 (1.9)     Total Intake(mL/kg)  926.1 (10.1) 770.4 (8.4) 120 (1.3)    Net  +926.1 +770.4 +120            Urine Unmeasured Occurrence 1 x 4 x 3 x     Stool Unmeasured Occurrence  1 x          Diet Regular; Cardiac  ----------------------------------------------------------------------------------------------------------------------  Physical exam:  Constitutional:  Well-developed and well-nourished.  No respiratory distress.      HENT:  Head:  Normocephalic and atraumatic.  Mouth:  Moist mucous membranes.    Eyes:  Conjunctivae and EOM are normal. No scleral icterus.    Neck:  Neck supple.  No JVD present.    Cardiovascular:  Normal rate, regular rhythm and normal heart sounds with no murmur.  Pulmonary/Chest:  No respiratory distress, no wheezes, no crackles, with normal breath sounds and good air movement.  Abdominal:  Soft.  Bowel sounds are normal.  No distension and no tenderness.   Musculoskeletal:  No edema, no tenderness, and no deformity.  No red or swollen joints anywhere.    Neurological:  Alert and oriented to person, place, and time.  No cranial nerve deficit.  No tongue deviation.  No facial droop.  No slurred speech.   Skin:  Skin is warm and dry. No rash noted. No pallor.   Peripheral vascular:   Pulses in all 4 extremities with no clubbing, no cyanosis, no edema.  ----------------------------------------------------------------------------------------------------------------------  Tele:    ----------------------------------------------------------------------------------------------------------------------  Results from last 7 days   Lab Units 04/05/22 0033 04/04/22 0129 04/03/22 0428 04/03/22 0238 04/02/22 2250 03/30/22 2012   CRP mg/dL  --   --   --   --  <0.30 <0.30   LACTATE mmol/L  --   --   --  0.8  --  1.4   WBC 10*3/mm3 5.00 6.39 7.42  --  9.09 9.63   HEMOGLOBIN g/dL 14.1 13.7 13.4  --  14.9 15.3   HEMATOCRIT % 43.3 40.5 40.0  --  45.0 45.7   MCV fL 83.6 82.7 81.5  --  80.5 80.5   MCHC g/dL 32.6 33.8 33.5  --  33.1 33.5   PLATELETS 10*3/mm3 280 225 241  --  327 359   INR   --   --   --   --  0.91  --          Results from last 7 days   Lab Units 04/05/22 0033 04/04/22 0129 04/03/22 0450 04/03/22 0428 04/02/22  2250 03/30/22 2012   SODIUM mmol/L 132* 138  --  140 133* 128*   POTASSIUM mmol/L 4.1 3.7  --  3.8 3.8 4.4   MAGNESIUM mg/dL  --  2.3 1.8  --  1.2*  --    CHLORIDE mmol/L 100 106  --  107 97* 93*   CO2 mmol/L 18.7* 19.3*  --  19.0* 17.9* 18.3*   BUN mg/dL 7 6  --  8 10 13   CREATININE mg/dL 0.77 0.73  --  0.75 0.95 0.81   CALCIUM mg/dL 9.4 8.7  --  8.4* 9.3 9.6   GLUCOSE mg/dL 195* 111*  --  100* 120* 88   ALBUMIN g/dL  --   --   --  3.75 4.51 4.59   BILIRUBIN mg/dL  --   --   --  0.3 0.5 0.7   ALK PHOS U/L  --   --   --  78 91 92   AST (SGOT) U/L  --   --   --  19 21 19   ALT (SGPT) U/L  --   --   --  19 23 17   Estimated Creatinine Clearance: 93.6 mL/min (by C-G formula based on SCr of 0.77 mg/dL).  No results found for: AMMONIA  Results from last 7 days   Lab Units 04/03/22  0744 04/03/22 0428 04/03/22  0047   TROPONIN T ng/mL 0.073* 0.125* 0.154*         Results from last 7 days   Lab Units 04/03/22 0428   CHOLESTEROL mg/dL 85   TRIGLYCERIDES mg/dL 175*   HDL CHOL mg/dL 32*    LDL CHOL mg/dL 24     Hemoglobin A1C   Date/Time Value Ref Range Status   04/02/2022 2250 5.90 (H) 4.80 - 5.60 % Final     Glucose   Date/Time Value Ref Range Status   04/05/2022 1634 245 (H) 70 - 130 mg/dL Final     Comment:     Meter: [id number] : [id number] DAWSON MATT   04/05/2022 0551 172 (H) 70 - 130 mg/dL Final     Comment:     Meter: [id number] : [id number] NAYELI BEAUCHAMP   04/04/2022 2101 129 70 - 130 mg/dL Final     Comment:     Meter: [id number] : [id number] NAYELI GUOER   04/04/2022 1634 104 70 - 130 mg/dL Final     Comment:     Meter: [id number] : [id number] Lisette Duenasicoat   04/04/2022 1112 109 70 - 130 mg/dL Final     Comment:     Meter: [id number] : [id number] Lisette Mallicoat   04/04/2022 0615 135 (H) 70 - 130 mg/dL Final     Comment:     Meter: [id number] : [id number] ZOË REESE   04/03/2022 2213 175 (H) 70 - 130 mg/dL Final     Comment:     Meter: [id number] : [id number] ZOË REESE   04/03/2022 1633 101 70 - 130 mg/dL Final     Comment:     Meter: [id number] : [id number] HOWIE BAR     Lab Results   Component Value Date    TSH 1.950 04/02/2022     No results found for: PREGTESTUR, PREGSERUM, HCG, HCGQUANT  Pain Management Panel    There is no flowsheet data to display.       Brief Urine Lab Results  (Last result in the past 365 days)      Color   Clarity   Blood   Leuk Est   Nitrite   Protein   CREAT   Urine HCG        03/30/22 2013 Yellow   Clear   Negative   Negative   Positive   Negative               No results found for: BLOODCX  Urine Culture   Date Value Ref Range Status   03/30/2022 50,000 CFU/mL Escherichia coli (A)  Final     No results found for: WOUNDCX  No results found for: STOOLCX  No results found for: RESPCX  No results found for: AFBCX  Results from last 7 days   Lab Units 04/03/22  0238 04/02/22  2250 03/30/22 2012   LACTATE mmol/L 0.8  --  1.4   SED RATE mm/hr  --  9 13   CRP mg/dL  --  <0.30 <0.30       I have personally looked at  the labs and they are summarized above.  ----------------------------------------------------------------------------------------------------------------------  Detailed radiology reports for the last 24 hours:    Imaging Results (Last 24 Hours)     ** No results found for the last 24 hours. **        Assessment & Plan    #NSTEMI  #Hx of CABG  - Stress test abnormal with  evidence of ischemia on anterior wall   - Cardiology following.   - Patient had LHC on 4/5 that per verbal report revealed RCA occlusion requiring PCI.   - Patient with less severe chest pain after procedure. Cardiology had ordered two repeat EKGs that do not show new or worsening evidence of ischemia.   - Will monitor on tele overnight.     #Constipation  - Repeat CT abdomen/pelvis without evidence of obstruction   - BM with lactulose yesterday and patient requesting repeat dose.     #GERD  #Suspected recent episode of gastroenteritis   - Patient had N/V with some blood with emesis   - Possible partial esophageal rupture that could explain blood in emesis. Had isolated recurrent episode on 4/4  - No more emesis and nausea improved   - Hemoglobin stable. Will hold off on endoscopy and monitor symptoms.     #DM II  - A1C 5.9%  - SSI while inpatient and continue home regimen at discharge.     #HTN  - Restart home regimen     #QTC prolongation   #Hypomagnesemia  - Avoid QT prolongation as able and replace magnesium per protocol     F: PO  E: Replace as needed   N: CC    Code status: Full     Dispo: Pending clinical improvement and ischemic evaluation.     VTE Prophylaxis:   Mechanical Order History:     None      Pharmalogical Order History:      Ordered     Dose Route Frequency Stop    04/05/22 1053  heparin (porcine) injection  Status:  Discontinued         -- -- As Needed 04/05/22 1213    04/03/22 0315  heparin 92627 units/250 mL (100 units/mL) in 0.9% NaCl infusion  9.96 mL/hr         10.8 Units/kg/hr IV Titrated --    04/03/22 0159  heparin  (porcine) 5000 UNIT/ML injection 5,000 Units         5,000 Units IV Once 04/03/22 0209    04/03/22 0159  heparin 11697 units/250 mL (100 units/mL) in 0.9% NaCl infusion  10 mL/hr,   Status:  Discontinued         11.3 Units/kg/hr IV Titrated 04/03/22 0315    04/03/22 0159  heparin (porcine) 5000 UNIT/ML injection 5,000 Units         5,000 Units IV As Needed --    04/03/22 0159  heparin (porcine) 5000 UNIT/ML injection 2,500 Units         2,500 Units IV As Needed --                Aaron Wiley MD  [facility]  04/05/22  16:53 EDT

## 2022-04-05 NOTE — PLAN OF CARE
Goal Outcome Evaluation:              Outcome Evaluation: heart cath today with 1 stent to RCA placed. initially had chest pain and femoral site pain but is now releaved.

## 2022-04-05 NOTE — NURSING NOTE
Due to the Covid 19 pandemic [facility]  is open but we are social distancing that means space is limited  We will do our best to get  the patients that want to participate into the program scheduled. Patients are being notified of the the restrictions and  being placed on a waiting list at this time and if they are interested staff will schedule patients as soon as possible.    Patient is scheduled for April 29 2022 at 10 am   She is going to get [facility] to bring her two days a week.

## 2022-04-06 LAB
ALBUMIN SERPL-MCNC: 3.84 G/DL (ref 3.5–5.2)
ALBUMIN/GLOB SERPL: 1.4 G/DL
ALP SERPL-CCNC: 82 U/L (ref 39–117)
ALT SERPL W P-5'-P-CCNC: 17 U/L (ref 1–33)
ANION GAP SERPL CALCULATED.3IONS-SCNC: 10.1 MMOL/L (ref 5–15)
APTT PPP: 25.4 SECONDS (ref 26.5–34.5)
AST SERPL-CCNC: 12 U/L (ref 1–32)
BASOPHILS # BLD AUTO: 0.02 10*3/MM3 (ref 0–0.2)
BASOPHILS NFR BLD AUTO: 0.2 % (ref 0–1.5)
BILIRUB SERPL-MCNC: 0.3 MG/DL (ref 0–1.2)
BUN SERPL-MCNC: 7 MG/DL (ref 6–20)
BUN/CREAT SERPL: 9.3 (ref 7–25)
CALCIUM SPEC-SCNC: 8.8 MG/DL (ref 8.6–10.5)
CHLORIDE SERPL-SCNC: 107 MMOL/L (ref 98–107)
CHOLEST SERPL-MCNC: 123 MG/DL (ref 0–200)
CO2 SERPL-SCNC: 18.9 MMOL/L (ref 22–29)
CREAT SERPL-MCNC: 0.75 MG/DL (ref 0.57–1)
DEPRECATED RDW RBC AUTO: 43 FL (ref 37–54)
EGFRCR SERPLBLD CKD-EPI 2021: 95.9 ML/MIN/1.73
EOSINOPHIL # BLD AUTO: 0.01 10*3/MM3 (ref 0–0.4)
EOSINOPHIL NFR BLD AUTO: 0.1 % (ref 0.3–6.2)
ERYTHROCYTE [DISTWIDTH] IN BLOOD BY AUTOMATED COUNT: 14.4 % (ref 12.3–15.4)
GLOBULIN UR ELPH-MCNC: 2.8 GM/DL
GLUCOSE BLDC GLUCOMTR-MCNC: 119 MG/DL (ref 70–130)
GLUCOSE BLDC GLUCOMTR-MCNC: 142 MG/DL (ref 70–130)
GLUCOSE BLDC GLUCOMTR-MCNC: 150 MG/DL (ref 70–130)
GLUCOSE SERPL-MCNC: 201 MG/DL (ref 65–99)
HCT VFR BLD AUTO: 39.3 % (ref 34–46.6)
HDLC SERPL-MCNC: 40 MG/DL (ref 40–60)
HGB BLD-MCNC: 13 G/DL (ref 12–15.9)
IMM GRANULOCYTES # BLD AUTO: 0.08 10*3/MM3 (ref 0–0.05)
IMM GRANULOCYTES NFR BLD AUTO: 0.7 % (ref 0–0.5)
LDLC SERPL CALC-MCNC: 29 MG/DL (ref 0–100)
LDLC/HDLC SERPL: 0.19 {RATIO}
LYMPHOCYTES # BLD AUTO: 1.77 10*3/MM3 (ref 0.7–3.1)
LYMPHOCYTES NFR BLD AUTO: 15.4 % (ref 19.6–45.3)
MCH RBC QN AUTO: 27.2 PG (ref 26.6–33)
MCHC RBC AUTO-ENTMCNC: 33.1 G/DL (ref 31.5–35.7)
MCV RBC AUTO: 82.2 FL (ref 79–97)
MONOCYTES # BLD AUTO: 0.63 10*3/MM3 (ref 0.1–0.9)
MONOCYTES NFR BLD AUTO: 5.5 % (ref 5–12)
NEUTROPHILS NFR BLD AUTO: 78.1 % (ref 42.7–76)
NEUTROPHILS NFR BLD AUTO: 8.98 10*3/MM3 (ref 1.7–7)
NRBC BLD AUTO-RTO: 0 /100 WBC (ref 0–0.2)
PLATELET # BLD AUTO: 270 10*3/MM3 (ref 140–450)
PMV BLD AUTO: 9.2 FL (ref 6–12)
POTASSIUM SERPL-SCNC: 3.9 MMOL/L (ref 3.5–5.2)
PROT SERPL-MCNC: 6.6 G/DL (ref 6–8.5)
RBC # BLD AUTO: 4.78 10*6/MM3 (ref 3.77–5.28)
SODIUM SERPL-SCNC: 136 MMOL/L (ref 136–145)
TRIGL SERPL-MCNC: 377 MG/DL (ref 0–150)
VLDLC SERPL-MCNC: 54 MG/DL (ref 5–40)
WBC NRBC COR # BLD: 11.49 10*3/MM3 (ref 3.4–10.8)

## 2022-04-06 PROCEDURE — 63710000001 INSULIN ASPART PER 5 UNITS: Performed by: INTERNAL MEDICINE

## 2022-04-06 PROCEDURE — 94799 UNLISTED PULMONARY SVC/PX: CPT

## 2022-04-06 PROCEDURE — 85025 COMPLETE CBC W/AUTO DIFF WBC: CPT | Performed by: INTERNAL MEDICINE

## 2022-04-06 PROCEDURE — 85730 THROMBOPLASTIN TIME PARTIAL: CPT | Performed by: INTERNAL MEDICINE

## 2022-04-06 PROCEDURE — 99232 SBSQ HOSP IP/OBS MODERATE 35: CPT | Performed by: INTERNAL MEDICINE

## 2022-04-06 PROCEDURE — 82962 GLUCOSE BLOOD TEST: CPT

## 2022-04-06 PROCEDURE — 80053 COMPREHEN METABOLIC PANEL: CPT | Performed by: INTERNAL MEDICINE

## 2022-04-06 PROCEDURE — 80061 LIPID PANEL: CPT | Performed by: INTERNAL MEDICINE

## 2022-04-06 RX ORDER — LACTULOSE 10 G/15ML
30 SOLUTION ORAL ONCE
Status: COMPLETED | OUTPATIENT
Start: 2022-04-06 | End: 2022-04-06

## 2022-04-06 RX ADMIN — COLCHICINE 0.6 MG: 0.6 TABLET, FILM COATED ORAL at 09:15

## 2022-04-06 RX ADMIN — DULOXETINE HYDROCHLORIDE 30 MG: 30 CAPSULE, DELAYED RELEASE ORAL at 20:43

## 2022-04-06 RX ADMIN — ACETAMINOPHEN 650 MG: 325 TABLET ORAL at 14:52

## 2022-04-06 RX ADMIN — METOPROLOL TARTRATE 100 MG: 100 TABLET, FILM COATED ORAL at 20:43

## 2022-04-06 RX ADMIN — PANTOPRAZOLE SODIUM 40 MG: 40 TABLET, DELAYED RELEASE ORAL at 06:45

## 2022-04-06 RX ADMIN — ATORVASTATIN CALCIUM 80 MG: 40 TABLET, FILM COATED ORAL at 20:43

## 2022-04-06 RX ADMIN — METOPROLOL TARTRATE 100 MG: 100 TABLET, FILM COATED ORAL at 09:15

## 2022-04-06 RX ADMIN — Medication 10 ML: at 09:17

## 2022-04-06 RX ADMIN — CETIRIZINE HYDROCHLORIDE 10 MG: 10 TABLET, FILM COATED ORAL at 09:15

## 2022-04-06 RX ADMIN — ASPIRIN 81 MG: 81 TABLET, COATED ORAL at 09:15

## 2022-04-06 RX ADMIN — DULOXETINE HYDROCHLORIDE 30 MG: 30 CAPSULE, DELAYED RELEASE ORAL at 09:15

## 2022-04-06 RX ADMIN — TICAGRELOR 90 MG: 90 TABLET ORAL at 20:44

## 2022-04-06 RX ADMIN — LACTULOSE 30 G: 10 SOLUTION ORAL at 14:52

## 2022-04-06 RX ADMIN — ACETAMINOPHEN AND CODEINE PHOSPHATE 1 TABLET: 300; 30 TABLET ORAL at 20:42

## 2022-04-06 RX ADMIN — CEFDINIR 300 MG: 300 CAPSULE ORAL at 09:15

## 2022-04-06 RX ADMIN — Medication 10 ML: at 09:15

## 2022-04-06 RX ADMIN — ISOSORBIDE MONONITRATE 30 MG: 30 TABLET, EXTENDED RELEASE ORAL at 09:15

## 2022-04-06 RX ADMIN — Medication 10 ML: at 20:44

## 2022-04-06 RX ADMIN — ACETAMINOPHEN 650 MG: 325 TABLET ORAL at 23:35

## 2022-04-06 RX ADMIN — Medication 5 MG: at 23:23

## 2022-04-06 RX ADMIN — COLCHICINE 0.6 MG: 0.6 TABLET, FILM COATED ORAL at 20:43

## 2022-04-06 RX ADMIN — ACETAMINOPHEN AND CODEINE PHOSPHATE 1 TABLET: 300; 30 TABLET ORAL at 06:45

## 2022-04-06 RX ADMIN — TICAGRELOR 90 MG: 90 TABLET ORAL at 09:15

## 2022-04-06 RX ADMIN — CEFDINIR 300 MG: 300 CAPSULE ORAL at 20:44

## 2022-04-06 RX ADMIN — INSULIN ASPART 2 UNITS: 100 INJECTION, SOLUTION INTRAVENOUS; SUBCUTANEOUS at 11:09

## 2022-04-06 NOTE — PROGRESS NOTES
[facility] HOSPITALIST PROGRESS NOTE     Patient Identification:  Name:  Lissa Eisenberg  Age:  51 y.o.  Sex:  female  :  1970  MRN:  [id number]  Visit Number:  [id number]  ROOM: [place] [address]     Primary Care Provider:  Yolanda Velasco DO    Length of stay in inpatient status:  3    Subjective     Chief Compliant:    Chief Complaint   Patient presents with   • Chest Pain       History of Presenting Illness:    Patient still reporting intermittent chest pain. No BM yesterday. No emesis yesterday.     ROS:  Otherwise 10 point ROS negative other than documented above in HPI.     Objective     Current Hospital Meds:aspirin, 81 mg, Oral, Daily  atorvastatin, 80 mg, Oral, Nightly  cefdinir, 300 mg, Oral, Q12H  cetirizine, 10 mg, Oral, Daily  colchicine, 0.6 mg, Oral, Q12H  DULoxetine, 30 mg, Oral, BID  insulin aspart, 0-7 Units, Subcutaneous, TID AC  isosorbide mononitrate, 30 mg, Oral, Q24H  metoprolol tartrate, 100 mg, Oral, Q12H  pantoprazole, 40 mg, Oral, QAM  sodium chloride, 10 mL, Intravenous, Q12H  sodium chloride, 10 mL, Intravenous, Q12H  ticagrelor, 90 mg, Oral, BID         Current Antimicrobial Therapy:  Anti-Infectives (From admission, onward)    Ordered     Dose/Rate Route Frequency Start Stop    22 1428  cefdinir (OMNICEF) capsule 300 mg        Ordering Provider: Aaron Wiley MD    300 mg Oral Every 12 Hours Scheduled 22 2100 22        Current Diuretic Therapy:  Diuretics (From admission, onward)    None        ----------------------------------------------------------------------------------------------------------------------  Vital Signs:  Temp:  [97.6 °F (36.4 °C)-98.6 °F (37 °C)] 98.3 °F (36.8 °C)  Heart Rate:  [73-95] 90  Resp:  [8-26] 19  BP: ()/(52-79) 112/71  SpO2:  [92 %-99 %] 98 %  on   ;   Device (Oxygen Therapy): room air  Body mass index is 34.57 kg/m².    Wt Readings from Last 3 Encounters:   22 91.4 kg (201 lb 8 oz)   22 89 kg  (196 lb 3.4 oz)   11/04/21 98.4 kg (217 lb)     Intake & Output (last 3 days)       04/03 0701 04/04 0700 04/04 0701 04/05 0700 04/05 0701 04/06 0700 04/06 0701 04/07 0700    P.O. 486 600 360 566    I.V. (mL/kg) 440.1 (4.8) 170.4 (1.9) 1970 (21.6) 101.5 (1.1)    Total Intake(mL/kg) 926.1 (10.1) 770.4 (8.4) 2330 (25.5) 667.5 (7.3)    Net +926.1 +770.4 +2330 +667.5            Urine Unmeasured Occurrence 4 x 3 x 4 x     Stool Unmeasured Occurrence 1 x           Diet Regular; Cardiac  ----------------------------------------------------------------------------------------------------------------------  Physical exam:  Constitutional:  Well-developed and well-nourished.  No respiratory distress.      HENT:  Head:  Normocephalic and atraumatic.  Mouth:  Moist mucous membranes.    Eyes:  Conjunctivae and EOM are normal. No scleral icterus.    Neck:  Neck supple.  No JVD present.    Cardiovascular:  Normal rate, regular rhythm and normal heart sounds with no murmur.  Pulmonary/Chest:  No respiratory distress, no wheezes, no crackles, with normal breath sounds and good air movement.  Abdominal:  Soft.  Bowel sounds are normal.  No distension and no tenderness.   Musculoskeletal:  No edema, no tenderness, and no deformity.  No red or swollen joints anywhere.    Neurological:  Alert and oriented to person, place, and time.  No cranial nerve deficit.  No tongue deviation.  No facial droop.  No slurred speech.   Skin:  Skin is warm and dry. No rash noted. No pallor.   Peripheral vascular:  Pulses in all 4 extremities with no clubbing, no cyanosis, no edema.  ----------------------------------------------------------------------------------------------------------------------  Tele:    ----------------------------------------------------------------------------------------------------------------------  Results from last 7 days   Lab Units 04/06/22  0151 04/05/22  0033 04/04/22  0129 04/03/22  0428 04/03/22  0238  04/02/22 2250 03/30/22 2012   CRP mg/dL  --   --   --   --   --  <0.30 <0.30   LACTATE mmol/L  --   --   --   --  0.8  --  1.4   WBC 10*3/mm3 11.49* 5.00 6.39   < >  --  9.09 9.63   HEMOGLOBIN g/dL 13.0 14.1 13.7   < >  --  14.9 15.3   HEMATOCRIT % 39.3 43.3 40.5   < >  --  45.0 45.7   MCV fL 82.2 83.6 82.7   < >  --  80.5 80.5   MCHC g/dL 33.1 32.6 33.8   < >  --  33.1 33.5   PLATELETS 10*3/mm3 270 280 225   < >  --  327 359   INR   --   --   --   --   --  0.91  --     < > = values in this interval not displayed.         Results from last 7 days   Lab Units 04/06/22  0151 04/05/22  0033 04/04/22  0129 04/03/22  0450 04/03/22  0428 04/02/22 2250   SODIUM mmol/L 136 132* 138  --  140 133*   POTASSIUM mmol/L 3.9 4.1 3.7  --  3.8 3.8   MAGNESIUM mg/dL  --   --  2.3 1.8  --  1.2*   CHLORIDE mmol/L 107 100 106  --  107 97*   CO2 mmol/L 18.9* 18.7* 19.3*  --  19.0* 17.9*   BUN mg/dL 7 7 6  --  8 10   CREATININE mg/dL 0.75 0.77 0.73  --  0.75 0.95   CALCIUM mg/dL 8.8 9.4 8.7  --  8.4* 9.3   GLUCOSE mg/dL 201* 195* 111*  --  100* 120*   ALBUMIN g/dL 3.84  --   --   --  3.75 4.51   BILIRUBIN mg/dL 0.3  --   --   --  0.3 0.5   ALK PHOS U/L 82  --   --   --  78 91   AST (SGOT) U/L 12  --   --   --  19 21   ALT (SGPT) U/L 17  --   --   --  19 23   Estimated Creatinine Clearance: 96.1 mL/min (by C-G formula based on SCr of 0.75 mg/dL).  No results found for: AMMONIA  Results from last 7 days   Lab Units 04/03/22  0744 04/03/22  0428 04/03/22  0047   TROPONIN T ng/mL 0.073* 0.125* 0.154*         Results from last 7 days   Lab Units 04/06/22  0151   CHOLESTEROL mg/dL 123   TRIGLYCERIDES mg/dL 377*   HDL CHOL mg/dL 40   LDL CHOL mg/dL 29     Glucose   Date/Time Value Ref Range Status   04/06/2022 1616 119 70 - 130 mg/dL Final     Comment:     Meter: [id number] : [id number] Lisette [facility]   04/06/2022 1044 150 (H) 70 - 130 mg/dL Final     Comment:     Meter: [id number] : [id number] [facility]   04/06/2022 0633  142 (H) 70 - 130 mg/dL Final     Comment:     Meter: [id number] : [id number] MICHELLE VUONG   04/05/2022 2124 212 (H) 70 - 130 mg/dL Final     Comment:     Meter: [id number] : [id number] stevie tiwari   04/05/2022 1634 245 (H) 70 - 130 mg/dL Final     Comment:     Meter: [id number] : [id number] DAWSON MATT   04/05/2022 0551 172 (H) 70 - 130 mg/dL Final     Comment:     Meter: [id number] : [id number] NAYELI BEAUCHAMP   04/04/2022 2101 129 70 - 130 mg/dL Final     Comment:     Meter: [id number] : [id number] NAYELI BEAUCHAMP   04/04/2022 1634 104 70 - 130 mg/dL Final     Comment:     Meter: [id number] : [id number] Lisette Duenastyshawnziyad     Lab Results   Component Value Date    TSH 1.950 04/02/2022     No results found for: PREGTESTUR, PREGSERUM, HCG, HCGQUANT  Pain Management Panel    There is no flowsheet data to display.       Brief Urine Lab Results  (Last result in the past 365 days)      Color   Clarity   Blood   Leuk Est   Nitrite   Protein   CREAT   Urine HCG        03/30/22 2013 Yellow   Clear   Negative   Negative   Positive   Negative               No results found for: BLOODCX  Urine Culture   Date Value Ref Range Status   03/30/2022 50,000 CFU/mL Escherichia coli (A)  Final     No results found for: WOUNDCX  No results found for: STOOLCX  No results found for: RESPCX  No results found for: AFBCX  Results from last 7 days   Lab Units 04/03/22  0238 04/02/22  2250 03/30/22 2012   LACTATE mmol/L 0.8  --  1.4   SED RATE mm/hr  --  9 13   CRP mg/dL  --  <0.30 <0.30       I have personally looked at the labs and they are summarized above.  ----------------------------------------------------------------------------------------------------------------------  Detailed radiology reports for the last 24 hours:    Imaging Results (Last 24 Hours)     Procedure Component Value Units Date/Time    US Arterial Doppler Lower Extremity Right [[id number]] Collected: 04/06/22 0813     Updated: 04/06/22 0822     Narrative:      US ARTERIAL DOPPLER LOWER EXTREMITY  RIGHT-     REASON FOR EXAM:  femoral site pain post heart cath; I21.4-Non-ST  elevation (NSTEMI) myocardial infarction; R94.39-Abnormal result of  other cardiovascular function study     Comparison:None.     TECHNIQUE:  Multiple real-time color doppler images were obtained.  M-mode Doppler was used for velocity determination and spectral pattern.  Stenosis was evaluated using the NASCET or similar measurement  technique.      RIGHT LEG: In the right groin there is a rounded area of echogenicity  around the common femoral artery and vein that would be consistent with  hematoma. There was color Doppler flow in the profunda femoral and  superficial femoral arteries. The wave pattern was triphasic in the  superficial femoral artery. Velocities were appropriate. The popliteal  artery has a triphasic wave pattern, velocity of 79 cm/s. There is  triphasic flow in the posterior tibial artery to the ankle. Velocity at  the ankle was 54 cm/s.       Impression:      Echogenic material around the common femoral artery in the  groin, consistent with hematoma. There is triphasic flow throughout the  arteries of the right lower extremity.     This report was finalized on 4/6/2022 8:19 AM by Dr. Angelo Diaz II, MD.           Assessment & Plan    #NSTEMI  #Hx of CABG  - Stress test abnormal with  evidence of ischemia on anterior wall   - Cardiology following.   - Patient had LHC on 4/5 that per verbal report revealed RCA occlusion requiring PCI.   - Patient with less severe chest pain after procedure. Cardiology had ordered two repeat EKGs that do not show new or worsening evidence of ischemia.   - Will monitor on tele overnight.     #Constipation  - Repeat CT abdomen/pelvis without evidence of obstruction   - BM with lactulose. Patient still reports symptoms. Will repeat dose.     #GERD  #Suspected recent episode of gastroenteritis   - Patient had N/V with some blood with  emesis   - Possible partial esophageal rupture that could explain blood in emesis. Had isolated recurrent episode on 4/4  - No more emesis and nausea improved   - Hemoglobin stable. Will hold off on endoscopy and monitor symptoms.     #Femoral cath site hematoma   - Monitor Hgb. Cardiology aware.     #DM II  - A1C 5.9%  - SSI while inpatient and continue home regimen at discharge.     #HTN  - Restart home regimen     #QTC prolongation   #Hypomagnesemia  - Avoid QT prolongation as able and replace magnesium per protocol     F: PO  E: Replace as needed   N: CC    Code status: Full     Dispo: Pending clinical improvement and ischemic evaluation. Likely d/c tomorrow. Transfer to [facility].       VTE Prophylaxis:   Mechanical Order History:     None      Pharmalogical Order History:      Ordered     Dose Route Frequency Stop    04/05/22 1053  heparin (porcine) injection  Status:  Discontinued         -- -- As Needed 04/05/22 1213    04/03/22 0315  heparin 56265 units/250 mL (100 units/mL) in 0.9% NaCl infusion  9.96 mL/hr,   Status:  Discontinued         10.8 Units/kg/hr IV Titrated 04/05/22 1759    04/03/22 0159  heparin (porcine) 5000 UNIT/ML injection 5,000 Units         5,000 Units IV Once 04/03/22 0209    04/03/22 0159  heparin 33619 units/250 mL (100 units/mL) in 0.9% NaCl infusion  10 mL/hr,   Status:  Discontinued         11.3 Units/kg/hr IV Titrated 04/03/22 0315    04/03/22 0159  heparin (porcine) 5000 UNIT/ML injection 5,000 Units         5,000 Units IV As Needed --    04/03/22 0159  heparin (porcine) 5000 UNIT/ML injection 2,500 Units         2,500 Units IV As Needed --                  Aaron Wiley MD  [facility] Hospital[facility]  04/06/22  17:48 EDT

## 2022-04-06 NOTE — NURSING NOTE
Patient transferred up to 3 south from PCU this shift. Patient denies any issues at this time. Will continue with plan of care.

## 2022-04-06 NOTE — PROGRESS NOTES
.  Progress note     Patient Identification:  Name:  Lissa Eisenberg  Age:  51 y.o.  Sex:  female  :  1970  MRN:  [id number]  Visit Number:  [id number]    NSTEMI (non-ST elevated myocardial infarction) (HCC)        Assessment       1. NSTEMI due to severe in-stent restenosis of the RCA status post PCI x1 with a drug-eluting stent with JAQUELINE-3 flow distally and less than 20% was a stenosis  2. ASCVD, s/p CABG x2 5 years ago with subsequent PCI in 2021 due to medically refractory angina  3. Essential hypertension, controlled   4. Hyperlipidemia, on statin     Recommendations      1. NSTEMI/ Chest pain  · Addressed with PCI.  Improving.  Chest pain-free.  · Continue guideline directed medical therapy for ACS.  Consider cardiac rehabilitation  · Patient will need close follow-up with cardiology after discharge.  Her EF is preserved.    As per history she has CABG x2.  During the diagnostic cardiac catheterization LIMA was engaged and nonselective of the ascending aorta was taking which showed no grafts to the RCA or left circumflex artery.  For future reference if she needs a cardiac catheterization again would recommend selective engage of the JAZZ.  CT surgery report not available prior to to her cardiac catheterization.    2.  Pain at the access site  Mild hematoma.  Groin is soft. vascular structures preserved.  No fistula no pseudoaneurysm  No RP bleed  Reassurance  Pain control    From a cardiac standpoint patient was supposed to be discharged today but she is going through some transportation issues follow-up with  and .    Dr. Smalls will return to the service tomorrow     Reason for consultation: NSTEMI and Chest pain    Subjective:   Still c/o chest pain  ----------------------------------------------------------------------------------------------------------------------  Current Hospital Meds:  aspirin, 81 mg, Oral, Daily  atorvastatin, 80 mg, Oral, Nightly  cefdinir,  300 mg, Oral, Q12H  cetirizine, 10 mg, Oral, Daily  colchicine, 0.6 mg, Oral, Q12H  DULoxetine, 30 mg, Oral, BID  insulin aspart, 0-7 Units, Subcutaneous, TID AC  isosorbide mononitrate, 30 mg, Oral, Q24H  metoprolol tartrate, 100 mg, Oral, Q12H  pantoprazole, 40 mg, Oral, QAM  sodium chloride, 10 mL, Intravenous, Q12H  sodium chloride, 10 mL, Intravenous, Q12H  ticagrelor, 90 mg, Oral, BID         ----------------------------------------------------------------------------------------------------------------------  Vital Signs:  Temp:  [97.6 °F (36.4 °C)-98.6 °F (37 °C)] 98.3 °F (36.8 °C)  Heart Rate:  [73-95] (P) 77  Resp:  [8-26] (P) 10  BP: ()/(52-91) (P) 112/70      04/04/22  0500 04/05/22  0500 04/06/22  0456   Weight: 91.4 kg (201 lb 8 oz) 91.5 kg (201 lb 11.2 oz) 91.4 kg (201 lb 8 oz)     Body mass index is 34.57 kg/m².    Intake/Output Summary (Last 24 hours) at 4/6/2022 1631  Last data filed at 4/6/2022 1300  Gross per 24 hour   Intake 2877.46 ml   Output --   Net 2877.46 ml     Diet Regular; Cardiac  ----------------------------------------------------------------------------------------------------------------------  Physical exam:  Constitutional:  Well-developed and well-nourished.  No respiratory distress.      HENT:  Head:  Normocephalic and atraumatic.  Mouth:  Moist mucous membranes.    Eyes:  Conjunctivae and EOM are normal.  Pupils are equal, round, and reactive to light.  No scleral icterus.    Neck:  Neck supple.  No JVD present.    Cardiovascular:  Normal rate, regular rhythm and normal heart sounds with no murmur.  Pulmonary/Chest:  No respiratory distress, no wheezes, no crackles, with normal breath sounds and good air movement.  Abdominal:  Soft.  Bowel sounds are normal.  No distension and no tenderness.   Musculoskeletal:  No edema, no tenderness, and no deformity.  No red or swollen joints anywhere.    Neurological:  Alert and oriented to person, place, and time.  No cranial nerve  deficit.  No tongue deviation.  No facial droop.  No slurred speech.   Skin:  Skin is warm and dry. No rash noted. No pallor.   Peripheral vascular:  Strong pulses in all 4 extremities with no clubbing, no cyanosis, no edema.  ----------------------------------------------------------------------------------------------------------------------  Tele:   ----------------------------------------------------------------------------------------------------------------------  Results from last 7 days   Lab Units 04/03/22  0744 04/03/22  0428 04/03/22  0047   TROPONIN T ng/mL 0.073* 0.125* 0.154*     Results from last 7 days   Lab Units 04/06/22  0151 04/05/22  0033 04/04/22  0129 04/03/22  0428 04/03/22  0238 04/02/22  2250 03/30/22 2012   CRP mg/dL  --   --   --   --   --  <0.30 <0.30   LACTATE mmol/L  --   --   --   --  0.8  --  1.4   WBC 10*3/mm3 11.49* 5.00 6.39   < >  --  9.09 9.63   HEMOGLOBIN g/dL 13.0 14.1 13.7   < >  --  14.9 15.3   HEMATOCRIT % 39.3 43.3 40.5   < >  --  45.0 45.7   MCV fL 82.2 83.6 82.7   < >  --  80.5 80.5   MCHC g/dL 33.1 32.6 33.8   < >  --  33.1 33.5   PLATELETS 10*3/mm3 270 280 225   < >  --  327 359   INR   --   --   --   --   --  0.91  --     < > = values in this interval not displayed.         Results from last 7 days   Lab Units 04/06/22  0151 04/05/22  0033 04/04/22  0129 04/03/22  0450 04/03/22  0428 04/02/22  2250   SODIUM mmol/L 136 132* 138  --  140 133*   POTASSIUM mmol/L 3.9 4.1 3.7  --  3.8 3.8   MAGNESIUM mg/dL  --   --  2.3 1.8  --  1.2*   CHLORIDE mmol/L 107 100 106  --  107 97*   CO2 mmol/L 18.9* 18.7* 19.3*  --  19.0* 17.9*   BUN mg/dL 7 7 6  --  8 10   CREATININE mg/dL 0.75 0.77 0.73  --  0.75 0.95   CALCIUM mg/dL 8.8 9.4 8.7  --  8.4* 9.3   GLUCOSE mg/dL 201* 195* 111*  --  100* 120*   ALBUMIN g/dL 3.84  --   --   --  3.75 4.51   BILIRUBIN mg/dL 0.3  --   --   --  0.3 0.5   ALK PHOS U/L 82  --   --   --  78 91   AST (SGOT) U/L 12  --   --   --  19 21   ALT (SGPT) U/L 17   --   --   --  19 23   Estimated Creatinine Clearance: 96.1 mL/min (by C-G formula based on SCr of 0.75 mg/dL).    No results found for: AMMONIA  Results from last 7 days   Lab Units 04/06/22  0151   CHOLESTEROL mg/dL 123   TRIGLYCERIDES mg/dL 377*   HDL CHOL mg/dL 40   LDL CHOL mg/dL 29     No results found for: BLOODCX  Urine Culture   Date Value Ref Range Status   03/30/2022 50,000 CFU/mL Escherichia coli (A)  Final     No results found for: WOUNDCX  No results found for: STOOLCX    I have personally looked at the labs and they are summarized above.  ----------------------------------------------------------------------------------------------------------------------  Imaging Results (Last 24 Hours)     Procedure Component Value Units Date/Time    US Arterial Doppler Lower Extremity Right [[id number]] Collected: 04/06/22 0813     Updated: 04/06/22 0822    Narrative:      US ARTERIAL DOPPLER LOWER EXTREMITY  RIGHT-     REASON FOR EXAM:  femoral site pain post heart cath; I21.4-Non-ST  elevation (NSTEMI) myocardial infarction; R94.39-Abnormal result of  other cardiovascular function study     Comparison:None.     TECHNIQUE:  Multiple real-time color doppler images were obtained.  M-mode Doppler was used for velocity determination and spectral pattern.  Stenosis was evaluated using the NASCET or similar measurement  technique.      RIGHT LEG: In the right groin there is a rounded area of echogenicity  around the common femoral artery and vein that would be consistent with  hematoma. There was color Doppler flow in the profunda femoral and  superficial femoral arteries. The wave pattern was triphasic in the  superficial femoral artery. Velocities were appropriate. The popliteal  artery has a triphasic wave pattern, velocity of 79 cm/s. There is  triphasic flow in the posterior tibial artery to the ankle. Velocity at  the ankle was 54 cm/s.       Impression:      Echogenic material around the common femoral artery in  the  groin, consistent with hematoma. There is triphasic flow throughout the  arteries of the right lower extremity.     This report was finalized on 4/6/2022 8:19 AM by Dr. Angelo Diaz II, MD.           ----------------------------------------------------------------------------------------------------------------------      Alxeandr Marquez MD  04/06/22  16:31 EDT

## 2022-04-06 NOTE — PLAN OF CARE
"Goal Outcome Evaluation:           Progress: (P) improving  Outcome Evaluation: (P) Patient has been resting majority of the day; patient has had minimal-moderate pain (chest and groin); pt was given tylenol once today PRN for pain; pt VSS; pt has hematoma formation on rt groin where cardiac cath was placed (MD knows and stated \"normal\"); bed low and locked and call light is in reach.  "

## 2022-04-06 NOTE — PLAN OF CARE
Goal Outcome Evaluation:              Outcome Evaluation: Patient resting in bed at this time. Patient denies complaints. Patient remains on room air. Patient requested medication to help her sleep and recieved Melatonin and Benadryl as ordered PRN. Patient requested pain medication one time this shift for leg pain. Call light within reach.

## 2022-04-06 NOTE — CASE MANAGEMENT/SOCIAL WORK
Discharge Planning Assessment   [facility]     Patient Name: Lissa Eisenberg  MRN: [id number]  Today's Date: 4/6/2022    Admit Date: 4/2/2022     Discharge Plan     Row Name 04/06/22 1054       Plan    Plan Pt admitted 4/2/22. Pt lives with spouse and son and plans to return home at discharge. Pt lives in a camper with family and reports no issues. Pt does not utilize home health services or DME. Pt does not have a current PCP. Pt's friend to provide transportation home at discharge. SS following.    14:00: SS received additional consult stating that pt is requesting to speak with SS again. SS spoke with pt who states she will need [facility] arranged for transportation at discharge and for appointments post discharge. SS spoke with [facility] via [facility] who states pt has an established profile and will not need a new transportation referral. Transportation referral would be considered at time of each scheduled appointment and faxed directly to PCP if required.  to provide pt with number to [facility] and [facility] per pt's request.               GUADALUPE Sharp

## 2022-04-07 ENCOUNTER — APPOINTMENT (OUTPATIENT)
Dept: GENERAL RADIOLOGY | Facility: HOSPITAL | Age: 52
End: 2022-04-07

## 2022-04-07 ENCOUNTER — APPOINTMENT (OUTPATIENT)
Dept: ULTRASOUND IMAGING | Facility: HOSPITAL | Age: 52
End: 2022-04-07

## 2022-04-07 LAB
BASOPHILS # BLD AUTO: 0.06 10*3/MM3 (ref 0–0.2)
BASOPHILS NFR BLD AUTO: 0.7 % (ref 0–1.5)
DEPRECATED RDW RBC AUTO: 44.6 FL (ref 37–54)
EOSINOPHIL # BLD AUTO: 0.17 10*3/MM3 (ref 0–0.4)
EOSINOPHIL NFR BLD AUTO: 1.9 % (ref 0.3–6.2)
ERYTHROCYTE [DISTWIDTH] IN BLOOD BY AUTOMATED COUNT: 14.6 % (ref 12.3–15.4)
GLUCOSE BLDC GLUCOMTR-MCNC: 148 MG/DL (ref 70–130)
GLUCOSE BLDC GLUCOMTR-MCNC: 162 MG/DL (ref 70–130)
GLUCOSE BLDC GLUCOMTR-MCNC: 172 MG/DL (ref 70–130)
HCT VFR BLD AUTO: 40.6 % (ref 34–46.6)
HGB BLD-MCNC: 13.5 G/DL (ref 12–15.9)
IMM GRANULOCYTES # BLD AUTO: 0.08 10*3/MM3 (ref 0–0.05)
IMM GRANULOCYTES NFR BLD AUTO: 0.9 % (ref 0–0.5)
LYMPHOCYTES # BLD AUTO: 4.12 10*3/MM3 (ref 0.7–3.1)
LYMPHOCYTES NFR BLD AUTO: 46.9 % (ref 19.6–45.3)
MCH RBC QN AUTO: 27.5 PG (ref 26.6–33)
MCHC RBC AUTO-ENTMCNC: 33.3 G/DL (ref 31.5–35.7)
MCV RBC AUTO: 82.7 FL (ref 79–97)
MONOCYTES # BLD AUTO: 0.52 10*3/MM3 (ref 0.1–0.9)
MONOCYTES NFR BLD AUTO: 5.9 % (ref 5–12)
NEUTROPHILS NFR BLD AUTO: 3.83 10*3/MM3 (ref 1.7–7)
NEUTROPHILS NFR BLD AUTO: 43.7 % (ref 42.7–76)
NRBC BLD AUTO-RTO: 0 /100 WBC (ref 0–0.2)
PLATELET # BLD AUTO: 248 10*3/MM3 (ref 140–450)
PMV BLD AUTO: 9.3 FL (ref 6–12)
QT INTERVAL: 392 MS
QT INTERVAL: 402 MS
QT INTERVAL: 406 MS
QTC INTERVAL: 457 MS
QTC INTERVAL: 468 MS
QTC INTERVAL: 472 MS
RBC # BLD AUTO: 4.91 10*6/MM3 (ref 3.77–5.28)
TROPONIN T SERPL-MCNC: <0.01 NG/ML (ref 0–0.03)
WBC NRBC COR # BLD: 8.78 10*3/MM3 (ref 3.4–10.8)

## 2022-04-07 PROCEDURE — 93926 LOWER EXTREMITY STUDY: CPT | Performed by: RADIOLOGY

## 2022-04-07 PROCEDURE — 93926 LOWER EXTREMITY STUDY: CPT

## 2022-04-07 PROCEDURE — 93005 ELECTROCARDIOGRAM TRACING: CPT | Performed by: INTERNAL MEDICINE

## 2022-04-07 PROCEDURE — 63710000001 INSULIN ASPART PER 5 UNITS: Performed by: INTERNAL MEDICINE

## 2022-04-07 PROCEDURE — 85025 COMPLETE CBC W/AUTO DIFF WBC: CPT | Performed by: INTERNAL MEDICINE

## 2022-04-07 PROCEDURE — 84484 ASSAY OF TROPONIN QUANT: CPT | Performed by: INTERNAL MEDICINE

## 2022-04-07 PROCEDURE — 73030 X-RAY EXAM OF SHOULDER: CPT

## 2022-04-07 PROCEDURE — 73564 X-RAY EXAM KNEE 4 OR MORE: CPT | Performed by: RADIOLOGY

## 2022-04-07 PROCEDURE — 73030 X-RAY EXAM OF SHOULDER: CPT | Performed by: RADIOLOGY

## 2022-04-07 PROCEDURE — 99233 SBSQ HOSP IP/OBS HIGH 50: CPT | Performed by: INTERNAL MEDICINE

## 2022-04-07 PROCEDURE — 82962 GLUCOSE BLOOD TEST: CPT

## 2022-04-07 PROCEDURE — 99232 SBSQ HOSP IP/OBS MODERATE 35: CPT | Performed by: INTERNAL MEDICINE

## 2022-04-07 PROCEDURE — 73564 X-RAY EXAM KNEE 4 OR MORE: CPT

## 2022-04-07 PROCEDURE — 93010 ELECTROCARDIOGRAM REPORT: CPT | Performed by: INTERNAL MEDICINE

## 2022-04-07 RX ORDER — ISOSORBIDE MONONITRATE 60 MG/1
60 TABLET, EXTENDED RELEASE ORAL
Status: DISCONTINUED | OUTPATIENT
Start: 2022-04-08 | End: 2022-04-08

## 2022-04-07 RX ADMIN — TICAGRELOR 90 MG: 90 TABLET ORAL at 20:21

## 2022-04-07 RX ADMIN — Medication 10 ML: at 20:25

## 2022-04-07 RX ADMIN — Medication 10 ML: at 09:44

## 2022-04-07 RX ADMIN — TICAGRELOR 90 MG: 90 TABLET ORAL at 09:44

## 2022-04-07 RX ADMIN — ACETAMINOPHEN 650 MG: 325 TABLET ORAL at 10:01

## 2022-04-07 RX ADMIN — CETIRIZINE HYDROCHLORIDE 10 MG: 10 TABLET, FILM COATED ORAL at 09:44

## 2022-04-07 RX ADMIN — CEFDINIR 300 MG: 300 CAPSULE ORAL at 20:22

## 2022-04-07 RX ADMIN — ATORVASTATIN CALCIUM 80 MG: 40 TABLET, FILM COATED ORAL at 20:21

## 2022-04-07 RX ADMIN — PANTOPRAZOLE SODIUM 40 MG: 40 TABLET, DELAYED RELEASE ORAL at 06:10

## 2022-04-07 RX ADMIN — ASPIRIN 81 MG: 81 TABLET, COATED ORAL at 09:44

## 2022-04-07 RX ADMIN — ISOSORBIDE MONONITRATE 30 MG: 30 TABLET, EXTENDED RELEASE ORAL at 09:44

## 2022-04-07 RX ADMIN — ACETAMINOPHEN 650 MG: 325 TABLET ORAL at 16:01

## 2022-04-07 RX ADMIN — INSULIN ASPART 2 UNITS: 100 INJECTION, SOLUTION INTRAVENOUS; SUBCUTANEOUS at 17:40

## 2022-04-07 RX ADMIN — ACETAMINOPHEN AND CODEINE PHOSPHATE 1 TABLET: 300; 30 TABLET ORAL at 20:23

## 2022-04-07 RX ADMIN — Medication 10 ML: at 09:45

## 2022-04-07 RX ADMIN — DULOXETINE HYDROCHLORIDE 30 MG: 30 CAPSULE, DELAYED RELEASE ORAL at 09:44

## 2022-04-07 RX ADMIN — COLCHICINE 0.6 MG: 0.6 TABLET, FILM COATED ORAL at 20:23

## 2022-04-07 RX ADMIN — METOPROLOL TARTRATE 100 MG: 100 TABLET, FILM COATED ORAL at 09:44

## 2022-04-07 RX ADMIN — CEFDINIR 300 MG: 300 CAPSULE ORAL at 09:44

## 2022-04-07 RX ADMIN — COLCHICINE 0.6 MG: 0.6 TABLET, FILM COATED ORAL at 09:44

## 2022-04-07 RX ADMIN — DULOXETINE HYDROCHLORIDE 30 MG: 30 CAPSULE, DELAYED RELEASE ORAL at 20:21

## 2022-04-07 RX ADMIN — ACETAMINOPHEN AND CODEINE PHOSPHATE 1 TABLET: 300; 30 TABLET ORAL at 02:55

## 2022-04-07 NOTE — CASE MANAGEMENT/SOCIAL WORK
Discharge Planning Assessment  DAVID Ruggiero     Patient Name: Lissa Eisenberg  MRN: [id number]  Today's Date: 4/7/2022    Admit Date: 4/2/2022     Discharge Needs Assessment    No documentation.                Discharge Plan     Row Name 04/07/22 1315       Plan    Plan Pt was admitted on 04/02/22. Pt was transferred from PCU. Pt lives with spouse and minor son and plans to return home at discharge. Pt lives in a camper with family and reports no issues. Pt does not utilize home health services or DME. SS to follow.                Hilary Glaser

## 2022-04-07 NOTE — PROGRESS NOTES
[facility] HOSPITALIST PROGRESS NOTE     Patient Identification:  Name:  Lissa Eisenberg  Age:  51 y.o.  Sex:  female  :  1970  MRN:  [id number]  Visit Number:  [id number]  ROOM: [address] [place] [address]     Primary Care Provider:  Yolanda Velasco DO    Length of stay in inpatient status:  4    Subjective     Chief Compliant:    Chief Complaint   Patient presents with   • Chest Pain       History of Presenting Illness:    Patient continues to report right groin pain at cath side. Intermittent but improved chest discomfort. Patient fell last night when cath leg became weak. Shoulder and knee with mild soreness.     ROS:  Otherwise 10 point ROS negative other than documented above in HPI.     Objective     Current Hospital Meds:aspirin, 81 mg, Oral, Daily  atorvastatin, 80 mg, Oral, Nightly  cefdinir, 300 mg, Oral, Q12H  cetirizine, 10 mg, Oral, Daily  colchicine, 0.6 mg, Oral, Q12H  DULoxetine, 30 mg, Oral, BID  insulin aspart, 0-7 Units, Subcutaneous, TID AC  [START ON 2022] isosorbide mononitrate, 60 mg, Oral, Q24H  metoprolol tartrate, 100 mg, Oral, Q12H  pantoprazole, 40 mg, Oral, QAM  sodium chloride, 10 mL, Intravenous, Q12H  sodium chloride, 10 mL, Intravenous, Q12H  ticagrelor, 90 mg, Oral, BID         Current Antimicrobial Therapy:  Anti-Infectives (From admission, onward)    Ordered     Dose/Rate Route Frequency Start Stop    22 1428  cefdinir (OMNICEF) capsule 300 mg        Ordering Provider: Aaron Wiley MD    300 mg Oral Every 12 Hours Scheduled 22 2100 22        Current Diuretic Therapy:  Diuretics (From admission, onward)    None        ----------------------------------------------------------------------------------------------------------------------  Vital Signs:  Temp:  [97.6 °F (36.4 °C)-98.1 °F (36.7 °C)] 98 °F (36.7 °C)  Heart Rate:  [78-92] 84  Resp:  [18] 18  BP: ()/(55-72) 93/58  SpO2:  [96 %-98 %] 96 %  on  Flow (L/min):  [0] 0;   Device (Oxygen  Therapy): room air  Body mass index is 34.59 kg/m².    Wt Readings from Last 3 Encounters:   04/06/22 91.4 kg (201 lb 8 oz)   03/30/22 89 kg (196 lb 3.4 oz)   11/04/21 98.4 kg (217 lb)     Intake & Output (last 3 days)       04/04 0701 04/05 0700 04/05 0701 04/06 0700 04/06 0701 04/07 0700 04/07 0701 04/08 0700    P.O.  540    I.V. (mL/kg) 170.4 (1.9) 1970 (21.6) 101.5 (1.1)     Total Intake(mL/kg) 770.4 (8.4) 2330 (25.5) 1747.5 (19.1) 540 (5.9)    Net +770.4 +2330 +1747.5 +540            Urine Unmeasured Occurrence 3 x 4 x 5 x     Stool Unmeasured Occurrence   3 x         Diet Regular; Cardiac  ----------------------------------------------------------------------------------------------------------------------  Physical exam:  Constitutional:  Well-developed and well-nourished.  No respiratory distress.      HENT:  Head:  Normocephalic and atraumatic.  Mouth:  Moist mucous membranes.    Eyes:  Conjunctivae and EOM are normal. No scleral icterus.    Neck:  Neck supple.  No JVD present.    Cardiovascular:  Normal rate, regular rhythm and normal heart sounds with no murmur.  Pulmonary/Chest:  No respiratory distress, no wheezes, no crackles, with normal breath sounds and good air movement.  Abdominal:  Soft.  Bowel sounds are normal.  No distension and no tenderness.   Musculoskeletal:  Tender right groin, slightly hard  Neurological:  Alert and oriented to person, place, and time.  No cranial nerve deficit.  No tongue deviation.  No facial droop.  No slurred speech.   Skin:  Skin is warm and dry. No rash noted. No pallor.   Peripheral vascular:  Pulses in all 4 extremities with no clubbing, no cyanosis, no edema.  ----------------------------------------------------------------------------------------------------------------------  Tele:    ----------------------------------------------------------------------------------------------------------------------  Results from last 7 days   Lab Units  04/07/22  0232 04/06/22  0151 04/05/22  0033 04/03/22  0428 04/03/22 0238 04/02/22  2250   CRP mg/dL  --   --   --   --   --  <0.30   LACTATE mmol/L  --   --   --   --  0.8  --    WBC 10*3/mm3 8.78 11.49* 5.00   < >  --  9.09   HEMOGLOBIN g/dL 13.5 13.0 14.1   < >  --  14.9   HEMATOCRIT % 40.6 39.3 43.3   < >  --  45.0   MCV fL 82.7 82.2 83.6   < >  --  80.5   MCHC g/dL 33.3 33.1 32.6   < >  --  33.1   PLATELETS 10*3/mm3 248 270 280   < >  --  327   INR   --   --   --   --   --  0.91    < > = values in this interval not displayed.         Results from last 7 days   Lab Units 04/06/22 0151 04/05/22  0033 04/04/22  0129 04/03/22  0450 04/03/22 0428 04/02/22  2250   SODIUM mmol/L 136 132* 138  --  140 133*   POTASSIUM mmol/L 3.9 4.1 3.7  --  3.8 3.8   MAGNESIUM mg/dL  --   --  2.3 1.8  --  1.2*   CHLORIDE mmol/L 107 100 106  --  107 97*   CO2 mmol/L 18.9* 18.7* 19.3*  --  19.0* 17.9*   BUN mg/dL 7 7 6  --  8 10   CREATININE mg/dL 0.75 0.77 0.73  --  0.75 0.95   CALCIUM mg/dL 8.8 9.4 8.7  --  8.4* 9.3   GLUCOSE mg/dL 201* 195* 111*  --  100* 120*   ALBUMIN g/dL 3.84  --   --   --  3.75 4.51   BILIRUBIN mg/dL 0.3  --   --   --  0.3 0.5   ALK PHOS U/L 82  --   --   --  78 91   AST (SGOT) U/L 12  --   --   --  19 21   ALT (SGPT) U/L 17  --   --   --  19 23   Estimated Creatinine Clearance: 96.1 mL/min (by C-G formula based on SCr of 0.75 mg/dL).  No results found for: AMMONIA  Results from last 7 days   Lab Units 04/07/22  1005 04/03/22  0744 04/03/22  0428   TROPONIN T ng/mL <0.010 0.073* 0.125*         Results from last 7 days   Lab Units 04/06/22  0151   CHOLESTEROL mg/dL 123   TRIGLYCERIDES mg/dL 377*   HDL CHOL mg/dL 40   LDL CHOL mg/dL 29     Glucose   Date/Time Value Ref Range Status   04/07/2022 1621 172 (H) 70 - 130 mg/dL Final     Comment:     Meter: [id number] : [id number] ALYSSA ALFREDO   04/07/2022 1110 162 (H) 70 - 130 mg/dL Final     Comment:     Meter: [id number] : [id number] ALYSSA ALFREDO   04/07/2022  0633 148 (H) 70 - 130 mg/dL Final     Comment:     Meter: [id number] : [id number] ALYSSA ALFREDO   04/06/2022 1616 119 70 - 130 mg/dL Final     Comment:     Meter: [id number] : [id number] Lisette Mallicoat   04/06/2022 1044 150 (H) 70 - 130 mg/dL Final     Comment:     Meter: [id number] : [id number] Lisette Mallicoat   04/06/2022 0633 142 (H) 70 - 130 mg/dL Final     Comment:     Meter: [id number] : [id number] MICHELLE VUONG   04/05/2022 2124 212 (H) 70 - 130 mg/dL Final     Comment:     Meter: [id number] : [id number] stevie tiwari   04/05/2022 1634 245 (H) 70 - 130 mg/dL Final     Comment:     Meter: [id number] : [id number] DAWSON AMOS VERNELL     Lab Results   Component Value Date    TSH 1.950 04/02/2022     No results found for: PREGTESTUR, PREGSERUM, HCG, HCGQUANT  Pain Management Panel    There is no flowsheet data to display.       Brief Urine Lab Results  (Last result in the past 365 days)      Color   Clarity   Blood   Leuk Est   Nitrite   Protein   CREAT   Urine HCG        03/30/22 2013 Yellow   Clear   Negative   Negative   Positive   Negative               No results found for: BLOODCX  No results found for: URINECX  No results found for: WOUNDCX  No results found for: STOOLCX  No results found for: RESPCX  No results found for: AFBCX  Results from last 7 days   Lab Units 04/03/22  0238 04/02/22  2250   LACTATE mmol/L 0.8  --    SED RATE mm/hr  --  9   CRP mg/dL  --  <0.30       I have personally looked at the labs and they are summarized above.  ----------------------------------------------------------------------------------------------------------------------  Detailed radiology reports for the last 24 hours:    Imaging Results (Last 24 Hours)     Procedure Component Value Units Date/Time    US Arterial Doppler Lower Extremity Right [[id number]] Resulted: 04/07/22 1551     Updated: 04/07/22 1552    XR Shoulder 2+ View Right [[id number]] Collected: 04/07/22 1027     Updated: 04/07/22 1052     Narrative:      EXAMINATION: XR SHOULDER 2+ VIEW RIGHT-      CLINICAL INDICATION: S/P fall, complains of pain; I21.4-Non-ST elevation  (NSTEMI) myocardial infarction; R94.39-Abnormal result of other  cardiovascular function study        COMPARISON: None available.     FINDINGS:  Three views of the right shoulder.     Humeral head is somewhat high in the glenoid, but I do not see  convincing evidence of dislocation.     No fracture is seen.      This report was finalized on 4/7/2022 10:50 AM by Dr. Jerrod Ching MD.       XR Knee 4+ View Bilateral [[id number]] Collected: 04/07/22 1035     Updated: 04/07/22 1052    Narrative:      EXAMINATION: XR KNEE 4+ VW BILATERAL-      CLINICAL INDICATION: S/P fall, complains of knee pain; I21.4-Non-ST  elevation (NSTEMI) myocardial infarction; R94.39-Abnormal result of  other cardiovascular function study        COMPARISON: None available.     FINDINGS:  4 views of the right knee and 4 views of the left knee show  postoperative staple in the proximal left tibia.     No acute fracture or dislocation is seen.     Joint space narrowing in the medial compartments of both knees.       Impression:      Arthritic change, but no acute fracture or dislocation.      This report was finalized on 4/7/2022 10:50 AM by Dr. Jerrod Ching MD.           Assessment & Plan    #NSTEMI  #Hx of CABG  - Stress test abnormal with  evidence of ischemia on anterior wall   - Cardiology following.   - Patient had LHC on 4/5 that revealed RCA occlusion requiring PCI. Unable to amend distal RCA occlusion likely resulting in residual symptoms.   - Cardiology monitoring recommending medical management. DAPT and start apixiban 2.5 mg BID when femoral hematoma resolves.   - Will monitor on tele overnight.      #Constipation  - Repeat CT abdomen/pelvis without evidence of obstruction   - BMs with lactulose. Symptoms resolved.      #GERD  #Suspected recent episode of gastroenteritis   - Patient had N/V with some  blood with emesis   - Possible partial esophageal rupture that could explain blood in emesis. Had isolated recurrent episode on 4/4  - No more emesis and nausea improved   - Hemoglobin stable. Will hold off on endoscopy and monitor symptoms.      #Femoral cath site hematoma   - Monitor Hgb. Stable. Cardiology aware.   - Given continued symptoms, repeat US ordered.      #DM II  - A1C 5.9%  - SSI while inpatient and continue home regimen at discharge.     #Fall   - Plain film negative for fractures   - PT/OT pending      #HTN  - Restart home regimen      #QTC prolongation   #Hypomagnesemia  - Avoid QT prolongation as able and replace magnesium per protocol      F: PO  E: Replace as needed   N: CC     Code status: Full      Dispo: Pending clinical improvement and ischemic evaluation. Likely d/c tomorrow. Transfer to tele.     VTE Prophylaxis:   Mechanical Order History:     None      Pharmalogical Order History:      Ordered     Dose Route Frequency Stop    04/05/22 1053  heparin (porcine) injection  Status:  Discontinued         -- -- As Needed 04/05/22 1213    04/03/22 0315  heparin 40367 units/250 mL (100 units/mL) in 0.9% NaCl infusion  9.96 mL/hr,   Status:  Discontinued         10.8 Units/kg/hr IV Titrated 04/05/22 1759    04/03/22 0159  heparin (porcine) 5000 UNIT/ML injection 5,000 Units         5,000 Units IV Once 04/03/22 0209    04/03/22 0159  heparin 61599 units/250 mL (100 units/mL) in 0.9% NaCl infusion  10 mL/hr,   Status:  Discontinued         11.3 Units/kg/hr IV Titrated 04/03/22 0315    04/03/22 0159  heparin (porcine) 5000 UNIT/ML injection 5,000 Units         5,000 Units IV As Needed --    04/03/22 0159  heparin (porcine) 5000 UNIT/ML injection 2,500 Units         2,500 Units IV As Needed --                  Aaron Wiley MD  [facility]  04/07/22  17:59 EDT

## 2022-04-07 NOTE — PLAN OF CARE
"Patient resting in the bed throughout the night. Around 20:48 nurse aide called to say patient stated she fell in the bathroom. VSS. Patient states she got up from the toilet and her Right leg \"started to burn and gave out\" she hit her Right knee on the floor and Right Shoulder on the toilet.  was in the room and assisted patient back to the bed. Patient complains of pain in those said areas. NICK HUYNH aware (see orders). Patients fall score updated, bed alarm placed and a bedside commode placed and educated to ring the call light for assistance with ambulating to the toilet. No s/s of distress noted. No further complaints. Will continues to monitor and follow care plan.    "

## 2022-04-07 NOTE — PROGRESS NOTES
Patient Identification:  Name:  Lissa Eisenberg  Age:  51 y.o.  Sex:  female  :  1970  MRN:  [id number]  Visit Number:  [id number]    Chief Complaint:   F/u RCA PCI    Subjective:  Patient was seen and examined.  She had a mid RCA in-stent restenosis intervention 2 days ago, reviewed the images, she also had an ostial RPDA intervention which is across the stent from the distal RCA into the RPL, intervention was tried for the ostial RPDA but because of the stent struts it was unable to advance into the ostial RPDA lesion.  I explained to the patient regarding her chronic angina and this is a small caliber RPDA and which is medically managed for now.  Her right groin hematoma appears to be stable though she has some pain from right common femoral nerve impingement likely from the hematoma.    ----------------------------------------------------------------------------------------------------------------------  Current Hospital Meds:  aspirin, 81 mg, Oral, Daily  atorvastatin, 80 mg, Oral, Nightly  cefdinir, 300 mg, Oral, Q12H  cetirizine, 10 mg, Oral, Daily  colchicine, 0.6 mg, Oral, Q12H  DULoxetine, 30 mg, Oral, BID  insulin aspart, 0-7 Units, Subcutaneous, TID AC  isosorbide mononitrate, 30 mg, Oral, Q24H  metoprolol tartrate, 100 mg, Oral, Q12H  pantoprazole, 40 mg, Oral, QAM  sodium chloride, 10 mL, Intravenous, Q12H  sodium chloride, 10 mL, Intravenous, Q12H  ticagrelor, 90 mg, Oral, BID         ----------------------------------------------------------------------------------------------------------------------  Vital Signs:  Temp:  [97.6 °F (36.4 °C)-98.3 °F (36.8 °C)] 97.7 °F (36.5 °C)  Heart Rate:  [77-92] 83  Resp:  [3-19] 3  BP: ()/(55-72) 95/60      22  0500 22  0456 22  0054   Weight: 91.5 kg (201 lb 11.2 oz) 91.4 kg (201 lb 8 oz) (unable to weigh due to no scale on bed)     Body mass index is 34.59 kg/m².    Intake/Output Summary (Last 24 hours) at 2022 1356  Last  data filed at 4/7/2022 0054  Gross per 24 hour   Intake 1080 ml   Output --   Net 1080 ml     Diet Regular; Cardiac  ----------------------------------------------------------------------------------------------------------------------  Physical exam:  Constitutional:  Awake and comfortable  HENT:  Head:  Normocephalic and atraumatic.    Eyes:  Conjunctivae and EOM are normal.  Pupils are equal, round, and reactive to light.  No scleral icterus.    Neck:  Neck supple.  No JVD present.    Cardiovascular: Normal rate, regular rhythm, S1 S2+, NO S3 / S4  Pulmonary/Chest:  Vesicular breath sounds B/L  Abdominal:  Soft.  Bowel sounds are normal.  No distension and no tenderness.      Neurological:  Alert and oriented to person, place, and time. No focal defecits  Skin:  Skin is warm and dry. No rash noted. No pallor.   Musculoskeletal:  No edema, no tenderness, and no deformity.  No red or swollen joints anywhere.   Peripheral vascular:  2+ Pulses B/L DP  ----------------------------------------------------------------------------------------------------------------------    ----------------------------------------------------------------------------------------------------------------------  Results from last 7 days   Lab Units 04/07/22  1005 04/03/22  0744 04/03/22  0428   TROPONIN T ng/mL <0.010 0.073* 0.125*     Results from last 7 days   Lab Units 04/07/22  0232 04/06/22  0151 04/05/22  0033 04/03/22  0428 04/03/22  0238 04/02/22  2250   CRP mg/dL  --   --   --   --   --  <0.30   LACTATE mmol/L  --   --   --   --  0.8  --    WBC 10*3/mm3 8.78 11.49* 5.00   < >  --  9.09   HEMOGLOBIN g/dL 13.5 13.0 14.1   < >  --  14.9   HEMATOCRIT % 40.6 39.3 43.3   < >  --  45.0   MCV fL 82.7 82.2 83.6   < >  --  80.5   MCHC g/dL 33.3 33.1 32.6   < >  --  33.1   PLATELETS 10*3/mm3 248 270 280   < >  --  327   INR   --   --   --   --   --  0.91    < > = values in this interval not displayed.         Results from last 7 days   Lab  Units 04/06/22  0151 04/05/22  0033 04/04/22  0129 04/03/22  0450 04/03/22  0428 04/02/22  2250   SODIUM mmol/L 136 132* 138  --  140 133*   POTASSIUM mmol/L 3.9 4.1 3.7  --  3.8 3.8   MAGNESIUM mg/dL  --   --  2.3 1.8  --  1.2*   CHLORIDE mmol/L 107 100 106  --  107 97*   CO2 mmol/L 18.9* 18.7* 19.3*  --  19.0* 17.9*   BUN mg/dL 7 7 6  --  8 10   CREATININE mg/dL 0.75 0.77 0.73  --  0.75 0.95   CALCIUM mg/dL 8.8 9.4 8.7  --  8.4* 9.3   GLUCOSE mg/dL 201* 195* 111*  --  100* 120*   ALBUMIN g/dL 3.84  --   --   --  3.75 4.51   BILIRUBIN mg/dL 0.3  --   --   --  0.3 0.5   ALK PHOS U/L 82  --   --   --  78 91   AST (SGOT) U/L 12  --   --   --  19 21   ALT (SGPT) U/L 17  --   --   --  19 23   Estimated Creatinine Clearance: 96.1 mL/min (by C-G formula based on SCr of 0.75 mg/dL).    No results found for: AMMONIA  Results from last 7 days   Lab Units 04/06/22  0151   CHOLESTEROL mg/dL 123   TRIGLYCERIDES mg/dL 377*   HDL CHOL mg/dL 40   LDL CHOL mg/dL 29     No results found for: BLOODCX  No results found for: URINECX  No results found for: WOUNDCX  No results found for: STOOLCX    I have personally looked at the labs and they are summarized above.  ----------------------------------------------------------------------------------------------------------------------  Imaging Results (Last 24 Hours)     Procedure Component Value Units Date/Time    XR Shoulder 2+ View Right [[id number]] Collected: 04/07/22 1027     Updated: 04/07/22 1052    Narrative:      EXAMINATION: XR SHOULDER 2+ VIEW RIGHT-      CLINICAL INDICATION: S/P fall, complains of pain; I21.4-Non-ST elevation  (NSTEMI) myocardial infarction; R94.39-Abnormal result of other  cardiovascular function study        COMPARISON: None available.     FINDINGS:  Three views of the right shoulder.     Humeral head is somewhat high in the glenoid, but I do not see  convincing evidence of dislocation.     No fracture is seen.      This report was finalized on 4/7/2022 10:50  AM by Dr. Jerrod Ching MD.       XR Knee 4+ View Bilateral [[id number]] Collected: 04/07/22 1035     Updated: 04/07/22 1052    Narrative:      EXAMINATION: XR KNEE 4+ VW BILATERAL-      CLINICAL INDICATION: S/P fall, complains of knee pain; I21.4-Non-ST  elevation (NSTEMI) myocardial infarction; R94.39-Abnormal result of  other cardiovascular function study        COMPARISON: None available.     FINDINGS:  4 views of the right knee and 4 views of the left knee show  postoperative staple in the proximal left tibia.     No acute fracture or dislocation is seen.     Joint space narrowing in the medial compartments of both knees.       Impression:      Arthritic change, but no acute fracture or dislocation.      This report was finalized on 4/7/2022 10:50 AM by Dr. Jerrod Ching MD.           ----------------------------------------------------------------------------------------------------------------------    Assessment:  NSTEMI CAD s/p mid RCA in-stent restenosis PCI, ostial RPDA across the stents jailed with a residual 70% stenosis.   Stable ischemic heart disease with chronic CCS class II angina   CAD s/p mid RCA in-stent restenosis PCI, ostial RPDA across the stents jailed with a residual 70% stenosis.    Plan:  patient has extensive CAD and extensive PCI into the RCA with a recurrent in-stent restenosis I did recommend her to be on Xarelto 2.5 twice daily along with aspirin and Brilinta, since that she is having an ongoing hematoma will wait until the hematoma resolves and can resume the Xarelto as an outpatient  Possible discharge in a.m.  Discussed with Dr. Wiley.  Pt requested to follow-up up with us in 2 to 4 weeks.          Connor Chaney MD, formerly [facility]  Interventional Cardiology        04/07/22  13:56 EDT

## 2022-04-07 NOTE — PLAN OF CARE
Goal Outcome Evaluation:           Progress: improving   Pt resting in bed with visitor at bedside, no acute distress noted. Pt complained of intermittent CP this AM that was brief only lasting approximately a minute and resolved on its own. Pt has voiced no complaints of pain since this AM. Hematoma at cath site does not appear to be worsening. Will continue to monitor and follow plan of care

## 2022-04-08 ENCOUNTER — APPOINTMENT (OUTPATIENT)
Dept: GENERAL RADIOLOGY | Facility: HOSPITAL | Age: 52
End: 2022-04-08

## 2022-04-08 LAB
ANION GAP SERPL CALCULATED.3IONS-SCNC: 10.3 MMOL/L (ref 5–15)
BASOPHILS # BLD AUTO: 0.06 10*3/MM3 (ref 0–0.2)
BASOPHILS NFR BLD AUTO: 0.8 % (ref 0–1.5)
BUN SERPL-MCNC: 11 MG/DL (ref 6–20)
BUN/CREAT SERPL: 16.4 (ref 7–25)
CALCIUM SPEC-SCNC: 8.8 MG/DL (ref 8.6–10.5)
CHLORIDE SERPL-SCNC: 99 MMOL/L (ref 98–107)
CO2 SERPL-SCNC: 21.7 MMOL/L (ref 22–29)
CREAT SERPL-MCNC: 0.67 MG/DL (ref 0.57–1)
DEPRECATED RDW RBC AUTO: 44.2 FL (ref 37–54)
EGFRCR SERPLBLD CKD-EPI 2021: 105.3 ML/MIN/1.73
EOSINOPHIL # BLD AUTO: 0.34 10*3/MM3 (ref 0–0.4)
EOSINOPHIL NFR BLD AUTO: 4.5 % (ref 0.3–6.2)
ERYTHROCYTE [DISTWIDTH] IN BLOOD BY AUTOMATED COUNT: 14.6 % (ref 12.3–15.4)
GLUCOSE BLDC GLUCOMTR-MCNC: 132 MG/DL (ref 70–130)
GLUCOSE BLDC GLUCOMTR-MCNC: 144 MG/DL (ref 70–130)
GLUCOSE BLDC GLUCOMTR-MCNC: 154 MG/DL (ref 70–130)
GLUCOSE BLDC GLUCOMTR-MCNC: 185 MG/DL (ref 70–130)
GLUCOSE SERPL-MCNC: 148 MG/DL (ref 65–99)
HCT VFR BLD AUTO: 40.8 % (ref 34–46.6)
HGB BLD-MCNC: 13.4 G/DL (ref 12–15.9)
IMM GRANULOCYTES # BLD AUTO: 0.09 10*3/MM3 (ref 0–0.05)
IMM GRANULOCYTES NFR BLD AUTO: 1.2 % (ref 0–0.5)
LYMPHOCYTES # BLD AUTO: 3.35 10*3/MM3 (ref 0.7–3.1)
LYMPHOCYTES NFR BLD AUTO: 44.8 % (ref 19.6–45.3)
MCH RBC QN AUTO: 27.4 PG (ref 26.6–33)
MCHC RBC AUTO-ENTMCNC: 32.8 G/DL (ref 31.5–35.7)
MCV RBC AUTO: 83.4 FL (ref 79–97)
MONOCYTES # BLD AUTO: 0.5 10*3/MM3 (ref 0.1–0.9)
MONOCYTES NFR BLD AUTO: 6.7 % (ref 5–12)
NEUTROPHILS NFR BLD AUTO: 3.14 10*3/MM3 (ref 1.7–7)
NEUTROPHILS NFR BLD AUTO: 42 % (ref 42.7–76)
NRBC BLD AUTO-RTO: 0 /100 WBC (ref 0–0.2)
PLATELET # BLD AUTO: 286 10*3/MM3 (ref 140–450)
PMV BLD AUTO: 9.1 FL (ref 6–12)
POTASSIUM SERPL-SCNC: 3.9 MMOL/L (ref 3.5–5.2)
QT INTERVAL: 380 MS
QTC INTERVAL: 457 MS
RBC # BLD AUTO: 4.89 10*6/MM3 (ref 3.77–5.28)
SODIUM SERPL-SCNC: 131 MMOL/L (ref 136–145)
TROPONIN T SERPL-MCNC: <0.01 NG/ML (ref 0–0.03)
WBC NRBC COR # BLD: 7.48 10*3/MM3 (ref 3.4–10.8)

## 2022-04-08 PROCEDURE — 84484 ASSAY OF TROPONIN QUANT: CPT | Performed by: INTERNAL MEDICINE

## 2022-04-08 PROCEDURE — 85025 COMPLETE CBC W/AUTO DIFF WBC: CPT | Performed by: INTERNAL MEDICINE

## 2022-04-08 PROCEDURE — 99232 SBSQ HOSP IP/OBS MODERATE 35: CPT | Performed by: INTERNAL MEDICINE

## 2022-04-08 PROCEDURE — 80048 BASIC METABOLIC PNL TOTAL CA: CPT | Performed by: INTERNAL MEDICINE

## 2022-04-08 PROCEDURE — 97161 PT EVAL LOW COMPLEX 20 MIN: CPT

## 2022-04-08 PROCEDURE — 93005 ELECTROCARDIOGRAM TRACING: CPT | Performed by: INTERNAL MEDICINE

## 2022-04-08 PROCEDURE — 73562 X-RAY EXAM OF KNEE 3: CPT

## 2022-04-08 PROCEDURE — 63710000001 INSULIN ASPART PER 5 UNITS: Performed by: INTERNAL MEDICINE

## 2022-04-08 PROCEDURE — 93010 ELECTROCARDIOGRAM REPORT: CPT | Performed by: INTERNAL MEDICINE

## 2022-04-08 PROCEDURE — 97166 OT EVAL MOD COMPLEX 45 MIN: CPT

## 2022-04-08 PROCEDURE — 82962 GLUCOSE BLOOD TEST: CPT

## 2022-04-08 PROCEDURE — 73562 X-RAY EXAM OF KNEE 3: CPT | Performed by: RADIOLOGY

## 2022-04-08 RX ORDER — ISOSORBIDE MONONITRATE 30 MG/1
30 TABLET, EXTENDED RELEASE ORAL
Status: DISCONTINUED | OUTPATIENT
Start: 2022-04-09 | End: 2022-04-13 | Stop reason: HOSPADM

## 2022-04-08 RX ADMIN — ASPIRIN 81 MG: 81 TABLET, COATED ORAL at 10:10

## 2022-04-08 RX ADMIN — Medication 10 ML: at 20:02

## 2022-04-08 RX ADMIN — DULOXETINE HYDROCHLORIDE 30 MG: 30 CAPSULE, DELAYED RELEASE ORAL at 20:02

## 2022-04-08 RX ADMIN — PANTOPRAZOLE SODIUM 40 MG: 40 TABLET, DELAYED RELEASE ORAL at 06:33

## 2022-04-08 RX ADMIN — CEFDINIR 300 MG: 300 CAPSULE ORAL at 10:10

## 2022-04-08 RX ADMIN — TICAGRELOR 90 MG: 90 TABLET ORAL at 10:10

## 2022-04-08 RX ADMIN — Medication 10 ML: at 11:38

## 2022-04-08 RX ADMIN — ATORVASTATIN CALCIUM 80 MG: 40 TABLET, FILM COATED ORAL at 20:03

## 2022-04-08 RX ADMIN — TICAGRELOR 90 MG: 90 TABLET ORAL at 20:03

## 2022-04-08 RX ADMIN — METOPROLOL TARTRATE 100 MG: 100 TABLET, FILM COATED ORAL at 10:10

## 2022-04-08 RX ADMIN — Medication 10 ML: at 10:11

## 2022-04-08 RX ADMIN — COLCHICINE 0.6 MG: 0.6 TABLET, FILM COATED ORAL at 10:10

## 2022-04-08 RX ADMIN — INSULIN ASPART 2 UNITS: 100 INJECTION, SOLUTION INTRAVENOUS; SUBCUTANEOUS at 11:37

## 2022-04-08 RX ADMIN — METOPROLOL TARTRATE 100 MG: 100 TABLET, FILM COATED ORAL at 20:03

## 2022-04-08 RX ADMIN — ISOSORBIDE MONONITRATE 60 MG: 60 TABLET ORAL at 10:09

## 2022-04-08 RX ADMIN — DULOXETINE HYDROCHLORIDE 30 MG: 30 CAPSULE, DELAYED RELEASE ORAL at 10:10

## 2022-04-08 RX ADMIN — ACETAMINOPHEN 650 MG: 325 TABLET ORAL at 14:11

## 2022-04-08 RX ADMIN — COLCHICINE 0.6 MG: 0.6 TABLET, FILM COATED ORAL at 20:02

## 2022-04-08 RX ADMIN — ACETAMINOPHEN AND CODEINE PHOSPHATE 1 TABLET: 300; 30 TABLET ORAL at 06:33

## 2022-04-08 RX ADMIN — SODIUM CHLORIDE 500 ML: 9 INJECTION, SOLUTION INTRAVENOUS at 16:03

## 2022-04-08 RX ADMIN — CETIRIZINE HYDROCHLORIDE 10 MG: 10 TABLET, FILM COATED ORAL at 10:10

## 2022-04-08 NOTE — PLAN OF CARE
Patient resting in the bed throughout the night. No s/s of distress noted. No complaints. Will continue to monitor and follow care plan.    Immediate family member

## 2022-04-08 NOTE — THERAPY EVALUATION
Acute Care - Occupational Therapy Initial Evaluation   [facility]     Patient Name: Lissa Eisenberg  : 1970  MRN: [id number]  Today's Date: 2022             Admit Date: 2022   OT will address adaptive equipment needs to enhance safety/independence in home environment. Assistive device for ambulation/transfers recommended for safety as well as bedside commode to aid in sit/stand. Pt is cooperative and motivated to improve safety/functional status.        ICD-10-CM ICD-9-CM   1. NSTEMI (non-ST elevated myocardial infarction) (HCC)  I21.4 410.70   2. Abnormal nuclear stress test  R94.39 794.39     Patient Active Problem List   Diagnosis   • NSTEMI (non-ST elevated myocardial infarction) ([facility])   • Abnormal nuclear stress test     Past Medical History:   Diagnosis Date   • COVID-19    • Diabetes mellitus (HCC)    • Hyperlipidemia    • Hypertension    • Myocardial infarct (HCC)    • PCOS (polycystic ovarian syndrome)      Past Surgical History:   Procedure Laterality Date   • CARDIAC CATHETERIZATION     •  SECTION     • CHOLECYSTECTOMY     • CORONARY ARTERY BYPASS GRAFT      x 2   • CORONARY STENT PLACEMENT      x 6 per patient   • DILATATION AND CURETTAGE     • KNEE SURGERY Left    • TONSILLECTOMY           OT ASSESSMENT FLOWSHEET (last 12 hours)     OT Evaluation and Treatment     Row Name 22 1113                   OT Time and Intention    Document Type evaluation  -KR        Mode of Treatment occupational therapy  -KR        Patient Effort adequate  -KR                  General Information    General Observations of Patient alert/cooperative  -KR        Prior Level of Function independent:  -KR                  Living Environment    Current Living Arrangements home  -KR        People in Home spouse  -KR                  Home Use of Assistive/Adaptive Equipment    Equipment Currently Used at Home none  -KR                  Cognition    Affect/Mental Status (Cognition) WFL  -KR         Orientation Status (Cognition) oriented x 3  -KR        Follows Commands (Cognition) WFL  -KR                  Range of Motion Comprehensive    Comment, General Range of Motion BUE WFL  -KR                  Strength Comprehensive (MMT)    Comment, General Manual Muscle Testing (MMT) Assessment [facility] for performance of self care/assist with mobility  -KR                  Activities of Daily Living    BADL Assessment/Intervention bathing;upper body dressing;lower body dressing;grooming;feeding;toileting  -KR                  Bathing Assessment/Intervention    [place] Level (Bathing) bathing skills;minimum assist (75% patient effort)  -KR                  Upper Body Dressing Assessment/Training    [place] Level (Upper Body Dressing) upper body dressing skills;standby assist  -KR                  Lower Body Dressing Assessment/Training    [place] Level (Lower Body Dressing) lower body dressing skills;minimum assist (75% patient effort)  -KR                  Grooming Assessment/Training    [place] Level (Grooming) grooming skills;set up  -KR                  Self-Feeding Assessment/Training    [place] Level (Feeding) feeding skills;set up  -KR                  Toileting Assessment/Training    [place] Level (Toileting) toileting skills;minimum assist (75% patient effort)  -KR                  Plan of Care Review    Plan of Care Reviewed With patient;spouse  -KR                  Therapy Assessment/Plan (OT)    Rehab Potential (OT) good, to achieve stated therapy goals  -KR        Planned Therapy Interventions (OT) adaptive equipment training  -KR                  OT Goals    Transfer Goal Selection (OT) transfer, OT goal 1  -KR        Dressing Goal Selection (OT) dressing, OT goal 1  -KR                  Transfer Goal 1 (OT)    Activity/Assistive Device (Transfer Goal 1, OT) toilet;sit-to-stand/stand-to-sit  -KR        [place] Level/Cues Needed (Transfer Goal 1, OT) standby assist  -KR         Time Frame (Transfer Goal 1, OT) by discharge  -KR                  Dressing Goal 1 (OT)    Activity/Device (Dressing Goal 1, OT) dressing skills, all  -KR        [place]/Cues Needed (Dressing Goal 1, OT) standby assist  -KR        Time Frame (Dressing Goal 1, OT) by discharge  -KR                  Patient Education Goal (OT)    Activity (Patient Education Goal, OT) AE/DME training to enhance safety/independence in home environment  -KR        [place]/Cues/Accuracy (Memory Goal 2, OT) verbalizes understanding  -KR        Time Frame (Patient Education Goal, OT) by discharge  -KR              User Key  (r) = Recorded By, (t) = Taken By, (c) = Cosigned By    Initials Name Effective Dates    Jesu Zamarripa OT 06/16/21 -                        OT Recommendation and Plan  Planned Therapy Interventions (OT): adaptive equipment training  Plan of Care Review  Plan of Care Reviewed With: patient, spouse  Plan of Care Reviewed With: patient, spouse        Time Calculation:     Therapy Charges for Today     Code Description Service Date Service Provider Modifiers Qty    [id number]  OT EVAL MOD COMPLEXITY 4 4/8/2022 Jesu Thompson OT GO 1               Jesu Thompson OT  4/8/2022

## 2022-04-08 NOTE — PROGRESS NOTES
[facility]  CROSS COVER NOTE    Contacted per Archana TRIPP. Patient had another fall, fell last night as well. Patient complaining of right knee pain. Will obtain XR of right knee, follow up on results once available. Fall precautions previously ordered. She also has existing bed rest order and up with assist order. Will add bed alarm order to be turned on. Patient to be re-educated to get up with staff assistance to prevent further falls.       Stacia Angeles PA-C  Hospitalist Service -- [facility]   Pager: [phone]    04/08/22  01:06 EDT

## 2022-04-08 NOTE — THERAPY EVALUATION
Acute Care - Physical Therapy Initial Evaluation   [facility]     Patient Name: Lissa Eisenberg  : 1970  MRN: [id number]  Today's Date: 2022      Visit Dx:     ICD-10-CM ICD-9-CM   1. NSTEMI (non-ST elevated myocardial infarction) (HCC)  I21.4 410.70   2. Abnormal nuclear stress test  R94.39 794.39     Patient Active Problem List   Diagnosis   • NSTEMI (non-ST elevated myocardial infarction) (HCC)   • Abnormal nuclear stress test     Past Medical History:   Diagnosis Date   • COVID-19    • Diabetes mellitus (HCC)    • Hyperlipidemia    • Hypertension    • Myocardial infarct (HCC)    • PCOS (polycystic ovarian syndrome)      Past Surgical History:   Procedure Laterality Date   • CARDIAC CATHETERIZATION     •  SECTION     • CHOLECYSTECTOMY     • CORONARY ARTERY BYPASS GRAFT      x 2   • CORONARY STENT PLACEMENT      x 6 per patient   • DILATATION AND CURETTAGE     • KNEE SURGERY Left    • TONSILLECTOMY       PT Assessment (last 12 hours)     PT Evaluation and Treatment     Row Name 22 1519          Physical Therapy Time and Intention    Document Type evaluation  -     Mode of Treatment physical therapy  -     Row Name 22 1519          General Information    Equipment Currently Used at Home none  -     Row Name 22 1519          Previous Level of Function/Home Environm    BADLs, Premorbid Functional Level independent  -     Household Ambulation, Premorbid Functional Level independent  -     Row Name 22 1519          Living Environment    Current Living Arrangements other (see comments)  pop up camper, pt states they are homeless  -     People in Home spouse;child(risa), adult  -     Row Name 22 1519          Range of Motion Comprehensive    Comment, General Range of Motion A/PROM grossly WFL  -     Row Name 22 1519          Strength Comprehensive (MMT)    Comment, General Manual Muscle Testing (MMT) Assessment L LE grossly 5/5, R LE grossly 4 to 5/5   -[facility] Name 04/08/22 1519          Bed Mobility    Bed Mobility supine-sit  -     Supine-Sit [place] (Bed Mobility) independent;modified independence  -[facility] Name 04/08/22 1519          Transfers    Transfers sit-stand transfer;stand-sit transfer  -     Sit-Stand [place] (Transfers) set up;standby assist;contact guard  -     Stand-Sit [place] (Transfers) set up;standby assist;contact guard  -[facility] Name 04/08/22 1519          Stand-Sit Transfer    Assistive Device (Stand-Sit Transfers) walker, front-wheeled  -[facility] Name 04/08/22 1519          Gait/Stairs (Locomotion)    Gait/Stairs Locomotion gait/ambulation assistive device;gait/ambulation independence  -     [place] Level (Gait) contact guard  -     Assistive Device (Gait) walker, front-wheeled  also independently  -     Distance in Feet (Gait) Grossly 20', small distance ambulated without RW (chair from bed)  -[facility] Name 04/08/22 1519          Positioning and Restraints    Pre-Treatment Position in bed  -     Post Treatment Position chair  -     In Chair sitting;with family/caregiver  -[facility] Name 04/08/22 1519          Therapy Assessment/Plan (PT)    Functional Level at Time of Evaluation (PT) CGA  -     PT Diagnosis (PT) Fall risk  -     Rehab Potential (PT) fair, will monitor progress closely  -     Criteria for Skilled Interventions Met (PT) other (see comments)  Pt to have trial PT to see if working on endurance helps reduce fall risk  -     Predicted Duration of Therapy Intervention (PT) LOS  -[facility] Name 04/08/22 1519          Physical Therapy Goals    Gait Training Goal Selection (PT) gait training, PT goal 1  -[facility] Name 04/08/22 1519          Gait Training Goal 1 (PT)    Activity/Assistive Device (Gait Training Goal 1, PT) gait (walking locomotion)  -     [place] Level (Gait Training Goal 1, PT) independent  -     Distance (Gait Training Goal 1, PT) 50'  without  fall/LOB  -     Time Frame (Gait Training Goal 1, PT) short term goal (STG);long term goal (LTG)  -           User Key  (r) = Recorded By, (t) = Taken By, (c) = Cosigned By    Initials Name Provider Type    Larisa Whatley, PT Physical Therapist                  PT Recommendation and Plan  Anticipated Discharge Disposition (PT): home with assist, home with supervision  Planned Therapy Interventions (PT): gait training, other (see comments) (Mobility/ambulation practice)  Therapy Frequency (PT): 2 times/wk          Time Calculation:    PT Charges     Row Name 04/08/22 1718             Time Calculation    PT Received On 04/08/22  -      PT Goal Re-Cert Due Date 04/22/22  -            User Key  (r) = Recorded By, (t) = Taken By, (c) = Cosigned By    Initials Name Provider Type    Larisa Whatley, PT Physical Therapist              Therapy Charges for Today     Code Description Service Date Service Provider Modifiers Qty    [id number] HC PT EVAL LOW COMPLEXITY 4 4/8/2022 Larisa Pereira, PT GP 1               Larisa Pereira, PT  4/8/2022

## 2022-04-08 NOTE — PLAN OF CARE
Goal Outcome Evaluation:           Progress: improving   Pt resting in bed with no acute distress noted. Pt currently receiving IVF bolus for hypotension. Pt voices no concerns or complaints at this time. No complaints of CP this shift. Will continue to monitor and follow plan of care

## 2022-04-08 NOTE — PROGRESS NOTES
[facility] HOSPITALIST PROGRESS NOTE     Patient Identification:  Name:  Lissa Eisenberg  Age:  51 y.o.  Sex:  female  :  1970  MRN:  [id number]  Visit Number:  [id number]  ROOM: [address] [place] [address]     Primary Care Provider:  Yolanda Velasco DO    Length of stay in inpatient status:  5    Subjective     Chief Compliant:    Chief Complaint   Patient presents with   • Chest Pain       History of Presenting Illness:    Patient had another fall overnight. Right leg got weak and she sat down. Plain film of knee unrevealing. Patient still reports pain at femoral site. Patient continues to voice concern going home to [facility]. They have the goal of getting a home in [place] next month.     ROS:  Otherwise 10 point ROS negative other than documented above in HPI.     Objective     Current Hospital Meds:aspirin, 81 mg, Oral, Daily  atorvastatin, 80 mg, Oral, Nightly  cetirizine, 10 mg, Oral, Daily  colchicine, 0.6 mg, Oral, Q12H  DULoxetine, 30 mg, Oral, BID  insulin aspart, 0-7 Units, Subcutaneous, TID AC  [START ON 2022] isosorbide mononitrate, 30 mg, Oral, Q24H  metoprolol tartrate, 100 mg, Oral, Q12H  pantoprazole, 40 mg, Oral, QAM  sodium chloride, 10 mL, Intravenous, Q12H  sodium chloride, 10 mL, Intravenous, Q12H  ticagrelor, 90 mg, Oral, BID         Current Antimicrobial Therapy:  Anti-Infectives (From admission, onward)    Ordered     Dose/Rate Route Frequency Start Stop    22 1428  cefdinir (OMNICEF) capsule 300 mg        Ordering Provider: Aaron Wiley MD    300 mg Oral Every 12 Hours Scheduled 22 2100 22 1010        Current Diuretic Therapy:  Diuretics (From admission, onward)    None        ----------------------------------------------------------------------------------------------------------------------  Vital Signs:  Temp:  [97.4 °F (36.3 °C)-98 °F (36.7 °C)] 98 °F (36.7 °C)  Heart Rate:  [] 91  Resp:  [18-20] 18  BP: ()/(59-80) 108/62  SpO2:  [97 %-99 %]  98 %  on   ;   Device (Oxygen Therapy): room air  Body mass index is 34.59 kg/m².    Wt Readings from Last 3 Encounters:   03/30/22 89 kg (196 lb 3.4 oz)   11/04/21 98.4 kg (217 lb)   10/21/21 99.8 kg (220 lb)     Intake & Output (last 3 days)       04/05 0701  04/06 0700 04/06 0701 04/07 0700 04/07 0701 04/08 0700 04/08 0701 04/09 0700    P.O. 360 1646 540 720    I.V. (mL/kg) 1970 (21.6) 101.5 (1.1)      Total Intake(mL/kg) 2330 (25.5) 1747.5 (19.1) 540 (5.9) 720 (7.9)    Net +2330 +1747.5 +540 +720            Urine Unmeasured Occurrence 4 x 5 x 3 x 3 x    Stool Unmeasured Occurrence  3 x  1 x        Diet Regular; Cardiac  ----------------------------------------------------------------------------------------------------------------------  Physical exam:  Constitutional:  Well-developed and well-nourished.  No respiratory distress.      HENT:  Head:  Normocephalic and atraumatic.  Mouth:  Moist mucous membranes.    Eyes:  Conjunctivae and EOM are normal. No scleral icterus.    Neck:  Neck supple.  No JVD present.    Cardiovascular:  Normal rate, regular rhythm and normal heart sounds with no murmur.  Pulmonary/Chest:  No respiratory distress, no wheezes, no crackles, with normal breath sounds and good air movement.  Abdominal:  Soft.  Bowel sounds are normal.  No distension and no tenderness.   Musculoskeletal:  No edema, no tenderness, and no deformity.  No red or swollen joints anywhere.    Neurological:  Alert and oriented to person, place, and time.  No cranial nerve deficit.  No tongue deviation.  No facial droop.  No slurred speech.   Skin:  Skin is warm and dry. No rash noted. No pallor.   Peripheral vascular:  Pulses in all 4 extremities with no clubbing, no cyanosis, no edema.  ----------------------------------------------------------------------------------------------------------------------  Tele:     ----------------------------------------------------------------------------------------------------------------------  Results from last 7 days   Lab Units 04/08/22 0021 04/07/22 0232 04/06/22 0151 04/03/22 0428 04/03/22 0238 04/02/22  2250   CRP mg/dL  --   --   --   --   --  <0.30   LACTATE mmol/L  --   --   --   --  0.8  --    WBC 10*3/mm3 7.48 8.78 11.49*   < >  --  9.09   HEMOGLOBIN g/dL 13.4 13.5 13.0   < >  --  14.9   HEMATOCRIT % 40.8 40.6 39.3   < >  --  45.0   MCV fL 83.4 82.7 82.2   < >  --  80.5   MCHC g/dL 32.8 33.3 33.1   < >  --  33.1   PLATELETS 10*3/mm3 286 248 270   < >  --  327   INR   --   --   --   --   --  0.91    < > = values in this interval not displayed.         Results from last 7 days   Lab Units 04/08/22 0021 04/06/22 0151 04/05/22  0033 04/04/22  0129 04/03/22 0450 04/03/22 0428 04/02/22  2250   SODIUM mmol/L 131* 136 132* 138  --  140 133*   POTASSIUM mmol/L 3.9 3.9 4.1 3.7  --  3.8 3.8   MAGNESIUM mg/dL  --   --   --  2.3 1.8  --  1.2*   CHLORIDE mmol/L 99 107 100 106  --  107 97*   CO2 mmol/L 21.7* 18.9* 18.7* 19.3*  --  19.0* 17.9*   BUN mg/dL 11 7 7 6  --  8 10   CREATININE mg/dL 0.67 0.75 0.77 0.73  --  0.75 0.95   CALCIUM mg/dL 8.8 8.8 9.4 8.7  --  8.4* 9.3   GLUCOSE mg/dL 148* 201* 195* 111*  --  100* 120*   ALBUMIN g/dL  --  3.84  --   --   --  3.75 4.51   BILIRUBIN mg/dL  --  0.3  --   --   --  0.3 0.5   ALK PHOS U/L  --  82  --   --   --  78 91   AST (SGOT) U/L  --  12  --   --   --  19 21   ALT (SGPT) U/L  --  17  --   --   --  19 23   Estimated Creatinine Clearance: 107.6 mL/min (by C-G formula based on SCr of 0.67 mg/dL).  No results found for: AMMONIA  Results from last 7 days   Lab Units 04/08/22  0021 04/07/22  1005 04/03/22  0744   TROPONIN T ng/mL <0.010 <0.010 0.073*         Results from last 7 days   Lab Units 04/06/22  0151   CHOLESTEROL mg/dL 123   TRIGLYCERIDES mg/dL 377*   HDL CHOL mg/dL 40   LDL CHOL mg/dL 29     Glucose   Date/Time Value Ref Range  Status   04/08/2022 1557 144 (H) 70 - 130 mg/dL Final     Comment:     Meter: [id number] : [id number] HEATH RUANO   04/08/2022 1004 154 (H) 70 - 130 mg/dL Final     Comment:     Meter: [id number] : [id number] HEATH RUANO   04/08/2022 0703 132 (H) 70 - 130 mg/dL Final     Comment:     Meter: [id number] : [id number] HEATH RUANO   04/07/2022 1621 172 (H) 70 - 130 mg/dL Final     Comment:     Meter: [id number] : [id number] [email or web address]   04/07/2022 1110 162 (H) 70 - 130 mg/dL Final     Comment:     Meter: [id number] : [id number] ALYSSA ALFREDO   04/07/2022 0633 148 (H) 70 - 130 mg/dL Final     Comment:     Meter: [id number] : [id number] Yones   04/06/2022 1616 119 70 - 130 mg/dL Final     Comment:     Meter: [id number] : [id number] Lisette Mallicoat   04/06/2022 1044 150 (H) 70 - 130 mg/dL Final     Comment:     Meter: [id number] : [id number] Lisette Mallicoat     Lab Results   Component Value Date    TSH 1.950 04/02/2022     No results found for: PREGTESTUR, PREGSERUM, HCG, HCGQUANT  Pain Management Panel    There is no flowsheet data to display.       Brief Urine Lab Results  (Last result in the past 365 days)      Color   Clarity   Blood   Leuk Est   Nitrite   Protein   CREAT   Urine HCG        03/30/22 2013 Yellow   Clear   Negative   Negative   Positive   Negative               No results found for: BLOODCX  No results found for: URINECX  No results found for: WOUNDCX  No results found for: STOOLCX  No results found for: RESPCX  No results found for: AFBCX  Results from last 7 days   Lab Units 04/03/22  0238 04/02/22  2250   LACTATE mmol/L 0.8  --    SED RATE mm/hr  --  9   CRP mg/dL  --  <0.30       I have personally looked at the labs and they are summarized above.  ----------------------------------------------------------------------------------------------------------------------  Detailed radiology reports for the last 24 hours:    Imaging Results (Last 24 Hours)      Procedure Component Value Units Date/Time    US Arterial Doppler Lower Extremity Right [[id number]] Collected: 04/08/22 0819     Updated: 04/08/22 0830    Narrative:      US ARTERIAL DOPPLER LOWER EXTREMITY  RIGHT-     REASON FOR EXAM:  groin pain; I21.4-Non-ST elevation (NSTEMI) myocardial  infarction; R94.39-Abnormal result of other cardiovascular function  study     Comparison:Previous color Doppler ultrasound from 04/05/2022.     TECHNIQUE:  Multiple real-time color doppler images were obtained.  M-mode Doppler was used for velocity determination and spectral pattern.  Stenosis was evaluated using the NASCET or similar measurement  technique.     Right leg: The distal portion of the external iliac artery was normal in  caliber. The Doppler wave pattern was triphasic. Peak systolic velocity  was 111 cm/s. The common femoral artery was normal in caliber with color  Doppler flow with a triphasic wave pattern. The velocity was 106 cm/s.  There is color Doppler flow in the profunda femoral and superficial  femoral artery throughout the thigh. The Doppler wave pattern in the  superficial femoral artery was triphasic. There is triphasic flow in the  popliteal artery, velocity 55 cm/s. Below the knee, there is triphasic  flow in the posterior tibial artery to the ankle, velocity at the ankle  was 75 cm/s. There continues to be evidence of a small hematoma in the  groin on the right.       Impression:      Triphasic flow was noted throughout the arteries of the  right lower extremities. No areas of focal stenosis or vessel occlusions  are demonstrated. A small hematoma persist in the groin.     This report was finalized on 4/8/2022 8:27 AM by Dr. Angelo iDaz II, MD.       XR Knee 3 View Right [[id number]] Collected: 04/08/22 0755     Updated: 04/08/22 0757    Narrative:      EXAMINATION: XR KNEE 3 VW RIGHT-      CLINICAL INDICATION: s/p fall, right knee; I21.4-Non-ST elevation  (NSTEMI) myocardial infarction;  R94.39-Abnormal result of other  cardiovascular function study        COMPARISON: 04/07/2022.     FINDINGS:  3 views of the right knee show no fracture or dislocation.     No blastic or lytic lesions.       Impression:      No acute bony abnormality      This report was finalized on 4/8/2022 7:55 AM by Dr. Jerrod Ching MD.           Assessment & Plan    #NSTEMI  #Hx of CABG  - Stress test abnormal with  evidence of ischemia on anterior wall   - Cardiology following.   - Patient had LHC on 4/5 that revealed RCA occlusion requiring PCI. Unable to amend distal RCA occlusion likely resulting in residual symptoms.   - Cardiology monitoring recommending medical management. DAPT and start apixiban 2.5 mg BID when femoral hematoma resolves.   - Will monitor on tele overnight.      #Constipation  - Repeat CT abdomen/pelvis without evidence of obstruction   - BMs with lactulose. Symptoms resolved.      #GERD  #Suspected recent episode of gastroenteritis   - Patient had N/V with some blood with emesis   - Possible partial esophageal rupture that could explain blood in emesis. Had isolated recurrent episode on 4/4  - No more emesis and nausea improved   - Hemoglobin stable. Will hold off on endoscopy and monitor symptoms.      #Femoral cath site hematoma   - Monitor Hgb. Stable. Cardiology aware.   - Given continued symptoms, repeat US ordered and only revealed small persistent hematoma.     #UTI  - Finished three days of omnicef but has recurrent dysuria. Repeat UA ordered.      #DM II  - A1C 5.9%  - SSI while inpatient and continue home regimen at discharge.      #Falls  - Plain film negative for fractures   - PT/OT evaluated, home w/ assist.      #HTN  - Restart home regimen      #QTC prolongation   #Hypomagnesemia  - Avoid QT prolongation as able and replace magnesium per protocol      F: PO  E: Replace as needed   N: CC     Code status: Full      Dispo: Pending clinical improvement and ischemic evaluation. Likely d/c  tomorrow. Transfer to [facility].     VTE Prophylaxis:   Mechanical Order History:     None      Pharmalogical Order History:      Ordered     Dose Route Frequency Stop    04/05/22 1053  heparin (porcine) injection  Status:  Discontinued         -- -- As Needed 04/05/22 1213    04/03/22 0315  heparin 94135 units/250 mL (100 units/mL) in 0.9% NaCl infusion  9.96 mL/hr,   Status:  Discontinued         10.8 Units/kg/hr IV Titrated 04/05/22 1759    04/03/22 0159  heparin (porcine) 5000 UNIT/ML injection 5,000 Units         5,000 Units IV Once 04/03/22 0209    04/03/22 0159  heparin 08424 units/250 mL (100 units/mL) in 0.9% NaCl infusion  10 mL/hr,   Status:  Discontinued         11.3 Units/kg/hr IV Titrated 04/03/22 0315    04/03/22 0159  heparin (porcine) 5000 UNIT/ML injection 5,000 Units         5,000 Units IV As Needed --    04/03/22 0159  heparin (porcine) 5000 UNIT/ML injection 2,500 Units         2,500 Units IV As Needed --             Aaron Wiley MD  [facility]  04/08/22  18:52 EDT

## 2022-04-08 NOTE — NURSING NOTE
"Patients bathroom emergency light rang out. Went to patients room to find her laying in the bathroom floor in a sitting position. She stated she got up, used the bathroom, went to get back up and her leg \"gave out\" She complained of Right knee pain, no visible damage present at this time, Xrays ordered (see [facility]ICK [facility] notes) . This is patients 2nd fall in 24 hours. She is A&Ox4, with bedside commode beside her bed, and  has been approved to stay with her to help her. At this time  was at the nurses station, not in the room. Patients current bed has no option for a bed alarm to be placed, this will be changed immediately and patient has been educated several times she is not to get up without assistance.    "

## 2022-04-09 LAB
ANION GAP SERPL CALCULATED.3IONS-SCNC: 11.5 MMOL/L (ref 5–15)
BASOPHILS # BLD AUTO: 0.05 10*3/MM3 (ref 0–0.2)
BASOPHILS NFR BLD AUTO: 0.6 % (ref 0–1.5)
BILIRUB UR QL STRIP: NEGATIVE
BUN SERPL-MCNC: 12 MG/DL (ref 6–20)
BUN/CREAT SERPL: 17.4 (ref 7–25)
CALCIUM SPEC-SCNC: 8.8 MG/DL (ref 8.6–10.5)
CHLORIDE SERPL-SCNC: 102 MMOL/L (ref 98–107)
CLARITY UR: CLEAR
CO2 SERPL-SCNC: 22.5 MMOL/L (ref 22–29)
COLOR UR: YELLOW
CREAT SERPL-MCNC: 0.69 MG/DL (ref 0.57–1)
DEPRECATED RDW RBC AUTO: 43.7 FL (ref 37–54)
EGFRCR SERPLBLD CKD-EPI 2021: 104.6 ML/MIN/1.73
EOSINOPHIL # BLD AUTO: 0.43 10*3/MM3 (ref 0–0.4)
EOSINOPHIL NFR BLD AUTO: 5.6 % (ref 0.3–6.2)
ERYTHROCYTE [DISTWIDTH] IN BLOOD BY AUTOMATED COUNT: 14.6 % (ref 12.3–15.4)
GLUCOSE BLDC GLUCOMTR-MCNC: 107 MG/DL (ref 70–130)
GLUCOSE BLDC GLUCOMTR-MCNC: 148 MG/DL (ref 70–130)
GLUCOSE BLDC GLUCOMTR-MCNC: 181 MG/DL (ref 70–130)
GLUCOSE BLDC GLUCOMTR-MCNC: 255 MG/DL (ref 70–130)
GLUCOSE SERPL-MCNC: 185 MG/DL (ref 65–99)
GLUCOSE UR STRIP-MCNC: NEGATIVE MG/DL
HCT VFR BLD AUTO: 40.9 % (ref 34–46.6)
HGB BLD-MCNC: 13.4 G/DL (ref 12–15.9)
HGB UR QL STRIP.AUTO: NEGATIVE
IMM GRANULOCYTES # BLD AUTO: 0.09 10*3/MM3 (ref 0–0.05)
IMM GRANULOCYTES NFR BLD AUTO: 1.2 % (ref 0–0.5)
KETONES UR QL STRIP: NEGATIVE
LEUKOCYTE ESTERASE UR QL STRIP.AUTO: NEGATIVE
LYMPHOCYTES # BLD AUTO: 3.57 10*3/MM3 (ref 0.7–3.1)
LYMPHOCYTES NFR BLD AUTO: 46.2 % (ref 19.6–45.3)
MCH RBC QN AUTO: 27 PG (ref 26.6–33)
MCHC RBC AUTO-ENTMCNC: 32.8 G/DL (ref 31.5–35.7)
MCV RBC AUTO: 82.5 FL (ref 79–97)
MONOCYTES # BLD AUTO: 0.52 10*3/MM3 (ref 0.1–0.9)
MONOCYTES NFR BLD AUTO: 6.7 % (ref 5–12)
NEUTROPHILS NFR BLD AUTO: 3.07 10*3/MM3 (ref 1.7–7)
NEUTROPHILS NFR BLD AUTO: 39.7 % (ref 42.7–76)
NITRITE UR QL STRIP: NEGATIVE
NRBC BLD AUTO-RTO: 0 /100 WBC (ref 0–0.2)
PH UR STRIP.AUTO: 6.5 [PH] (ref 5–8)
PLATELET # BLD AUTO: 307 10*3/MM3 (ref 140–450)
PMV BLD AUTO: 9.4 FL (ref 6–12)
POTASSIUM SERPL-SCNC: 4.3 MMOL/L (ref 3.5–5.2)
PROT UR QL STRIP: NEGATIVE
RBC # BLD AUTO: 4.96 10*6/MM3 (ref 3.77–5.28)
SODIUM SERPL-SCNC: 136 MMOL/L (ref 136–145)
SP GR UR STRIP: 1.01 (ref 1–1.03)
UROBILINOGEN UR QL STRIP: NORMAL
WBC NRBC COR # BLD: 7.73 10*3/MM3 (ref 3.4–10.8)

## 2022-04-09 PROCEDURE — 81003 URINALYSIS AUTO W/O SCOPE: CPT | Performed by: INTERNAL MEDICINE

## 2022-04-09 PROCEDURE — 82962 GLUCOSE BLOOD TEST: CPT

## 2022-04-09 PROCEDURE — 85025 COMPLETE CBC W/AUTO DIFF WBC: CPT | Performed by: INTERNAL MEDICINE

## 2022-04-09 PROCEDURE — 99232 SBSQ HOSP IP/OBS MODERATE 35: CPT | Performed by: INTERNAL MEDICINE

## 2022-04-09 PROCEDURE — 80048 BASIC METABOLIC PNL TOTAL CA: CPT | Performed by: INTERNAL MEDICINE

## 2022-04-09 PROCEDURE — 94799 UNLISTED PULMONARY SVC/PX: CPT

## 2022-04-09 PROCEDURE — 99231 SBSQ HOSP IP/OBS SF/LOW 25: CPT | Performed by: INTERNAL MEDICINE

## 2022-04-09 PROCEDURE — 63710000001 INSULIN ASPART PER 5 UNITS: Performed by: INTERNAL MEDICINE

## 2022-04-09 RX ORDER — METOPROLOL TARTRATE 50 MG/1
50 TABLET, FILM COATED ORAL EVERY 12 HOURS SCHEDULED
Status: DISCONTINUED | OUTPATIENT
Start: 2022-04-09 | End: 2022-04-10

## 2022-04-09 RX ADMIN — METOPROLOL TARTRATE 50 MG: 50 TABLET, FILM COATED ORAL at 19:46

## 2022-04-09 RX ADMIN — METOPROLOL TARTRATE 100 MG: 100 TABLET, FILM COATED ORAL at 08:15

## 2022-04-09 RX ADMIN — ISOSORBIDE MONONITRATE 30 MG: 30 TABLET, EXTENDED RELEASE ORAL at 08:15

## 2022-04-09 RX ADMIN — ASPIRIN 81 MG: 81 TABLET, COATED ORAL at 08:15

## 2022-04-09 RX ADMIN — ATORVASTATIN CALCIUM 80 MG: 40 TABLET, FILM COATED ORAL at 19:45

## 2022-04-09 RX ADMIN — DULOXETINE HYDROCHLORIDE 30 MG: 30 CAPSULE, DELAYED RELEASE ORAL at 08:15

## 2022-04-09 RX ADMIN — TICAGRELOR 90 MG: 90 TABLET ORAL at 08:15

## 2022-04-09 RX ADMIN — Medication 10 ML: at 08:16

## 2022-04-09 RX ADMIN — INSULIN ASPART 4 UNITS: 100 INJECTION, SOLUTION INTRAVENOUS; SUBCUTANEOUS at 12:12

## 2022-04-09 RX ADMIN — PANTOPRAZOLE SODIUM 40 MG: 40 TABLET, DELAYED RELEASE ORAL at 06:36

## 2022-04-09 RX ADMIN — Medication 10 ML: at 08:15

## 2022-04-09 RX ADMIN — CETIRIZINE HYDROCHLORIDE 10 MG: 10 TABLET, FILM COATED ORAL at 08:15

## 2022-04-09 RX ADMIN — COLCHICINE 0.6 MG: 0.6 TABLET, FILM COATED ORAL at 08:15

## 2022-04-09 RX ADMIN — Medication 10 ML: at 19:45

## 2022-04-09 RX ADMIN — DULOXETINE HYDROCHLORIDE 30 MG: 30 CAPSULE, DELAYED RELEASE ORAL at 19:46

## 2022-04-09 RX ADMIN — TICAGRELOR 90 MG: 90 TABLET ORAL at 19:46

## 2022-04-09 NOTE — PROGRESS NOTES
[facility] HOSPITALIST PROGRESS NOTE     Patient Identification:  Name:  Lissa Eisenberg  Age:  51 y.o.  Sex:  female  :  1970  MRN:  [id number]  Visit Number:  [id number]  ROOM: [address] [place] [address]     Primary Care Provider:  Yolanda Velasco DO    Length of stay in inpatient status:  6    Subjective     Chief Compliant:    Chief Complaint   Patient presents with   • Chest Pain       History of Presenting Illness:    Patient continues to report RLE weakness and pain since cath. No falls, but very weak needing her  support. She reports she is not comfortable going home today to her camper but they will work and get things as safe as possible for return tomorrow. Reports chest discomfort continues to improve. Continues to report dysuria.     ROS:  Otherwise 10 point ROS negative other than documented above in HPI.     Objective     Current Hospital Meds:aspirin, 81 mg, Oral, Daily  atorvastatin, 80 mg, Oral, Nightly  cetirizine, 10 mg, Oral, Daily  colchicine, 0.6 mg, Oral, Q12H  DULoxetine, 30 mg, Oral, BID  insulin aspart, 0-7 Units, Subcutaneous, TID AC  isosorbide mononitrate, 30 mg, Oral, Q24H  metoprolol tartrate, 100 mg, Oral, Q12H  pantoprazole, 40 mg, Oral, QAM  sodium chloride, 10 mL, Intravenous, Q12H  sodium chloride, 10 mL, Intravenous, Q12H  ticagrelor, 90 mg, Oral, BID         Current Antimicrobial Therapy:  Anti-Infectives (From admission, onward)    Ordered     Dose/Rate Route Frequency Start Stop    22 1428  cefdinir (OMNICEF) capsule 300 mg        Ordering Provider: Aaron Wiley MD    300 mg Oral Every 12 Hours Scheduled 22 2100 22 1010        Current Diuretic Therapy:  Diuretics (From admission, onward)    None        ----------------------------------------------------------------------------------------------------------------------  Vital Signs:  Temp:  [97.6 °F (36.4 °C)-98 °F (36.7 °C)] 97.6 °F (36.4 °C)  Heart Rate:  [68-93] 85  Resp:  [17-18]  17  BP: ()/(55-72) (P) 90/72  SpO2:  [94 %-98 %] 97 %  on  Flow (L/min):  [0] (P) 0;   Device (Oxygen Therapy): room air  Body mass index is 36.39 kg/m².    Wt Readings from Last 3 Encounters:   04/09/22 96.2 kg (212 lb)   03/30/22 89 kg (196 lb 3.4 oz)   11/04/21 98.4 kg (217 lb)     Intake & Output (last 3 days)       04/06 0701 04/07 0700 04/07 0701 04/08 0700 04/08 0701 04/09 0700 04/09 0701  04/10 0700    P.O. 1646 540 720 240    I.V. (mL/kg) 101.5 (1.1)       Total Intake(mL/kg) 1747.5 (19.1) 540 (5.9) 720 (7.5) 240 (2.5)    Urine (mL/kg/hr)    800 (2.3)    Total Output    800    Net +1747.5 +540 +720 -560            Urine Unmeasured Occurrence 5 x 3 x 4 x     Stool Unmeasured Occurrence 3 x  1 x         Diet Regular; Cardiac  ----------------------------------------------------------------------------------------------------------------------  Physical exam:  Constitutional:  Well-developed and well-nourished.  No respiratory distress.      HENT:  Head:  Normocephalic and atraumatic.  Mouth:  Moist mucous membranes.    Eyes:  Conjunctivae and EOM are normal. No scleral icterus.    Neck:  Neck supple.  No JVD present.    Cardiovascular:  Normal rate, regular rhythm and normal heart sounds with no murmur.  Pulmonary/Chest:  No respiratory distress, no wheezes, no crackles, with normal breath sounds and good air movement.  Abdominal:  Soft.  Bowel sounds are normal.  No distension and no tenderness.   Musculoskeletal:  No edema, no tenderness, and no deformity.  No red or swollen joints anywhere.    Neurological:  Alert and oriented to person, place, and time.  No cranial nerve deficit.  No tongue deviation.  No facial droop.  No slurred speech.   Skin:  Skin is warm and dry. No rash noted. No pallor.   Peripheral vascular:  Pulses in all 4 extremities with no clubbing, no cyanosis, no  edema.  ----------------------------------------------------------------------------------------------------------------------  Tele:    ----------------------------------------------------------------------------------------------------------------------  Results from last 7 days   Lab Units 04/09/22 0329 04/08/22  0021 04/07/22  0232 04/03/22  0428 04/03/22  0238 04/02/22  2250   CRP mg/dL  --   --   --   --   --  <0.30   LACTATE mmol/L  --   --   --   --  0.8  --    WBC 10*3/mm3 7.73 7.48 8.78   < >  --  9.09   HEMOGLOBIN g/dL 13.4 13.4 13.5   < >  --  14.9   HEMATOCRIT % 40.9 40.8 40.6   < >  --  45.0   MCV fL 82.5 83.4 82.7   < >  --  80.5   MCHC g/dL 32.8 32.8 33.3   < >  --  33.1   PLATELETS 10*3/mm3 307 286 248   < >  --  327   INR   --   --   --   --   --  0.91    < > = values in this interval not displayed.         Results from last 7 days   Lab Units 04/09/22 0329 04/08/22  0021 04/06/22  0151 04/05/22  0033 04/04/22  0129 04/03/22  0450 04/03/22  0428 04/02/22  2250   SODIUM mmol/L 136 131* 136   < > 138  --  140 133*   POTASSIUM mmol/L 4.3 3.9 3.9   < > 3.7  --  3.8 3.8   MAGNESIUM mg/dL  --   --   --   --  2.3 1.8  --  1.2*   CHLORIDE mmol/L 102 99 107   < > 106  --  107 97*   CO2 mmol/L 22.5 21.7* 18.9*   < > 19.3*  --  19.0* 17.9*   BUN mg/dL 12 11 7   < > 6  --  8 10   CREATININE mg/dL 0.69 0.67 0.75   < > 0.73  --  0.75 0.95   CALCIUM mg/dL 8.8 8.8 8.8   < > 8.7  --  8.4* 9.3   GLUCOSE mg/dL 185* 148* 201*   < > 111*  --  100* 120*   ALBUMIN g/dL  --   --  3.84  --   --   --  3.75 4.51   BILIRUBIN mg/dL  --   --  0.3  --   --   --  0.3 0.5   ALK PHOS U/L  --   --  82  --   --   --  78 91   AST (SGOT) U/L  --   --  12  --   --   --  19 21   ALT (SGPT) U/L  --   --  17  --   --   --  19 23    < > = values in this interval not displayed.   Estimated Creatinine Clearance: 107.4 mL/min (by C-G formula based on SCr of 0.69 mg/dL).  No results found for: AMMONIA  Results from last 7 days   Lab Units  04/08/22  0021 04/07/22  1005 04/03/22  0744   TROPONIN T ng/mL <0.010 <0.010 0.073*         Results from last 7 days   Lab Units 04/06/22  0151   CHOLESTEROL mg/dL 123   TRIGLYCERIDES mg/dL 377*   HDL CHOL mg/dL 40   LDL CHOL mg/dL 29     Glucose   Date/Time Value Ref Range Status   04/09/2022 1016 255 (H) 70 - 130 mg/dL Final     Comment:     Meter: [id number] : [id number] LIGIA ARZATE   04/09/2022 0632 148 (H) 70 - 130 mg/dL Final     Comment:     Meter: [id number] : [id number] LIGIA ARZATE   04/08/2022 1914 185 (H) 70 - 130 mg/dL Final     Comment:     Meter: [id number] : [id number] LISETTE NEAL   04/08/2022 1557 144 (H) 70 - 130 mg/dL Final     Comment:     Meter: [id number] : [id number] HEATH RUANO   04/08/2022 1004 154 (H) 70 - 130 mg/dL Final     Comment:     Meter: [id number] : [id number] HEATH RUANO   04/08/2022 0703 132 (H) 70 - 130 mg/dL Final     Comment:     Meter: [id number] : [id number] HEATH RUANO   04/07/2022 1621 172 (H) 70 - 130 mg/dL Final     Comment:     Meter: [id number] : [id number] ALYSSA ALFREDO   04/07/2022 1110 162 (H) 70 - 130 mg/dL Final     Comment:     Meter: [id number] : [id number] ALFREDO     Lab Results   Component Value Date    TSH 1.950 04/02/2022     No results found for: PREGTESTUR, PREGSERUM, HCG, HCGQUANT  Pain Management Panel    There is no flowsheet data to display.       Brief Urine Lab Results  (Last result in the past 365 days)      Color   Clarity   Blood   Leuk Est   Nitrite   Protein   CREAT   Urine HCG        03/30/22 2013 Yellow   Clear   Negative   Negative   Positive   Negative               No results found for: BLOODCX  No results found for: URINECX  No results found for: WOUNDCX  No results found for: STOOLCX  No results found for: RESPCX  No results found for: AFBCX  Results from last 7 days   Lab Units 04/03/22  0238 04/02/22  2250   LACTATE mmol/L 0.8  --    SED RATE mm/hr  --  9   CRP mg/dL  --  <0.30       I  have personally looked at the labs and they are summarized above.  ----------------------------------------------------------------------------------------------------------------------  Detailed radiology reports for the last 24 hours:    Imaging Results (Last 24 Hours)     ** No results found for the last 24 hours. **        Assessment & Plan    #NSTEMI  #Hx of CABG  - Stress test abnormal with  evidence of ischemia on anterior wall   - Cardiology following.   - Patient had LHC on 4/5 that revealed RCA occlusion requiring PCI. Unable to amend distal RCA occlusion likely resulting in residual symptoms.   - Cardiology monitoring recommending medical management. DAPT and start apixiban 2.5 mg BID when femoral hematoma resolves.   - Will monitor on tele overnight.      #Constipation  - Repeat CT abdomen/pelvis without evidence of obstruction   - BMs with lactulose. Symptoms resolved.      #GERD  #Suspected recent episode of gastroenteritis   - Patient had N/V with some blood with emesis   - Possible partial esophageal rupture that could explain blood in emesis. Had isolated recurrent episode on 4/4  - No more emesis and nausea improved   - Hemoglobin stable. Will hold off on endoscopy and monitor symptoms.      #Femoral cath site hematoma   - Monitor Hgb. Stable. Cardiology aware.   - Given continued symptoms, repeat US ordered and only revealed small persistent hematoma.      #UTI  - Finished three days of omnicef but has recurrent dysuria. Repeat UA ordered.      #DM II  - A1C 5.9%  - SSI while inpatient and continue home regimen at discharge.      #Falls  - Plain film negative for fractures   - PT/OT evaluated, home w/ assist.      #HTN  - Restart home regimen      #QTC prolongation   #Hypomagnesemia  - Avoid QT prolongation as able and replace magnesium per protocol      F: PO  E: Replace as needed   N: CC     Code status: Full      Dispo: Will monitor another day as patient prepares to return home to her home.        VTE Prophylaxis:   Mechanical Order History:     None      Pharmalogical Order History:      Ordered     Dose Route Frequency Stop    04/05/22 1053  heparin (porcine) injection  Status:  Discontinued         -- -- As Needed 04/05/22 1213    04/03/22 0315  heparin 75083 units/250 mL (100 units/mL) in 0.9% NaCl infusion  9.96 mL/hr,   Status:  Discontinued         10.8 Units/kg/hr IV Titrated 04/05/22 1759    04/03/22 0159  heparin (porcine) 5000 UNIT/ML injection 5,000 Units         5,000 Units IV Once 04/03/22 0209    04/03/22 0159  heparin 03433 units/250 mL (100 units/mL) in 0.9% NaCl infusion  10 mL/hr,   Status:  Discontinued         11.3 Units/kg/hr IV Titrated 04/03/22 0315    04/03/22 0159  heparin (porcine) 5000 UNIT/ML injection 5,000 Units         5,000 Units IV As Needed --    04/03/22 0159  heparin (porcine) 5000 UNIT/ML injection 2,500 Units         2,500 Units IV As Needed --                    Aaron Wiley MD  [facility]  04/09/22  10:37 EDT

## 2022-04-09 NOTE — PROGRESS NOTES
Patient Identification:  Name:  Lissa Eisenberg  Age:  51 y.o.  Sex:  female  :  1970  MRN:  [id number]  Visit Number:  [id number]    Patient had no falls in the past 2 days but she does complain of neuropathy Pain now radiating from the access site both towards her hip joint and the knee joint, she also feels weak while she stands up and walks.  Most of her symptoms are psychosomatic.  No chest pain.  Her blood pressure has been borderline low.  ----------------------------------------------------------------------------------------------------------------------  Current Hospital Meds:  aspirin, 81 mg, Oral, Daily  atorvastatin, 80 mg, Oral, Nightly  cetirizine, 10 mg, Oral, Daily  colchicine, 0.6 mg, Oral, Q12H  DULoxetine, 30 mg, Oral, BID  insulin aspart, 0-7 Units, Subcutaneous, TID AC  isosorbide mononitrate, 30 mg, Oral, Q24H  metoprolol tartrate, 50 mg, Oral, Q12H  pantoprazole, 40 mg, Oral, QAM  sodium chloride, 10 mL, Intravenous, Q12H  sodium chloride, 10 mL, Intravenous, Q12H  ticagrelor, 90 mg, Oral, BID         ----------------------------------------------------------------------------------------------------------------------  Vital Signs:  Temp:  [97.6 °F (36.4 °C)-98 °F (36.7 °C)] 97.9 °F (36.6 °C)  Heart Rate:  [68-93] 88  Resp:  [17-18] 18  BP: ()/(55-72) 99/58      22  0054 22  0500 22  0451   Weight: (unable to weigh due to no scale on bed) (bed doesnt have scale) 96.2 kg (212 lb)     Body mass index is 36.39 kg/m².    Intake/Output Summary (Last 24 hours) at 2022 5628  Last data filed at 2022 1418  Gross per 24 hour   Intake 720 ml   Output 800 ml   Net -80 ml     Diet Regular; Cardiac  ----------------------------------------------------------------------------------------------------------------------  Physical exam:  Constitutional:    HENT:  Head:  Normocephalic and atraumatic.    Eyes:  Conjunctivae and EOM are normal.  Pupils are equal, round,  and reactive to light.  No scleral icterus.    Neck:  Neck supple.  No JVD present.    Cardiovascular: Normal rate, regular rhythm, S1 S2+, NO S3 / S4  Pulmonary/Chest:  Vesicular breath sounds B/L  Abdominal:  Soft.  Bowel sounds are normal.  No distension and no tenderness.      Neurological:  Alert and oriented to person, place, and time. No focal defecits  Skin:  Skin is warm and dry. No rash noted. No pallor.   Musculoskeletal:  No edema, no tenderness, and no deformity.  No red or swollen joints anywhere.   Peripheral vascular:  2+ Pulses B/L DP  ----------------------------------------------------------------------------------------------------------------------    ----------------------------------------------------------------------------------------------------------------------  Results from last 7 days   Lab Units 04/08/22  0021 04/07/22  1005 04/03/22  0744   TROPONIN T ng/mL <0.010 <0.010 0.073*     Results from last 7 days   Lab Units 04/09/22  0329 04/08/22  0021 04/07/22  0232 04/03/22  0428 04/03/22  0238 04/02/22  2250   CRP mg/dL  --   --   --   --   --  <0.30   LACTATE mmol/L  --   --   --   --  0.8  --    WBC 10*3/mm3 7.73 7.48 8.78   < >  --  9.09   HEMOGLOBIN g/dL 13.4 13.4 13.5   < >  --  14.9   HEMATOCRIT % 40.9 40.8 40.6   < >  --  45.0   MCV fL 82.5 83.4 82.7   < >  --  80.5   MCHC g/dL 32.8 32.8 33.3   < >  --  33.1   PLATELETS 10*3/mm3 307 286 248   < >  --  327   INR   --   --   --   --   --  0.91    < > = values in this interval not displayed.         Results from last 7 days   Lab Units 04/09/22  0329 04/08/22  0021 04/06/22  0151 04/05/22  0033 04/04/22  0129 04/03/22  0450 04/03/22  0428 04/02/22  2250   SODIUM mmol/L 136 131* 136   < > 138  --  140 133*   POTASSIUM mmol/L 4.3 3.9 3.9   < > 3.7  --  3.8 3.8   MAGNESIUM mg/dL  --   --   --   --  2.3 1.8  --  1.2*   CHLORIDE mmol/L 102 99 107   < > 106  --  107 97*   CO2 mmol/L 22.5 21.7* 18.9*   < > 19.3*  --  19.0* 17.9*   BUN  mg/dL 12 11 7   < > 6  --  8 10   CREATININE mg/dL 0.69 0.67 0.75   < > 0.73  --  0.75 0.95   CALCIUM mg/dL 8.8 8.8 8.8   < > 8.7  --  8.4* 9.3   GLUCOSE mg/dL 185* 148* 201*   < > 111*  --  100* 120*   ALBUMIN g/dL  --   --  3.84  --   --   --  3.75 4.51   BILIRUBIN mg/dL  --   --  0.3  --   --   --  0.3 0.5   ALK PHOS U/L  --   --  82  --   --   --  78 91   AST (SGOT) U/L  --   --  12  --   --   --  19 21   ALT (SGPT) U/L  --   --  17  --   --   --  19 23    < > = values in this interval not displayed.   Estimated Creatinine Clearance: 107.4 mL/min (by C-G formula based on SCr of 0.69 mg/dL).    No results found for: AMMONIA  Results from last 7 days   Lab Units 04/06/22  0151   CHOLESTEROL mg/dL 123   TRIGLYCERIDES mg/dL 377*   HDL CHOL mg/dL 40   LDL CHOL mg/dL 29     No results found for: BLOODCX  No results found for: URINECX  No results found for: WOUNDCX  No results found for: STOOLCX    I have personally looked at the labs and they are summarized above.  ----------------------------------------------------------------------------------------------------------------------  Imaging Results (Last 24 Hours)     ** No results found for the last 24 hours. **        ----------------------------------------------------------------------------------------------------------------------    Assessment:  Non-STEMI s/p RCA in-stent restenosis PCI with a residual ostial RPDA stenosis jailed across the stents in the ER PL.  CAD s/p CABG  Hypertension  Dyslipidemia  Right femoral access site hematoma resolved      Plan:  I explained the patient that her pain could be likely coming from compressed of the right femoral nerve impingement from hematoma which should resolve in 2 to 3 weeks.  We will initiate her on Xarelto 2.5 twice daily in the outpatient setting once her hematoma related issues are resolved completely.  I will decrease her metoprolol to 50 twice daily given that her blood pressure has been low.  Continue with  aspirin and Brilinta and statin.  We will follow-up in 2 to 4 weeks in our outpatient office.  Discussed with Dr. Wiley.              Connor Chaney MD, [facility]  Interventional Cardiology        04/09/22  14:58 EDT

## 2022-04-09 NOTE — PLAN OF CARE
Goal Outcome Evaluation:           Progress: no change   Complaints of groin pain continued, MD aware. BP medications modified to keep BP stable. Will continue to monitor

## 2022-04-09 NOTE — PLAN OF CARE
Problem: Adult Inpatient Plan of Care  Goal: Absence of Hospital-Acquired Illness or Injury  Intervention: Prevent Skin Injury  Recent Flowsheet Documentation  Taken 4/8/2022 1950 by Christa Jacobs RN  Skin Protection:   adhesive use limited   incontinence pads utilized     Problem: Fall Injury Risk  Goal: Absence of Fall and Fall-Related Injury  Intervention: Identify and Manage Contributors  Recent Flowsheet Documentation  Taken 4/8/2022 1950 by Christa Jacobs RN  Medication Review/Management: medications reviewed  Intervention: Promote Injury-Free Environment  Recent Flowsheet Documentation  Taken 4/8/2022 2300 by Christa Jacobs RN  Safety Promotion/Fall Prevention:   safety round/check completed   room organization consistent   fall prevention program maintained   clutter free environment maintained   assistive device/personal items within reach  Taken 4/8/2022 2100 by Christa Jacobs RN  Safety Promotion/Fall Prevention:   safety round/check completed   room organization consistent   fall prevention program maintained   clutter free environment maintained   assistive device/personal items within reach  Taken 4/8/2022 1950 by Christa Jacobs RN  Safety Promotion/Fall Prevention:   safety round/check completed   room organization consistent   fall prevention program maintained   clutter free environment maintained   assistive device/personal items within reach   Goal Outcome Evaluation:

## 2022-04-10 LAB
ANION GAP SERPL CALCULATED.3IONS-SCNC: 9.4 MMOL/L (ref 5–15)
BASOPHILS # BLD AUTO: 0.05 10*3/MM3 (ref 0–0.2)
BASOPHILS NFR BLD AUTO: 0.7 % (ref 0–1.5)
BUN SERPL-MCNC: 9 MG/DL (ref 6–20)
BUN/CREAT SERPL: 13 (ref 7–25)
CALCIUM SPEC-SCNC: 9.4 MG/DL (ref 8.6–10.5)
CHLORIDE SERPL-SCNC: 100 MMOL/L (ref 98–107)
CO2 SERPL-SCNC: 24.6 MMOL/L (ref 22–29)
CREAT SERPL-MCNC: 0.69 MG/DL (ref 0.57–1)
DEPRECATED RDW RBC AUTO: 43.1 FL (ref 37–54)
EGFRCR SERPLBLD CKD-EPI 2021: 104.6 ML/MIN/1.73
EOSINOPHIL # BLD AUTO: 0.59 10*3/MM3 (ref 0–0.4)
EOSINOPHIL NFR BLD AUTO: 7.8 % (ref 0.3–6.2)
ERYTHROCYTE [DISTWIDTH] IN BLOOD BY AUTOMATED COUNT: 14.5 % (ref 12.3–15.4)
GLUCOSE BLDC GLUCOMTR-MCNC: 120 MG/DL (ref 70–130)
GLUCOSE BLDC GLUCOMTR-MCNC: 124 MG/DL (ref 70–130)
GLUCOSE BLDC GLUCOMTR-MCNC: 183 MG/DL (ref 70–130)
GLUCOSE SERPL-MCNC: 145 MG/DL (ref 65–99)
HCT VFR BLD AUTO: 43.6 % (ref 34–46.6)
HGB BLD-MCNC: 14.4 G/DL (ref 12–15.9)
IMM GRANULOCYTES # BLD AUTO: 0.09 10*3/MM3 (ref 0–0.05)
IMM GRANULOCYTES NFR BLD AUTO: 1.2 % (ref 0–0.5)
LYMPHOCYTES # BLD AUTO: 2.81 10*3/MM3 (ref 0.7–3.1)
LYMPHOCYTES NFR BLD AUTO: 37.3 % (ref 19.6–45.3)
MCH RBC QN AUTO: 27.4 PG (ref 26.6–33)
MCHC RBC AUTO-ENTMCNC: 33 G/DL (ref 31.5–35.7)
MCV RBC AUTO: 82.9 FL (ref 79–97)
MONOCYTES # BLD AUTO: 0.54 10*3/MM3 (ref 0.1–0.9)
MONOCYTES NFR BLD AUTO: 7.2 % (ref 5–12)
NEUTROPHILS NFR BLD AUTO: 3.45 10*3/MM3 (ref 1.7–7)
NEUTROPHILS NFR BLD AUTO: 45.8 % (ref 42.7–76)
NRBC BLD AUTO-RTO: 0 /100 WBC (ref 0–0.2)
PLATELET # BLD AUTO: 298 10*3/MM3 (ref 140–450)
PMV BLD AUTO: 9 FL (ref 6–12)
POTASSIUM SERPL-SCNC: 4.2 MMOL/L (ref 3.5–5.2)
RBC # BLD AUTO: 5.26 10*6/MM3 (ref 3.77–5.28)
SODIUM SERPL-SCNC: 134 MMOL/L (ref 136–145)
WBC NRBC COR # BLD: 7.53 10*3/MM3 (ref 3.4–10.8)

## 2022-04-10 PROCEDURE — 99232 SBSQ HOSP IP/OBS MODERATE 35: CPT | Performed by: INTERNAL MEDICINE

## 2022-04-10 PROCEDURE — 85025 COMPLETE CBC W/AUTO DIFF WBC: CPT | Performed by: INTERNAL MEDICINE

## 2022-04-10 PROCEDURE — 82962 GLUCOSE BLOOD TEST: CPT

## 2022-04-10 PROCEDURE — 63710000001 INSULIN ASPART PER 5 UNITS: Performed by: INTERNAL MEDICINE

## 2022-04-10 PROCEDURE — 80048 BASIC METABOLIC PNL TOTAL CA: CPT | Performed by: INTERNAL MEDICINE

## 2022-04-10 RX ADMIN — INSULIN ASPART 2 UNITS: 100 INJECTION, SOLUTION INTRAVENOUS; SUBCUTANEOUS at 17:22

## 2022-04-10 RX ADMIN — Medication 10 ML: at 08:55

## 2022-04-10 RX ADMIN — Medication 10 ML: at 20:15

## 2022-04-10 RX ADMIN — ACETAMINOPHEN 650 MG: 325 TABLET ORAL at 09:05

## 2022-04-10 RX ADMIN — SODIUM CHLORIDE 500 ML: 9 INJECTION, SOLUTION INTRAVENOUS at 08:54

## 2022-04-10 RX ADMIN — DULOXETINE HYDROCHLORIDE 30 MG: 30 CAPSULE, DELAYED RELEASE ORAL at 20:14

## 2022-04-10 RX ADMIN — ATORVASTATIN CALCIUM 80 MG: 40 TABLET, FILM COATED ORAL at 20:14

## 2022-04-10 RX ADMIN — METOPROLOL TARTRATE 25 MG: 25 TABLET, FILM COATED ORAL at 20:14

## 2022-04-10 RX ADMIN — CETIRIZINE HYDROCHLORIDE 10 MG: 10 TABLET, FILM COATED ORAL at 08:55

## 2022-04-10 RX ADMIN — ISOSORBIDE MONONITRATE 30 MG: 30 TABLET, EXTENDED RELEASE ORAL at 08:55

## 2022-04-10 RX ADMIN — PANTOPRAZOLE SODIUM 40 MG: 40 TABLET, DELAYED RELEASE ORAL at 05:35

## 2022-04-10 RX ADMIN — ASPIRIN 81 MG: 81 TABLET, COATED ORAL at 08:55

## 2022-04-10 RX ADMIN — DULOXETINE HYDROCHLORIDE 30 MG: 30 CAPSULE, DELAYED RELEASE ORAL at 08:54

## 2022-04-10 RX ADMIN — TICAGRELOR 90 MG: 90 TABLET ORAL at 20:14

## 2022-04-10 RX ADMIN — TICAGRELOR 90 MG: 90 TABLET ORAL at 08:54

## 2022-04-10 NOTE — PROGRESS NOTES
[facility] HOSPITALIST PROGRESS NOTE     Patient Identification:  Name:  Lissa Eisenberg  Age:  51 y.o.  Sex:  female  :  1970  MRN:  [id number]  Visit Number:  [id number]  ROOM: [address] [place] [address]     Primary Care Provider:  Yolanda Velasco DO    Length of stay in inpatient status:  7    Subjective     Chief Compliant:    Chief Complaint   Patient presents with   • Chest Pain       History of Presenting Illness:    Patient reported three episodes of her right leg hurting, becoming numb for a short time then having worse pain after. She notes continued weakness. She continues to voice concern about going home. She reported her BP was 79/58 but nursing staff reports 90/54 with no lower BPs noted overnight.     ROS:  Otherwise 10 point ROS negative other than documented above in HPI.     Objective     Current Hospital Meds:aspirin, 81 mg, Oral, Daily  atorvastatin, 80 mg, Oral, Nightly  cetirizine, 10 mg, Oral, Daily  DULoxetine, 30 mg, Oral, BID  insulin aspart, 0-7 Units, Subcutaneous, TID AC  isosorbide mononitrate, 30 mg, Oral, Q24H  metoprolol tartrate, 25 mg, Oral, Q12H  pantoprazole, 40 mg, Oral, QAM  sodium chloride, 500 mL, Intravenous, Once  sodium chloride, 10 mL, Intravenous, Q12H  sodium chloride, 10 mL, Intravenous, Q12H  ticagrelor, 90 mg, Oral, BID         Current Antimicrobial Therapy:  Anti-Infectives (From admission, onward)    Ordered     Dose/Rate Route Frequency Start Stop    22 1428  cefdinir (OMNICEF) capsule 300 mg        Ordering Provider: Aaron Wiley MD    300 mg Oral Every 12 Hours Scheduled 22 2100 22 1010        Current Diuretic Therapy:  Diuretics (From admission, onward)    None        ----------------------------------------------------------------------------------------------------------------------  Vital Signs:  Temp:  [97.6 °F (36.4 °C)-98.1 °F (36.7 °C)] 98 °F (36.7 °C)  Heart Rate:  [84-88] 84  Resp:  [17-18] 18  BP: ()/(54-72)  90/54  SpO2:  [93 %-98 %] 98 %  on   ;   Device (Oxygen Therapy): room air  Body mass index is 36.61 kg/m².    Wt Readings from Last 3 Encounters:   04/10/22 96.8 kg (213 lb 4.8 oz)   03/30/22 89 kg (196 lb 3.4 oz)   11/04/21 98.4 kg (217 lb)     Intake & Output (last 3 days)       04/07 0701 04/08 0700 04/08 0701  04/09 0700 04/09 0701  04/10 0700 04/10 0701 04/11 0700    P.O. 540 720 600     I.V. (mL/kg)        Total Intake(mL/kg) 540 (5.9) 720 (7.5) 600 (6.2)     Urine (mL/kg/hr)   800 (0.3)     Stool   0     Total Output   800     Net +540 +720 -200             Urine Unmeasured Occurrence 3 x 4 x      Stool Unmeasured Occurrence  1 x 1 x         Diet Regular; Cardiac  ----------------------------------------------------------------------------------------------------------------------  Physical exam:  Constitutional:  Well-developed and well-nourished.  No respiratory distress.      HENT:  Head:  Normocephalic and atraumatic.  Mouth:  Moist mucous membranes.    Eyes:  Conjunctivae and EOM are normal. No scleral icterus.    Neck:  Neck supple.  No JVD present.    Cardiovascular:  Normal rate, regular rhythm and normal heart sounds with no murmur.  Pulmonary/Chest:  No respiratory distress, no wheezes, no crackles, with normal breath sounds and good air movement.  Abdominal:  Soft.  Bowel sounds are normal.  No distension and no tenderness.   Musculoskeletal:  No edema, no tenderness, and no deformity.  No red or swollen joints anywhere.    Neurological:  Alert and oriented to person, place, and time.  No cranial nerve deficit.  No tongue deviation.  No facial droop.  No slurred speech.   Skin:  Skin is warm and dry. No rash noted. No pallor.   Peripheral vascular:  Pulses in all 4 extremities with no clubbing, no cyanosis, no edema.  ----------------------------------------------------------------------------------------------------------------------  Tele:     ----------------------------------------------------------------------------------------------------------------------  Results from last 7 days   Lab Units 04/09/22  0329 04/08/22  0021 04/07/22  0232   WBC 10*3/mm3 7.73 7.48 8.78   HEMOGLOBIN g/dL 13.4 13.4 13.5   HEMATOCRIT % 40.9 40.8 40.6   MCV fL 82.5 83.4 82.7   MCHC g/dL 32.8 32.8 33.3   PLATELETS 10*3/mm3 307 286 248         Results from last 7 days   Lab Units 04/09/22  0329 04/08/22  0021 04/06/22  0151 04/05/22  0033 04/04/22  0129   SODIUM mmol/L 136 131* 136   < > 138   POTASSIUM mmol/L 4.3 3.9 3.9   < > 3.7   MAGNESIUM mg/dL  --   --   --   --  2.3   CHLORIDE mmol/L 102 99 107   < > 106   CO2 mmol/L 22.5 21.7* 18.9*   < > 19.3*   BUN mg/dL 12 11 7   < > 6   CREATININE mg/dL 0.69 0.67 0.75   < > 0.73   CALCIUM mg/dL 8.8 8.8 8.8   < > 8.7   GLUCOSE mg/dL 185* 148* 201*   < > 111*   ALBUMIN g/dL  --   --  3.84  --   --    BILIRUBIN mg/dL  --   --  0.3  --   --    ALK PHOS U/L  --   --  82  --   --    AST (SGOT) U/L  --   --  12  --   --    ALT (SGPT) U/L  --   --  17  --   --     < > = values in this interval not displayed.   Estimated Creatinine Clearance: 107.7 mL/min (by C-G formula based on SCr of 0.69 mg/dL).  No results found for: AMMONIA  Results from last 7 days   Lab Units 04/08/22  0021 04/07/22  1005   TROPONIN T ng/mL <0.010 <0.010         Results from last 7 days   Lab Units 04/06/22  0151   CHOLESTEROL mg/dL 123   TRIGLYCERIDES mg/dL 377*   HDL CHOL mg/dL 40   LDL CHOL mg/dL 29     Glucose   Date/Time Value Ref Range Status   04/10/2022 0722 120 70 - 130 mg/dL Final     Comment:     Meter: [id number] : [id number] david gusman   04/09/2022 1914 181 (H) 70 - 130 mg/dL Final     Comment:     Meter: [id number] : [id number] LISETTE DE JESUS   04/09/2022 1534 107 70 - 130 mg/dL Final     Comment:     Meter: [id number] : [id number] LIGIA ARZATE   04/09/2022 1016 255 (H) 70 - 130 mg/dL Final     Comment:     Meter: [id number]  : [id number] LIGIA ARZATE   04/09/2022 0632 148 (H) 70 - 130 mg/dL Final     Comment:     Meter: [id number] : [id number] LIGIA ARZATE   04/08/2022 1914 185 (H) 70 - 130 mg/dL Final     Comment:     Meter: [id number] : [id number] LISETTE DE JESUS   04/08/2022 1557 144 (H) 70 - 130 mg/dL Final     Comment:     Meter: [id number] : [id number] HEATH RUANO   04/08/2022 1004 154 (H) 70 - 130 mg/dL Final     Comment:     Meter: [id number] : [id number] HEATH RUANO     Lab Results   Component Value Date    TSH 1.950 04/02/2022     No results found for: PREGTESTUR, PREGSERUM, HCG, HCGQUANT  Pain Management Panel    There is no flowsheet data to display.       Brief Urine Lab Results  (Last result in the past 365 days)      Color   Clarity   Blood   Leuk Est   Nitrite   Protein   CREAT   Urine HCG        04/09/22 1019 Yellow   Clear   Negative   Negative   Negative   Negative               No results found for: BLOODCX  No results found for: URINECX  No results found for: WOUNDCX  No results found for: STOOLCX  No results found for: RESPCX  No results found for: AFBCX        I have personally looked at the labs and they are summarized above.  ----------------------------------------------------------------------------------------------------------------------  Detailed radiology reports for the last 24 hours:    Imaging Results (Last 24 Hours)     ** No results found for the last 24 hours. **        Assessment & Plan    #NSTEMI  #Hx of CABG  - Stress test abnormal with  evidence of ischemia on anterior wall   - Cardiology consulted and have been following.   - Patient had LHC on 4/5 that revealed RCA occlusion requiring PCI. Unable to amend distal RCA occlusion likely resulting in residual anginal symptoms.   - Cardiology monitoring recommending medical management. DAPT and start apixiban 2.5 mg BID when femoral hematoma resolves. Continue imdur.   - Will monitor on tele    #Relative hypotension   -  Will decrease metoprolol to 25 mg BID. Will give 500 cc bolus. Will get labs.     #Constipation  - Repeat CT abdomen/pelvis without evidence of obstruction   - BMs with lactulose. Symptoms resolved.      #GERD  #Suspected recent episode of gastroenteritis   - Patient had N/V with some blood with emesis   - Possible partial esophageal rupture that could explain blood in emesis. Had isolated recurrent episode on 4/4  - No more emesis and nausea improved   - Hemoglobin stable. Will hold off on endoscopy and monitor symptoms.      #Femoral cath site hematoma   - Monitor Hgb. Stable. Cardiology aware.   - Given continued symptoms, repeat US ordered and only revealed small persistent hematoma.      #UTI  - Finished three days of omnicef but has recurrent dysuria. Repeat UA bland, UTI appears treated.      #DM II  - A1C 5.9%  - SSI while inpatient and continue home regimen at discharge.      #Falls  - Plain film negative for fractures   - PT/OT evaluated, home w/ assist.      #HTN  - Restart home regimen      #QTC prolongation   #Hypomagnesemia  - Avoid QT prolongation as able and replace magnesium per protocol      F: PO  E: Replace as needed   N: CC     Code status: Full      Dispo: Will monitor another day as patient prepares to return home to her home.     VTE Prophylaxis:   Mechanical Order History:     None      Pharmalogical Order History:      Ordered     Dose Route Frequency Stop    04/05/22 1053  heparin (porcine) injection  Status:  Discontinued         -- -- As Needed 04/05/22 1213    04/03/22 0315  heparin 44877 units/250 mL (100 units/mL) in 0.9% NaCl infusion  9.96 mL/hr,   Status:  Discontinued         10.8 Units/kg/hr IV Titrated 04/05/22 1759    04/03/22 0159  heparin (porcine) 5000 UNIT/ML injection 5,000 Units         5,000 Units IV Once 04/03/22 0209    04/03/22 0159  heparin 94650 units/250 mL (100 units/mL) in 0.9% NaCl infusion  10 mL/hr,   Status:  Discontinued         11.3 Units/kg/hr IV Titrated  04/03/22 0315    04/03/22 0159  heparin (porcine) 5000 UNIT/ML injection 5,000 Units         5,000 Units IV As Needed --    04/03/22 0159  heparin (porcine) 5000 UNIT/ML injection 2,500 Units         2,500 Units IV As Needed --                  Aaron Wiley MD  [facility]  04/10/22  08:42 EDT

## 2022-04-10 NOTE — PLAN OF CARE
Problem: Adult Inpatient Plan of Care  Goal: Absence of Hospital-Acquired Illness or Injury  Intervention: Prevent Skin Injury  Recent Flowsheet Documentation  Taken 4/9/2022 1938 by Christa Jacobs RN  Body Position: supine  Skin Protection:   adhesive use limited   incontinence pads utilized     Problem: Adult Inpatient Plan of Care  Goal: Absence of Hospital-Acquired Illness or Injury  Intervention: Prevent Infection  Recent Flowsheet Documentation  Taken 4/10/2022 0100 by Christa Jacobs RN  Infection Prevention:   rest/sleep promoted   environmental surveillance performed  Taken 4/9/2022 2300 by Christa Jacobs RN  Infection Prevention:   rest/sleep promoted   environmental surveillance performed  Taken 4/9/2022 2100 by Christa Jacobs RN  Infection Prevention:   rest/sleep promoted   environmental surveillance performed  Taken 4/9/2022 1938 by Christa Jacobs RN  Infection Prevention:   rest/sleep promoted   environmental surveillance performed   Goal Outcome Evaluation:

## 2022-04-10 NOTE — PLAN OF CARE
Goal Outcome Evaluation:  Plan of Care Reviewed With: patient        Progress: improving  Outcome Evaluation: Pt resting in bed with no acute distress noted. Pt had IVF bolus 500cc for hypotension. BP meds adjusted by MD. Pt voices no concerns or complaints at this time. Will continue to monitor and follow plan of care.

## 2022-04-11 LAB
GLUCOSE BLDC GLUCOMTR-MCNC: 115 MG/DL (ref 70–130)
GLUCOSE BLDC GLUCOMTR-MCNC: 123 MG/DL (ref 70–130)
GLUCOSE BLDC GLUCOMTR-MCNC: 137 MG/DL (ref 70–130)
GLUCOSE BLDC GLUCOMTR-MCNC: 150 MG/DL (ref 70–130)
GLUCOSE BLDC GLUCOMTR-MCNC: 170 MG/DL (ref 70–130)
TROPONIN T SERPL-MCNC: <0.01 NG/ML (ref 0–0.03)

## 2022-04-11 PROCEDURE — 82962 GLUCOSE BLOOD TEST: CPT

## 2022-04-11 PROCEDURE — 63710000001 INSULIN ASPART PER 5 UNITS: Performed by: INTERNAL MEDICINE

## 2022-04-11 PROCEDURE — 93005 ELECTROCARDIOGRAM TRACING: CPT | Performed by: INTERNAL MEDICINE

## 2022-04-11 PROCEDURE — 99232 SBSQ HOSP IP/OBS MODERATE 35: CPT | Performed by: INTERNAL MEDICINE

## 2022-04-11 PROCEDURE — 84484 ASSAY OF TROPONIN QUANT: CPT | Performed by: INTERNAL MEDICINE

## 2022-04-11 RX ADMIN — ISOSORBIDE MONONITRATE 30 MG: 30 TABLET, EXTENDED RELEASE ORAL at 09:27

## 2022-04-11 RX ADMIN — DULOXETINE HYDROCHLORIDE 30 MG: 30 CAPSULE, DELAYED RELEASE ORAL at 21:18

## 2022-04-11 RX ADMIN — Medication 10 ML: at 21:06

## 2022-04-11 RX ADMIN — NITROGLYCERIN 0.4 MG: 0.4 TABLET, ORALLY DISINTEGRATING SUBLINGUAL at 21:05

## 2022-04-11 RX ADMIN — INSULIN ASPART 2 UNITS: 100 INJECTION, SOLUTION INTRAVENOUS; SUBCUTANEOUS at 12:34

## 2022-04-11 RX ADMIN — TICAGRELOR 90 MG: 90 TABLET ORAL at 09:22

## 2022-04-11 RX ADMIN — TICAGRELOR 90 MG: 90 TABLET ORAL at 21:18

## 2022-04-11 RX ADMIN — PANTOPRAZOLE SODIUM 40 MG: 40 TABLET, DELAYED RELEASE ORAL at 05:18

## 2022-04-11 RX ADMIN — METOPROLOL TARTRATE 12.5 MG: 25 TABLET, FILM COATED ORAL at 21:17

## 2022-04-11 RX ADMIN — DULOXETINE HYDROCHLORIDE 30 MG: 30 CAPSULE, DELAYED RELEASE ORAL at 09:22

## 2022-04-11 RX ADMIN — Medication 10 ML: at 09:22

## 2022-04-11 RX ADMIN — CETIRIZINE HYDROCHLORIDE 10 MG: 10 TABLET, FILM COATED ORAL at 09:22

## 2022-04-11 RX ADMIN — ATORVASTATIN CALCIUM 80 MG: 40 TABLET, FILM COATED ORAL at 21:18

## 2022-04-11 RX ADMIN — ASPIRIN 81 MG: 81 TABLET, COATED ORAL at 09:22

## 2022-04-11 NOTE — CASE MANAGEMENT/SOCIAL WORK
Discharge Planning Assessment  DAVID Ruggiero     Patient Name: Lissa Eisenberg  MRN: [id number]  Today's Date: 4/11/2022    Admit Date: 4/2/2022     Discharge Needs Assessment    No documentation.                Discharge Plan     Row Name 04/11/22 1049       Plan    Plan Pt was admitted on 04/02/22. Pt lives with spouse and minor son and plans to return home at discharge. Pt lives in a camper with family and reports no issues. Pt does not utilize home health services or DME. SS to follow.                Hilary Glaser

## 2022-04-11 NOTE — PLAN OF CARE
Problem: Adult Inpatient Plan of Care  Goal: Absence of Hospital-Acquired Illness or Injury  Intervention: Prevent Skin Injury  Recent Flowsheet Documentation  Taken 4/10/2022 2005 by Christa Jacobs RN  Body Position: supine  Skin Protection:   adhesive use limited   incontinence pads utilized     Problem: Adult Inpatient Plan of Care  Goal: Absence of Hospital-Acquired Illness or Injury  Intervention: Prevent Infection  Recent Flowsheet Documentation  Taken 4/11/2022 0102 by Christa Jacobs RN  Infection Prevention:   rest/sleep promoted   environmental surveillance performed  Taken 4/10/2022 2300 by Christa Jacobs RN  Infection Prevention:   rest/sleep promoted   environmental surveillance performed  Taken 4/10/2022 2100 by Christa Jacobs RN  Infection Prevention:   rest/sleep promoted   environmental surveillance performed  Taken 4/10/2022 1905 by Christa Jacobs RN  Infection Prevention:   rest/sleep promoted   environmental surveillance performed   Goal Outcome Evaluation:

## 2022-04-11 NOTE — PLAN OF CARE
Goal Outcome Evaluation:               Pt resting in bed. No s/s acute distress noted. Will monitor.

## 2022-04-12 LAB
ALBUMIN SERPL-MCNC: 3.53 G/DL (ref 3.5–5.2)
ALBUMIN/GLOB SERPL: 1.2 G/DL
ALP SERPL-CCNC: 83 U/L (ref 39–117)
ALT SERPL W P-5'-P-CCNC: 17 U/L (ref 1–33)
ANION GAP SERPL CALCULATED.3IONS-SCNC: 11 MMOL/L (ref 5–15)
AST SERPL-CCNC: 13 U/L (ref 1–32)
BASOPHILS # BLD AUTO: 0.05 10*3/MM3 (ref 0–0.2)
BASOPHILS NFR BLD AUTO: 0.7 % (ref 0–1.5)
BILIRUB SERPL-MCNC: 0.2 MG/DL (ref 0–1.2)
BUN SERPL-MCNC: 11 MG/DL (ref 6–20)
BUN/CREAT SERPL: 12.5 (ref 7–25)
CALCIUM SPEC-SCNC: 8.9 MG/DL (ref 8.6–10.5)
CHLORIDE SERPL-SCNC: 104 MMOL/L (ref 98–107)
CO2 SERPL-SCNC: 23 MMOL/L (ref 22–29)
CREAT SERPL-MCNC: 0.88 MG/DL (ref 0.57–1)
DEPRECATED RDW RBC AUTO: 43.1 FL (ref 37–54)
EGFRCR SERPLBLD CKD-EPI 2021: 79.2 ML/MIN/1.73
EOSINOPHIL # BLD AUTO: 0.55 10*3/MM3 (ref 0–0.4)
EOSINOPHIL NFR BLD AUTO: 7.7 % (ref 0.3–6.2)
ERYTHROCYTE [DISTWIDTH] IN BLOOD BY AUTOMATED COUNT: 14.3 % (ref 12.3–15.4)
GLOBULIN UR ELPH-MCNC: 2.9 GM/DL
GLUCOSE BLDC GLUCOMTR-MCNC: 108 MG/DL (ref 70–130)
GLUCOSE BLDC GLUCOMTR-MCNC: 131 MG/DL (ref 70–130)
GLUCOSE BLDC GLUCOMTR-MCNC: 132 MG/DL (ref 70–130)
GLUCOSE BLDC GLUCOMTR-MCNC: 203 MG/DL (ref 70–130)
GLUCOSE SERPL-MCNC: 120 MG/DL (ref 65–99)
HCT VFR BLD AUTO: 39.1 % (ref 34–46.6)
HGB BLD-MCNC: 12.8 G/DL (ref 12–15.9)
IMM GRANULOCYTES # BLD AUTO: 0.08 10*3/MM3 (ref 0–0.05)
IMM GRANULOCYTES NFR BLD AUTO: 1.1 % (ref 0–0.5)
LYMPHOCYTES # BLD AUTO: 3.17 10*3/MM3 (ref 0.7–3.1)
LYMPHOCYTES NFR BLD AUTO: 44.5 % (ref 19.6–45.3)
MCH RBC QN AUTO: 27.2 PG (ref 26.6–33)
MCHC RBC AUTO-ENTMCNC: 32.7 G/DL (ref 31.5–35.7)
MCV RBC AUTO: 83 FL (ref 79–97)
MONOCYTES # BLD AUTO: 0.5 10*3/MM3 (ref 0.1–0.9)
MONOCYTES NFR BLD AUTO: 7 % (ref 5–12)
NEUTROPHILS NFR BLD AUTO: 2.78 10*3/MM3 (ref 1.7–7)
NEUTROPHILS NFR BLD AUTO: 39 % (ref 42.7–76)
NRBC BLD AUTO-RTO: 0 /100 WBC (ref 0–0.2)
PLATELET # BLD AUTO: 231 10*3/MM3 (ref 140–450)
PMV BLD AUTO: 9 FL (ref 6–12)
POTASSIUM SERPL-SCNC: 3.8 MMOL/L (ref 3.5–5.2)
PROT SERPL-MCNC: 6.4 G/DL (ref 6–8.5)
QT INTERVAL: 364 MS
QTC INTERVAL: 469 MS
RBC # BLD AUTO: 4.71 10*6/MM3 (ref 3.77–5.28)
SODIUM SERPL-SCNC: 138 MMOL/L (ref 136–145)
WBC NRBC COR # BLD: 7.13 10*3/MM3 (ref 3.4–10.8)

## 2022-04-12 PROCEDURE — 80053 COMPREHEN METABOLIC PANEL: CPT | Performed by: INTERNAL MEDICINE

## 2022-04-12 PROCEDURE — 85025 COMPLETE CBC W/AUTO DIFF WBC: CPT | Performed by: INTERNAL MEDICINE

## 2022-04-12 PROCEDURE — 99231 SBSQ HOSP IP/OBS SF/LOW 25: CPT | Performed by: INTERNAL MEDICINE

## 2022-04-12 PROCEDURE — 82962 GLUCOSE BLOOD TEST: CPT

## 2022-04-12 RX ADMIN — PANTOPRAZOLE SODIUM 40 MG: 40 TABLET, DELAYED RELEASE ORAL at 04:31

## 2022-04-12 RX ADMIN — TICAGRELOR 90 MG: 90 TABLET ORAL at 09:28

## 2022-04-12 RX ADMIN — ASPIRIN 81 MG: 81 TABLET, COATED ORAL at 09:28

## 2022-04-12 RX ADMIN — Medication 10 ML: at 09:28

## 2022-04-12 RX ADMIN — METOPROLOL TARTRATE 12.5 MG: 25 TABLET, FILM COATED ORAL at 09:28

## 2022-04-12 RX ADMIN — DULOXETINE HYDROCHLORIDE 30 MG: 30 CAPSULE, DELAYED RELEASE ORAL at 09:28

## 2022-04-12 RX ADMIN — ACETAMINOPHEN 650 MG: 325 TABLET ORAL at 17:21

## 2022-04-12 RX ADMIN — ACETAMINOPHEN 650 MG: 325 TABLET ORAL at 23:29

## 2022-04-12 RX ADMIN — ISOSORBIDE MONONITRATE 30 MG: 30 TABLET, EXTENDED RELEASE ORAL at 09:28

## 2022-04-12 RX ADMIN — CETIRIZINE HYDROCHLORIDE 10 MG: 10 TABLET, FILM COATED ORAL at 09:28

## 2022-04-12 RX ADMIN — Medication 10 ML: at 20:27

## 2022-04-12 RX ADMIN — ATORVASTATIN CALCIUM 80 MG: 40 TABLET, FILM COATED ORAL at 20:26

## 2022-04-12 RX ADMIN — DULOXETINE HYDROCHLORIDE 30 MG: 30 CAPSULE, DELAYED RELEASE ORAL at 20:26

## 2022-04-12 RX ADMIN — TICAGRELOR 90 MG: 90 TABLET ORAL at 20:26

## 2022-04-12 RX ADMIN — Medication 10 ML: at 09:29

## 2022-04-12 RX ADMIN — METOPROLOL TARTRATE 12.5 MG: 25 TABLET, FILM COATED ORAL at 20:26

## 2022-04-12 NOTE — PROGRESS NOTES
Assisted By: Charlene TRIPP    CC: Follow-up on coronary artery disease    Interview History/HPI: Patient states she was having some heartburn this morning.  This began after she ate some fruit.  She however last night had some chest pain, some of this radiated down into her left arm.  She thinks it lasted about 30 minutes.  Eventually seem to be relieved with nitroglycerin.  EKG was checked at the time was also troponin, I reviewed the EKG image that looked okay and troponin was negative.    ROS:     Vitals:    04/12/22 1056   BP: 114/64   Pulse: 89   Resp: 18   Temp: 98 °F (36.7 °C)   SpO2: 98%         Intake/Output Summary (Last 24 hours) at 4/12/2022 1652  Last data filed at 4/12/2022 0456  Gross per 24 hour   Intake 800 ml   Output --   Net 800 ml       EXAM: Pleasant, no distress, heart regular rate and rhythm no murmur gallop, lungs bilateral breath sounds are clear abdomen soft, benign she still has good sensation right leg, she moves her right foot well, adequate palpable distal pulses      EKG:     Tele: Sinus rhythm    LABS:     Lab Results (last 48 hours)     Procedure Component Value Units Date/Time    POC Glucose Once [[id number]]  (Abnormal) Collected: 04/12/22 1155    Specimen: Blood Updated: 04/12/22 1202     Glucose 132 mg/dL      Comment: Meter: [id number] : [id number] CHARLENE GONZALEZ       POC Glucose Once [[id number]]  (Abnormal) Collected: 04/12/22 0714    Specimen: Blood Updated: 04/12/22 0730     Glucose 131 mg/dL      Comment: Meter: [id number] : [id number] FLORECITA GILLESPIE       Comprehensive Metabolic Panel [[id number]]  (Abnormal) Collected: 04/12/22 0301    Specimen: Blood Updated: 04/12/22 0409     Glucose 120 mg/dL      BUN 11 mg/dL      Creatinine 0.88 mg/dL      Sodium 138 mmol/L      Potassium 3.8 mmol/L      Comment: Slight hemolysis detected by analyzer. Results may be affected.        Chloride 104 mmol/L      CO2 23.0 mmol/L      Calcium 8.9 mg/dL      Total Protein 6.4 g/dL       Albumin 3.53 g/dL      ALT (SGPT) 17 U/L      AST (SGOT) 13 U/L      Alkaline Phosphatase 83 U/L      Total Bilirubin 0.2 mg/dL      Globulin 2.9 gm/dL      A/G Ratio 1.2 g/dL      BUN/Creatinine Ratio 12.5     Anion Gap 11.0 mmol/L      eGFR 79.2 mL/min/1.73      Comment: National Kidney Foundation and American Society of Nephrology (ASN) Task Force recommended calculation based on the Chronic Kidney Disease Epidemiology Collaboration (CKD-EPI) equation refit without adjustment for race.       Narrative:      GFR Normal >60  Chronic Kidney Disease <60  Kidney Failure <15      CBC & Differential [[id number]]  (Abnormal) Collected: 04/12/22 0301    Specimen: Blood Updated: 04/12/22 0340    Narrative:      The following orders were created for panel order CBC & Differential.  Procedure                               Abnormality         Status                     ---------                               -----------         ------                     CBC Auto Differential[[id number]]        Abnormal            Final result                 Please view results for these tests on the individual orders.    CBC Auto Differential [[id number]]  (Abnormal) Collected: 04/12/22 0301    Specimen: Blood Updated: 04/12/22 0340     WBC 7.13 10*3/mm3      RBC 4.71 10*6/mm3      Hemoglobin 12.8 g/dL      Hematocrit 39.1 %      MCV 83.0 fL      MCH 27.2 pg      MCHC 32.7 g/dL      RDW 14.3 %      RDW-SD 43.1 fl      MPV 9.0 fL      Platelets 231 10*3/mm3      Neutrophil % 39.0 %      Lymphocyte % 44.5 %      Monocyte % 7.0 %      Eosinophil % 7.7 %      Basophil % 0.7 %      Immature Grans % 1.1 %      Neutrophils, Absolute 2.78 10*3/mm3      Lymphocytes, Absolute 3.17 10*3/mm3      Monocytes, Absolute 0.50 10*3/mm3      Eosinophils, Absolute 0.55 10*3/mm3      Basophils, Absolute 0.05 10*3/mm3      Immature Grans, Absolute 0.08 10*3/mm3      nRBC 0.0 /100 WBC     POC Glucose Once [[id number]]  (Abnormal) Collected: 04/11/22 7258    Specimen:  Blood Updated: 04/11/22 2254     Glucose 137 mg/dL      Comment: Meter: [id number] : [id number] LA NENA BORJA       Troponin [[id number]]  (Normal) Collected: 04/11/22 2053    Specimen: Blood Updated: 04/11/22 2125     Troponin T <0.010 ng/mL     Narrative:      Troponin T Reference Range:  <= 0.03 ng/mL-   Negative for AMI  >0.03 ng/mL-     Abnormal for myocardial necrosis.  Clinicians would have to utilize clinical acumen, EKG, Troponin and serial changes to determine if it is an Acute Myocardial Infarction or myocardial injury due to an underlying chronic condition.       Results may be falsely decreased if patient taking Biotin.      POC Glucose Once [[id number]]  (Abnormal) Collected: 04/11/22 1919    Specimen: Blood Updated: 04/11/22 1925     Glucose 150 mg/dL      Comment: Meter: [id number] : [id number] ELICEO HURD       POC Glucose Once [[id number]]  (Normal) Collected: 04/11/22 1645    Specimen: Blood Updated: 04/11/22 1651     Glucose 123 mg/dL      Comment: Meter: [id number] : [id number] BRAYDON AYALA       POC Glucose Once [[id number]]  (Abnormal) Collected: 04/11/22 1119    Specimen: Blood Updated: 04/11/22 1127     Glucose 170 mg/dL      Comment: Meter: [id number] : [id number] Tri Ortega       POC Glucose Once [[id number]]  (Normal) Collected: 04/11/22 0721    Specimen: Blood Updated: 04/11/22 0727     Glucose 115 mg/dL      Comment: Meter: [id number] : [id number] DEEPALI DOTY       POC Glucose Once [[id number]]  (Abnormal) Collected: 04/10/22 1717    Specimen: Blood Updated: 04/10/22 1723     Glucose 183 mg/dL      Comment: Meter: [id number] : [id number] yodit goodman                  Radiology:    Imaging Results (Last 72 Hours)     ** No results found for the last 72 hours. **          Results for orders placed during the hospital encounter of 04/02/22    Adult Transthoracic Echo Complete w/ Color, Spectral and Contrast if necessary per protocol    Interpretation  Summary  · Left ventricular ejection fraction appears to be 61 - 65%.  · Left ventricular diastolic function is consistent with (grade I) impaired relaxation.  · No significant valvular abnormality  · Moderate sclerosis of the aortic valve  · No pericardial effusion  · No prior study for comparison      Assessment/Plan:   Non-ST elevation myocardial infarction status post intervention.  Patient is on dual antiplatelet therapy, she did have chest pain last night however this is resolved.  It did last 30 minutes according to her.  EKG was unremarkable and troponin negative.  I have contacted Dr. Barry and asked him to evaluate the patient prior to potential discharge.  He had said that he would however he has been unable to evaluate her as of yet.  Will observe overnight and if stable and cardiology in agreement consider discharge tomorrow.    Small right inguinal site hematoma, clinically her strength is improving    Diabetes, controlled    DVT prophylaxis, SCUDs    Disposition Home    Ananth Noble MD

## 2022-04-12 NOTE — PLAN OF CARE
Goal Outcome Evaluation:    Patient is resting in bed with no s/s of distress. Patient is a possible discharge in the AM, pending cardiology input. VSS. Will continue with plan of care.

## 2022-04-12 NOTE — PLAN OF CARE
Goal Outcome Evaluation:   Pt experienced one episode of CP. EKG revealed NSR. Trop (-), x1 nitro administered per protocol. No further interventions required. Vitals remained stable. Patient slept well overnight. No concerns to continue current  plan of care.

## 2022-04-13 ENCOUNTER — READMISSION MANAGEMENT (OUTPATIENT)
Dept: CALL CENTER | Facility: HOSPITAL | Age: 52
End: 2022-04-13

## 2022-04-13 VITALS
HEART RATE: 110 BPM | DIASTOLIC BLOOD PRESSURE: 87 MMHG | HEIGHT: 64 IN | TEMPERATURE: 98.1 F | SYSTOLIC BLOOD PRESSURE: 142 MMHG | BODY MASS INDEX: 34.62 KG/M2 | RESPIRATION RATE: 18 BRPM | WEIGHT: 202.8 LBS | OXYGEN SATURATION: 95 %

## 2022-04-13 LAB
ANION GAP SERPL CALCULATED.3IONS-SCNC: 12.2 MMOL/L (ref 5–15)
BASOPHILS # BLD AUTO: 0.05 10*3/MM3 (ref 0–0.2)
BASOPHILS NFR BLD AUTO: 0.7 % (ref 0–1.5)
BUN SERPL-MCNC: 10 MG/DL (ref 6–20)
BUN/CREAT SERPL: 16.1 (ref 7–25)
CALCIUM SPEC-SCNC: 9.3 MG/DL (ref 8.6–10.5)
CHLORIDE SERPL-SCNC: 103 MMOL/L (ref 98–107)
CO2 SERPL-SCNC: 20.8 MMOL/L (ref 22–29)
CREAT SERPL-MCNC: 0.62 MG/DL (ref 0.57–1)
DEPRECATED RDW RBC AUTO: 43.4 FL (ref 37–54)
EGFRCR SERPLBLD CKD-EPI 2021: 107.3 ML/MIN/1.73
EOSINOPHIL # BLD AUTO: 0.46 10*3/MM3 (ref 0–0.4)
EOSINOPHIL NFR BLD AUTO: 6.6 % (ref 0.3–6.2)
ERYTHROCYTE [DISTWIDTH] IN BLOOD BY AUTOMATED COUNT: 14.4 % (ref 12.3–15.4)
GLUCOSE BLDC GLUCOMTR-MCNC: 102 MG/DL (ref 70–130)
GLUCOSE BLDC GLUCOMTR-MCNC: 131 MG/DL (ref 70–130)
GLUCOSE SERPL-MCNC: 113 MG/DL (ref 65–99)
HCT VFR BLD AUTO: 42.4 % (ref 34–46.6)
HGB BLD-MCNC: 13.9 G/DL (ref 12–15.9)
IMM GRANULOCYTES # BLD AUTO: 0.1 10*3/MM3 (ref 0–0.05)
IMM GRANULOCYTES NFR BLD AUTO: 1.4 % (ref 0–0.5)
LYMPHOCYTES # BLD AUTO: 3 10*3/MM3 (ref 0.7–3.1)
LYMPHOCYTES NFR BLD AUTO: 43 % (ref 19.6–45.3)
MCH RBC QN AUTO: 27.4 PG (ref 26.6–33)
MCHC RBC AUTO-ENTMCNC: 32.8 G/DL (ref 31.5–35.7)
MCV RBC AUTO: 83.6 FL (ref 79–97)
MONOCYTES # BLD AUTO: 0.41 10*3/MM3 (ref 0.1–0.9)
MONOCYTES NFR BLD AUTO: 5.9 % (ref 5–12)
NEUTROPHILS NFR BLD AUTO: 2.96 10*3/MM3 (ref 1.7–7)
NEUTROPHILS NFR BLD AUTO: 42.4 % (ref 42.7–76)
NRBC BLD AUTO-RTO: 0 /100 WBC (ref 0–0.2)
PLATELET # BLD AUTO: 272 10*3/MM3 (ref 140–450)
PMV BLD AUTO: 9.3 FL (ref 6–12)
POTASSIUM SERPL-SCNC: 4 MMOL/L (ref 3.5–5.2)
RBC # BLD AUTO: 5.07 10*6/MM3 (ref 3.77–5.28)
SODIUM SERPL-SCNC: 136 MMOL/L (ref 136–145)
WBC NRBC COR # BLD: 6.98 10*3/MM3 (ref 3.4–10.8)

## 2022-04-13 PROCEDURE — 99239 HOSP IP/OBS DSCHRG MGMT >30: CPT | Performed by: INTERNAL MEDICINE

## 2022-04-13 PROCEDURE — 82962 GLUCOSE BLOOD TEST: CPT

## 2022-04-13 PROCEDURE — 94799 UNLISTED PULMONARY SVC/PX: CPT

## 2022-04-13 PROCEDURE — 80048 BASIC METABOLIC PNL TOTAL CA: CPT | Performed by: INTERNAL MEDICINE

## 2022-04-13 PROCEDURE — 99232 SBSQ HOSP IP/OBS MODERATE 35: CPT | Performed by: NURSE PRACTITIONER

## 2022-04-13 PROCEDURE — 85025 COMPLETE CBC W/AUTO DIFF WBC: CPT | Performed by: INTERNAL MEDICINE

## 2022-04-13 RX ORDER — ISOSORBIDE MONONITRATE 30 MG/1
30 TABLET, EXTENDED RELEASE ORAL
Qty: 30 TABLET | Refills: 0 | Status: SHIPPED | OUTPATIENT
Start: 2022-04-14 | End: 2022-04-26 | Stop reason: SDUPTHER

## 2022-04-13 RX ORDER — CETIRIZINE HYDROCHLORIDE 10 MG/1
10 TABLET ORAL DAILY
Start: 2022-04-13 | End: 2022-04-26 | Stop reason: SDUPTHER

## 2022-04-13 RX ADMIN — DULOXETINE HYDROCHLORIDE 30 MG: 30 CAPSULE, DELAYED RELEASE ORAL at 08:56

## 2022-04-13 RX ADMIN — ISOSORBIDE MONONITRATE 30 MG: 30 TABLET, EXTENDED RELEASE ORAL at 08:56

## 2022-04-13 RX ADMIN — PANTOPRAZOLE SODIUM 40 MG: 40 TABLET, DELAYED RELEASE ORAL at 04:35

## 2022-04-13 RX ADMIN — Medication 10 ML: at 08:56

## 2022-04-13 RX ADMIN — ACETAMINOPHEN 650 MG: 325 TABLET ORAL at 08:55

## 2022-04-13 RX ADMIN — ASPIRIN 81 MG: 81 TABLET, COATED ORAL at 08:56

## 2022-04-13 RX ADMIN — CETIRIZINE HYDROCHLORIDE 10 MG: 10 TABLET, FILM COATED ORAL at 08:56

## 2022-04-13 RX ADMIN — TICAGRELOR 90 MG: 90 TABLET ORAL at 08:56

## 2022-04-13 RX ADMIN — METOPROLOL TARTRATE 12.5 MG: 25 TABLET, FILM COATED ORAL at 08:56

## 2022-04-13 NOTE — PROGRESS NOTES
LOS: 10 days     Name: Lissa Eisenberg  Age/Sex: 51 y.o. female  :  1970        PCP: Yolanda Velasco DO    Active Problems:    NSTEMI (non-ST elevated myocardial infarction) (HCC)    Abnormal nuclear stress test    Chief Complaint: Follow-up non-STEMI status post PCI JAYDA to RCA for ISR    Interval history: Patient seen and examined.  She denies any chest pain.  She has been doing well.  She denies any recent chest pain.  She is expecting discharge home today.    Subjective     Vital Signs  Vital Signs (last 72 hrs)       04/10 0700   0659  0700   0659  0700   0659  0700   0959   Most Recent      Temp (°F) 97.6 -  98.1    97.2 -  97.9    97.5 -  98       97.5 (36.4)  0621    Heart Rate 85 -  109    87 -  106    86 -  99      101     101  0855    Resp   18    16 -  20    16 -  20       16  0621    BP 91/57 -  109/68    106/63 -  139/79    96/62 -  131/86      115/78     115/78  0855    SpO2 (%) 98 -  99    96 -  98    95 -  98      95     95  0842        Temp:  [97.5 °F (36.4 °C)-98 °F (36.7 °C)] 97.5 °F (36.4 °C)  Heart Rate:  [] 101  Resp:  [16-20] 16  BP: ()/(62-86) 115/78  Body mass index is 34.81 kg/m².      Intake/Output Summary (Last 24 hours) at 2022 0959  Last data filed at 2022 0841  Gross per 24 hour   Intake 1140 ml   Output --   Net 1140 ml       Vitals reviewed.   Constitutional:       Appearance: Well-developed.   Neck:      Vascular: No carotid bruit or JVD.   Pulmonary:      Effort: Pulmonary effort is normal. No respiratory distress.      Breath sounds: Normal breath sounds. No wheezing. No rales.   Cardiovascular:      Normal rate. Regular rhythm.      Comments: No lower extremity edema  Skin:     General: Skin is warm and dry.   Neurological:      Mental Status: Alert and oriented to person, place, and time.           Results Review:     Results from last 7 days   Lab Units 22  0322 22  2664  04/10/22  0840 04/09/22  0329 04/08/22  0021 04/07/22  0232   WBC 10*3/mm3 6.98 7.13 7.53 7.73 7.48 8.78   HEMOGLOBIN g/dL 13.9 12.8 14.4 13.4 13.4 13.5   PLATELETS 10*3/mm3 272 231 298 307 286 248     Results from last 7 days   Lab Units 04/13/22  0322 04/12/22  0301 04/10/22  0840 04/09/22 0329 04/08/22  0021   SODIUM mmol/L 136 138 134* 136 131*   POTASSIUM mmol/L 4.0 3.8 4.2 4.3 3.9   CHLORIDE mmol/L 103 104 100 102 99   CO2 mmol/L 20.8* 23.0 24.6 22.5 21.7*   BUN mg/dL 10 11 9 12 11   CREATININE mg/dL 0.62 0.88 0.69 0.69 0.67   CALCIUM mg/dL 9.3 8.9 9.4 8.8 8.8   GLUCOSE mg/dL 113* 120* 145* 185* 148*     Results from last 7 days   Lab Units 04/11/22 2053 04/08/22  0021 04/07/22  1005   TROPONIN T ng/mL <0.010 <0.010 <0.010             I reviewed the patient's new clinical results.  I reviewed the patient's new imaging results and agree with the interpretation.  I personally viewed and interpreted the patient's EKG/Telemetry data      Medication Review:   aspirin, 81 mg, Oral, Daily  atorvastatin, 80 mg, Oral, Nightly  cetirizine, 10 mg, Oral, Daily  DULoxetine, 30 mg, Oral, BID  insulin aspart, 0-7 Units, Subcutaneous, TID AC  isosorbide mononitrate, 30 mg, Oral, Q24H  metoprolol tartrate, 12.5 mg, Oral, Q12H  pantoprazole, 40 mg, Oral, QAM  sodium chloride, 10 mL, Intravenous, Q12H  sodium chloride, 10 mL, Intravenous, Q12H  ticagrelor, 90 mg, Oral, BID           Assessment:  1. Non-STEMI, status post RCA in-stent restenosis PCI with a residual ostial RPDA stenosis jailed across the stents in the ER PL  2. CAD, status post CABG  3. Hypertension  4. Dyslipidemia  5. Right femoral access site hematoma, resolved      Recommendations:  1. Patient is placed on GDMT for CAD including aspirin, Brilinta, metoprolol, and atorvastatin.  Due to her extensive coronary artery disease patient has been given Xarelto 2.5 twice daily to start on April 22.  2. Follow-up in our office in 2 weeks.    I discussed the patients  findings and my recommendations with patient and family      Electronically signed by JAVIER Berg, 04/13/22, 10:43 AM EDT.

## 2022-04-13 NOTE — CASE MANAGEMENT/SOCIAL WORK
Discharge Planning Assessment   [facility]     Patient Name: Lissa Eisenberg  MRN: [id number]  Today's Date: 4/13/2022    Admit Date: 4/2/2022     Discharge Needs Assessment    No documentation.                Discharge Plan     Row Name 04/13/22 6295       Plan    Plan Pt to be dc'd with MD order for [facility]. Pt reports no preference for DME provider. CM faxed MD referral to [facility] , spoke with Kyler, pt elects to stop by and pick DME equipement up , on her way home today. Pt expresses appreciation.    Row Name 04/13/22 1028       Plan    Final Discharge Disposition Code 01 - home or self-care    Final Note Pt. discharging home on this date. No other needs identified.              Continued Care and Services - Admitted Since 4/2/2022    Coordination has not been started for this encounter.       Expected Discharge Date and Time     Expected Discharge Date Expected Discharge Time    Apr 13, 2022          Demographic Summary    No documentation.                Functional Status    No documentation.                Psychosocial    No documentation.                Abuse/Neglect    No documentation.                Legal    No documentation.                Substance Abuse    No documentation.                Patient Forms    No documentation.                   Nanette Bucio, RN

## 2022-04-13 NOTE — PLAN OF CARE
Goal Outcome Evaluation:   Plan of care: reviewed with patient   Patient slept well overnight. Vitals remain within patient baseline. Pain controlled throughout shift (see MAR). No concerns to continue current plan of care.

## 2022-04-13 NOTE — DISCHARGE PLACEMENT REQUEST
"Carlos Eisenberg (51 y.o. Female)             Date of Birth   1970    Social Security Number       Address   [address] [facility] [address]    Home Phone   [phone]    MRN   [id number]       Catholic   Unknown    Marital Status                               Admission Date   22    Admission Type   Emergency    Admitting Provider   Nikhil Erickson MD    Attending Provider   Ananth Noble MD    Department, Room/Bed   [address], 3307/1S       Discharge Date       Discharge Disposition   Home or Self Care    Discharge Destination                               Attending Provider: Ananth Noble MD    Allergies: Contrast Dye, Ciprofloxacin, Sulfa Antibiotics, Drug [Hydrocodone-acetaminophen]    Isolation: None   Infection: None   Code Status: CPR   Advance Care Planning Activity    Ht: 162.6 cm (64\")   Wt: 92 kg (202 lb 12.8 oz)    Admission Cmt: None   Principal Problem: None                Active Insurance as of 2022     Primary Coverage     Payor Plan Insurance Group Employer/Plan Group    [place] [id number]     Payor Plan Address Payor Plan Phone Number Payor Plan Fax Number Effective Dates    HU[employer]NA MEDICAL [address] [address] [phone]  2021 - None Entered    [facility] [id number]       Subscriber Name Subscriber Birth Date Member ID       CARLOS EISENBERG 1970 [id number]                 Emergency Contacts      (Rel.) Home Phone Work Phone Mobile Phone    ZAC EISENBERG (Spouse) [phone] -- --        [address] [address]  Dept. Phone:  [phone]  Dept. Fax:  [phone] Date Ordered: 2022         Patient:  Carlos Eisenberg MRN:  [id number]   [address]  [facility] [address] :  1970  SSN:    Phone: [phone] Sex:  F     Weight: 92 kg (202 lb 12.8 oz)         Ht Readings from Last 1 Encounters:   22 162.6 cm (64\")    " "     Walker               (Order ID: [id number])    Diagnosis:  Fall, subsequent encounter (W19.XXXD [ICD-10-CM] V58.89,E888.9 [ICD-9-CM])   Quantity:  1     Equipment:  Walker Folding with Wheels  Length of Need (99 Months = Lifetime): 6 Months        Authorizing Provider's Phone: [phone]  Authorizing Provider:Ananth Noble MD  Authorizing Provider's NPI: [id number]  Order Entered By: Ananth Noble MD 2022 10:16 AM     Electronically signed by: Ananth Noble MD 2022 10:16 AM     [address] [address]  Dept. Phone:  [phone]  Dept. Fax:  [phone] Date Ordered: 2022         Patient:  Lissa Eisenberg MRN:  [id number]   [address] [place] [address] :  1970  SSN:    Phone: [phone] Sex:  F     Weight: 92 kg (202 lb 12.8 oz)         Ht Readings from Last 1 Encounters:   22 162.6 cm (64\")         Commode Chair          (Order ID: [id number])    Diagnosis:  Fall, subsequent encounter (W19.XXXD [ICD-10-CM] V58.89,E888.9 [ICD-9-CM])   Quantity:  1     Equipment:  Bedside Commode Chair w/Fixed Arms  Length of Need (99 Months = Lifetime): 6 Months        Authorizing Provider's Phone: [phone]  Authorizing Provider:Ananth Noble MD  Authorizing Provider's NPI: [id number]  Order Entered By: Ananth Noble MD 2022 10:16 AM     Electronically signed by: Ananth Noble MD 2022 10:16 AM            History & Physical      Nikhil Erickson MD at 22 0349          Hospitalist History and Physical        Patient Identification  Name: Lissa Eisenberg  Age/Sex: 51 y.o. female  :  1970        MRN: [id number]  Visit Number: [id number]  Admit Date: 2022   PCP: Yolanda Velasco DO          Chief complaint chest pain    History of Present Illness:  Patient is a 51 y.o. female with history of CAD s/p CABG x2 5 years ago and s/p 6-7 stents total, the last 2 in  and Aug 2021 " at [facility] in [place] (along with a third heart cath in Oct 2021 at which time she did not receive a stent), along with type II DM previously insulin dependent but no longer, HTN, HLD, who initially was seen in our ED with nausea and vomiting of 3 days duration on 3/30/2021. She was diagnosed with viral gastroenteritis and UTI and sent home with a prescription for cefdinir. Urine culture has since resulted, growing 50,000 CFU E coli. Patient denies any dysuria or foul smelling urine. She reports her nausea and vomiting had completely resolved. She was doing well until earlier this evening while walking around after dinner, at which time she acutely developed chest pressure radiating to her neck, left arm and mid back, associated with nausea, dyspnea and diaphoresis. She ran to the restroom and vomited, but her symptoms persisted thereafter, so she came to the ED for further evaluation. In the ED, EKG showed a nonspecific IV block and q waves anterolaterally concerning for possible old infarct. Repeat EKG showed only sinus tachycardia with resolution of IV block. Initial troponin was negative. She was given morphine and ASA and her pain resolved. Second troponin a few hours later was positive, at which time she nitropaste was applied. She has had no recurrent chest pain. She has been admitted to the PCU for further management.     Review of Systems  Review of Systems   Constitutional: Positive for diaphoresis. Negative for activity change, appetite change, chills, fatigue, fever and unexpected weight change.   HENT: Negative for congestion, postnasal drip, rhinorrhea, sinus pressure, sinus pain and sore throat.    Eyes: Negative for photophobia, pain, discharge, redness, itching and visual disturbance.   Respiratory: Positive for shortness of breath. Negative for cough and wheezing.    Cardiovascular: Positive for chest pain. Negative for palpitations and leg swelling.   Gastrointestinal:  Positive for nausea and vomiting. Negative for abdominal distention, abdominal pain, constipation and diarrhea.   Endocrine: Negative for cold intolerance, heat intolerance, polydipsia, polyphagia and polyuria.   Genitourinary: Negative for difficulty urinating, dysuria, flank pain, frequency and hematuria.   Musculoskeletal: Negative for arthralgias, back pain, joint swelling, myalgias, neck pain and neck stiffness.   Skin: Negative for color change, pallor, rash and wound.   Neurological: Negative for dizziness, tremors, seizures, syncope, weakness, light-headedness, numbness and headaches.   Hematological: Negative for adenopathy. Does not bruise/bleed easily.   Psychiatric/Behavioral: Negative for agitation, behavioral problems and confusion.       History  Past Medical History:   Diagnosis Date   • COVID-19    • Diabetes mellitus (HCC)    • Hyperlipidemia    • Hypertension    • Myocardial infarct (HCC)    • PCOS (polycystic ovarian syndrome)      Past Surgical History:   Procedure Laterality Date   • CARDIAC CATHETERIZATION     •  SECTION     • CHOLECYSTECTOMY     • CORONARY ARTERY BYPASS GRAFT      x 2   • CORONARY STENT PLACEMENT      x 6 per patient   • DILATATION AND CURETTAGE     • KNEE SURGERY Left    • TONSILLECTOMY       Family History   Problem Relation Age of Onset   • Lung cancer Father    • Thyroid cancer Brother    • Heart attack Maternal Grandfather    • Lung cancer Maternal Grandfather    • Heart attack Paternal Grandmother    • Emphysema Paternal Grandfather      Social History     Tobacco Use   • Smoking status: Never Smoker   • Smokeless tobacco: Never Used   Substance Use Topics   • Alcohol use: Yes   • Drug use: Never     Medications Prior to Admission   Medication Sig Dispense Refill Last Dose   • aspirin (aspirin) 81 MG EC tablet Take 81 mg by mouth Daily.      • atorvastatin (LIPITOR) 80 MG tablet Take 80 mg by mouth Every Night.      • cefdinir (OMNICEF) 300 MG capsule Take 1  capsule by mouth 2 (Two) Times a Day for 7 days. 14 capsule 0    • cetirizine (zyrTEC) 10 MG tablet Take 10 mg by mouth 2 (two) times a day.      • cyclobenzaprine (FLEXERIL) 10 MG tablet       • dicyclomine (BENTYL) 10 MG capsule Take 1 capsule by mouth 3 (Three) Times a Day As Needed (abdominal pain). 20 capsule 0    • Dulaglutide (TRULICITY SC) Inject  under the skin into the appropriate area as directed.      • DULoxetine (CYMBALTA) 30 MG capsule Take 30 mg by mouth 2 (Two) Times a Day.      • Flaxseed, Linseed, (FLAXSEED OIL PO) Take  by mouth Daily.      • isosorbide mononitrate (IMDUR) 30 MG 24 hr tablet Take 1 tablet by mouth once daily 90 tablet 0    • Jardiance 10 MG tablet Daily.      • lisinopril (PRINIVIL,ZESTRIL) 2.5 MG tablet Take 2.5 mg by mouth Daily.      • metFORMIN (GLUCOPHAGE) 1000 MG tablet Take 1,000 mg by mouth 2 (Two) Times a Day With Meals.      • methylPREDNISolone (MEDROL) 4 MG dose pack Take as directed on package instructions. 21 each 0    • metoprolol tartrate (LOPRESSOR) 100 MG tablet Take 100 mg by mouth. 1 1/2 tabs at hs      • naproxen (NAPROSYN) 500 MG tablet Take 500 mg by mouth 2 (Two) Times a Day With Meals.      • omeprazole (priLOSEC) 20 MG capsule Take 20 mg by mouth Daily.      • ondansetron ODT (ZOFRAN-ODT) 4 MG disintegrating tablet Place 1 tablet on the tongue Every 12 (Twelve) Hours As Needed for Nausea. 12 tablet 0    • ranolazine (RANEXA) 1000 MG 12 hr tablet Take 1,000 mg by mouth 2 (Two) Times a Day.      • ticagrelor (BRILINTA) 90 MG tablet tablet Take 1 tablet by mouth 2 (Two) Times a Day. 180 tablet 3      Allergies:  Contrast dye, Ciprofloxacin, and Sulfa antibiotics    Objective     Vital Signs  Temp:  [97.9 °F (36.6 °C)-98.5 °F (36.9 °C)] 97.9 °F (36.6 °C)  Heart Rate:  [] 106  Resp:  [22-24] 22  BP: (100-135)/(64-92) 101/71  Body mass index is 34.91 kg/m².    Physical Exam:  Physical Exam  Constitutional:       General: She is not in acute distress.      Appearance: Normal appearance. She is not ill-appearing.   HENT:      Head: Normocephalic and atraumatic.      Right Ear: External ear normal.      Left Ear: External ear normal.      Nose: Nose normal.      Mouth/Throat:      Mouth: Mucous membranes are moist.      Pharynx: Oropharynx is clear.   Eyes:      Extraocular Movements: Extraocular movements intact.      Conjunctiva/sclera: Conjunctivae normal.      Pupils: Pupils are equal, round, and reactive to light.   Cardiovascular:      Rate and Rhythm: Regular rhythm. Tachycardia present.      Pulses: Normal pulses.      Heart sounds: Normal heart sounds. No murmur heard.  Pulmonary:      Effort: Pulmonary effort is normal. No respiratory distress.      Breath sounds: Normal breath sounds. No wheezing or rales.   Chest:      Chest wall: No tenderness.   Abdominal:      General: Abdomen is flat. Bowel sounds are normal. There is no distension.      Palpations: Abdomen is soft.      Tenderness: There is no abdominal tenderness.   Musculoskeletal:         General: No swelling or tenderness. Normal range of motion.      Cervical back: Normal range of motion and neck supple. No tenderness.      Right lower leg: No edema.      Left lower leg: No edema.   Lymphadenopathy:      Cervical: No cervical adenopathy.   Skin:     General: Skin is warm and dry.      Capillary Refill: Capillary refill takes less than 2 seconds.      Coloration: Skin is not jaundiced.      Findings: No bruising or lesion.   Neurological:      General: No focal deficit present.      Mental Status: She is alert and oriented to person, place, and time.   Psychiatric:         Mood and Affect: Mood normal.         Behavior: Behavior normal.         Thought Content: Thought content normal.         Judgment: Judgment normal.           Results Review:       Lab Results:  Results from last 7 days   Lab Units 04/02/22  2250 03/30/22 2012   WBC 10*3/mm3 9.09 9.63   HEMOGLOBIN g/dL 14.9 15.3   PLATELETS  10*3/mm3 327 359     Results from last 7 days   Lab Units 04/02/22 2250 03/30/22 2012   CRP mg/dL <0.30 <0.30     Results from last 7 days   Lab Units 04/02/22 2250 03/30/22 2012   SODIUM mmol/L 133* 128*   POTASSIUM mmol/L 3.8 4.4   CHLORIDE mmol/L 97* 93*   CO2 mmol/L 17.9* 18.3*   BUN mg/dL 10 13   CREATININE mg/dL 0.95 0.81   CALCIUM mg/dL 9.3 9.6   GLUCOSE mg/dL 120* 88     Results from last 7 days   Lab Units 04/02/22 2250   MAGNESIUM mg/dL 1.2*     Hemoglobin A1C   Date Value Ref Range Status   04/02/2022 5.90 (H) 4.80 - 5.60 % Final     Results from last 7 days   Lab Units 04/02/22 2250 03/30/22 2012   BILIRUBIN mg/dL 0.5 0.7   ALK PHOS U/L 91 92   AST (SGOT) U/L 21 19   ALT (SGPT) U/L 23 17     Results from last 7 days   Lab Units 04/03/22  0047 04/02/22 2250 03/30/22 2012   TROPONIN T ng/mL 0.154* <0.010 <0.010         Results from last 7 days   Lab Units 04/02/22 2250   INR  0.91           I have reviewed the patient's laboratory results.    Imaging:  Imaging Results (Last 72 Hours)     Procedure Component Value Units Date/Time    CT Abdomen Pelvis Without Contrast [[id number]] Collected: 04/03/22 0131     Updated: 04/03/22 0133    Narrative:      CT Abdomen Pelvis WO    INDICATION:   Epigastric pain with nausea and vomiting.    TECHNIQUE:   CT of the abdomen and pelvis without IV contrast. Coronal and sagittal reconstructions were obtained.  Radiation dose reduction techniques included automated exposure control or exposure modulation based on body size. Count of known CT and cardiac nuc  med studies performed in previous 12 months: 1.     COMPARISON:   3/30/2022    FINDINGS:  Please see chest CT report dictated separately. There is atherosclerotic disease with no aortic aneurysm. Gallbladder is surgically absent. No biliary obstruction is seen. No renal or ureteral stones are seen. There is no hydronephrosis. Unenhanced solid  abdominal organs are normal. Patient is status post tubal ligation.  Solid pelvic organs are grossly normal. Urinary bladder is normal. There are multiple phleboliths in the pelvis.    The appendix is normal. There is no evidence of acute colitis. The stomach is normal. There are some prominent mid to distal small bowel loops which may reflect an ileus or developing small bowel obstruction. No focal obstructing lesion is identified. No  free fluid or bulky adenopathy is seen.      Impression:        1. Prominent mid to distal small bowel loops may reflect an ileus or developing small bowel obstruction.  2. No evidence of acute colitis. The appendix is normal.  3. Cholecystectomy and tubal ligation.  4. No renal or ureteral stones. No hydronephrosis.          Signer Name: Alexei Roman MD   Signed: 4/3/2022 1:31 AM   Workstation Name: [email or web address]    [facility]    CT Chest Without Contrast Diagnostic [[id number]] Collected: 04/03/22 0127     Updated: 04/03/22 0129    Narrative:      CT Chest WO    INDICATION:   Chest pain and nausea. Vomiting.    TECHNIQUE:   CT of the thorax without IV contrast. Coronal and sagittal reconstructions were obtained.  Radiation dose reduction techniques included automated exposure control or exposure modulation based on body size. Count of known CT and cardiac nuc med studies  performed in previous 12 months: 1.     COMPARISON:   None available.    FINDINGS:  No pleural or pericardial effusion is identified. Patient is status post CABG. There is no adenopathy. Heart size is normal. There is a 3 mm noncalcified left lower lobe nodule. The lungs are otherwise clear. No CT findings present to indicate pneumonia.  Note: CT may be negative in the earliest stages of COVID-19. No pneumothorax. Please see abdomen and pelvis CT report dictated separately.      Impression:        1. No acute findings in the chest.  2. 3 mm left lower lobe nodule. In the absence of risk factors for malignancy, no follow-up is indicated. Otherwise, follow-up  could be obtained in 12 months per Fleischner criteria.    Signer Name: Alexei Roman MD   Signed: 4/3/2022 1:27 AM   Workstation Name: [facility]    [facility]    XR Chest 1 View [[id number]] Collected: 22     Updated: 22    Narrative:      CR Chest 1 Vw    INDICATION:   Chest pain.     COMPARISON:    None available.    FINDINGS:  Portable AP view(s) of the chest.  Cardiac and mediastinal contours are normal. Patient is status post sternotomy. There is some minimal atelectasis in the lung bases. Lungs otherwise are clear and there is no pneumothorax. Pulmonary vascularity is  normal.      Impression:      Minimal bibasilar atelectasis, otherwise no active disease.    Signer Name: Alexei Roman MD   Signed: 2022 11:07 PM   Workstation Name: [facility]    [facility]          I have personally reviewed the patient's radiologic imaging.        EKst-  Sinus tachycardia, , QTc 533  Left axis deviation  Nonspecific intraventricular block  Anterolateral infarct , age undetermined  Abnormal ECG  No previous ECGs available    2nd-  Sinus tachycardia, , QTc 525  Low voltage QRS  Prolonged QT  Abnormal ECG  When compared with ECG of 2022 22:27, (Unconfirmed)  Questionable change in QRS duration  Criteria for Anterior infarct are no longer present  Criteria for Anterolateral infarct are no longer present    I have personally reviewed the patient's EKG.        Assessment/Plan     - NSTEMI (non-ST elevated myocardial infarction) (HCC) in setting of extensive CAD history: received full dose ASA and nitropaste in the ED. Continue home baby ASA and brilinta, along with lipitor and metoprolol. Heparin drip initiated, continue for now. Continue to trend troponin. Obtain ECHO in the morning. Await further recommendations from cardiology, but keep NPO except for sips with meds in anticipation of heart cath. Request records from [facility] regarding recent stents there.  - Low bicarb, elevated anion gap, suggestive of high anion gap metabolic acidosis, likely secondary to ketoacidosis from jardiance. Not in DKA. Not in KEVEN. Lactic acid normal. No other source of metabolic acidosis identified. Hold jardiance for now, along with home metformin, and continue to monitor closely.   - Abnormal UA with urine culture positive for E coli but <100,000 CFU/ml, suggesting possible asymptomatic bacteruria. Hold further antibiotics for now.   - Ileus vs early SBO on CT abdomen: suspect findings are sequelae from viral enteritis patient suffered earlier in the week. She has no abdominal pain on exam and reports her nausea, vomiting, abdominal pain and diarrhea from earlier this week had all resolved a few days ago.   - Type II DM, non insulin dependent: hold jardiance and metformin as noted above. Monitor accuchecks, cover with SSI if needed.  - HTN, HLD: resume metoprolol and statin as noted above. BP currently 101/71. Check fastin lipid panel in the morning.  - QT prolongation: K+ low normal, but mag is only 1.2 so will replace per protocol. Avoid or limit QT prolonging agents. Continue to monitor. Repeat EKG in the morning.   - Hypomagnesemia: replace as noted above.    DVT Prophylaxis: IV heparin drip; home PPI once home meds reconciled    Estimated Length of Stay >2 midnights    I discussed the patient's findings, assessment and plan with the patient and lead JOSEE Bhakta who was present during my interview and exam in the PCU.     * patient is high risk due to NSTEMI, extensive coronary artery disease history    Nikhil Erickson MD  04/03/22  03:50 EDT      Electronically signed by Nikhil Erickson MD at 04/03/22 9562

## 2022-04-13 NOTE — DISCHARGE INSTR - APPOINTMENTS
Pt  has  an  apt  with  dr dai  for April 20  at 10 ;15  at  the  [facility]  and  meli  with  dr orozco  for may 5  at 9:15

## 2022-04-13 NOTE — DISCHARGE SUMMARY
Date of admission: 4/2/2022  Date of discharge: 4/13/2022    Principal diagnosis: Non-ST elevation myocardial infarction  Secondary diagnoses:  ASCVD status post CABG x2 5 years ago and subsequent PCI in 2021  Hypertension  Dyslipidemia  PTSD  Femoral cath site hematoma  Debility/falls  Diabetes type 2  3 mm left lower lobe nodule, not a smoker    Consultants:  Interventional cardiology    Procedures:  Cardiac catheterization performed on 4/5/2022 findings:  1. Coronary Angiogram selective x2  2. Left heart catheretization    3.  Selective coronary bypass angiography  4.  Intracoronary imaging of the RCA in the form of IVUS  5.  Arthrotomy of the severe in-stent restenosis using [place] cutting balloon  6.  PCI of the mid RCA using a 3.0 x 38 mm drug-eluting stent postdilated with a 3.5 mm noncompliant balloon  7.  Attempt to stent the ostial of the RPDA unsuccessfully due to stent scaffold protruding in overlapping fashion  8.  Ascending aortography  8.  6 [place] Angio-Seal vascular closure device    Nuclear stress test    2D echocardiogram showing moderate sclerosis of the aortic valve but otherwise negative for significant findings grade 1 impaired relaxation    Arterial Doppler x2    CT chest 3 mm left lower lobe nodule    CT abdomen without contrast x2    Exam: Assisted by patient's , friend, as well as Silvia RN.  I have gotten verbal permission to discuss her case in front of them.  Patient states she feels well no further chest pain or shortness of breath she states that her right leg did get weak again and she however she was able to catch herself on a walker and able to sit down.  She did not fall.  Lungs are clear heart mild tachycardic regular rhythm without murmur or rub no edema the small hematoma to evaluation is just a yellowing bruise at the cath site.  Vital signs: 142/87, 18, 101, 98.1    Hospital course: Patient had previously been in the emergency room 2 to 3 days prior to this admission  and diagnosed with a UTI however she went home and was doing okay but then eating dinner she developed chest pain, she presented here.  Initial troponin was negative but then it did turn positive.  Patient was admitted and treated for non-STEMI.  She was taken to cardiac catheterization lab with the above findings.  Patient overall has been chest pain-free since the cath except for 2 days ago.  She did have a brief episode of chest pain but this has not recurred.  Cardiology is evaluating her prior to discharge.  She did develop a small hematoma in the right femoral cath site.  She is complaining of some numbness and weakness in the leg although this is getting better she is being prescribed a walker to bedside commode on discharge.  Arterial Dopplers just showed a small hematoma in this area.  This should resolve.  From a diabetic standpoint glucose are controlled on minimal medication, she is being discharged home on the Metformin but I am holding the Jardiance at this time.  Patient also has history of hypertension blood pressures been marginally low, she is being discharged home on low-dose metoprolol only for post MI.  Cardiac rehab consult has been placed.  She had this 3 mm nodule but no smoking history which places her at low risk, PCP could consider repeat CT in 12 months but officially by Fleischner criteria recommendations the low risk this did not have to be followed.    Condition: Stable    Disposition: Home with     Follow-up:   next week  PCP 1 week    Activity: As tolerated with a walker    Diet: Constant carbohydrate    Medication:  Aspirin 81 mg a day  Lipitor 80 mg every night  Zyrtec 10 mg a day  Dicyclomine 10 mg 3 times a day as needed  Cymbalta 30 mg twice daily  Imdur 30 mg a day  Metformin 500 mg twice daily  Metoprolol one half 25 mg tablet twice daily  Omeprazole 20 mg a day  Brilinta 90 mg twice daily  Xarelto 2.5 mg daily to start next week per Carlita De Jesus cardiology  NP    Greater than 30-minute discharge

## 2022-04-13 NOTE — CASE MANAGEMENT/SOCIAL WORK
Discharge Planning Assessment  DAVID Ruggiero     Patient Name: Lissa Eisenberg  MRN: [id number]  Today's Date: 4/13/2022    Admit Date: 4/2/2022     Discharge Plan     Row Name 04/13/22 1028       Plan    Final Discharge Disposition Code 01 - home or self-care    Final Note Pt. discharging home on this date. No other needs identified.            GUADALUPE Crooks

## 2022-04-13 NOTE — PAYOR COMM NOTE
"[facility]  NPI: [id number]    Utilization Review   Contact:Nemo Flowers MSN, APRN, FNP- BC  Phone: [phone]  Fax: [phone]    Discharge Notification  REF# [id number]      Carlos Eisenberg (51 y.o. Female)             Date of Birth   1970    Social Security Number       Address   [address] [facility] [address]    Home Phone   [phone]    MRN   [id number]       Gnosticist   Unknown    Marital Status                               Admission Date   4/2/22    Admission Type   Emergency    Admitting Provider   Nikhil Erickson MD    Attending Provider       Department, Room/Bed   [facility] 3 [facility], 3307/1S       Discharge Date   4/13/2022    Discharge Disposition   Home or Self Care    Discharge Destination                               Attending Provider: (none)   Allergies: Contrast Dye, Ciprofloxacin, Sulfa Antibiotics, Drug [Hydrocodone-acetaminophen]    Isolation: None   Infection: None   Code Status: CPR   Advance Care Planning Activity    Ht: 162.6 cm (64\")   Wt: 92 kg (202 lb 12.8 oz)    Admission Cmt: None   Principal Problem: None                Active Insurance as of 4/2/2022     Primary Coverage     Payor Plan Insurance Group Employer/Plan Group    [place] [id number]     Payor Plan Address Payor Plan Phone Number Payor Plan Fax Number Effective Dates    [employer] [address] [address] [phone]  1/1/2021 - None Entered    [facility] [id number]       Subscriber Name Subscriber Birth Date Member ID       CARLOS EISENBERG 1970 [id number]                 Emergency Contacts      (Rel.) Home Phone Work Phone Mobile Phone    ZAC EISENBERG (Spouse) [phone] -- --            Ananth Noble MD    Physician   Medicine   Discharge Summary       Addendum   Date of Service:  04/13/22 1017   Creation Time:  04/13/22 1017                      Show:Clear all  [x]Manual[]Template[x]Copied    Added by:  [x]Ananth Noble " MD VALERIE      []Tanja for details    Date of admission: 4/2/2022  Date of discharge: 4/13/2022     Principal diagnosis: Non-ST elevation myocardial infarction  Secondary diagnoses:  ASCVD status post CABG x2 5 years ago and subsequent PCI in 2021  Hypertension  Dyslipidemia  PTSD  Femoral cath site hematoma  Debility/falls  Diabetes type 2  3 mm left lower lobe nodule, not a smoker     Consultants:  Interventional cardiology     Procedures:  Cardiac catheterization performed on 4/5/2022 findings:  1. Coronary Angiogram selective x2  2. Left heart catheretization    3.  Selective coronary bypass angiography  4.  Intracoronary imaging of the RCA in the form of IVUS  5.  Arthrotomy of the severe in-stent restenosis using Cheney cutting balloon  6.  PCI of the mid RCA using a 3.0 x 38 mm drug-eluting stent postdilated with a 3.5 mm noncompliant balloon  7.  Attempt to stent the ostial of the RPDA unsuccessfully due to stent scaffold protruding in overlapping fashion  8.  Ascending aortography  8.  6 Russian Angio-Seal vascular closure device     Nuclear stress test     2D echocardiogram showing moderate sclerosis of the aortic valve but otherwise negative for significant findings grade 1 impaired relaxation     Arterial Doppler x2     CT chest 3 mm left lower lobe nodule     CT abdomen without contrast x2     Exam: Assisted by patient's , friend, as well as Silvia RN.  I have gotten verbal permission to discuss her case in front of them.  Patient states she feels well no further chest pain or shortness of breath she states that her right leg did get weak again and she however she was able to catch herself on a walker and able to sit down.  She did not fall.  Lungs are clear heart mild tachycardic regular rhythm without murmur or rub no edema the small hematoma to evaluation is just a yellowing bruise at the cath site.  Vital signs: 142/87, 18, 101, 98.1     Hospital course: Patient had previously been in the  emergency room 2 to 3 days prior to this admission and diagnosed with a UTI however she went home and was doing okay but then eating dinner she developed chest pain, she presented here.  Initial troponin was negative but then it did turn positive.  Patient was admitted and treated for non-STEMI.  She was taken to cardiac catheterization lab with the above findings.  Patient overall has been chest pain-free since the cath except for 2 days ago.  She did have a brief episode of chest pain but this has not recurred.  Cardiology is evaluating her prior to discharge.  She did develop a small hematoma in the right femoral cath site.  She is complaining of some numbness and weakness in the leg although this is getting better she is being prescribed a walker to bedside commode on discharge.  Arterial Dopplers just showed a small hematoma in this area.  This should resolve.  From a diabetic standpoint glucose are controlled on minimal medication, she is being discharged home on the Metformin but I am holding the Jardiance at this time.  Patient also has history of hypertension blood pressures been marginally low, she is being discharged home on low-dose metoprolol only for post MI.  Cardiac rehab consult has been placed.  She had this 3 mm nodule but no smoking history which places her at low risk, PCP could consider repeat CT in 12 months but officially by Fleischner criteria recommendations the low risk this did not have to be followed.     Condition: Stable     Disposition: Home with      Follow-up:   next week  PCP 1 week     Activity: As tolerated with a walker     Diet: Constant carbohydrate     Medication:  Aspirin 81 mg a day  Lipitor 80 mg every night  Zyrtec 10 mg a day  Dicyclomine 10 mg 3 times a day as needed  Cymbalta 30 mg twice daily  Imdur 30 mg a day  Metformin 500 mg twice daily  Metoprolol one half 25 mg tablet twice daily  Omeprazole 20 mg a day  Brilinta 90 mg twice daily  Xarelto 2.5 mg  daily to start next week per Carlita De Jesus cardiology NP     Greater than 30-minute discharge          Revision History                                Routing History

## 2022-04-14 NOTE — OUTREACH NOTE
Prep Survey    Flowsheet Row Responses   Confucianism facility patient discharged from? [facility]   Is LACE score < 7 ? No   Emergency Room discharge w/ pulse ox? No   Eligibility Readm Mgmt   Discharge diagnosis NSTEMI   Does the patient have one of the following disease processes/diagnoses(primary or secondary)? Acute MI (STEMI,NSTEMI)   Does the patient have Home health ordered? No   Is there a DME ordered? Yes   What DME was ordered? [employer] [facility] for [employer]   Prep survey completed? Yes          USHA JUAREZ - Registered Nurse

## 2022-04-15 ENCOUNTER — READMISSION MANAGEMENT (OUTPATIENT)
Dept: CALL CENTER | Facility: HOSPITAL | Age: 52
End: 2022-04-15

## 2022-04-15 NOTE — OUTREACH NOTE
AMI Week 1 Survey    Flowsheet Row Responses   Hinduism facility patient discharged from? [facility]   Does the patient have one of the following disease processes/diagnoses(primary or secondary)? Acute MI (STEMI,NSTEMI)   Week 1 attempt successful? No   Unsuccessful attempts Attempt 1          NAYELI PADILLA - Registered Nurse

## 2022-04-20 ENCOUNTER — READMISSION MANAGEMENT (OUTPATIENT)
Dept: CALL CENTER | Facility: HOSPITAL | Age: 52
End: 2022-04-20

## 2022-04-20 NOTE — OUTREACH NOTE
AMI Week 1 Survey    Flowsheet Row Responses   Episcopalian facility patient discharged from? [facility]   Does the patient have one of the following disease processes/diagnoses(primary or secondary)? Acute MI (STEMI,NSTEMI)   Week 1 attempt successful? No   Unsuccessful attempts Attempt 2          ABI ERNANDEZ - Licensed Nurse

## 2022-04-26 ENCOUNTER — OFFICE VISIT (OUTPATIENT)
Dept: FAMILY MEDICINE CLINIC | Facility: CLINIC | Age: 52
End: 2022-04-26

## 2022-04-26 VITALS
DIASTOLIC BLOOD PRESSURE: 87 MMHG | HEART RATE: 106 BPM | WEIGHT: 200 LBS | HEIGHT: 64 IN | BODY MASS INDEX: 34.15 KG/M2 | OXYGEN SATURATION: 95 % | TEMPERATURE: 97.3 F | SYSTOLIC BLOOD PRESSURE: 132 MMHG | RESPIRATION RATE: 18 BRPM

## 2022-04-26 DIAGNOSIS — E11.65 TYPE 2 DIABETES MELLITUS WITH HYPERGLYCEMIA, WITHOUT LONG-TERM CURRENT USE OF INSULIN: Chronic | ICD-10-CM

## 2022-04-26 DIAGNOSIS — I10 PRIMARY HYPERTENSION: Chronic | ICD-10-CM

## 2022-04-26 DIAGNOSIS — L30.9 DERMATITIS: ICD-10-CM

## 2022-04-26 DIAGNOSIS — H54.8 LEGALLY BLIND IN RIGHT EYE, AS DEFINED IN USA: Chronic | ICD-10-CM

## 2022-04-26 DIAGNOSIS — K21.9 GASTROESOPHAGEAL REFLUX DISEASE WITHOUT ESOPHAGITIS: Chronic | ICD-10-CM

## 2022-04-26 DIAGNOSIS — I25.118 CORONARY ARTERY DISEASE OF NATIVE ARTERY OF NATIVE HEART WITH STABLE ANGINA PECTORIS: Primary | Chronic | ICD-10-CM

## 2022-04-26 PROBLEM — I25.10 CORONARY ARTERY DISEASE INVOLVING NATIVE CORONARY ARTERY OF NATIVE HEART: Chronic | Status: ACTIVE | Noted: 2022-04-26

## 2022-04-26 PROCEDURE — 99214 OFFICE O/P EST MOD 30 MIN: CPT | Performed by: INTERNAL MEDICINE

## 2022-04-26 RX ORDER — NITROGLYCERIN 0.4 MG/1
0.4 TABLET SUBLINGUAL
COMMUNITY
Start: 2022-02-18 | End: 2022-05-25

## 2022-04-26 RX ORDER — NAPROXEN 500 MG/1
500 TABLET ORAL 2 TIMES DAILY WITH MEALS
Status: ON HOLD | COMMUNITY
End: 2022-05-22

## 2022-04-26 RX ORDER — ONDANSETRON 4 MG/1
4 TABLET, FILM COATED ORAL EVERY 8 HOURS PRN
Status: ON HOLD | COMMUNITY
End: 2022-05-22

## 2022-04-26 RX ORDER — NYSTATIN AND TRIAMCINOLONE ACETONIDE 100000; 1 [USP'U]/G; MG/G
1 OINTMENT TOPICAL 2 TIMES DAILY PRN
Qty: 60 G | Refills: 2 | Status: ON HOLD | OUTPATIENT
Start: 2022-04-26 | End: 2022-05-22

## 2022-04-26 RX ORDER — ATORVASTATIN CALCIUM 80 MG/1
80 TABLET, FILM COATED ORAL NIGHTLY
Qty: 90 TABLET | Refills: 1 | Status: SHIPPED | OUTPATIENT
Start: 2022-04-26 | End: 2022-06-23 | Stop reason: SDUPTHER

## 2022-04-26 RX ORDER — ISOSORBIDE MONONITRATE 30 MG/1
30 TABLET, EXTENDED RELEASE ORAL
Qty: 90 TABLET | Refills: 1 | Status: SHIPPED | OUTPATIENT
Start: 2022-04-26 | End: 2022-06-21 | Stop reason: HOSPADM

## 2022-04-26 RX ORDER — LISINOPRIL 2.5 MG/1
2.5 TABLET ORAL DAILY
COMMUNITY
End: 2022-04-26 | Stop reason: SDUPTHER

## 2022-04-26 RX ORDER — LISINOPRIL 2.5 MG/1
2.5 TABLET ORAL DAILY
Qty: 90 TABLET | Refills: 1 | Status: SHIPPED | OUTPATIENT
Start: 2022-04-26 | End: 2022-05-05 | Stop reason: SINTOL

## 2022-04-26 RX ORDER — CYCLOBENZAPRINE HCL 10 MG
10 TABLET ORAL 3 TIMES DAILY PRN
Status: ON HOLD | COMMUNITY
End: 2022-05-22

## 2022-04-26 RX ORDER — SEMAGLUTIDE 1.34 MG/ML
1 INJECTION, SOLUTION SUBCUTANEOUS WEEKLY
COMMUNITY
Start: 2022-03-22 | End: 2022-04-26 | Stop reason: SDUPTHER

## 2022-04-26 RX ORDER — SEMAGLUTIDE 1.34 MG/ML
1 INJECTION, SOLUTION SUBCUTANEOUS WEEKLY
Qty: 9 ML | Refills: 1 | Status: SHIPPED | OUTPATIENT
Start: 2022-04-26

## 2022-04-26 RX ORDER — OMEPRAZOLE 20 MG/1
20 CAPSULE, DELAYED RELEASE ORAL DAILY
Qty: 90 CAPSULE | Refills: 1 | Status: SHIPPED | OUTPATIENT
Start: 2022-04-26 | End: 2022-06-24 | Stop reason: ALTCHOICE

## 2022-04-26 RX ORDER — CETIRIZINE HYDROCHLORIDE 10 MG/1
10 TABLET ORAL DAILY
Qty: 90 TABLET | Refills: 1 | Status: SHIPPED | OUTPATIENT
Start: 2022-04-26 | End: 2022-05-31 | Stop reason: ALTCHOICE

## 2022-04-26 NOTE — PROGRESS NOTES
Patient Name: Lissa Eisenberg Today's Date: 2022   Patient MRN / CSN: [id number] / [id number] Date of Encounter: 2022   Patient Age / : 51 y.o. / 1970 Encounter Provider: Roxy Deng DO   Referring Physician: No ref. provider found          Lissa is a 51 y.o. female who is being seen today for Establish Care (Patient is here for the first time, following up on a heart cath done at [facility] & needs medication refills. )      Coronary Artery Disease  Visit type: Lissa presents to establish care and follow up outpatient on CAD. Pertinent negatives include no chest pain or shortness of breath. (Patient presents today to establish care with me, after being in the hospital at [facility] earlier this month due to NSTEMI. She had JAYDA PCI to RCA. She denies chest pain at this time. She has noted 2 episodes of chest pain since hospitalization. She is planning to follow up with her cardiologist, Dr. Smalls, next week) The symptoms have been improving. Compliance with medications is good.   Hypertension  This is a chronic problem. The problem is uncontrolled (Patient reports her blood pressure has been running higher at home. She used to take a higher metoprolol dose and feels that it needs to be increased at this time). Pertinent negatives include no chest pain or shortness of breath. Current antihypertension treatment includes beta blockers and ACE inhibitors. The current treatment provides significant improvement. There are no compliance problems.    Diabetes  She has type 2 diabetes mellitus. Her disease course has been improving. Associated symptoms include fatigue. Pertinent negatives for diabetes include no chest pain. Current diabetic treatment includes oral agent (dual therapy) and diet. She is compliant with treatment all of the time. An ACE inhibitor/angiotensin II receptor blocker is being taken.       Allergies include:Contrast dye, Ciprofloxacin, Sulfa antibiotics,  and Drug [hydrocodone-acetaminophen]  Current Outpatient Medications   Medication Sig Dispense Refill   • aspirin 81 MG EC tablet Take 81 mg by mouth Daily.     • atorvastatin (LIPITOR) 80 MG tablet Take 1 tablet by mouth Every Night. 90 tablet 1   • cetirizine (zyrTEC) 10 MG tablet Take 1 tablet by mouth Daily. 90 tablet 1   • DULoxetine (CYMBALTA) 30 MG capsule Take 30 mg by mouth 2 (Two) Times a Day.     • isosorbide mononitrate (IMDUR) 30 MG 24 hr tablet Take 1 tablet by mouth Daily. 90 tablet 1   • lisinopril (PRINIVIL,ZESTRIL) 2.5 MG tablet Take 1 tablet by mouth Daily. 90 tablet 1   • metFORMIN (GLUCOPHAGE) 1000 MG tablet Take 1 tablet by mouth 2 (Two) Times a Day With Meals. 180 tablet 1   • metoprolol tartrate (LOPRESSOR) 25 MG tablet Take 1 tablet by mouth 2 (Two) Times a Day. 180 tablet 1   • omeprazole (priLOSEC) 20 MG capsule Take 1 capsule by mouth Daily. 90 capsule 1   • Ozempic, 1 MG/DOSE, 4 MG/3ML solution pen-injector Inject 1 mg into the appropriate muscle as directed by prescriber 1 (One) Time Per Week. 9 mL 1   • Rivaroxaban (Xarelto) 2.5 MG tablet Take 1 tablet by mouth 2 (Two) Times a Day. 180 tablet 1   • ticagrelor (BRILINTA) 90 MG tablet tablet Take 1 tablet by mouth 2 (Two) Times a Day. 180 tablet 3   • cyclobenzaprine (FLEXERIL) 10 MG tablet Take 10 mg by mouth 3 (Three) Times a Day As Needed for Muscle Spasms.     • dicyclomine (BENTYL) 10 MG capsule Take 1 capsule by mouth 3 (Three) Times a Day As Needed (abdominal pain). 20 capsule 0   • naproxen (NAPROSYN) 500 MG tablet Take 500 mg by mouth 2 (Two) Times a Day With Meals.     • nitroglycerin (NITROSTAT) 0.4 MG SL tablet Place 0.4 mg under the tongue Every 5 (Five) Minutes As Needed. do not exceed a total of 3 doses in 15 minutes     • nystatin-triamcinolone (MYCOLOG) 319056-9.1 UNIT/GM-% ointment Apply 1 application topically to the appropriate area as directed 2 (Two) Times a Day As Needed (Rash). Avoid using for more than 2 weeks at  a time. 60 g 2   • ondansetron (ZOFRAN) 4 MG tablet Take 4 mg by mouth Every 8 (Eight) Hours As Needed for Nausea or Vomiting.       No current facility-administered medications for this visit.     Past Medical History:   Diagnosis Date   • COVID-19    • Diabetes mellitus (HCC)    • Hyperlipidemia    • Hypertension    • Myocardial infarct (HCC)    • PCOS (polycystic ovarian syndrome)    • PTSD (post-traumatic stress disorder)      Family History   Problem Relation Age of Onset   • Lung cancer Father    • Thyroid cancer Brother    • Heart attack Maternal Grandfather    • Lung cancer Maternal Grandfather    • Heart attack Paternal Grandmother    • Emphysema Paternal Grandfather      Past Surgical History:   Procedure Laterality Date   • CARDIAC CATHETERIZATION     • CARDIAC CATHETERIZATION N/A 2022    Procedure: Left Heart Cath;  Surgeon: Alexandr Marquez MD;  [facility] CATH INVASIVE LOC[facility];  Service: Cardiology;  Laterality: N/A;   •  SECTION     • CHOLECYSTECTOMY     • CORONARY ARTERY BYPASS GRAFT      x 2   • CORONARY STENT PLACEMENT      x 6 per patient   • DILATATION AND CURETTAGE     • KNEE SURGERY Left    • TONSILLECTOMY       Social History     Substance and Sexual Activity   Alcohol Use Not Currently     Social History     Tobacco Use   Smoking Status Never Smoker   Smokeless Tobacco Never Used     Social History     Substance and Sexual Activity   Drug Use Never     Review of Systems   Constitutional: Positive for fatigue. Negative for fever.   Respiratory: Negative for shortness of breath.    Cardiovascular: Negative for chest pain.        Intermittent chest pain, last episode yesterday. She takes nitro prn.    Gastrointestinal: Negative for abdominal pain and blood in stool.   Genitourinary: Negative for hematuria.   Skin: Positive for rash.        In skin folds on abdomen        Depression Assessment Review:  PHQ-9 Total Score: 3  Vital Signs & Measurements Taken This Encounter  BP  "132/87 (BP Location: Right arm, Patient Position: Sitting, Cuff Size: Adult)   Pulse 106   Temp 97.3 °F (36.3 °C) (Temporal)   Resp 18   Ht 162.6 cm (64\")   Wt 90.7 kg (200 lb)   SpO2 95%   BMI 34.33 kg/m²    SpO2 Percentage    04/26/22 0932   SpO2: 95%            Physical Exam  Vitals reviewed.   Constitutional:       General: She is not in acute distress.  HENT:      Head: Normocephalic and atraumatic.   Eyes:      General: No scleral icterus.     Extraocular Movements: Extraocular movements intact.      Conjunctiva/sclera: Conjunctivae normal.   Cardiovascular:      Rate and Rhythm: Regular rhythm. Tachycardia present.   Pulmonary:      Effort: Pulmonary effort is normal. No respiratory distress.      Breath sounds: Normal breath sounds.   Abdominal:      Palpations: Abdomen is soft.      Tenderness: There is no abdominal tenderness.   Musculoskeletal:         General: No swelling.      Cervical back: Neck supple. No tenderness.   Lymphadenopathy:      Cervical: No cervical adenopathy.   Skin:     General: Skin is warm and dry.      Coloration: Skin is not jaundiced.      Comments: Erythematous macular rash in periumbilical region   Neurological:      Mental Status: She is alert.   Psychiatric:         Mood and Affect: Mood normal.         Behavior: Behavior normal.              Assessment & Plan  Patient Active Problem List   Diagnosis   • NSTEMI (non-ST elevated myocardial infarction) ([facility])   • Abnormal nuclear stress test   • Coronary artery disease involving native coronary artery of native heart   • Legally blind in right eye, as defined in [place]   • Primary hypertension   • Gastroesophageal reflux disease without esophagitis   • Type 2 diabetes mellitus with hyperglycemia (HCC)       ICD-10-CM ICD-9-CM   1. Coronary artery disease of native artery of native heart with stable angina pectoris (HCC)  I25.118 414.01     413.9   2. Primary hypertension  I10 401.9   3. Type 2 diabetes mellitus with " hyperglycemia, without long-term current use of insulin ([facility])  E11.65 250.00     790.29   4. Gastroesophageal reflux disease without esophagitis  K21.9 530.81   5. Legally blind in right eye, as defined in [place]  H54.8 369.4   6. Dermatitis  L30.9 692.9     No orders of the defined types were placed in this encounter.      Meds Ordered During Visit:  New Medications Ordered This Visit   Medications   • metoprolol tartrate (LOPRESSOR) 25 MG tablet     Sig: Take 1 tablet by mouth 2 (Two) Times a Day.     Dispense:  180 tablet     Refill:  1   • Ozempic, 1 MG/DOSE, 4 MG/3ML solution pen-injector     Sig: Inject 1 mg into the appropriate muscle as directed by prescriber 1 (One) Time Per Week.     Dispense:  9 mL     Refill:  1   • Rivaroxaban (Xarelto) 2.5 MG tablet     Sig: Take 1 tablet by mouth 2 (Two) Times a Day.     Dispense:  180 tablet     Refill:  1   • omeprazole (priLOSEC) 20 MG capsule     Sig: Take 1 capsule by mouth Daily.     Dispense:  90 capsule     Refill:  1   • cetirizine (zyrTEC) 10 MG tablet     Sig: Take 1 tablet by mouth Daily.     Dispense:  90 tablet     Refill:  1   • lisinopril (PRINIVIL,ZESTRIL) 2.5 MG tablet     Sig: Take 1 tablet by mouth Daily.     Dispense:  90 tablet     Refill:  1   • isosorbide mononitrate (IMDUR) 30 MG 24 hr tablet     Sig: Take 1 tablet by mouth Daily.     Dispense:  90 tablet     Refill:  1   • metFORMIN (GLUCOPHAGE) 1000 MG tablet     Sig: Take 1 tablet by mouth 2 (Two) Times a Day With Meals.     Dispense:  180 tablet     Refill:  1   • atorvastatin (LIPITOR) 80 MG tablet     Sig: Take 1 tablet by mouth Every Night.     Dispense:  90 tablet     Refill:  1   • nystatin-triamcinolone (MYCOLOG) 243110-8.1 UNIT/GM-% ointment     Sig: Apply 1 application topically to the appropriate area as directed 2 (Two) Times a Day As Needed (Rash). Avoid using for more than 2 weeks at a time.     Dispense:  60 g     Refill:  2     Metoprolol increased from 12.5 mg to 25 mg bid.  Continue other medicines as prescribed.  I reviewed hospital records today and discussed with patient.   Mycolog cream discussed with patient to use topically as needed, but to avoid prolonged use.   Follow up with Cardiology as planned.     Return in about 1 month (around 5/26/2022), or if symptoms worsen or fail to improve, for Recheck.            Referring Provider (if known): No ref. provider found      This document has been electronically signed by Roxy Deng DO  April 26, 2022 18:17 EDT    Roxy Deng DO, FACOI  [address] [place] [address]  [phone] (office)    Part of this note may be an electronic transcription/translation of spoken language to printed text using the Dragon Dictation System.

## 2022-04-27 ENCOUNTER — PATIENT ROUNDING (BHMG ONLY) (OUTPATIENT)
Dept: FAMILY MEDICINE CLINIC | Facility: CLINIC | Age: 52
End: 2022-04-27

## 2022-04-27 NOTE — PROGRESS NOTES
April 27, 2022    Hello, may I speak with Lissa Eisenberg?    My name is Melvina      I am  with [facility]  [address] [address] [phone].    Before we get started may I verify your date of birth? 1970    I am calling to officially welcome you to our practice and ask about your recent visit. Is this a good time to talk? Unable to leave message due to patient not having a voicemail box set up yet     Tell me about your visit with us. What things went well?         We're always looking for ways to make our patients' experiences even better. Do you have recommendations on ways we may improve?      Overall were you satisfied with your first visit to our practice?        I appreciate you taking the time to speak with me today. Is there anything else I can do for you?       Thank you, and have a great day.

## 2022-04-29 ENCOUNTER — TREATMENT (OUTPATIENT)
Dept: CARDIAC REHAB | Facility: HOSPITAL | Age: 52
End: 2022-04-29

## 2022-04-29 VITALS — HEART RATE: 104 BPM | DIASTOLIC BLOOD PRESSURE: 76 MMHG | SYSTOLIC BLOOD PRESSURE: 122 MMHG

## 2022-04-29 DIAGNOSIS — I21.4 NSTEMI (NON-ST ELEVATED MYOCARDIAL INFARCTION): Primary | ICD-10-CM

## 2022-04-29 PROCEDURE — 93798 PHYS/QHP OP CAR RHAB W/ECG: CPT

## 2022-05-03 ENCOUNTER — TREATMENT (OUTPATIENT)
Dept: CARDIAC REHAB | Facility: HOSPITAL | Age: 52
End: 2022-05-03

## 2022-05-03 VITALS — DIASTOLIC BLOOD PRESSURE: 78 MMHG | OXYGEN SATURATION: 98 % | HEART RATE: 108 BPM | SYSTOLIC BLOOD PRESSURE: 128 MMHG

## 2022-05-03 DIAGNOSIS — I21.4 NSTEMI (NON-ST ELEVATED MYOCARDIAL INFARCTION): Primary | ICD-10-CM

## 2022-05-03 PROCEDURE — 93798 PHYS/QHP OP CAR RHAB W/ECG: CPT

## 2022-05-03 NOTE — PROGRESS NOTES
Pt attended phase II visit.  Tolerated exercise well, NAD noted, no complaints voiced, skin warm, pink, dry, resp nl and even, denies chest discomfort, denies SOA.   Monitor shows NSR-ST , V/S WDL ,see [facility] documentation for exercise data.  Dr. Smalls physician immediately available       Patient attended first day of exercise. Educated on warm up, cool down, cleaning and use of equipment, signs and symptoms to report, Cardiac Rehab Protocol, Applying the heart monitor.

## 2022-05-05 ENCOUNTER — OFFICE VISIT (OUTPATIENT)
Dept: CARDIOLOGY | Facility: CLINIC | Age: 52
End: 2022-05-05

## 2022-05-05 ENCOUNTER — APPOINTMENT (OUTPATIENT)
Dept: CARDIAC REHAB | Facility: HOSPITAL | Age: 52
End: 2022-05-05

## 2022-05-05 VITALS
SYSTOLIC BLOOD PRESSURE: 106 MMHG | OXYGEN SATURATION: 98 % | DIASTOLIC BLOOD PRESSURE: 69 MMHG | BODY MASS INDEX: 33.97 KG/M2 | WEIGHT: 199 LBS | HEART RATE: 108 BPM | HEIGHT: 64 IN

## 2022-05-05 DIAGNOSIS — I10 PRIMARY HYPERTENSION: Primary | Chronic | ICD-10-CM

## 2022-05-05 DIAGNOSIS — I25.118 CORONARY ARTERY DISEASE OF NATIVE ARTERY OF NATIVE HEART WITH STABLE ANGINA PECTORIS: Chronic | ICD-10-CM

## 2022-05-05 DIAGNOSIS — E78.5 DYSLIPIDEMIA, GOAL LDL BELOW 70: Chronic | ICD-10-CM

## 2022-05-05 PROCEDURE — 99214 OFFICE O/P EST MOD 30 MIN: CPT | Performed by: NURSE PRACTITIONER

## 2022-05-05 PROCEDURE — 93000 ELECTROCARDIOGRAM COMPLETE: CPT | Performed by: NURSE PRACTITIONER

## 2022-05-05 RX ORDER — RANOLAZINE 500 MG/1
500 TABLET, EXTENDED RELEASE ORAL 2 TIMES DAILY
Qty: 60 TABLET | Refills: 11 | Status: SHIPPED | OUTPATIENT
Start: 2022-05-05 | End: 2022-05-25 | Stop reason: HOSPADM

## 2022-05-05 NOTE — PROGRESS NOTES
"Chief Complaint  Hospital Follow Up Visit (Pt had heart cath on 04/05/2022. Pt states she is having chest pain and weakness in her right leg. Pt states she has been throwing up, having dizziness, chest tightness, fatigue.)    Donato Eisenberg presents to [facility] CARDIOLOG[facility] for hospital follow up.    History of Present Illness    Patient was last seen during her hospitalization April 2, 2022 through April 13, 2022.  On April 5 patient underwent LHC for non-STEMI.  She had an arthrotomy of severe ISR in the RCA, PCI of the mid RCA and attempt to stent to the ostial RPDA was unsuccessful secondary to stent scaffold protruding in overlapping fashion.  LIMA to LAD was noted to be patent.  Patient had a small hematoma of the right femoral cath site.  She is here today for follow-up    Patient reports that she has occasional chest pain.  She says it is like a pressure like pain which lasts a few minutes.  She states that it radiates to her shoulder.  She does report that she has been compliant with her medications.  She reports associated shortness of breath when she has her chest pain.    Patient continues to use a walker for ambulation.  She is having some sharp electrical shocks going through her right leg, presumed to be a result of the hematoma putting pressure on her nerves.  She does report that she has tried to continue to be active despite the \"electrical shocks\".    Patient is planning to participate in cardiac rehabilitation.    Objective     Vital Signs:   /69   Pulse 108   Ht 162.6 cm (64\")   Wt 90.3 kg (199 lb)   SpO2 98%   BMI 34.16 kg/m²       Physical Exam  Vitals reviewed.   Constitutional:       Appearance: Normal appearance. She is well-developed.   Cardiovascular:      Rate and Rhythm: Normal rate and regular rhythm.      Heart sounds: No murmur heard.    No friction rub. No gallop.   Pulmonary:      Effort: Pulmonary effort is normal. No respiratory " distress.      Breath sounds: Normal breath sounds. No wheezing or rales.   Skin:     General: Skin is warm and dry.   Neurological:      Mental Status: She is alert and oriented to person, place, and time.   Psychiatric:         Mood and Affect: Mood normal.         Behavior: Behavior normal.          Result Review :            ECG 12 Lead    Date/Time: 5/5/2022 9:27 AM  Performed by: Carlita Grider APRN  Authorized by: Carlita Grider APRN   Comparison: compared with previous ECG from 4/11/2022  Similar to previous ECG  Comparison to previous ECG: Normal sinus rhythm 100 bpm  Rhythm: sinus tachycardia  Rate: tachycardic  BPM: 105  Comments: Short ME interval 104 ms  When compared to EKG of 4/11/2022 ventricular rate has increased by 5 bpm               Current Outpatient Medications   Medication Sig Dispense Refill   • aspirin 81 MG EC tablet Take 81 mg by mouth Daily.     • atorvastatin (LIPITOR) 80 MG tablet Take 1 tablet by mouth Every Night. 90 tablet 1   • cetirizine (zyrTEC) 10 MG tablet Take 1 tablet by mouth Daily. 90 tablet 1   • cyclobenzaprine (FLEXERIL) 10 MG tablet Take 10 mg by mouth 3 (Three) Times a Day As Needed for Muscle Spasms.     • dicyclomine (BENTYL) 10 MG capsule Take 1 capsule by mouth 3 (Three) Times a Day As Needed (abdominal pain). 20 capsule 0   • DULoxetine (CYMBALTA) 30 MG capsule Take 30 mg by mouth 2 (Two) Times a Day.     • isosorbide mononitrate (IMDUR) 30 MG 24 hr tablet Take 1 tablet by mouth Daily. 90 tablet 1   • metFORMIN (GLUCOPHAGE) 1000 MG tablet Take 1 tablet by mouth 2 (Two) Times a Day With Meals. 180 tablet 1   • metoprolol tartrate (LOPRESSOR) 25 MG tablet Take 1 tablet by mouth 2 (Two) Times a Day. 180 tablet 1   • naproxen (NAPROSYN) 500 MG tablet Take 500 mg by mouth 2 (Two) Times a Day With Meals.     • nitroglycerin (NITROSTAT) 0.4 MG SL tablet Place 0.4 mg under the tongue Every 5 (Five) Minutes As Needed. do not exceed a total of 3 doses in 15  minutes     • nystatin-triamcinolone (MYCOLOG) 189707-9.1 UNIT/GM-% ointment Apply 1 application topically to the appropriate area as directed 2 (Two) Times a Day As Needed (Rash). Avoid using for more than 2 weeks at a time. 60 g 2   • omeprazole (priLOSEC) 20 MG capsule Take 1 capsule by mouth Daily. 90 capsule 1   • ondansetron (ZOFRAN) 4 MG tablet Take 4 mg by mouth Every 8 (Eight) Hours As Needed for Nausea or Vomiting.     • Ozempic, 1 MG/DOSE, 4 MG/3ML solution pen-injector Inject 1 mg into the appropriate muscle as directed by prescriber 1 (One) Time Per Week. 9 mL 1   • Rivaroxaban (Xarelto) 2.5 MG tablet Take 1 tablet by mouth 2 (Two) Times a Day. 180 tablet 1   • ticagrelor (BRILINTA) 90 MG tablet tablet Take 1 tablet by mouth 2 (Two) Times a Day. 180 tablet 3   • ranolazine (Ranexa) 500 MG 12 hr tablet Take 1 tablet by mouth 2 (Two) Times a Day. 60 tablet 11     No current facility-administered medications for this visit.            Assessment and Plan    Problem List Items Addressed This Visit        Cardiac and Vasculature    Coronary artery disease involving native coronary artery of native heart (Chronic)    Overview     · Self reported first MI at age 35  · Approximately 2017 two-vessel bypass  · 8/12/2021 non-STEMI, PCI JAYDA to the RCA  · 4/5/2022 non-STEMI, arthrotomy of severe ISR, PCI of mid RCA, attempt to stent ostial RPDA unsuccessful secondary to stent scaffold protruding in overlapping fashion, and LIMA to LAD is widely patent             Relevant Medications    ranolazine (Ranexa) 500 MG 12 hr tablet    Primary hypertension - Primary (Chronic)    Dyslipidemia, goal LDL below 70 (Chronic)    Overview     · 4/3/2022 total cholesterol 85, triglycerides 175, HDL 32, and LDL 24                     Follow Up     Medications were reviewed with the patient.    Patient is to continue aspirin, atorvastatin, Brilinta in the presence of ASCVD and recent stent placement.  I have added Ranexa today for  improved anginal treatment.  She is on isosorbide mononitrate.  Blood pressure is rather low.    Hypertension is controlled.    With regard to dyslipidemia, plan to redraw labs in 6 to 12 months.  Patient is at LDL goal of less than 70.    Risk factor modification: Patient counseled regarding exercise.  Patient counseled to participate in physical activity 30 minutes a day most days of the week.    Risk factor modification:  Patient counseled regarding diet.      Risk factor modification: The patient was counseled regarding the correlation between uncontrolled diabetes and coronary artery disease.  The patient was urged to follow an appropriate diet with very limited simple carbohydrates and discuss with their primary care provider possible strategies to control blood sugar, including additional medications.    Return in about 9 weeks (around 7/7/2022).    Patient was given instructions and counseling regarding her condition or for health maintenance advice. Please see specific information pulled into the AVS if appropriate.

## 2022-05-08 PROBLEM — E78.5 DYSLIPIDEMIA, GOAL LDL BELOW 70: Status: ACTIVE | Noted: 2022-05-08

## 2022-05-08 PROBLEM — E78.5 DYSLIPIDEMIA, GOAL LDL BELOW 70: Chronic | Status: ACTIVE | Noted: 2022-05-08

## 2022-05-09 ENCOUNTER — TELEPHONE (OUTPATIENT)
Dept: FAMILY MEDICINE CLINIC | Facility: CLINIC | Age: 52
End: 2022-05-09

## 2022-05-09 NOTE — TELEPHONE ENCOUNTER
Caller: ZAC BARRY    Relationship: Emergency Contact    Best call back number: [phone]    What form or medical record are you requesting: LETTER FOR [facility]    Who is requesting this form or medical record from you: [facility]    How would you like to receive the form or medical records (pick-up, mail, fax): FAX  If fax, what is the fax number: [phone]    Timeframe paperwork needed: TODAY IF POSSIBLE    Additional notes: PATIENT NEEDS TO BE ACCOMPANIED BY HER  AND THEY NEED A LETTER STATING THAT SO THEY CAN SEND A VEHICLE THAT WILL ACCOMODATE BOTH OF THEM.    SHE HAS RECURRING APPOINTMENTS ON TUES AND THURSDAYS AND ANY OTHER APPOINTMENTS THAT SHE WILL NEED.

## 2022-05-10 ENCOUNTER — TREATMENT (OUTPATIENT)
Dept: CARDIAC REHAB | Facility: HOSPITAL | Age: 52
End: 2022-05-10

## 2022-05-10 VITALS — DIASTOLIC BLOOD PRESSURE: 66 MMHG | SYSTOLIC BLOOD PRESSURE: 100 MMHG | HEART RATE: 98 BPM | OXYGEN SATURATION: 98 %

## 2022-05-10 DIAGNOSIS — I21.4 NSTEMI (NON-ST ELEVATED MYOCARDIAL INFARCTION): Primary | ICD-10-CM

## 2022-05-10 PROCEDURE — 93798 PHYS/QHP OP CAR RHAB W/ECG: CPT

## 2022-05-10 NOTE — PROGRESS NOTES
Pt attended phase II visit.  Tolerated exercise well, NAD noted, no complaints voiced, skin warm, pink, dry, resp nl and even, denies chest discomfort, denies SOA.   Monitor shows NSR-ST , V/S WDL ,see [place] documentation for exercise data.  Dr. Barry  physician immediately available           
normal balance

## 2022-05-12 ENCOUNTER — TREATMENT (OUTPATIENT)
Dept: CARDIAC REHAB | Facility: HOSPITAL | Age: 52
End: 2022-05-12

## 2022-05-12 VITALS — DIASTOLIC BLOOD PRESSURE: 80 MMHG | HEART RATE: 76 BPM | OXYGEN SATURATION: 98 % | SYSTOLIC BLOOD PRESSURE: 130 MMHG

## 2022-05-12 DIAGNOSIS — I21.4 NSTEMI (NON-ST ELEVATED MYOCARDIAL INFARCTION): Primary | ICD-10-CM

## 2022-05-12 PROCEDURE — 93798 PHYS/QHP OP CAR RHAB W/ECG: CPT

## 2022-05-12 NOTE — PROGRESS NOTES
Pt attended phase II visit.  Tolerated exercise well, NAD noted, no complaints voiced, skin warm, pink, dry, resp nl and even, denies chest discomfort, denies SOA.   Monitor shows NSR-ST , V/S WDL ,see [place] documentation for exercise data.  Dr. Barry  physician immediately available

## 2022-05-17 ENCOUNTER — TREATMENT (OUTPATIENT)
Dept: CARDIAC REHAB | Facility: HOSPITAL | Age: 52
End: 2022-05-17

## 2022-05-17 VITALS — SYSTOLIC BLOOD PRESSURE: 102 MMHG | DIASTOLIC BLOOD PRESSURE: 64 MMHG | OXYGEN SATURATION: 98 % | HEART RATE: 91 BPM

## 2022-05-17 DIAGNOSIS — I21.4 NSTEMI (NON-ST ELEVATED MYOCARDIAL INFARCTION): Primary | ICD-10-CM

## 2022-05-17 PROCEDURE — 93798 PHYS/QHP OP CAR RHAB W/ECG: CPT

## 2022-05-17 NOTE — PROGRESS NOTES
Pt attended phase II visit.  Tolerated exercise well, NAD noted, no complaints voiced, skin warm, pink, dry, resp nl and even, denies chest discomfort, denies SOA.   Monitor shows NSR-ST , V/S WDL ,see [place] documentation for exercise data.  Dr. Smalls   physician immediately available

## 2022-05-19 ENCOUNTER — TREATMENT (OUTPATIENT)
Dept: CARDIAC REHAB | Facility: HOSPITAL | Age: 52
End: 2022-05-19

## 2022-05-19 VITALS — SYSTOLIC BLOOD PRESSURE: 110 MMHG | HEART RATE: 104 BPM | DIASTOLIC BLOOD PRESSURE: 60 MMHG | OXYGEN SATURATION: 98 %

## 2022-05-19 DIAGNOSIS — I21.4 NSTEMI (NON-ST ELEVATED MYOCARDIAL INFARCTION): Primary | ICD-10-CM

## 2022-05-19 PROCEDURE — 93798 PHYS/QHP OP CAR RHAB W/ECG: CPT

## 2022-05-19 NOTE — PROGRESS NOTES
Pt attended phase II visit.  Tolerated exercise well, NAD noted, no complaints voiced, skin warm, pink, dry, resp nl and even, denies chest discomfort, denies SOA.   Monitor shows NSR-ST , V/S WDL ,see [place] documentation for exercise data.  Dr. Smalls   physician immediately available

## 2022-05-21 ENCOUNTER — HOSPITAL ENCOUNTER (OUTPATIENT)
Facility: HOSPITAL | Age: 52
Setting detail: OBSERVATION
Discharge: HOME | End: 2022-05-25
Attending: EMERGENCY MEDICINE | Admitting: INTERNAL MEDICINE

## 2022-05-21 ENCOUNTER — APPOINTMENT (OUTPATIENT)
Dept: GENERAL RADIOLOGY | Facility: HOSPITAL | Age: 52
End: 2022-05-21

## 2022-05-21 DIAGNOSIS — E83.42 HYPOMAGNESEMIA: ICD-10-CM

## 2022-05-21 DIAGNOSIS — R07.9 CHEST PAIN, UNSPECIFIED TYPE: ICD-10-CM

## 2022-05-21 DIAGNOSIS — I21.4 NSTEMI (NON-ST ELEVATED MYOCARDIAL INFARCTION): ICD-10-CM

## 2022-05-21 DIAGNOSIS — E86.0 DEHYDRATION: Primary | ICD-10-CM

## 2022-05-21 DIAGNOSIS — I20.8 STABLE ANGINA PECTORIS: ICD-10-CM

## 2022-05-21 DIAGNOSIS — R11.2 NAUSEA AND VOMITING, UNSPECIFIED VOMITING TYPE: ICD-10-CM

## 2022-05-21 LAB
APTT PPP: 31.9 SECONDS (ref 26.5–34.5)
BASOPHILS # BLD AUTO: 0.06 10*3/MM3 (ref 0–0.2)
BASOPHILS NFR BLD AUTO: 0.6 % (ref 0–1.5)
DEPRECATED RDW RBC AUTO: 41.2 FL (ref 37–54)
EOSINOPHIL # BLD AUTO: 0.44 10*3/MM3 (ref 0–0.4)
EOSINOPHIL NFR BLD AUTO: 4.3 % (ref 0.3–6.2)
ERYTHROCYTE [DISTWIDTH] IN BLOOD BY AUTOMATED COUNT: 14.2 % (ref 12.3–15.4)
HCT VFR BLD AUTO: 44 % (ref 34–46.6)
HGB BLD-MCNC: 14.8 G/DL (ref 12–15.9)
IMM GRANULOCYTES # BLD AUTO: 0.09 10*3/MM3 (ref 0–0.05)
IMM GRANULOCYTES NFR BLD AUTO: 0.9 % (ref 0–0.5)
INR PPP: 1.06 (ref 0.9–1.1)
LYMPHOCYTES # BLD AUTO: 3.51 10*3/MM3 (ref 0.7–3.1)
LYMPHOCYTES NFR BLD AUTO: 34.3 % (ref 19.6–45.3)
MCH RBC QN AUTO: 27.4 PG (ref 26.6–33)
MCHC RBC AUTO-ENTMCNC: 33.6 G/DL (ref 31.5–35.7)
MCV RBC AUTO: 81.3 FL (ref 79–97)
MONOCYTES # BLD AUTO: 0.65 10*3/MM3 (ref 0.1–0.9)
MONOCYTES NFR BLD AUTO: 6.3 % (ref 5–12)
NEUTROPHILS NFR BLD AUTO: 5.49 10*3/MM3 (ref 1.7–7)
NEUTROPHILS NFR BLD AUTO: 53.6 % (ref 42.7–76)
NRBC BLD AUTO-RTO: 0 /100 WBC (ref 0–0.2)
PLATELET # BLD AUTO: 355 10*3/MM3 (ref 140–450)
PMV BLD AUTO: 8.9 FL (ref 6–12)
PROTHROMBIN TIME: 14 SECONDS (ref 12.1–14.7)
RBC # BLD AUTO: 5.41 10*6/MM3 (ref 3.77–5.28)
WBC NRBC COR # BLD: 10.24 10*3/MM3 (ref 3.4–10.8)

## 2022-05-21 PROCEDURE — 85610 PROTHROMBIN TIME: CPT | Performed by: EMERGENCY MEDICINE

## 2022-05-21 PROCEDURE — 83735 ASSAY OF MAGNESIUM: CPT | Performed by: EMERGENCY MEDICINE

## 2022-05-21 PROCEDURE — 83880 ASSAY OF NATRIURETIC PEPTIDE: CPT | Performed by: EMERGENCY MEDICINE

## 2022-05-21 PROCEDURE — 93005 ELECTROCARDIOGRAM TRACING: CPT | Performed by: EMERGENCY MEDICINE

## 2022-05-21 PROCEDURE — 84484 ASSAY OF TROPONIN QUANT: CPT | Performed by: EMERGENCY MEDICINE

## 2022-05-21 PROCEDURE — C9803 HOPD COVID-19 SPEC COLLECT: HCPCS

## 2022-05-21 PROCEDURE — 83036 HEMOGLOBIN GLYCOSYLATED A1C: CPT | Performed by: PHYSICIAN ASSISTANT

## 2022-05-21 PROCEDURE — 85025 COMPLETE CBC W/AUTO DIFF WBC: CPT | Performed by: EMERGENCY MEDICINE

## 2022-05-21 PROCEDURE — 71045 X-RAY EXAM CHEST 1 VIEW: CPT

## 2022-05-21 PROCEDURE — 36415 COLL VENOUS BLD VENIPUNCTURE: CPT

## 2022-05-21 PROCEDURE — G0378 HOSPITAL OBSERVATION PER HR: HCPCS

## 2022-05-21 PROCEDURE — 80053 COMPREHEN METABOLIC PANEL: CPT | Performed by: EMERGENCY MEDICINE

## 2022-05-21 PROCEDURE — 84100 ASSAY OF PHOSPHORUS: CPT | Performed by: EMERGENCY MEDICINE

## 2022-05-21 PROCEDURE — 87636 SARSCOV2 & INF A&B AMP PRB: CPT | Performed by: EMERGENCY MEDICINE

## 2022-05-21 PROCEDURE — 85730 THROMBOPLASTIN TIME PARTIAL: CPT | Performed by: EMERGENCY MEDICINE

## 2022-05-21 PROCEDURE — 99285 EMERGENCY DEPT VISIT HI MDM: CPT

## 2022-05-21 PROCEDURE — 82550 ASSAY OF CK (CPK): CPT | Performed by: EMERGENCY MEDICINE

## 2022-05-21 RX ORDER — SODIUM CHLORIDE 0.9 % (FLUSH) 0.9 %
10 SYRINGE (ML) INJECTION AS NEEDED
Status: DISCONTINUED | OUTPATIENT
Start: 2022-05-21 | End: 2022-05-25 | Stop reason: HOSPADM

## 2022-05-21 RX ORDER — ASPIRIN 81 MG/1
324 TABLET, CHEWABLE ORAL ONCE
Status: COMPLETED | OUTPATIENT
Start: 2022-05-21 | End: 2022-05-21

## 2022-05-21 RX ADMIN — ASPIRIN 324 MG: 81 TABLET, CHEWABLE ORAL at 23:37

## 2022-05-22 ENCOUNTER — APPOINTMENT (OUTPATIENT)
Dept: CT IMAGING | Facility: HOSPITAL | Age: 52
End: 2022-05-22

## 2022-05-22 PROBLEM — R07.9 CHEST PAIN: Status: ACTIVE | Noted: 2022-05-22

## 2022-05-22 LAB
ALBUMIN SERPL-MCNC: 4.51 G/DL (ref 3.5–5.2)
ALBUMIN/GLOB SERPL: 1.4 G/DL
ALP SERPL-CCNC: 106 U/L (ref 39–117)
ALT SERPL W P-5'-P-CCNC: 24 U/L (ref 1–33)
AMPHET+METHAMPHET UR QL: NEGATIVE
AMPHETAMINES UR QL: NEGATIVE
ANION GAP SERPL CALCULATED.3IONS-SCNC: 16.7 MMOL/L (ref 5–15)
AST SERPL-CCNC: 24 U/L (ref 1–32)
BACTERIA UR QL AUTO: NORMAL /HPF
BARBITURATES UR QL SCN: NEGATIVE
BENZODIAZ UR QL SCN: NEGATIVE
BILIRUB SERPL-MCNC: 0.9 MG/DL (ref 0–1.2)
BILIRUB UR QL STRIP: NEGATIVE
BUN SERPL-MCNC: 10 MG/DL (ref 6–20)
BUN/CREAT SERPL: 12.7 (ref 7–25)
BUPRENORPHINE SERPL-MCNC: NEGATIVE NG/ML
CALCIUM SPEC-SCNC: 9.2 MG/DL (ref 8.6–10.5)
CANNABINOIDS SERPL QL: NEGATIVE
CHLORIDE SERPL-SCNC: 98 MMOL/L (ref 98–107)
CK SERPL-CCNC: 73 U/L (ref 20–180)
CLARITY UR: CLEAR
CO2 SERPL-SCNC: 19.3 MMOL/L (ref 22–29)
COCAINE UR QL: NEGATIVE
COLOR UR: YELLOW
CREAT SERPL-MCNC: 0.79 MG/DL (ref 0.57–1)
EGFRCR SERPLBLD CKD-EPI 2021: 90.1 ML/MIN/1.73
FLUAV SUBTYP SPEC NAA+PROBE: NOT DETECTED
FLUBV RNA ISLT QL NAA+PROBE: NOT DETECTED
GLOBULIN UR ELPH-MCNC: 3.3 GM/DL
GLUCOSE BLDC GLUCOMTR-MCNC: 107 MG/DL (ref 70–130)
GLUCOSE BLDC GLUCOMTR-MCNC: 112 MG/DL (ref 70–130)
GLUCOSE SERPL-MCNC: 101 MG/DL (ref 65–99)
GLUCOSE UR STRIP-MCNC: NEGATIVE MG/DL
HBA1C MFR BLD: 5.8 % (ref 4.8–5.6)
HGB UR QL STRIP.AUTO: NEGATIVE
HOLD SPECIMEN: NORMAL
HOLD SPECIMEN: NORMAL
HYALINE CASTS UR QL AUTO: NORMAL /LPF
KETONES UR QL STRIP: ABNORMAL
LEUKOCYTE ESTERASE UR QL STRIP.AUTO: ABNORMAL
MAGNESIUM SERPL-MCNC: 1.1 MG/DL (ref 1.6–2.6)
METHADONE UR QL SCN: NEGATIVE
NITRITE UR QL STRIP: NEGATIVE
NT-PROBNP SERPL-MCNC: 54.2 PG/ML (ref 0–900)
OPIATES UR QL: NEGATIVE
OXYCODONE UR QL SCN: NEGATIVE
PCP UR QL SCN: NEGATIVE
PH UR STRIP.AUTO: <=5 [PH] (ref 5–8)
PHOSPHATE SERPL-MCNC: 3.8 MG/DL (ref 2.5–4.5)
POTASSIUM SERPL-SCNC: 4.2 MMOL/L (ref 3.5–5.2)
PROPOXYPH UR QL: NEGATIVE
PROT SERPL-MCNC: 7.8 G/DL (ref 6–8.5)
PROT UR QL STRIP: NEGATIVE
QT INTERVAL: 394 MS
QT INTERVAL: 428 MS
QTC INTERVAL: 516 MS
QTC INTERVAL: 540 MS
RBC # UR STRIP: NORMAL /HPF
REF LAB TEST METHOD: NORMAL
SARS-COV-2 RNA PNL SPEC NAA+PROBE: NOT DETECTED
SODIUM SERPL-SCNC: 134 MMOL/L (ref 136–145)
SP GR UR STRIP: 1.02 (ref 1–1.03)
SQUAMOUS #/AREA URNS HPF: NORMAL /HPF
TRICYCLICS UR QL SCN: NEGATIVE
TROPONIN T SERPL-MCNC: <0.01 NG/ML (ref 0–0.03)
TSH SERPL DL<=0.05 MIU/L-ACNC: 2.41 UIU/ML (ref 0.27–4.2)
UROBILINOGEN UR QL STRIP: ABNORMAL
WBC # UR STRIP: NORMAL /HPF
WHOLE BLOOD HOLD COAG: NORMAL
WHOLE BLOOD HOLD SPECIMEN: NORMAL

## 2022-05-22 PROCEDURE — 84443 ASSAY THYROID STIM HORMONE: CPT | Performed by: PHYSICIAN ASSISTANT

## 2022-05-22 PROCEDURE — 96372 THER/PROPH/DIAG INJ SC/IM: CPT

## 2022-05-22 PROCEDURE — 99220 PR INITIAL OBSERVATION CARE/DAY 70 MINUTES: CPT | Performed by: INTERNAL MEDICINE

## 2022-05-22 PROCEDURE — 25010000002 ONDANSETRON PER 1 MG: Performed by: EMERGENCY MEDICINE

## 2022-05-22 PROCEDURE — 99214 OFFICE O/P EST MOD 30 MIN: CPT | Performed by: INTERNAL MEDICINE

## 2022-05-22 PROCEDURE — 81001 URINALYSIS AUTO W/SCOPE: CPT | Performed by: EMERGENCY MEDICINE

## 2022-05-22 PROCEDURE — 84484 ASSAY OF TROPONIN QUANT: CPT | Performed by: EMERGENCY MEDICINE

## 2022-05-22 PROCEDURE — 96376 TX/PRO/DX INJ SAME DRUG ADON: CPT

## 2022-05-22 PROCEDURE — 25010000002 ONDANSETRON PER 1 MG: Performed by: INTERNAL MEDICINE

## 2022-05-22 PROCEDURE — 74176 CT ABD & PELVIS W/O CONTRAST: CPT

## 2022-05-22 PROCEDURE — 25010000002 KETOROLAC TROMETHAMINE PER 15 MG: Performed by: PHYSICIAN ASSISTANT

## 2022-05-22 PROCEDURE — G0378 HOSPITAL OBSERVATION PER HR: HCPCS

## 2022-05-22 PROCEDURE — 25010000002 HEPARIN (PORCINE) PER 1000 UNITS: Performed by: INTERNAL MEDICINE

## 2022-05-22 PROCEDURE — 25010000002 MORPHINE PER 10 MG: Performed by: EMERGENCY MEDICINE

## 2022-05-22 PROCEDURE — 82962 GLUCOSE BLOOD TEST: CPT

## 2022-05-22 PROCEDURE — 93010 ELECTROCARDIOGRAM REPORT: CPT | Performed by: INTERNAL MEDICINE

## 2022-05-22 PROCEDURE — 25010000002 MAGNESIUM SULFATE 2 GM/50ML SOLUTION: Performed by: INTERNAL MEDICINE

## 2022-05-22 PROCEDURE — 93005 ELECTROCARDIOGRAM TRACING: CPT | Performed by: INTERNAL MEDICINE

## 2022-05-22 PROCEDURE — 84484 ASSAY OF TROPONIN QUANT: CPT | Performed by: INTERNAL MEDICINE

## 2022-05-22 PROCEDURE — 25010000002 MAGNESIUM SULFATE 2 GM/50ML SOLUTION: Performed by: EMERGENCY MEDICINE

## 2022-05-22 PROCEDURE — 96366 THER/PROPH/DIAG IV INF ADDON: CPT

## 2022-05-22 PROCEDURE — 93005 ELECTROCARDIOGRAM TRACING: CPT | Performed by: EMERGENCY MEDICINE

## 2022-05-22 PROCEDURE — 80306 DRUG TEST PRSMV INSTRMNT: CPT | Performed by: PHYSICIAN ASSISTANT

## 2022-05-22 PROCEDURE — 96365 THER/PROPH/DIAG IV INF INIT: CPT

## 2022-05-22 PROCEDURE — 96375 TX/PRO/DX INJ NEW DRUG ADDON: CPT

## 2022-05-22 RX ORDER — KETOROLAC TROMETHAMINE 30 MG/ML
15 INJECTION, SOLUTION INTRAMUSCULAR; INTRAVENOUS ONCE
Status: COMPLETED | OUTPATIENT
Start: 2022-05-22 | End: 2022-05-22

## 2022-05-22 RX ORDER — NICOTINE POLACRILEX 4 MG
15 LOZENGE BUCCAL
Status: DISCONTINUED | OUTPATIENT
Start: 2022-05-22 | End: 2022-05-25 | Stop reason: HOSPADM

## 2022-05-22 RX ORDER — SODIUM CHLORIDE 0.9 % (FLUSH) 0.9 %
10 SYRINGE (ML) INJECTION AS NEEDED
Status: DISCONTINUED | OUTPATIENT
Start: 2022-05-22 | End: 2022-05-25 | Stop reason: HOSPADM

## 2022-05-22 RX ORDER — PANTOPRAZOLE SODIUM 40 MG/1
40 TABLET, DELAYED RELEASE ORAL
Status: DISCONTINUED | OUTPATIENT
Start: 2022-05-22 | End: 2022-05-22

## 2022-05-22 RX ORDER — DULOXETIN HYDROCHLORIDE 60 MG/1
60 CAPSULE, DELAYED RELEASE ORAL DAILY
Status: DISCONTINUED | OUTPATIENT
Start: 2022-05-22 | End: 2022-05-25 | Stop reason: HOSPADM

## 2022-05-22 RX ORDER — ONDANSETRON 2 MG/ML
4 INJECTION INTRAMUSCULAR; INTRAVENOUS ONCE
Status: COMPLETED | OUTPATIENT
Start: 2022-05-22 | End: 2022-05-22

## 2022-05-22 RX ORDER — ASPIRIN 81 MG/1
81 TABLET, CHEWABLE ORAL DAILY
Status: DISCONTINUED | OUTPATIENT
Start: 2022-05-22 | End: 2022-05-25 | Stop reason: HOSPADM

## 2022-05-22 RX ORDER — ALUMINA, MAGNESIA, AND SIMETHICONE 2400; 2400; 240 MG/30ML; MG/30ML; MG/30ML
15 SUSPENSION ORAL ONCE
Status: COMPLETED | OUTPATIENT
Start: 2022-05-22 | End: 2022-05-22

## 2022-05-22 RX ORDER — PRENATAL VIT/IRON FUM/FOLIC AC 27MG-0.8MG
1 TABLET ORAL DAILY
Status: DISCONTINUED | OUTPATIENT
Start: 2022-05-22 | End: 2022-05-25 | Stop reason: HOSPADM

## 2022-05-22 RX ORDER — SODIUM CHLORIDE 9 MG/ML
50 INJECTION, SOLUTION INTRAVENOUS CONTINUOUS
Status: DISCONTINUED | OUTPATIENT
Start: 2022-05-22 | End: 2022-05-25

## 2022-05-22 RX ORDER — LISINOPRIL 2.5 MG/1
2.5 TABLET ORAL DAILY
Status: DISCONTINUED | OUTPATIENT
Start: 2022-05-22 | End: 2022-05-22

## 2022-05-22 RX ORDER — PHENOBARBITAL, HYOSCYAMINE SULFATE, ATROPINE SULFATE AND SCOPOLAMINE HYDROBROMIDE .0194; .1037; 16.2; .0065 MG/1; MG/1; MG/1; MG/1
2 TABLET ORAL ONCE
Status: COMPLETED | OUTPATIENT
Start: 2022-05-22 | End: 2022-05-22

## 2022-05-22 RX ORDER — ONDANSETRON 2 MG/ML
4 INJECTION INTRAMUSCULAR; INTRAVENOUS EVERY 6 HOURS PRN
Status: DISCONTINUED | OUTPATIENT
Start: 2022-05-22 | End: 2022-05-25 | Stop reason: HOSPADM

## 2022-05-22 RX ORDER — MAGNESIUM SULFATE HEPTAHYDRATE 40 MG/ML
2 INJECTION, SOLUTION INTRAVENOUS AS NEEDED
Status: DISCONTINUED | OUTPATIENT
Start: 2022-05-22 | End: 2022-05-25 | Stop reason: HOSPADM

## 2022-05-22 RX ORDER — PROMETHAZINE HYDROCHLORIDE 12.5 MG/1
12.5 TABLET ORAL EVERY 6 HOURS PRN
Status: DISCONTINUED | OUTPATIENT
Start: 2022-05-22 | End: 2022-05-25 | Stop reason: HOSPADM

## 2022-05-22 RX ORDER — MAGNESIUM SULFATE HEPTAHYDRATE 40 MG/ML
2 INJECTION, SOLUTION INTRAVENOUS ONCE
Status: COMPLETED | OUTPATIENT
Start: 2022-05-22 | End: 2022-05-22

## 2022-05-22 RX ORDER — RANOLAZINE 500 MG/1
500 TABLET, EXTENDED RELEASE ORAL 2 TIMES DAILY
Status: DISCONTINUED | OUTPATIENT
Start: 2022-05-22 | End: 2022-05-24

## 2022-05-22 RX ORDER — PANTOPRAZOLE SODIUM 40 MG/1
40 TABLET, DELAYED RELEASE ORAL EVERY MORNING
Status: DISCONTINUED | OUTPATIENT
Start: 2022-05-22 | End: 2022-05-25 | Stop reason: HOSPADM

## 2022-05-22 RX ORDER — DEXTROSE MONOHYDRATE 25 G/50ML
25 INJECTION, SOLUTION INTRAVENOUS
Status: DISCONTINUED | OUTPATIENT
Start: 2022-05-22 | End: 2022-05-25 | Stop reason: HOSPADM

## 2022-05-22 RX ORDER — MAGNESIUM SULFATE 1 G/100ML
1 INJECTION INTRAVENOUS AS NEEDED
Status: DISCONTINUED | OUTPATIENT
Start: 2022-05-22 | End: 2022-05-25 | Stop reason: HOSPADM

## 2022-05-22 RX ORDER — METOPROLOL TARTRATE 100 MG/1
25 TABLET ORAL 2 TIMES DAILY
Status: ON HOLD | COMMUNITY
End: 2022-05-25 | Stop reason: SDUPTHER

## 2022-05-22 RX ORDER — ISOSORBIDE MONONITRATE 30 MG/1
30 TABLET, EXTENDED RELEASE ORAL
Status: DISCONTINUED | OUTPATIENT
Start: 2022-05-22 | End: 2022-05-22

## 2022-05-22 RX ORDER — SODIUM CHLORIDE 0.9 % (FLUSH) 0.9 %
10 SYRINGE (ML) INJECTION EVERY 12 HOURS SCHEDULED
Status: DISCONTINUED | OUTPATIENT
Start: 2022-05-22 | End: 2022-05-25 | Stop reason: HOSPADM

## 2022-05-22 RX ORDER — LIDOCAINE HYDROCHLORIDE 20 MG/ML
15 SOLUTION OROPHARYNGEAL ONCE
Status: COMPLETED | OUTPATIENT
Start: 2022-05-22 | End: 2022-05-22

## 2022-05-22 RX ORDER — CETIRIZINE HYDROCHLORIDE 10 MG/1
10 TABLET ORAL DAILY
Status: DISCONTINUED | OUTPATIENT
Start: 2022-05-22 | End: 2022-05-25 | Stop reason: HOSPADM

## 2022-05-22 RX ORDER — INSULIN ASPART 100 [IU]/ML
0-7 INJECTION, SOLUTION INTRAVENOUS; SUBCUTANEOUS
Status: DISCONTINUED | OUTPATIENT
Start: 2022-05-22 | End: 2022-05-25 | Stop reason: HOSPADM

## 2022-05-22 RX ORDER — TRAMADOL HYDROCHLORIDE 50 MG/1
25 TABLET ORAL ONCE
Status: COMPLETED | OUTPATIENT
Start: 2022-05-22 | End: 2022-05-22

## 2022-05-22 RX ORDER — ATORVASTATIN CALCIUM 40 MG/1
80 TABLET, FILM COATED ORAL NIGHTLY
Status: DISCONTINUED | OUTPATIENT
Start: 2022-05-22 | End: 2022-05-25 | Stop reason: HOSPADM

## 2022-05-22 RX ORDER — ONDANSETRON 4 MG/1
4 TABLET, ORALLY DISINTEGRATING ORAL EVERY 8 HOURS PRN
Status: CANCELLED | OUTPATIENT
Start: 2022-05-22

## 2022-05-22 RX ORDER — NITROGLYCERIN 0.4 MG/1
0.4 TABLET SUBLINGUAL
Status: CANCELLED | OUTPATIENT
Start: 2022-05-22

## 2022-05-22 RX ORDER — ATORVASTATIN CALCIUM 40 MG/1
80 TABLET, FILM COATED ORAL NIGHTLY
Status: CANCELLED | OUTPATIENT
Start: 2022-05-22

## 2022-05-22 RX ORDER — LISINOPRIL 2.5 MG/1
2.5 TABLET ORAL DAILY
COMMUNITY
End: 2022-06-21 | Stop reason: HOSPADM

## 2022-05-22 RX ORDER — ASPIRIN 81 MG/1
81 TABLET ORAL DAILY
Status: CANCELLED | OUTPATIENT
Start: 2022-05-22

## 2022-05-22 RX ORDER — PROCHLORPERAZINE EDISYLATE 5 MG/ML
5 INJECTION INTRAMUSCULAR; INTRAVENOUS EVERY 6 HOURS PRN
Status: DISCONTINUED | OUTPATIENT
Start: 2022-05-22 | End: 2022-05-25 | Stop reason: HOSPADM

## 2022-05-22 RX ORDER — PRENATAL VIT/IRON FUM/FOLIC AC 27MG-0.8MG
1 TABLET ORAL DAILY
COMMUNITY

## 2022-05-22 RX ORDER — NITROGLYCERIN 0.4 MG/1
0.4 TABLET SUBLINGUAL
Status: DISCONTINUED | OUTPATIENT
Start: 2022-05-22 | End: 2022-05-25 | Stop reason: HOSPADM

## 2022-05-22 RX ORDER — HEPARIN SODIUM 5000 [USP'U]/ML
5000 INJECTION, SOLUTION INTRAVENOUS; SUBCUTANEOUS EVERY 8 HOURS SCHEDULED
Status: DISCONTINUED | OUTPATIENT
Start: 2022-05-22 | End: 2022-05-25 | Stop reason: HOSPADM

## 2022-05-22 RX ORDER — ONDANSETRON 4 MG/1
4 TABLET, ORALLY DISINTEGRATING ORAL EVERY 8 HOURS PRN
Qty: 12 TABLET | Refills: 0 | Status: SHIPPED | OUTPATIENT
Start: 2022-05-22 | End: 2022-05-25 | Stop reason: SDUPTHER

## 2022-05-22 RX ORDER — ONDANSETRON 4 MG/1
4 TABLET, ORALLY DISINTEGRATING ORAL EVERY 8 HOURS PRN
COMMUNITY
End: 2022-05-25 | Stop reason: HOSPADM

## 2022-05-22 RX ADMIN — PHENOBARBITAL, HYOSCYAMINE SULFATE, ATROPINE SULFATE, SCOPOLAMINE HYDROBROMIDE 32.4 MG: 16.2; .1037; .0194; .0065 TABLET ORAL at 03:15

## 2022-05-22 RX ADMIN — ATORVASTATIN CALCIUM 80 MG: 40 TABLET, FILM COATED ORAL at 20:03

## 2022-05-22 RX ADMIN — MAGNESIUM SULFATE HEPTAHYDRATE 2 G: 40 INJECTION, SOLUTION INTRAVENOUS at 00:59

## 2022-05-22 RX ADMIN — PANTOPRAZOLE SODIUM 40 MG: 40 TABLET, DELAYED RELEASE ORAL at 12:23

## 2022-05-22 RX ADMIN — TRAMADOL HYDROCHLORIDE 25 MG: 50 TABLET, COATED ORAL at 15:30

## 2022-05-22 RX ADMIN — HEPARIN SODIUM 5000 UNITS: 5000 INJECTION INTRAVENOUS; SUBCUTANEOUS at 20:02

## 2022-05-22 RX ADMIN — MORPHINE SULFATE 4 MG: 4 INJECTION, SOLUTION INTRAMUSCULAR; INTRAVENOUS at 01:09

## 2022-05-22 RX ADMIN — MAGNESIUM SULFATE HEPTAHYDRATE 2 G: 40 INJECTION, SOLUTION INTRAVENOUS at 13:31

## 2022-05-22 RX ADMIN — PRENATAL VIT W/ FE FUMARATE-FA TAB 27-0.8 MG 1 TABLET: 27-0.8 TAB at 16:57

## 2022-05-22 RX ADMIN — MORPHINE SULFATE 4 MG: 4 INJECTION, SOLUTION INTRAMUSCULAR; INTRAVENOUS at 07:44

## 2022-05-22 RX ADMIN — ONDANSETRON 4 MG: 2 INJECTION INTRAMUSCULAR; INTRAVENOUS at 07:44

## 2022-05-22 RX ADMIN — Medication 10 ML: at 10:38

## 2022-05-22 RX ADMIN — TICAGRELOR 90 MG: 90 TABLET ORAL at 12:23

## 2022-05-22 RX ADMIN — TICAGRELOR 90 MG: 90 TABLET ORAL at 20:03

## 2022-05-22 RX ADMIN — ONDANSETRON 4 MG: 2 INJECTION INTRAMUSCULAR; INTRAVENOUS at 15:30

## 2022-05-22 RX ADMIN — CETIRIZINE HYDROCHLORIDE 10 MG: 10 TABLET, FILM COATED ORAL at 16:57

## 2022-05-22 RX ADMIN — PANTOPRAZOLE SODIUM 40 MG: 40 TABLET, DELAYED RELEASE ORAL at 16:57

## 2022-05-22 RX ADMIN — DULOXETINE HYDROCHLORIDE 60 MG: 60 CAPSULE, DELAYED RELEASE ORAL at 16:57

## 2022-05-22 RX ADMIN — HEPARIN SODIUM 5000 UNITS: 5000 INJECTION INTRAVENOUS; SUBCUTANEOUS at 13:31

## 2022-05-22 RX ADMIN — LIDOCAINE HYDROCHLORIDE 15 ML: 20 SOLUTION ORAL; TOPICAL at 03:15

## 2022-05-22 RX ADMIN — ASPIRIN 81 MG: 81 TABLET, CHEWABLE ORAL at 12:23

## 2022-05-22 RX ADMIN — KETOROLAC TROMETHAMINE 15 MG: 30 INJECTION, SOLUTION INTRAMUSCULAR; INTRAVENOUS at 23:28

## 2022-05-22 RX ADMIN — ONDANSETRON 4 MG: 2 INJECTION INTRAMUSCULAR; INTRAVENOUS at 01:09

## 2022-05-22 RX ADMIN — SODIUM CHLORIDE 50 ML/HR: 9 INJECTION, SOLUTION INTRAVENOUS at 15:32

## 2022-05-22 RX ADMIN — ALUMINUM HYDROXIDE, MAGNESIUM HYDROXIDE, AND DIMETHICONE 15 ML: 400; 400; 40 SUSPENSION ORAL at 03:15

## 2022-05-22 NOTE — CONSULTS
Inpatient Interventional Cardiology Consult  Consult performed by: Connor Chaney MD  Consult ordered by: Aaron Wiley MD          Patient Identification:    Name:  Lissa Eisenberg  Age:  51 y.o.  Sex:  female  :  1970  MRN:  [id number]  Visit Number:  [id number]  Primary care provider:  Roxy Deng DO    Chief complaint:   Chest pain    History of presenting illness:   Patient is a 51-year-old female who has a history of CAD s/p multiple PCI, recently she had PCI to the mid RCA in-stent restenosis, the angiogram after the PCI did show she had an ostial RPDA lesion which appeared to be 90% after the stents in the mid RCA in overlapping fashion with the previously placed stents, patient did okay after the last PCI in 2022, she was doing cardiac rehab and she said this Tuesday while she was trying to do the rehab she started having chest pain and increasing fatigue and dyspnea that she could not able to complete her rehab and took her more time to do.  She did try rehab again on Thursday but due to her fatigue and chest pain she said it was hard for her to complete the rehab.  Since then she has been having exertional chest pain on less activity.  She said she also felt extremely sick to her stomach with nausea vomiting.  She presented to the ER yesterday, her troponins have been negative, she did had a new left bundle branch block on the EKG, her troponins have been negative x2 though and she is chest pain-free now.  ---------------------------------------------------------------------------------------------------------------------  Review of Systems   ---------------------------------------------------------------------------------------------------------------------   Past History:   Family History   Problem Relation Age of Onset   • Lung cancer Father    • Thyroid cancer Brother    • Heart attack Maternal Grandfather    • Lung cancer Maternal Grandfather    • Heart attack  Paternal Grandmother    • Emphysema Paternal Grandfather      Past Medical History:   Diagnosis Date   • COVID-    • Diabetes mellitus (HCC)    • Hyperlipidemia    • Hypertension    • Myocardial infarct (HCC)    • PCOS (polycystic ovarian syndrome)    • PTSD (post-traumatic stress disorder)      Past Surgical History:   Procedure Laterality Date   • CARDIAC CATHETERIZATION     • CARDIAC CATHETERIZATION N/A 2022    Procedure: Left Heart Cath;  Surgeon: Alexandr Marquez MD;  Location[facility] CATH INVASIVE [facility];  Service: Cardiology;  Laterality: N/A;   •  SECTION     • CHOLECYSTECTOMY     • CORONARY ARTERY BYPASS GRAFT      x 2   • CORONARY STENT PLACEMENT      x 6 per patient   • DILATATION AND CURETTAGE     • KNEE SURGERY Left    • TONSILLECTOMY       Social History     Socioeconomic History   • Marital status:    Tobacco Use   • Smoking status: Never Smoker   • Smokeless tobacco: Never Used   Vaping Use   • Vaping Use: Never used   Substance and Sexual Activity   • Alcohol use: Not Currently   • Drug use: Never   • Sexual activity: Defer     ---------------------------------------------------------------------------------------------------------------------   Allergies:  Contrast dye, Ciprofloxacin, Sulfa antibiotics, and Drug [hydrocodone-acetaminophen]  ---------------------------------------------------------------------------------------------------------------------   Prior to Admission Medications     Prescriptions Last Dose Informant Patient Reported? Taking?    aspirin 81 MG EC tablet  Self Yes No    Take 81 mg by mouth Daily.    atorvastatin (LIPITOR) 80 MG tablet   No No    Take 1 tablet by mouth Every Night.    cetirizine (zyrTEC) 10 MG tablet   No No    Take 1 tablet by mouth Daily.    cyclobenzaprine (FLEXERIL) 10 MG tablet  Self Yes No    Take 10 mg by mouth 3 (Three) Times a Day As Needed for Muscle Spasms.    dicyclomine (BENTYL) 10 MG capsule  Self No No    Take 1 capsule  by mouth 3 (Three) Times a Day As Needed (abdominal pain).    DULoxetine (CYMBALTA) 30 MG capsule  Self Yes No    Take 30 mg by mouth 2 (Two) Times a Day.    isosorbide mononitrate (IMDUR) 30 MG 24 hr tablet   No No    Take 1 tablet by mouth Daily.    metFORMIN (GLUCOPHAGE) 1000 MG tablet   No No    Take 1 tablet by mouth 2 (Two) Times a Day With Meals.    metoprolol tartrate (LOPRESSOR) 25 MG tablet   No No    Take 1 tablet by mouth 2 (Two) Times a Day.    naproxen (NAPROSYN) 500 MG tablet  Self Yes No    Take 500 mg by mouth 2 (Two) Times a Day With Meals.    nitroglycerin (NITROSTAT) 0.4 MG SL tablet   Yes No    Place 0.4 mg under the tongue Every 5 (Five) Minutes As Needed. do not exceed a total of 3 doses in 15 minutes    nystatin-triamcinolone (MYCOLOG) 384018-3.1 UNIT/GM-% ointment   No No    Apply 1 application topically to the appropriate area as directed 2 (Two) Times a Day As Needed (Rash). Avoid using for more than 2 weeks at a time.    omeprazole (priLOSEC) 20 MG capsule   No No    Take 1 capsule by mouth Daily.    ondansetron (ZOFRAN) 4 MG tablet  Self Yes No    Take 4 mg by mouth Every 8 (Eight) Hours As Needed for Nausea or Vomiting.    Ozempic, 1 MG/DOSE, 4 MG/3ML solution pen-injector   No No    Inject 1 mg into the appropriate muscle as directed by prescriber 1 (One) Time Per Week.    ranolazine (Ranexa) 500 MG 12 hr tablet   No No    Take 1 tablet by mouth 2 (Two) Times a Day.    Rivaroxaban (Xarelto) 2.5 MG tablet   No No    Take 1 tablet by mouth 2 (Two) Times a Day.    ticagrelor (BRILINTA) 90 MG tablet tablet  Self No No    Take 1 tablet by mouth 2 (Two) Times a Day.        Hospital Meds:  aspirin, 81 mg, Oral, Daily  atorvastatin, 80 mg, Oral, Nightly  heparin (porcine), 5,000 Units, Subcutaneous, Q8H  magnesium sulfate, 2 g, Intravenous, Once  pantoprazole, 40 mg, Oral, Q AM  sodium chloride, 10 mL, Intravenous, Q12H  ticagrelor, 90 mg, Oral, BID          ---------------------------------------------------------------------------------------------------------------------   Vital Signs:  Temp:  [97.9 °F (36.6 °C)-98.9 °F (37.2 °C)] 97.9 °F (36.6 °C)  Heart Rate:  [] 99  Resp:  [18-19] 18  BP: ()/(62-86) 133/80      05/21/22  2258 05/22/22  0959   Weight: 88.5 kg (195 lb) 86.9 kg (191 lb 9.6 oz)     Body mass index is 33.94 kg/m².  ---------------------------------------------------------------------------------------------------------------------   Physical exam:   Constitutional:  Well-developed and well-nourished.  No respiratory distress.      HENT:  Head: Normocephalic and atraumatic.  Mouth:  Moist mucous membranes.    Eyes:  Conjunctivae and EOM are normal.  Pupils are equal, round, and reactive to light.  No scleral icterus.  Neck:  Neck supple.  No JVD present.    Cardiovascular:  Normal rate, regular rhythm and normal heart sounds with no murmur.  Pulmonary/Chest:  No respiratory distress, no wheezes, no crackles, with normal breath sounds and good air movement.  Abdominal:  Soft.  Bowel sounds are normal.  No distension and no tenderness.   Musculoskeletal:  No edema, no tenderness, and no deformity.  No red or swollen joints anywhere.    Neurological:  Alert and oriented to person, place, and time.  No cranial nerve deficit.  No tongue deviation.  No facial droop.  No slurred speech.   Skin:  Skin is warm and dry.  No rash noted.  No pallor.   Psychiatric:  Normal mood and affect.  Behavior is normal.  Judgment and thought content normal.   Peripheral vascular:  No edema and strong pulses on all 4 extremities.    ---------------------------------------------------------------------------------------------------------------------   EKG: Sinus rhythm, left bundle branch block  Telemetry: Sinus  I have personally looked at both the EKG and the telemetry strips.  Echo:  Results for orders placed during the hospital encounter of 04/02/22    Adult  Transthoracic Echo Complete w/ Color, Spectral and Contrast if necessary per protocol    Interpretation Summary  · Left ventricular ejection fraction appears to be 61 - 65%.  · Left ventricular diastolic function is consistent with (grade I) impaired relaxation.  · No significant valvular abnormality  · Moderate sclerosis of the aortic valve  · No pericardial effusion  · No prior study for comparison    ---------------------------------------------------------------------------------------------------------------------   Results from last 7 days   Lab Units 05/21/22 2333   WBC 10*3/mm3 10.24   HEMOGLOBIN g/dL 14.8   HEMATOCRIT % 44.0   MCV fL 81.3   MCHC g/dL 33.6   PLATELETS 10*3/mm3 355   INR  1.06         Results from last 7 days   Lab Units 05/21/22  2333   SODIUM mmol/L 134*   POTASSIUM mmol/L 4.2   MAGNESIUM mg/dL 1.1*   CHLORIDE mmol/L 98   CO2 mmol/L 19.3*   BUN mg/dL 10   CREATININE mg/dL 0.79   CALCIUM mg/dL 9.2   PHOSPHORUS mg/dL 3.8   GLUCOSE mg/dL 101*   ALBUMIN g/dL 4.51   BILIRUBIN mg/dL 0.9   ALK PHOS U/L 106   AST (SGOT) U/L 24   ALT (SGPT) U/L 24   Estimated Creatinine Clearance: 87.1 mL/min (by C-G formula based on SCr of 0.79 mg/dL).  No results found for: AMMONIA  Results from last 7 days   Lab Units 05/22/22  0134 05/21/22 2333   CK TOTAL U/L  --  73   TROPONIN T ng/mL <0.010 <0.010     Results from last 7 days   Lab Units 05/21/22 2333   PROBNP pg/mL 54.2     Lab Results   Component Value Date    HGBA1C 5.80 (H) 05/21/2022     Lab Results   Component Value Date    TSH 2.410 05/22/2022     No results found for: PREGTESTUR, PREGSERUM, HCG, HCGQUANT  Pain Management Panel     Pain Management Panel Latest Ref Rng & Units 5/22/2022    AMPHETAMINES SCREEN, URINE Negative Negative    BARBITURATES SCREEN Negative Negative    BENZODIAZEPINE SCREEN, URINE Negative Negative    BUPRENORPHINEUR Negative Negative    COCAINE SCREEN, URINE Negative Negative    METHADONE SCREEN, URINE Negative Negative     METHAMPHETAMINEUR Negative Negative        No results found for: BLOODCX  No results found for: URINECX  No results found for: WOUNDCX  No results found for: STOOLCX        ---------------------------------------------------------------------------------------------------------------------   Imaging Results (Last 7 Days)     Procedure Component Value Units Date/Time    XR Chest 1 View [[id number]] Collected: 05/21/22 2300     Updated: 05/21/22 2303    Narrative:      CR Chest 1 Vw    INDICATION:   Chest pain and vomiting     COMPARISON:    4/2/2022    FINDINGS:  Portable AP view(s) of the chest.  Stable cardiac silhouette status post sternotomy. The vascularity is unremarkable. The lungs are clear. There is no effusion or pneumothorax.      Impression:      1. Negative chest.    Signer Name: Edson Mays MD   Signed: 5/21/2022 11:00 PM   Workstation Name: [id number]    [facility]        ----------------------------------------------------------------------------------------------------------------------  Assessment:   Chest pain concerning for unstable angina  CAD s/p multiple PCI, recent mid RCA in-stent restenosis PCI with ostial RPDA high-grade lesion, CAD s/p CABG with a patent LIMA to LAD,, stents from the left main to the diagonal artery appeared patent, very small left circumflex system.  Diabetes mellitus type 2  Hypertension        Plan:   Patient is currently chest pain-free, her troponins have been negative, I did discuss about the possibility of angiogram with possible balloon angioplasty to the ostial RPDA which did appear to be 90% on the last angiogram to see if that will help with her anginal symptoms.  I would discussed this with Dr. Oliver who will be covering from tomorrow.  Keep her n.p.o. after midnight.  Continue with aspirin and Brilinta, hold on a low-dose Xarelto for now.  Continue with high intensity statin.  Hold off on her other antianginal medication until her  blood pressure stabilizes.  Pt does have contrast allergy and will need premedication if decided for angiogram in am  Thank you for the consult.          Connor Chaney MD, [facility]  Interventional Cardiology      05/22/22  12:06 EDT

## 2022-05-22 NOTE — ED NOTES
Dr. Clifton called Dr. Smalls. However, no answer. Voicemail was left. EKG sent via text to Dr. Smalls.

## 2022-05-22 NOTE — H&P
[facility] Medicine Services  HISTORY & PHYSICAL    Patient Identification:  Name:  Lissa Eisenberg  Age:  51 y.o.  Sex:  female  :  1970  MRN:  [id number]   Visit Number:  [id number]  Admit Date: 2022   Primary Care Physician:  Roxy Deng DO     Subjective     Chief complaint:   Chief Complaint   Patient presents with   • Chest Pain   • Vomiting     History of presenting illness:   Patient is a 51 y.o. female with past medical history significant for coronary artery disease s/p CABG x 2 in past, PCI with JAYDA to mid RCA 2021, and arthrotomy of severe ISR and PCI of mid RCA with attempt to stent ostial RPDA unsuccessful 2/2 stent scaffold protruding in overlapping fashion 2022, HFpEF, essential hypertension, hyperlipidemia, non insulin dependent type II diabetes mellitus, GERD, and anxiety/depression/PTSD that presented to the [facility] emergency department for evaluation of chest pain and nausea/vomiting. Patient states that onset of symptoms was this past Tuesday. She states that she overexerted her self while at cardiac rehab. She states that she developed chest pressure and nausea. She states that she felt very weak and fatigued. She ultimately went home and states that she went to sleep. Patient states that the next day, she developed worsening nausea with emesis, could not tolerate much oral intake. Continued to have intermittent chest discomfort for which she reports was stabbing in sensation with pressure in the middle of her chest. She reports having abdominal cramping and diarrhea as well. She reports she did go to cardiac rehab again on Thursday but had very little exercise tolerance. She reported developing shortness of breath in addition to her other symptoms. Since then, she has had ongoing intermittent chest pain, nausea, vomiting, diarrhea, abdominal cramping, shortness of breath, and fatigue/weakness. She reports not being able to  tolerate much oral intake. Dexter reports three episodes of blood in her emesis from vomiting so much. Denies any blood in stool. Thus, patient came to ED today for evaluation as her symptoms were progressively worsening. Denies any fevers or chills. Denies any headache or dizziness, no LOC. Denies any numbness, tingling, paraesthesias, or lateralizing weakness.     It is of note that the patient was admitted to this facility for/2/20/2022 through 4/13/2022 where she was treated for non-ST elevation myocardial infarction.  Patient ultimately underwent left heart catheterization due to abnormal stress test that revealed 99% in-stent mid RCA stenosis.  Arthrotomy of severe in-stent restenosis was performed with PCI to mid RCA with JAYDA.  Attempt to stent the ostial RPDA was unsuccessful due to stent scaffold protruding in overlapping fashion.  Patient was stabilized, discharged home and stable condition.  Patient continued on DAPT and GDMT.  Patient was treated in cardiac rehab in the outpatient setting for which she has been going to 2 times per week.  It is of note that on the left heart catheterization report it was mentioned to consider a bypass or RPDA in the future if further in-stent restenosis were to occur.    Upon arrival to the ED, vitals were temperature 98.9, heart rate 101, respiratory rate 19, blood pressure 107/73, and oxygen saturation 98% on room air.  Troponin T negative x2.  proBNP 54.2.  CMP with glucose 101, sodium 134, CO2 19.3, and anion gap 16.7.  Magnesium 1.1.  CBC grossly unremarkable.  Urinalysis with 1+ ketones and trace leukocytes, otherwise unremarkable.  Urine drug screen negative.  COVID-19 and influenza screening negative.  Chest x-ray with no evidence of acute cardiopulmonary disease.     Patient has been admitted to the telemetry floor for further evaluation and treatment.     Present during exam: Patient's  spouse  ---------------------------------------------------------------------------------------------------------------------   Review of Systems   Constitutional: Positive for activity change, appetite change and fatigue. Negative for chills, diaphoresis and fever.   HENT: Negative for congestion and sore throat.    Eyes: Negative for discharge and visual disturbance.   Respiratory: Positive for shortness of breath. Negative for cough and wheezing.    Cardiovascular: Positive for chest pain. Negative for palpitations and leg swelling.   Gastrointestinal: Positive for abdominal pain (Cramping sensation ), diarrhea, nausea and vomiting. Negative for blood in stool and constipation.   Genitourinary: Negative for decreased urine volume, dysuria, frequency and urgency.   Musculoskeletal: Negative for arthralgias and myalgias.   Skin: Negative for color change and wound.   Neurological: Negative for dizziness, syncope, weakness, light-headedness and headaches.   Psychiatric/Behavioral: Negative for confusion. The patient is not nervous/anxious.       ---------------------------------------------------------------------------------------------------------------------   Past Medical History:   Diagnosis Date   • COVID-19    • Diabetes mellitus (HCC)    • Hyperlipidemia    • Hypertension    • Myocardial infarct (HCC)    • PCOS (polycystic ovarian syndrome)    • PTSD (post-traumatic stress disorder)      Past Surgical History:   Procedure Laterality Date   • CARDIAC CATHETERIZATION     • CARDIAC CATHETERIZATION N/A 2022    Procedure: Left Heart Cath;  Surgeon: Alexandr Marquez MD;  Location: [facility] CATH INVASIVE LOCATION;  Service: Cardiology;  Laterality: N/A;   •  SECTION     • CHOLECYSTECTOMY     • CORONARY ARTERY BYPASS GRAFT      x 2   • CORONARY STENT PLACEMENT      x 6 per patient   • DILATATION AND CURETTAGE     • KNEE SURGERY Left    • TONSILLECTOMY       Family History   Problem Relation Age of Onset    • Lung cancer Father    • Thyroid cancer Brother    • Heart attack Maternal Grandfather    • Lung cancer Maternal Grandfather    • Heart attack Paternal Grandmother    • Emphysema Paternal Grandfather      Social History     Socioeconomic History   • Marital status:    Tobacco Use   • Smoking status: Never Smoker   • Smokeless tobacco: Never Used   Vaping Use   • Vaping Use: Never used   Substance and Sexual Activity   • Alcohol use: Not Currently   • Drug use: Never   • Sexual activity: Defer     ---------------------------------------------------------------------------------------------------------------------   Allergies:  Contrast dye, Ciprofloxacin, Sulfa antibiotics, and Drug [hydrocodone-acetaminophen]  ---------------------------------------------------------------------------------------------------------------------   Medications below are reported home medications pulling from within the system; at this time, these medications have not been reconciled unless otherwise specified and are in the verification process for further verifcation as current home medications.    Prior to Admission Medications     Prescriptions Last Dose Informant Patient Reported? Taking?    aspirin 81 MG EC tablet  Self Yes No    Take 81 mg by mouth Daily.    dicyclomine (BENTYL) 10 MG capsule  Self No No    Take 1 capsule by mouth 3 (Three) Times a Day As Needed (abdominal pain).    DULoxetine (CYMBALTA) 30 MG capsule  Self Yes No    Take 30 mg by mouth 2 (Two) Times a Day.    ticagrelor (BRILINTA) 90 MG tablet tablet  Self No No    Take 1 tablet by mouth 2 (Two) Times a Day.    atorvastatin (LIPITOR) 80 MG tablet   No No    Take 1 tablet by mouth Every Night.    cetirizine (zyrTEC) 10 MG tablet   No No    Take 1 tablet by mouth Daily.    cyclobenzaprine (FLEXERIL) 10 MG tablet  Self Yes No    Take 10 mg by mouth 3 (Three) Times a Day As Needed for Muscle Spasms.    isosorbide mononitrate (IMDUR) 30 MG 24 hr tablet   No  No    Take 1 tablet by mouth Daily.    metFORMIN (GLUCOPHAGE) 1000 MG tablet   No No    Take 1 tablet by mouth 2 (Two) Times a Day With Meals.    metoprolol tartrate (LOPRESSOR) 25 MG tablet   No No    Take 1 tablet by mouth 2 (Two) Times a Day.    naproxen (NAPROSYN) 500 MG tablet  Self Yes No    Take 500 mg by mouth 2 (Two) Times a Day With Meals.    nitroglycerin (NITROSTAT) 0.4 MG SL tablet   Yes No    Place 0.4 mg under the tongue Every 5 (Five) Minutes As Needed. do not exceed a total of 3 doses in 15 minutes    nystatin-triamcinolone (MYCOLOG) 084794-7.1 UNIT/GM-% ointment   No No    Apply 1 application topically to the appropriate area as directed 2 (Two) Times a Day As Needed (Rash). Avoid using for more than 2 weeks at a time.    omeprazole (priLOSEC) 20 MG capsule   No No    Take 1 capsule by mouth Daily.    ondansetron (ZOFRAN) 4 MG tablet  Self Yes No    Take 4 mg by mouth Every 8 (Eight) Hours As Needed for Nausea or Vomiting.    Ozempic, 1 MG/DOSE, 4 MG/3ML solution pen-injector   No No    Inject 1 mg into the appropriate muscle as directed by prescriber 1 (One) Time Per Week.    ranolazine (Ranexa) 500 MG 12 hr tablet   No No    Take 1 tablet by mouth 2 (Two) Times a Day.    Rivaroxaban (Xarelto) 2.5 MG tablet   No No    Take 1 tablet by mouth 2 (Two) Times a Day.        ---------------------------------------------------------------------------------------------------------------------    Objective     Hospital Scheduled Meds:  heparin (porcine), 5,000 Units, Subcutaneous, Q8H  sodium chloride, 10 mL, Intravenous, Q12H         Current listed hospital scheduled medications may not yet reflect those currently placed in orders that are signed and held, awaiting patient's arrival to floor/unit.    ---------------------------------------------------------------------------------------------------------------------   Vital Signs:  Temp:  [97.9 °F (36.6 °C)-98.9 °F (37.2 °C)] 97.9 °F (36.6 °C)  Heart Rate:   [] 99  Resp:  [18-19] 18  BP: ()/(62-86) 133/80  Mean Arterial Pressure (Non-Invasive) for the past 24 hrs (Last 3 readings):   Noninvasive MAP (mmHg)   05/22/22 0959 97   05/22/22 0832 85   05/22/22 0816 87     SpO2 Percentage    05/22/22 0816 05/22/22 0832 05/22/22 0959   SpO2: 95% 100% 98%     SpO2:  [90 %-100 %] 98 %  on   ;   Device (Oxygen Therapy): room air    Body mass index is 33.94 kg/m².  Wt Readings from Last 3 Encounters:   05/22/22 86.9 kg (191 lb 9.6 oz)   05/05/22 90.3 kg (199 lb)   04/26/22 90.7 kg (200 lb)       ---------------------------------------------------------------------------------------------------------------------   Physical Exam:  Physical Exam  Nursing note reviewed.   Constitutional:       General: She is awake. She is not in acute distress.     Appearance: She is well-developed. She is obese. She is not toxic-appearing.      Comments: Pleasant. Sitting up in bed. No acute distress noted. On room air. Spouse at bedside.   HENT:      Head: Normocephalic and atraumatic.      Mouth/Throat:      Mouth: Mucous membranes are moist.   Eyes:      Extraocular Movements: Extraocular movements intact.      Conjunctiva/sclera: Conjunctivae normal.      Pupils: Pupils are equal, round, and reactive to light.   Cardiovascular:      Rate and Rhythm: Normal rate and regular rhythm.      Pulses:           Dorsalis pedis pulses are 2+ on the right side and 2+ on the left side.      Heart sounds: Normal heart sounds. No murmur heard.    No friction rub. No gallop.   Pulmonary:      Effort: Pulmonary effort is normal. No tachypnea, accessory muscle usage or respiratory distress.      Breath sounds: Normal breath sounds and air entry. No wheezing, rhonchi or rales.      Comments: On room air. Speaks in full sentences without dyspnea.  Chest:      Chest wall: No tenderness.   Abdominal:      General: Bowel sounds are normal. There is no distension.      Palpations: Abdomen is soft.       Tenderness: There is generalized abdominal tenderness. There is no guarding or rebound.      Comments: No peritoneal signs appreciated on exam.   Genitourinary:     Comments: No jones catheter in place.  Musculoskeletal:      Cervical back: Neck supple.      Right lower leg: No edema.      Left lower leg: No edema.   Skin:     General: Skin is warm and dry.      Capillary Refill: Capillary refill takes less than 2 seconds.   Neurological:      General: No focal deficit present.      Mental Status: She is alert and oriented to person, place, and time.      GCS: GCS eye subscore is 4. GCS verbal subscore is 5. GCS motor subscore is 6.      Sensory: Sensation is intact.      Motor: Motor function is intact.      Comments: Awake and alert. Follows commands. Answers questions appropriately. Moves all extremities equally. Strength and sensation intact. No focal neuro deficit on exam.   Psychiatric:         Attention and Perception: Attention normal.         Mood and Affect: Mood and affect normal.         Speech: Speech normal.         Behavior: Behavior is cooperative.         Cognition and Memory: Cognition and memory normal.       ---------------------------------------------------------------------------------------------------------------------  EKG:  Pending cardiology read. Per my interpretation: NSR, HR 96 BPM, LBBB present. Await final cardiology read.    Pending cardiology read. Per my interpretation: Sinus tach,  BPM, LBBB, await final cardiology read.    Telemetry:    Sinus 80s     I have personally reviewed the EKG/Telemetry strip  ---------------------------------------------------------------------------------------------------------------------   Results from last 7 days   Lab Units 05/22/22  0134 05/21/22 2333   CK TOTAL U/L  --  73   TROPONIN T ng/mL <0.010 <0.010     Results from last 7 days   Lab Units 05/21/22 2333   PROBNP pg/mL 54.2         Results from last 7 days   Lab Units 05/21/22 2333    WBC 10*3/mm3 10.24   HEMOGLOBIN g/dL 14.8   HEMATOCRIT % 44.0   MCV fL 81.3   MCHC g/dL 33.6   PLATELETS 10*3/mm3 355   INR  1.06     Results from last 7 days   Lab Units 05/21/22 2333   SODIUM mmol/L 134*   POTASSIUM mmol/L 4.2   MAGNESIUM mg/dL 1.1*   CHLORIDE mmol/L 98   CO2 mmol/L 19.3*   BUN mg/dL 10   CREATININE mg/dL 0.79   CALCIUM mg/dL 9.2   GLUCOSE mg/dL 101*   ALBUMIN g/dL 4.51   BILIRUBIN mg/dL 0.9   ALK PHOS U/L 106   AST (SGOT) U/L 24   ALT (SGPT) U/L 24   Estimated Creatinine Clearance: 87.1 mL/min (by C-G formula based on SCr of 0.79 mg/dL).    Lab Results   Component Value Date    HGBA1C 5.90 (H) 04/02/2022     Lab Results   Component Value Date    TSH 1.950 04/02/2022     Microbiology Results (last 10 days)     Procedure Component Value - Date/Time    COVID PRE-OP / PRE-PROCEDURE SCREENING ORDER (NO ISOLATION) - Swab, Nasopharynx [[id number]]  (Normal) Collected: 05/21/22 2339    Lab Status: Final result Specimen: Swab from Nasopharynx Updated: 05/22/22 0000    Narrative:      The following orders were created for panel order COVID PRE-OP / PRE-PROCEDURE SCREENING ORDER (NO ISOLATION) - Swab, Nasopharynx.  Procedure                               Abnormality         Status                     ---------                               -----------         ------                     COVID-19 and FLU A/B PCR...[[id number]]  Normal              Final result                 Please view results for these tests on the individual orders.    COVID-19 and FLU A/B PCR - Swab, Nasopharynx [[id number]]  (Normal) Collected: 05/21/22 2339    Lab Status: Final result Specimen: Swab from Nasopharynx Updated: 05/22/22 0000     COVID19 Not Detected     Influenza A PCR Not Detected     Influenza B PCR Not Detected    Narrative:      Fact sheet for providers: https://www.fda.gov/media/163607/download    Fact sheet for patients: https://www.fda.gov/media/786429/download    Test performed by PCR.         Pain Management  Panel    There is no flowsheet data to display.       I have personally reviewed the above laboratory results.   ---------------------------------------------------------------------------------------------------------------------  Imaging Results (Last 7 Days)     Procedure Component Value Units Date/Time    XR Chest 1 View [[id number]] Collected: 05/21/22 2300     Updated: 05/21/22 2303    Narrative:      CR Chest 1 Vw    INDICATION:   Chest pain and vomiting     COMPARISON:    4/2/2022    FINDINGS:  Portable AP view(s) of the chest.  Stable cardiac silhouette status post sternotomy. The vascularity is unremarkable. The lungs are clear. There is no effusion or pneumothorax.      Impression:      1. Negative chest.    Signer Name: Edson Mays MD   Signed: 5/21/2022 11:00 PM   Workstation Name: [id number]    [facility]      I have personally reviewed the above radiology results.     Last Echocardiogram:  Results for orders placed during the hospital encounter of 04/02/22    Adult Transthoracic Echo Complete w/ Color, Spectral and Contrast if necessary per protocol    Interpretation Summary  · Left ventricular ejection fraction appears to be 61 - 65%.  · Left ventricular diastolic function is consistent with (grade I) impaired relaxation.  · No significant valvular abnormality  · Moderate sclerosis of the aortic valve  · No pericardial effusion  · No prior study for comparison    ---------------------------------------------------------------------------------------------------------------------    Assessment & Plan      ACUTE HOSPITAL PROBLEMS    -Chest pain, mixed features, concerning for unstable angina   -New LBBB  -Coronary artery disease s/p CABG x 2 in past, PCI with JAYDA to mid RCA 8/2021, and arthrotomy of severe ISR and PCI of mid RCA with attempt to stent ostial RPDA unsuccessful 2/2 stent scaffold protruding in overlapping fashion 4/5/2022  • Troponin T negative x 3   • EKG with  new LBBB   • Cardiology consulted, planning for [facility] tomorrow, NPO after midnight   • Cont aspirin, Brilinta, statin    • GI cocktail administered in ED   • Closely monitor on telemetry     -Abdominal pain/cramping, nausea, vomiting   · Patient with mild TTP on exam   · Obtain CT abdomen/pelvis   · Obtain GI panel PCR  PRN antiemetics   · Gentle IV fluids     -Elevated anion gap   -Mild ketonuria   · Likely due to GI losses and decreased oral intake   · PRN antiemetics available   · Gentle IV fluids started   · Repeat chem panel in AM     -Hypomagnesemia   · Mag 1.1  · Replace electrolytes per protocol   · Telemetry monitoring   · Repeat mag level in AM     -F/E/N  • Gentle IV fluids. Replace electrolytes per protocol as necessary. Cardiac/consistent carbohydrate diet, NPO after midnight.     CHRONIC MEDICAL PROBLEMS    -HFpEF: Appears grossly compensated on exam. Monitor volume status closely. Prior TTE reviewed.   -Type II diabetes mellitus, non insulin dependent  • Obtain HgbA1C  • Review home medication regimen once reconciled per pharmacy   • Hold home oral DM medications for now.  • Start low dose SSI, titrate dosage as necessary   • Closely monitor blood glucose levels with accuchecks QAC and QHS  • Hypoglycemia protocol in place should it be necessary  -GERD: PPI  -Anxiety/Depression/PSTD: Supportive care. Cont home medication regimen once reconciled per pharmacy.   ---------------------------------------------------  DVT Prophylaxis: SQ heparin   GI Prophylaxis: PPI   Activity: Up with assistance as tolerated   ---------------------------------------------------    INPATIENT status due to the need for care which can only be reasonably provided in an hospital setting such as aggressive/expedited ancillary services and/or consultation services, the necessity for IV medications, close physician monitoring and/or the possible need for procedures.  In such, I feel patient’s risk for adverse outcomes and need for  care warrant INPATIENT evaluation and predict the patient’s care encounter to likely last beyond 2 midnights.    Code Status: FULL CODE   ---------------------------------------------------  Disposition/Discharge planning: Plans on home at discharge once medically stable   ---------------------------------------------------  I have discussed the patient's assessment and plan with the patient, nursing staff, and attending physician Aaron Black MD Allyson O'Kuma, PA-C  [facility]   Pager: [phone]    05/22/22  11:26 EDT    Attending Physician: Aaron Wiley MD        ---------------------------------------------------------------------------------------------------------------------

## 2022-05-22 NOTE — ED PROVIDER NOTES
Subjective   Patient presents to ER with vomiting for three days      Vomiting  The primary symptoms include fatigue, nausea and vomiting. Primary symptoms do not include fever. The illness began 3 to 5 days ago.   The illness is also significant for chills and anorexia.       Review of Systems   Constitutional: Positive for chills and fatigue. Negative for fever.   HENT: Negative.    Eyes: Negative.    Respiratory: Negative.    Cardiovascular: Negative.    Gastrointestinal: Positive for anorexia, nausea and vomiting.   Endocrine: Negative.    Genitourinary: Negative.    Musculoskeletal: Negative.    Skin: Negative.    Allergic/Immunologic: Negative.    Neurological: Negative.    Hematological: Negative.    Psychiatric/Behavioral: Negative.        Past Medical History:   Diagnosis Date   • COVID-19    • Diabetes mellitus (HCC)    • Hyperlipidemia    • Hypertension    • Myocardial infarct (HCC)    • PCOS (polycystic ovarian syndrome)    • PTSD (post-traumatic stress disorder)        Allergies   Allergen Reactions   • Contrast Dye Anaphylaxis   • Ciprofloxacin Other (See Comments)     Insomnia   • Sulfa Antibiotics Nausea And Vomiting   • Drug [Hydrocodone-Acetaminophen] Itching       Past Surgical History:   Procedure Laterality Date   • CARDIAC CATHETERIZATION     • CARDIAC CATHETERIZATION N/A 2022    Procedure: Left Heart Cath;  Surgeon: Alexandr Marquez MD;  Location: [facility] CATH INVASIVE LOCATION;  Service: Cardiology;  Laterality: N/A;   •  SECTION     • CHOLECYSTECTOMY     • CORONARY ARTERY BYPASS GRAFT      x 2   • CORONARY STENT PLACEMENT      x 6 per patient   • DILATATION AND CURETTAGE     • KNEE SURGERY Left    • TONSILLECTOMY         Family History   Problem Relation Age of Onset   • Lung cancer Father    • Thyroid cancer Brother    • Heart attack Maternal Grandfather    • Lung cancer Maternal Grandfather    • Heart attack Paternal Grandmother    • Emphysema Paternal Grandfather         Social History     Socioeconomic History   • Marital status:    Tobacco Use   • Smoking status: Never Smoker   • Smokeless tobacco: Never Used   Vaping Use   • Vaping Use: Never used   Substance and Sexual Activity   • Alcohol use: Not Currently   • Drug use: Never   • Sexual activity: Defer           Objective   Physical Exam  Vitals and nursing note reviewed.   Constitutional:       Appearance: She is well-developed.   HENT:      Head: Normocephalic.   Eyes:      Pupils: Pupils are equal, round, and reactive to light.   Cardiovascular:      Rate and Rhythm: Normal rate.      Heart sounds: Normal heart sounds.   Pulmonary:      Effort: Pulmonary effort is normal.      Breath sounds: Normal breath sounds.   Abdominal:      Comments: Tender epigastric area   Musculoskeletal:         General: Normal range of motion.      Cervical back: Normal range of motion.   Skin:     General: Skin is warm.      Capillary Refill: Capillary refill takes less than 2 seconds.   Neurological:      General: No focal deficit present.      Mental Status: She is alert.   Psychiatric:         Mood and Affect: Mood normal.         Procedures           ED Course  ED Course as of 05/22/22 1505   Sun May 22, 2022   0058 CXR : negative   [AMANDA]   0058 eCG 22:47 Sinus tachycardia, rate 103. LBBB. QT/QTc 394/516. Similar to ECG of 4-2-22 [AMANDA]   0310 Patient already discharged by Dr. Florentino, patient started having some more chest pain, EKG was repeated normal sinus rhythm 96 bpm left bundle branch block which is similar to prior no changes, will try GI cocktail as she has been recently vomiting. [JM]   1136 I assumed patient's care from Dr. Contreras this morning at shift change.  Please see previous documentation.  Case discussed with Dr. Smalls and Dr. Wiley.  Dr. Wiley is admitting patient to the hospitalist service with cardiology consulting.  Electronically signed by Anil Agrawal MD, 05/22/22, 8:52 AM EDT.   [CM]   5996 I  indirectly assumed care from Dr Florentino, pt called out many time stating she was having worsening chest pain, repest EKG revealed similar ot prior, trop negative, given hx I was uncomfortable sending home, signed out to dr kilgore pending admission [JM]      ED Course User Index  [CM] Anil Kilgore MD  [JM] Ralph Contreras MD  [AMANDA] Noam Florentino MD                                                 [facility]    Final diagnoses:   Dehydration   Hypomagnesemia   Nausea and vomiting, unspecified vomiting type   Chest pain, unspecified type       ED Disposition  ED Disposition     ED Disposition   Decision to Admit    Condition   --    Comment   Level of Care: Telemetry [5]   Diagnosis: Chest pain [760679]   Certification: I Certify That Inpatient Hospital Services Are Medically Necessary For Greater Than 2 Midnights               Roxy Deng DO  [address]  [facility] [id number] [phone]    Schedule an appointment as soon as possible for a visit   If symptoms worsen         Medication List      ASK your doctor about these medications    metoprolol tartrate 100 MG tablet  Commonly known as: LOPRESSOR  Ask about: Which instructions should I use?     * ondansetron ODT 4 MG disintegrating tablet  Commonly known as: ZOFRAN-ODT  Ask about: Which instructions should I use?     * ondansetron ODT 4 MG disintegrating tablet  Commonly known as: ZOFRAN-ODT  Place 1 tablet on the tongue Every 8 (Eight) Hours As Needed for Nausea or Vomiting.  Ask about: Which instructions should I use?         * This list has 2 medication(s) that are the same as other medications prescribed for you. Read the directions carefully, and ask your doctor or other care provider to review them with you.               Where to Get Your Medications      These medications were sent to [facility] [address] [place], [place] [address] [place] [address] - [phone]  - [phone]   [address] [address]    Phone: [phone]    · ondansetron ODT 4 MG disintegrating tablet          Noam Florentino MD  05/22/22 6896

## 2022-05-22 NOTE — ED NOTES
Received report from JOSEE Deal. Pt resting on ER stretcher. Pt reports nausea and nonspecific chest pain at this time. Dr. Contreras made aware. VSS. NAD noted. IV intact and patent. Updated on plan of  Care. WCTM.

## 2022-05-22 NOTE — PLAN OF CARE
Goal Outcome Evaluation:               Patient admitted from ER this shift. Patient denies chest pain at this time. No s/s of acute distress noted. Will continue with plan of care.

## 2022-05-23 LAB
ANION GAP SERPL CALCULATED.3IONS-SCNC: 9.7 MMOL/L (ref 5–15)
BASOPHILS # BLD AUTO: 0.03 10*3/MM3 (ref 0–0.2)
BASOPHILS NFR BLD AUTO: 0.5 % (ref 0–1.5)
BUN SERPL-MCNC: 9 MG/DL (ref 6–20)
BUN/CREAT SERPL: 13.8 (ref 7–25)
CALCIUM SPEC-SCNC: 9 MG/DL (ref 8.6–10.5)
CHLORIDE SERPL-SCNC: 101 MMOL/L (ref 98–107)
CO2 SERPL-SCNC: 23.3 MMOL/L (ref 22–29)
CREAT SERPL-MCNC: 0.65 MG/DL (ref 0.57–1)
DEPRECATED RDW RBC AUTO: 41.3 FL (ref 37–54)
EGFRCR SERPLBLD CKD-EPI 2021: 106.1 ML/MIN/1.73
EOSINOPHIL # BLD AUTO: 0.32 10*3/MM3 (ref 0–0.4)
EOSINOPHIL NFR BLD AUTO: 5 % (ref 0.3–6.2)
ERYTHROCYTE [DISTWIDTH] IN BLOOD BY AUTOMATED COUNT: 13.8 % (ref 12.3–15.4)
GLUCOSE BLDC GLUCOMTR-MCNC: 109 MG/DL (ref 70–130)
GLUCOSE BLDC GLUCOMTR-MCNC: 111 MG/DL (ref 70–130)
GLUCOSE BLDC GLUCOMTR-MCNC: 91 MG/DL (ref 70–130)
GLUCOSE BLDC GLUCOMTR-MCNC: 95 MG/DL (ref 70–130)
GLUCOSE SERPL-MCNC: 97 MG/DL (ref 65–99)
HCT VFR BLD AUTO: 37.7 % (ref 34–46.6)
HGB BLD-MCNC: 12.5 G/DL (ref 12–15.9)
IMM GRANULOCYTES # BLD AUTO: 0.05 10*3/MM3 (ref 0–0.05)
IMM GRANULOCYTES NFR BLD AUTO: 0.8 % (ref 0–0.5)
LYMPHOCYTES # BLD AUTO: 2.66 10*3/MM3 (ref 0.7–3.1)
LYMPHOCYTES NFR BLD AUTO: 42 % (ref 19.6–45.3)
MAGNESIUM SERPL-MCNC: 2 MG/DL (ref 1.6–2.6)
MCH RBC QN AUTO: 27.2 PG (ref 26.6–33)
MCHC RBC AUTO-ENTMCNC: 33.2 G/DL (ref 31.5–35.7)
MCV RBC AUTO: 82 FL (ref 79–97)
MONOCYTES # BLD AUTO: 0.51 10*3/MM3 (ref 0.1–0.9)
MONOCYTES NFR BLD AUTO: 8 % (ref 5–12)
NEUTROPHILS NFR BLD AUTO: 2.77 10*3/MM3 (ref 1.7–7)
NEUTROPHILS NFR BLD AUTO: 43.7 % (ref 42.7–76)
NRBC BLD AUTO-RTO: 0 /100 WBC (ref 0–0.2)
PLATELET # BLD AUTO: 218 10*3/MM3 (ref 140–450)
PMV BLD AUTO: 9 FL (ref 6–12)
POTASSIUM SERPL-SCNC: 4.5 MMOL/L (ref 3.5–5.2)
RBC # BLD AUTO: 4.6 10*6/MM3 (ref 3.77–5.28)
SODIUM SERPL-SCNC: 134 MMOL/L (ref 136–145)
TROPONIN T SERPL-MCNC: <0.01 NG/ML (ref 0–0.03)
WBC NRBC COR # BLD: 6.34 10*3/MM3 (ref 3.4–10.8)

## 2022-05-23 PROCEDURE — 25010000002 KETOROLAC TROMETHAMINE PER 15 MG: Performed by: PHYSICIAN ASSISTANT

## 2022-05-23 PROCEDURE — 94799 UNLISTED PULMONARY SVC/PX: CPT

## 2022-05-23 PROCEDURE — 83735 ASSAY OF MAGNESIUM: CPT | Performed by: PHYSICIAN ASSISTANT

## 2022-05-23 PROCEDURE — 85025 COMPLETE CBC W/AUTO DIFF WBC: CPT | Performed by: INTERNAL MEDICINE

## 2022-05-23 PROCEDURE — 93010 ELECTROCARDIOGRAM REPORT: CPT | Performed by: INTERNAL MEDICINE

## 2022-05-23 PROCEDURE — 99214 OFFICE O/P EST MOD 30 MIN: CPT | Performed by: INTERNAL MEDICINE

## 2022-05-23 PROCEDURE — 96372 THER/PROPH/DIAG INJ SC/IM: CPT

## 2022-05-23 PROCEDURE — 25010000002 HEPARIN (PORCINE) PER 1000 UNITS: Performed by: INTERNAL MEDICINE

## 2022-05-23 PROCEDURE — 25010000002 ONDANSETRON PER 1 MG: Performed by: INTERNAL MEDICINE

## 2022-05-23 PROCEDURE — 25010000002 PROCHLORPERAZINE 10 MG/2ML SOLUTION: Performed by: EMERGENCY MEDICINE

## 2022-05-23 PROCEDURE — 82962 GLUCOSE BLOOD TEST: CPT

## 2022-05-23 PROCEDURE — 96376 TX/PRO/DX INJ SAME DRUG ADON: CPT

## 2022-05-23 PROCEDURE — 93005 ELECTROCARDIOGRAM TRACING: CPT | Performed by: INTERNAL MEDICINE

## 2022-05-23 PROCEDURE — G0378 HOSPITAL OBSERVATION PER HR: HCPCS

## 2022-05-23 PROCEDURE — 84484 ASSAY OF TROPONIN QUANT: CPT | Performed by: INTERNAL MEDICINE

## 2022-05-23 PROCEDURE — 80048 BASIC METABOLIC PNL TOTAL CA: CPT | Performed by: INTERNAL MEDICINE

## 2022-05-23 PROCEDURE — 99225 PR SBSQ OBSERVATION CARE/DAY 25 MINUTES: CPT | Performed by: PHYSICIAN ASSISTANT

## 2022-05-23 PROCEDURE — 96375 TX/PRO/DX INJ NEW DRUG ADDON: CPT

## 2022-05-23 RX ORDER — KETOROLAC TROMETHAMINE 30 MG/ML
15 INJECTION, SOLUTION INTRAMUSCULAR; INTRAVENOUS ONCE
Status: COMPLETED | OUTPATIENT
Start: 2022-05-23 | End: 2022-05-23

## 2022-05-23 RX ORDER — DOCUSATE SODIUM 100 MG/1
100 CAPSULE, LIQUID FILLED ORAL 2 TIMES DAILY
Status: DISCONTINUED | OUTPATIENT
Start: 2022-05-23 | End: 2022-05-25 | Stop reason: HOSPADM

## 2022-05-23 RX ORDER — ACETAMINOPHEN 325 MG/1
650 TABLET ORAL EVERY 6 HOURS PRN
Status: DISCONTINUED | OUTPATIENT
Start: 2022-05-23 | End: 2022-05-25 | Stop reason: HOSPADM

## 2022-05-23 RX ORDER — KETOROLAC TROMETHAMINE 10 MG/1
10 TABLET, FILM COATED ORAL ONCE
Status: COMPLETED | OUTPATIENT
Start: 2022-05-23 | End: 2022-05-23

## 2022-05-23 RX ADMIN — TICAGRELOR 90 MG: 90 TABLET ORAL at 08:44

## 2022-05-23 RX ADMIN — ASPIRIN 81 MG: 81 TABLET, CHEWABLE ORAL at 08:44

## 2022-05-23 RX ADMIN — HEPARIN SODIUM 5000 UNITS: 5000 INJECTION INTRAVENOUS; SUBCUTANEOUS at 05:39

## 2022-05-23 RX ADMIN — HEPARIN SODIUM 5000 UNITS: 5000 INJECTION INTRAVENOUS; SUBCUTANEOUS at 20:34

## 2022-05-23 RX ADMIN — METOPROLOL TARTRATE 25 MG: 25 TABLET, FILM COATED ORAL at 08:44

## 2022-05-23 RX ADMIN — PRENATAL VIT W/ FE FUMARATE-FA TAB 27-0.8 MG 1 TABLET: 27-0.8 TAB at 08:44

## 2022-05-23 RX ADMIN — Medication 10 ML: at 08:44

## 2022-05-23 RX ADMIN — ONDANSETRON 4 MG: 2 INJECTION INTRAMUSCULAR; INTRAVENOUS at 11:16

## 2022-05-23 RX ADMIN — SODIUM CHLORIDE 50 ML/HR: 9 INJECTION, SOLUTION INTRAVENOUS at 10:33

## 2022-05-23 RX ADMIN — ATORVASTATIN CALCIUM 80 MG: 40 TABLET, FILM COATED ORAL at 20:35

## 2022-05-23 RX ADMIN — DULOXETINE HYDROCHLORIDE 60 MG: 60 CAPSULE, DELAYED RELEASE ORAL at 08:44

## 2022-05-23 RX ADMIN — DOCUSATE SODIUM 100 MG: 100 CAPSULE ORAL at 20:35

## 2022-05-23 RX ADMIN — RANOLAZINE 500 MG: 500 TABLET, FILM COATED, EXTENDED RELEASE ORAL at 08:44

## 2022-05-23 RX ADMIN — KETOROLAC TROMETHAMINE 10 MG: 10 TABLET, FILM COATED ORAL at 15:08

## 2022-05-23 RX ADMIN — CETIRIZINE HYDROCHLORIDE 10 MG: 10 TABLET, FILM COATED ORAL at 08:44

## 2022-05-23 RX ADMIN — KETOROLAC TROMETHAMINE 15 MG: 30 INJECTION, SOLUTION INTRAMUSCULAR; INTRAVENOUS at 22:53

## 2022-05-23 RX ADMIN — TICAGRELOR 90 MG: 90 TABLET ORAL at 20:35

## 2022-05-23 RX ADMIN — PROCHLORPERAZINE EDISYLATE 5 MG: 5 INJECTION INTRAMUSCULAR; INTRAVENOUS at 20:36

## 2022-05-23 RX ADMIN — HEPARIN SODIUM 5000 UNITS: 5000 INJECTION INTRAVENOUS; SUBCUTANEOUS at 13:44

## 2022-05-23 NOTE — PROGRESS NOTES
Patient Identification:  Name:  Lissa Eisenberg  Age:  51 y.o.  Sex:  female  :  1970  MRN:  [id number]  Visit Number:  [id number]    Chief Complaint:   History of typical and atypical chest pain since her CABG few years  Recent stent placement on the RCA couple of days ago  Currently asymptomatic    Subjective: Not in any distress  ----------------------------------------------------------------------------------------------------------------------  Current Hospital Meds:  aspirin, 81 mg, Oral, Daily  atorvastatin, 80 mg, Oral, Nightly  cetirizine, 10 mg, Oral, Daily  DULoxetine, 60 mg, Oral, Daily  heparin (porcine), 5,000 Units, Subcutaneous, Q8H  Insulin Aspart, 0-7 Units, Subcutaneous, TID AC  metoprolol tartrate, 25 mg, Oral, BID  pantoprazole, 40 mg, Oral, QAM  prenatal vitamin 27-0.8, 1 tablet, Oral, Daily  psyllium, 1 packet, Oral, Daily  ranolazine, 500 mg, Oral, BID  [START ON 2022] Semaglutide (1 MG/DOSE), 1 mg, Intramuscular, Weekly  sodium chloride, 10 mL, Intravenous, Q12H  ticagrelor, 90 mg, Oral, BID      sodium chloride, 50 mL/hr, Last Rate: 50 mL/hr (22 1033)      ----------------------------------------------------------------------------------------------------------------------  Vital Signs:  Temp:  [97.5 °F (36.4 °C)-98.3 °F (36.8 °C)] 98 °F (36.7 °C)  Heart Rate:  [83-94] 83  Resp:  [18-20] 20  BP: ()/(57-72) 111/69      22  2258 22  0959 22  0442   Weight: 88.5 kg (195 lb) 86.9 kg (191 lb 9.6 oz) 86.9 kg (191 lb 9.6 oz) (admit weight, less then 24hrs)     Body mass index is 33.94 kg/m².    Intake/Output Summary (Last 24 hours) at 2022 1144  Last data filed at 2022 1038  Gross per 24 hour   Intake 360 ml   Output 2000 ml   Net -1640 ml     NPO Diet NPO Type: Sips with Meds  ----------------------------------------------------------------------------------------------------------------------  Physical exam:  Constitutional:  Well-developed  and well-nourished.  No respiratory distress.      HENT:  Head:  Normocephalic and atraumatic.  Mouth:  Moist mucous membranes.    Eyes:  Conjunctivae and EOM are normal.  Pupils are equal, round, and reactive to light.  No scleral icterus.    Neck:  Neck supple.  No JVD present.    Cardiovascular:  Normal rate, regular rhythm and normal heart sounds with no murmur.  Pulmonary/Chest:  No respiratory distress, no wheezes, no crackles, with normal breath sounds and good air movement.  Abdominal:  Soft.  Bowel sounds are normal.  No distension and no tenderness.   Musculoskeletal:  No edema, no tenderness, and no deformity.  No red or swollen joints anywhere.    Neurological:  Alert and oriented to person, place, and time.  No cranial nerve deficit.  No tongue deviation.  No facial droop.  No slurred speech.   Skin:  Skin is warm and dry. No rash noted. No pallor.   Peripheral vascular:  Strong pulses in all 4 extremities with no clubbing, no cyanosis, no edema.  ----------------------------------------------------------------------------------------------------------------------  Tele: Normal sinus rhythm  ----------------------------------------------------------------------------------------------------------------------  Results from last 7 days   Lab Units 05/22/22 2025 05/22/22  1150 05/22/22  0134 05/21/22  2333   CK TOTAL U/L  --   --   --  73   TROPONIN T ng/mL <0.010 <0.010 <0.010 <0.010     Results from last 7 days   Lab Units 05/23/22  0218 05/21/22  2333   WBC 10*3/mm3 6.34 10.24   HEMOGLOBIN g/dL 12.5 14.8   HEMATOCRIT % 37.7 44.0   MCV fL 82.0 81.3   MCHC g/dL 33.2 33.6   PLATELETS 10*3/mm3 218 355   INR   --  1.06         Results from last 7 days   Lab Units 05/23/22  0218 05/21/22  2333   SODIUM mmol/L 134* 134*   POTASSIUM mmol/L 4.5 4.2   MAGNESIUM mg/dL 2.0 1.1*   CHLORIDE mmol/L 101 98   CO2 mmol/L 23.3 19.3*   BUN mg/dL 9 10   CREATININE mg/dL 0.65 0.79   CALCIUM mg/dL 9.0 9.2   GLUCOSE mg/dL 97  101*   ALBUMIN g/dL  --  4.51   BILIRUBIN mg/dL  --  0.9   ALK PHOS U/L  --  106   AST (SGOT) U/L  --  24   ALT (SGPT) U/L  --  24   Estimated Creatinine Clearance: 105.8 mL/min (by C-G formula based on SCr of 0.65 mg/dL).    No results found for: AMMONIA      No results found for: BLOODCX  No results found for: URINECX  No results found for: WOUNDCX  No results found for: STOOLCX    I have personally looked at the labs and they are summarized above.  ----------------------------------------------------------------------------------------------------------------------  Imaging Results (Last 24 Hours)     Procedure Component Value Units Date/Time    CT Abdomen Pelvis Without Contrast [[id number]] Collected: 05/22/22 2011     Updated: 05/22/22 2013    Narrative:      CT Abdomen Pelvis WO    INDICATION: Abdominal pain, nausea, vomiting;Dehydration;Hypomagnesemia;Nausea with vomiting, unspecified; Chest pain, unspecified; Non-ST elevation (NSTEMI) myocardial infarction  Additional Relevant clinical info: Nausea/vomiting    TECHNIQUE:   CT of the abdomen and pelvis without IV contrast. Coronal and sagittal reconstructions were obtained.   Oral contrast is given, using department protocol and dosing.   Radiation dose reduction techniques included automated exposure control or exposure modulation based on body size.  Count of known CT and cardiac nuc med studies performed in previous 12 months: 3.     COMPARISON: 4/4/2022    FINDINGS:  There is limited assessment of the solid viscera and the bowel wall without the use of any IV contrast.    Grossly, no worrisome contour abnormalities of solid viscera or gross low-attenuation lesion seen. Gallbladder absent.    Oral contrast given filling the stomach. Contrast reaches the right colon and thus no evidence of small bowel obstruction. Appendix is normal and filled with air. No free air, free fluid or focal inflammatory change is present.      Lung bases are clear of infiltrate.  However tiny 3 mm nodule seen periphery left lower lobe with no older films available to document stability.  Osseous structures are intact.      Impression:      1. No acute intra-abdominal process within the confines of non-IV contrast exam. No evidence of bowel obstruction.    2. Incidental small 3 mm nodule left lung base warranting six-month follow-up chest CT without contrast.       Signer Name: Obdulia Hopper MD   Signed: 5/22/2022 8:11 PM   Workstation Name: [id number]    [facility]        ----------------------------------------------------------------------------------------------------------------------    Assessment:  CAD status post in-stent restenosis and stent angioplasty  History of hypertension  History of CABG  Obesity      Plan:  Patient cath films reviewed patient will be candidate of medical management at the present  If patient continues to have symptoms then patient can be evaluated for single-vessel bypass  With some improvement  Aggressive risk modification  Advised to do some activity in the room today  Will decide tomorrow about going home  If patient develops again in-stent restenosis then she will be candidate for radiation therapy      Amy Delgado MD  05/23/22  11:44 EDT

## 2022-05-23 NOTE — CASE MANAGEMENT/SOCIAL WORK
Discharge Planning Assessment   [place]     Patient Name: Lissa Eisenberg  MRN: [id number]  Today's Date: 5/23/2022    Admit Date: 5/21/2022     Discharge Needs Assessment     Row Name 05/23/22 1255       Living Environment    People in Home spouse;child(risa), dependent    Name(s) of People in Home Lives at home with spouse and 15 y/o son.    Current Living Arrangements other (see comments)    Primary Care Provided by self    Provides Primary Care For child(risa)    Family Caregiver if Needed spouse    Family Caregiver Names Rob    Quality of Family Relationships helpful;involved;supportive    Able to Return to Prior Arrangements yes       Transition Planning    Patient/Family Anticipates Transition to home with family    Transportation Anticipated public transportation       Discharge Needs Assessment    Equipment Currently Used at Home commode;walker, rolling               Discharge Plan     Row Name 05/23/22 1258       Plan    Plan Pt admitted on 5/21/22.  SS received consult per nsg for discharge planning.  SS spoke with pt at bedside.  Pt resides in a camper with her spouse, Rob, and their 15 y/o son.  Pt stated that she and family are doing alright in the camper and her parents are planning to purchase the family a newer camper soon.  Pt stated no need for community resources and housing lists.  Pt stated spouse receives disability and family receives food stamps.  Pt stated that son's school assists with meals and clothing as needed through the resource center.  Pt stated son is well cared for and has clothing and food available.  Pt stated no HH is utilized at this time.  Pt currently utilizes bedside commode and rolling walker via unknown provider.  Pt stated no POA or Living will.  Pt may need assistance with transportation at discharge.  Pt's PCP is Roxy Deng.  SS will follow.              Continued Care and Services - Admitted Since 5/21/2022    Coordination has not been started for this  encounter.          Demographic Summary     Row Name 05/23/22 1692       General Information    Admission Type inpatient    Referral Source nursing    Reason for Consult discharge planning    Preferred Language English                      HALEY SparrowW

## 2022-05-23 NOTE — PAYOR COMM NOTE
"[facility]  NPI:[id number]    Utilization Review  Contact: Soraya Lawrence RN  Phone: [phone]  Fax:[phone]    INITIATE INPATIENT AUTHORIZATION   [id number]      Carlos Eisenberg (51 y.o. Female)             Date of Birth   1970    Social Security Number       Address   [address] [place] [address]    Home Phone   [phone]    MRN   [id number]       Orthodox   Unknown    Marital Status                               Admission Date   5/22/22    Admission Type   Emergency    Admitting Provider   Aaron Wiley MD    Attending Provider   Constantine Jones MD    Department, Room/Bed   [facility], 3313/1S       Discharge Date       Discharge Disposition       Discharge Destination                               Attending Provider: Constantine Jones MD    Allergies: Contrast Dye, Ciprofloxacin, Sulfa Antibiotics, Drug [Hydrocodone-acetaminophen]    Isolation: None   Infection: None   Code Status: CPR   Advance Care Planning Activity    Ht: 160 cm (63\")   Wt: 86.9 kg (191 lb 9.6 oz)    Admission Cmt: None   Principal Problem: NSTEMI (non-ST elevated myocardial infarction) (HCC) [I21.4]                 Active Insurance as of 5/21/2022     Primary Coverage     Payor Plan Insurance Group Employer/Plan Group    [id number]     Payor Plan Address Payor Plan Phone Number Payor Plan Fax Number Effective Dates    [employer] [address] [address] [phone]  1/1/2021 - None Entered    [facility] [id number]       Subscriber Name Subscriber Birth Date Member ID       CARLOS EISENBERG 1970 [id number]                 Emergency Contacts      (Rel.) Home Phone Work Phone Mobile Phone    ZAC EISENBERG (Spouse) -- -- [phone]               History & Physical      O'Stacia Lopez PA at 05/22/22 1126     Attestation signed by Aaron Wiley MD at 05/22/22 1525    I have reviewed this documentation and agree.    Ms. " Faina is our 50 yo F with hx  coronary artery disease s/p CABG x 2 in past, PCI with JAYDA to mid RCA 2021, and arthrotomy of severe ISR and PCI of mid RCA with attempt to stent ostial RPDA unsuccessful 2/2 stent scaffold protruding in overlapping fashion 2022, HFpEF, essential hypertension, hyperlipidemia, non insulin dependent type II diabetes mellitus, GERD, and anxiety/depression/PTSD who presents to the ED for worsening dyspnea and chest pain. Patient notes starting on Tuesday of this week when she thinks she overexerted herself, she has had worsening chest pain and exertional dyspnea. Chest pain radiates to her left arm. She notes she has vomited so much she has produced about 20 cc of bloody/mucous when she vomited. She did note she has decreased PO intake in an attempt to lose weight. Patient did not appear uncomfortable on my evaluation and she had tolerated some food and water. She had chest pain earlier today but it resolved. Patient requesting another dose of IV morphine and zofran. Patient was found to have new LBBB. Trops negative X2 with third pending. Given that patient has symptoms consistent with unstable angina with known lesion that was not amendable to intervention last admission, admitted for cardiology evaluation. BP borderline low, will hold home imdur and lisinopril. NPO for potential LHC tomorrow. Hematemesis does not seem significant but will continue to closely monitor for signs/symptoms of hematemesis and consider EGD if needed. CT abdomen/pelvis pending. Mild ketouria likely from starvation ketosis given recent poor PO intake.                        [facility] Medicine Services  HISTORY & PHYSICAL    Patient Identification:  Name:  Lissa Eisenberg  Age:  51 y.o.  Sex:  female  :  1970  MRN:  [id number]   Visit Number:  [id number]  Admit Date: 2022   Primary Care Physician:  Roxy Deng DO Subjective     Chief complaint:   Chief  Complaint   Patient presents with   • Chest Pain   • Vomiting     History of presenting illness:   Patient is a 51 y.o. female with past medical history significant for coronary artery disease s/p CABG x 2 in past, PCI with JAYDA to mid RCA 8/2021, and arthrotomy of severe ISR and PCI of mid RCA with attempt to stent ostial RPDA unsuccessful 2/2 stent scaffold protruding in overlapping fashion 4/5/2022, HFpEF, essential hypertension, hyperlipidemia, non insulin dependent type II diabetes mellitus, GERD, and anxiety/depression/PTSD that presented to the [facility] emergency department for evaluation of chest pain and nausea/vomiting. Patient states that onset of symptoms was this past Tuesday. She states that she overexerted her self while at cardiac rehab. She states that she developed chest pressure and nausea. She states that she felt very weak and fatigued. She ultimately went home and states that she went to sleep. Patient states that the next day, she developed worsening nausea with emesis, could not tolerate much oral intake. Continued to have intermittent chest discomfort for which she reports was stabbing in sensation with pressure in the middle of her chest. She reports having abdominal cramping and diarrhea as well. She reports she did go to cardiac rehab again on Thursday but had very little exercise tolerance. She reported developing shortness of breath in addition to her other symptoms. Since then, she has had ongoing intermittent chest pain, nausea, vomiting, diarrhea, abdominal cramping, shortness of breath, and fatigue/weakness. She reports not being able to tolerate much oral intake. Dexter reports three episodes of blood in her emesis from vomiting so much. Denies any blood in stool. Thus, patient came to ED today for evaluation as her symptoms were progressively worsening. Denies any fevers or chills. Denies any headache or dizziness, no LOC. Denies any numbness, tingling, paraesthesias, or  lateralizing weakness.     It is of note that the patient was admitted to this facility for/2/20/2022 through 4/13/2022 where she was treated for non-ST elevation myocardial infarction.  Patient ultimately underwent left heart catheterization due to abnormal stress test that revealed 99% in-stent mid RCA stenosis.  Arthrotomy of severe in-stent restenosis was performed with PCI to mid RCA with JAYDA.  Attempt to stent the ostial RPDA was unsuccessful due to stent scaffold protruding in overlapping fashion.  Patient was stabilized, discharged home and stable condition.  Patient continued on DAPT and GDMT.  Patient was treated in cardiac rehab in the outpatient setting for which she has been going to 2 times per week.  It is of note that on the left heart catheterization report it was mentioned to consider a bypass or RPDA in the future if further in-stent restenosis were to occur.    Upon arrival to the ED, vitals were temperature 98.9, heart rate 101, respiratory rate 19, blood pressure 107/73, and oxygen saturation 98% on room air.  Troponin T negative x2.  proBNP 54.2.  CMP with glucose 101, sodium 134, CO2 19.3, and anion gap 16.7.  Magnesium 1.1.  CBC grossly unremarkable.  Urinalysis with 1+ ketones and trace leukocytes, otherwise unremarkable.  Urine drug screen negative.  COVID-19 and influenza screening negative.  Chest x-ray with no evidence of acute cardiopulmonary disease.     Patient has been admitted to the telemetry floor for further evaluation and treatment.     Present during exam: Patient's spouse  ---------------------------------------------------------------------------------------------------------------------   Review of Systems   Constitutional: Positive for activity change, appetite change and fatigue. Negative for chills, diaphoresis and fever.   HENT: Negative for congestion and sore throat.    Eyes: Negative for discharge and visual disturbance.   Respiratory: Positive for shortness of  breath. Negative for cough and wheezing.    Cardiovascular: Positive for chest pain. Negative for palpitations and leg swelling.   Gastrointestinal: Positive for abdominal pain (Cramping sensation ), diarrhea, nausea and vomiting. Negative for blood in stool and constipation.   Genitourinary: Negative for decreased urine volume, dysuria, frequency and urgency.   Musculoskeletal: Negative for arthralgias and myalgias.   Skin: Negative for color change and wound.   Neurological: Negative for dizziness, syncope, weakness, light-headedness and headaches.   Psychiatric/Behavioral: Negative for confusion. The patient is not nervous/anxious.       ---------------------------------------------------------------------------------------------------------------------   Past Medical History:   Diagnosis Date   • COVID-19    • Diabetes mellitus (HCC)    • Hyperlipidemia    • Hypertension    • Myocardial infarct (HCC)    • PCOS (polycystic ovarian syndrome)    • PTSD (post-traumatic stress disorder)      Past Surgical History:   Procedure Laterality Date   • CARDIAC CATHETERIZATION     • CARDIAC CATHETERIZATION N/A 2022    Procedure: Left Heart Cath;  Surgeon: Alexandr Marquez MD;  Location: [facility] CATH INVASIVE LOCATION;  Service: Cardiology;  Laterality: N/A;   •  SECTION     • CHOLECYSTECTOMY     • CORONARY ARTERY BYPASS GRAFT      x 2   • CORONARY STENT PLACEMENT      x 6 per patient   • DILATATION AND CURETTAGE     • KNEE SURGERY Left    • TONSILLECTOMY       Family History   Problem Relation Age of Onset   • Lung cancer Father    • Thyroid cancer Brother    • Heart attack Maternal Grandfather    • Lung cancer Maternal Grandfather    • Heart attack Paternal Grandmother    • Emphysema Paternal Grandfather      Social History     Socioeconomic History   • Marital status:    Tobacco Use   • Smoking status: Never Smoker   • Smokeless tobacco: Never Used   Vaping Use   • Vaping Use: Never used   Substance and  Sexual Activity   • Alcohol use: Not Currently   • Drug use: Never   • Sexual activity: Defer     ---------------------------------------------------------------------------------------------------------------------   Allergies:  Contrast dye, Ciprofloxacin, Sulfa antibiotics, and Drug [hydrocodone-acetaminophen]  ---------------------------------------------------------------------------------------------------------------------   Medications below are reported home medications pulling from within the system; at this time, these medications have not been reconciled unless otherwise specified and are in the verification process for further verifcation as current home medications.    Prior to Admission Medications     Prescriptions Last Dose Informant Patient Reported? Taking?    aspirin 81 MG EC tablet  Self Yes No    Take 81 mg by mouth Daily.    dicyclomine (BENTYL) 10 MG capsule  Self No No    Take 1 capsule by mouth 3 (Three) Times a Day As Needed (abdominal pain).    DULoxetine (CYMBALTA) 30 MG capsule  Self Yes No    Take 30 mg by mouth 2 (Two) Times a Day.    ticagrelor (BRILINTA) 90 MG tablet tablet  Self No No    Take 1 tablet by mouth 2 (Two) Times a Day.    atorvastatin (LIPITOR) 80 MG tablet   No No    Take 1 tablet by mouth Every Night.    cetirizine (zyrTEC) 10 MG tablet   No No    Take 1 tablet by mouth Daily.    cyclobenzaprine (FLEXERIL) 10 MG tablet  Self Yes No    Take 10 mg by mouth 3 (Three) Times a Day As Needed for Muscle Spasms.    isosorbide mononitrate (IMDUR) 30 MG 24 hr tablet   No No    Take 1 tablet by mouth Daily.    metFORMIN (GLUCOPHAGE) 1000 MG tablet   No No    Take 1 tablet by mouth 2 (Two) Times a Day With Meals.    metoprolol tartrate (LOPRESSOR) 25 MG tablet   No No    Take 1 tablet by mouth 2 (Two) Times a Day.    naproxen (NAPROSYN) 500 MG tablet  Self Yes No    Take 500 mg by mouth 2 (Two) Times a Day With Meals.    nitroglycerin (NITROSTAT) 0.4 MG SL tablet   Yes No     Place 0.4 mg under the tongue Every 5 (Five) Minutes As Needed. do not exceed a total of 3 doses in 15 minutes    nystatin-triamcinolone (MYCOLOG) 976352-5.1 UNIT/GM-% ointment   No No    Apply 1 application topically to the appropriate area as directed 2 (Two) Times a Day As Needed (Rash). Avoid using for more than 2 weeks at a time.    omeprazole (priLOSEC) 20 MG capsule   No No    Take 1 capsule by mouth Daily.    ondansetron (ZOFRAN) 4 MG tablet  Self Yes No    Take 4 mg by mouth Every 8 (Eight) Hours As Needed for Nausea or Vomiting.    Ozempic, 1 MG/DOSE, 4 MG/3ML solution pen-injector   No No    Inject 1 mg into the appropriate muscle as directed by prescriber 1 (One) Time Per Week.    ranolazine (Ranexa) 500 MG 12 hr tablet   No No    Take 1 tablet by mouth 2 (Two) Times a Day.    Rivaroxaban (Xarelto) 2.5 MG tablet   No No    Take 1 tablet by mouth 2 (Two) Times a Day.        ---------------------------------------------------------------------------------------------------------------------    Objective     Hospital Scheduled Meds:  heparin (porcine), 5,000 Units, Subcutaneous, Q8H  sodium chloride, 10 mL, Intravenous, Q12H         Current listed hospital scheduled medications may not yet reflect those currently placed in orders that are signed and held, awaiting patient's arrival to floor/unit.    ---------------------------------------------------------------------------------------------------------------------   Vital Signs:  Temp:  [97.9 °F (36.6 °C)-98.9 °F (37.2 °C)] 97.9 °F (36.6 °C)  Heart Rate:  [] 99  Resp:  [18-19] 18  BP: ()/(62-86) 133/80  Mean Arterial Pressure (Non-Invasive) for the past 24 hrs (Last 3 readings):   Noninvasive MAP (mmHg)   05/22/22 0959 97   05/22/22 0832 85   05/22/22 0816 87     SpO2 Percentage    05/22/22 0816 05/22/22 0832 05/22/22 0959   SpO2: 95% 100% 98%     SpO2:  [90 %-100 %] 98 %  on   ;   Device (Oxygen Therapy): room air    Body mass index is 33.94  kg/m².  Wt Readings from Last 3 Encounters:   05/22/22 86.9 kg (191 lb 9.6 oz)   05/05/22 90.3 kg (199 lb)   04/26/22 90.7 kg (200 lb)       ---------------------------------------------------------------------------------------------------------------------   Physical Exam:  Physical Exam  Nursing note reviewed.   Constitutional:       General: She is awake. She is not in acute distress.     Appearance: She is well-developed. She is obese. She is not toxic-appearing.      Comments: Pleasant. Sitting up in bed. No acute distress noted. On room air. Spouse at bedside.   HENT:      Head: Normocephalic and atraumatic.      Mouth/Throat:      Mouth: Mucous membranes are moist.   Eyes:      Extraocular Movements: Extraocular movements intact.      Conjunctiva/sclera: Conjunctivae normal.      Pupils: Pupils are equal, round, and reactive to light.   Cardiovascular:      Rate and Rhythm: Normal rate and regular rhythm.      Pulses:           Dorsalis pedis pulses are 2+ on the right side and 2+ on the left side.      Heart sounds: Normal heart sounds. No murmur heard.    No friction rub. No gallop.   Pulmonary:      Effort: Pulmonary effort is normal. No tachypnea, accessory muscle usage or respiratory distress.      Breath sounds: Normal breath sounds and air entry. No wheezing, rhonchi or rales.      Comments: On room air. Speaks in full sentences without dyspnea.  Chest:      Chest wall: No tenderness.   Abdominal:      General: Bowel sounds are normal. There is no distension.      Palpations: Abdomen is soft.      Tenderness: There is generalized abdominal tenderness. There is no guarding or rebound.      Comments: No peritoneal signs appreciated on exam.   Genitourinary:     Comments: No jones catheter in place.  Musculoskeletal:      Cervical back: Neck supple.      Right lower leg: No edema.      Left lower leg: No edema.   Skin:     General: Skin is warm and dry.      Capillary Refill: Capillary refill takes less  than 2 seconds.   Neurological:      General: No focal deficit present.      Mental Status: She is alert and oriented to person, place, and time.      GCS: GCS eye subscore is 4. GCS verbal subscore is 5. GCS motor subscore is 6.      Sensory: Sensation is intact.      Motor: Motor function is intact.      Comments: Awake and alert. Follows commands. Answers questions appropriately. Moves all extremities equally. Strength and sensation intact. No focal neuro deficit on exam.   Psychiatric:         Attention and Perception: Attention normal.         Mood and Affect: Mood and affect normal.         Speech: Speech normal.         Behavior: Behavior is cooperative.         Cognition and Memory: Cognition and memory normal.       ---------------------------------------------------------------------------------------------------------------------  EKG:  Pending cardiology read. Per my interpretation: NSR, HR 96 BPM, LBBB present. Await final cardiology read.    Pending cardiology read. Per my interpretation: Sinus tach,  BPM, LBBB, await final cardiology read.    Telemetry:    Sinus 80s     I have personally reviewed the EKG/Telemetry strip  ---------------------------------------------------------------------------------------------------------------------   Results from last 7 days   Lab Units 05/22/22  0134 05/21/22  2333   CK TOTAL U/L  --  73   TROPONIN T ng/mL <0.010 <0.010     Results from last 7 days   Lab Units 05/21/22  2333   PROBNP pg/mL 54.2         Results from last 7 days   Lab Units 05/21/22  2333   WBC 10*3/mm3 10.24   HEMOGLOBIN g/dL 14.8   HEMATOCRIT % 44.0   MCV fL 81.3   MCHC g/dL 33.6   PLATELETS 10*3/mm3 355   INR  1.06     Results from last 7 days   Lab Units 05/21/22  2333   SODIUM mmol/L 134*   POTASSIUM mmol/L 4.2   MAGNESIUM mg/dL 1.1*   CHLORIDE mmol/L 98   CO2 mmol/L 19.3*   BUN mg/dL 10   CREATININE mg/dL 0.79   CALCIUM mg/dL 9.2   GLUCOSE mg/dL 101*   ALBUMIN g/dL 4.51   BILIRUBIN  mg/dL 0.9   ALK PHOS U/L 106   AST (SGOT) U/L 24   ALT (SGPT) U/L 24   Estimated Creatinine Clearance: 87.1 mL/min (by C-G formula based on SCr of 0.79 mg/dL).    Lab Results   Component Value Date    HGBA1C 5.90 (H) 04/02/2022     Lab Results   Component Value Date    TSH 1.950 04/02/2022     Microbiology Results (last 10 days)     Procedure Component Value - Date/Time    COVID PRE-OP / PRE-PROCEDURE SCREENING ORDER (NO ISOLATION) - Swab, Nasopharynx [[id number]]  (Normal) Collected: 05/21/22 2339    Lab Status: Final result Specimen: Swab from Nasopharynx Updated: 05/22/22 0000    Narrative:      The following orders were created for panel order COVID PRE-OP / PRE-PROCEDURE SCREENING ORDER (NO ISOLATION) - Swab, Nasopharynx.  Procedure                               Abnormality         Status                     ---------                               -----------         ------                     COVID-19 and FLU A/B PCR...[[id number]]  Normal              Final result                 Please view results for these tests on the individual orders.    COVID-19 and FLU A/B PCR - Swab, Nasopharynx [[id number]]  (Normal) Collected: 05/21/22 2339    Lab Status: Final result Specimen: Swab from Nasopharynx Updated: 05/22/22 0000     COVID19 Not Detected     Influenza A PCR Not Detected     Influenza B PCR Not Detected    Narrative:      Fact sheet for providers: https://www.fda.gov/media/891488/download    Fact sheet for patients: https://www.fda.gov/media/284374/download    Test performed by PCR.         Pain Management Panel    There is no flowsheet data to display.       I have personally reviewed the above laboratory results.   ---------------------------------------------------------------------------------------------------------------------  Imaging Results (Last 7 Days)     Procedure Component Value Units Date/Time    XR Chest 1 View [[id number]] Collected: 05/21/22 2300     Updated: 05/21/22 2303    Narrative:       CR Chest 1 Vw    INDICATION:   Chest pain and vomiting     COMPARISON:    4/2/2022    FINDINGS:  Portable AP view(s) of the chest.  Stable cardiac silhouette status post sternotomy. The vascularity is unremarkable. The lungs are clear. There is no effusion or pneumothorax.      Impression:      1. Negative chest.    Signer Name: Edson aMys MD   Signed: 5/21/2022 11:00 PM   Workstation Name: [id number]    [facility]      I have personally reviewed the above radiology results.     Last Echocardiogram:  Results for orders placed during the hospital encounter of 04/02/22    Adult Transthoracic Echo Complete w/ Color, Spectral and Contrast if necessary per protocol    Interpretation Summary  · Left ventricular ejection fraction appears to be 61 - 65%.  · Left ventricular diastolic function is consistent with (grade I) impaired relaxation.  · No significant valvular abnormality  · Moderate sclerosis of the aortic valve  · No pericardial effusion  · No prior study for comparison    ---------------------------------------------------------------------------------------------------------------------    Assessment & Plan      ACUTE HOSPITAL PROBLEMS    -Chest pain, mixed features, concerning for unstable angina   -New LBBB  -Coronary artery disease s/p CABG x 2 in past, PCI with JAYDA to mid RCA 8/2021, and arthrotomy of severe ISR and PCI of mid RCA with attempt to stent ostial RPDA unsuccessful 2/2 stent scaffold protruding in overlapping fashion 4/5/2022  • Troponin T negative x 3   • EKG with new LBBB   • Cardiology consulted, planning for C tomorrow, NPO after midnight   • Cont aspirin, Brilinta, statin    • GI cocktail administered in ED   • Closely monitor on telemetry     -Abdominal pain/cramping, nausea, vomiting   · Patient with mild TTP on exam   · Obtain CT abdomen/pelvis   · Obtain GI panel PCR  PRN antiemetics   · Gentle IV fluids     -Elevated anion gap   -Mild ketonuria   · Likely  due to GI losses and decreased oral intake   · PRN antiemetics available   · Gentle IV fluids started   · Repeat chem panel in AM     -Hypomagnesemia   · Mag 1.1  · Replace electrolytes per protocol   · Telemetry monitoring   · Repeat mag level in AM     -F/E/N  • Gentle IV fluids. Replace electrolytes per protocol as necessary. Cardiac/consistent carbohydrate diet, NPO after midnight.     CHRONIC MEDICAL PROBLEMS    -HFpEF: Appears grossly compensated on exam. Monitor volume status closely. Prior TTE reviewed.   -Type II diabetes mellitus, non insulin dependent  • Obtain HgbA1C  • Review home medication regimen once reconciled per pharmacy   • Hold home oral DM medications for now.  • Start low dose SSI, titrate dosage as necessary   • Closely monitor blood glucose levels with accuchecks QAC and QHS  • Hypoglycemia protocol in place should it be necessary  -GERD: PPI  -Anxiety/Depression/PSTD: Supportive care. Cont home medication regimen once reconciled per pharmacy.   ---------------------------------------------------  DVT Prophylaxis: SQ heparin   GI Prophylaxis: PPI   Activity: Up with assistance as tolerated   ---------------------------------------------------    INPATIENT status due to the need for care which can only be reasonably provided in an hospital setting such as aggressive/expedited ancillary services and/or consultation services, the necessity for IV medications, close physician monitoring and/or the possible need for procedures.  In such, I feel patient’s risk for adverse outcomes and need for care warrant INPATIENT evaluation and predict the patient’s care encounter to likely last beyond 2 midnights.    Code Status: FULL CODE   ---------------------------------------------------  Disposition/Discharge planning: Plans on home at discharge once medically stable   ---------------------------------------------------  I have discussed the patient's assessment and plan with the patient, nursing staff,  and attending physician Aaron Black MD Allyson O'Kuma, PA-C  Hospitalist Service -- [facility]   Pager: [phone]    05/22/22  11:26 EDT    Attending Physician: Aaron Wiley MD        ---------------------------------------------------------------------------------------------------------------------          Electronically signed by Aaron Wiley MD at 05/22/22 1525          Emergency Department Notes      Mick Harris at 05/21/22 2323        Dr. Clifton called Dr. Smalls. However, no answer. Voicemail was left. EKG sent via text to Dr. Smalls.    Electronically signed by Mick Harris at 05/21/22 2330     Noam Florentino MD at 05/22/22 0133          Subjective   Patient presents to ER with vomiting for three days      Vomiting  The primary symptoms include fatigue, nausea and vomiting. Primary symptoms do not include fever. The illness began 3 to 5 days ago.   The illness is also significant for chills and anorexia.       Review of Systems   Constitutional: Positive for chills and fatigue. Negative for fever.   HENT: Negative.    Eyes: Negative.    Respiratory: Negative.    Cardiovascular: Negative.    Gastrointestinal: Positive for anorexia, nausea and vomiting.   Endocrine: Negative.    Genitourinary: Negative.    Musculoskeletal: Negative.    Skin: Negative.    Allergic/Immunologic: Negative.    Neurological: Negative.    Hematological: Negative.    Psychiatric/Behavioral: Negative.        Past Medical History:   Diagnosis Date   • COVID-19    • Diabetes mellitus (HCC)    • Hyperlipidemia    • Hypertension    • Myocardial infarct (HCC)    • PCOS (polycystic ovarian syndrome)    • PTSD (post-traumatic stress disorder)        Allergies   Allergen Reactions   • Contrast Dye Anaphylaxis   • Ciprofloxacin Other (See Comments)     Insomnia   • Sulfa Antibiotics Nausea And Vomiting   • Drug [Hydrocodone-Acetaminophen] Itching       Past Surgical History:   Procedure  Laterality Date   • CARDIAC CATHETERIZATION     • CARDIAC CATHETERIZATION N/A 2022    Procedure: Left Heart Cath;  Surgeon: Alexandr Marquez MD;  Location: [facility] CATH INVASIVE LOCATION;  Service: Cardiology;  Laterality: N/A;   •  SECTION     • CHOLECYSTECTOMY     • CORONARY ARTERY BYPASS GRAFT      x 2   • CORONARY STENT PLACEMENT      x 6 per patient   • DILATATION AND CURETTAGE     • KNEE SURGERY Left    • TONSILLECTOMY         Family History   Problem Relation Age of Onset   • Lung cancer Father    • Thyroid cancer Brother    • Heart attack Maternal Grandfather    • Lung cancer Maternal Grandfather    • Heart attack Paternal Grandmother    • Emphysema Paternal Grandfather        Social History     Socioeconomic History   • Marital status:    Tobacco Use   • Smoking status: Never Smoker   • Smokeless tobacco: Never Used   Vaping Use   • Vaping Use: Never used   Substance and Sexual Activity   • Alcohol use: Not Currently   • Drug use: Never   • Sexual activity: Defer           Objective   Physical Exam  Vitals and nursing note reviewed.   Constitutional:       Appearance: She is well-developed.   HENT:      Head: Normocephalic.   Eyes:      Pupils: Pupils are equal, round, and reactive to light.   Cardiovascular:      Rate and Rhythm: Normal rate.      Heart sounds: Normal heart sounds.   Pulmonary:      Effort: Pulmonary effort is normal.      Breath sounds: Normal breath sounds.   Abdominal:      Comments: Tender epigastric area   Musculoskeletal:         General: Normal range of motion.      Cervical back: Normal range of motion.   Skin:     General: Skin is warm.      Capillary Refill: Capillary refill takes less than 2 seconds.   Neurological:      General: No focal deficit present.      Mental Status: She is alert.   Psychiatric:         Mood and Affect: Mood normal.         Procedures          ED Course  ED Course as of 22 1505   Sun May 22, 2022   0058 CXR : negative   [AMANDA]    0058 eCG 22:47 Sinus tachycardia, rate 103. LBBB. QT/QTc 394/516. Similar to ECG of 4-2-22 [AMANDA]   0310 Patient already discharged by Dr. Florentino, patient started having some more chest pain, EKG was repeated normal sinus rhythm 96 bpm left bundle branch block which is similar to prior no changes, will try GI cocktail as she has been recently vomiting. [JM]   0852 I assumed patient's care from Dr. Contreras this morning at shift change.  Please see previous documentation.  Case discussed with Dr. Smalls and Dr. Wiley.  Dr. Wiley is admitting patient to the hospitalist service with cardiology consulting.  Electronically signed by Anil Kilgore MD, 05/22/22, 8:52 AM EDT.   [CM]   0916 I indirectly assumed care from Dr Florentino, pt called out many time stating she was having worsening chest pain, repest EKG revealed similar ot prior, trop negative, given hx I was uncomfortable sending home, signed out to dr kilgore pending admission [JM]      ED Course User Index  [CM] Anil Kilgore MD  [JM] Ralph Contreras MD  [AMANDA] Noam Florentino MD                                                 [facility]    Final diagnoses:   Dehydration   Hypomagnesemia   Nausea and vomiting, unspecified vomiting type   Chest pain, unspecified type       ED Disposition  ED Disposition     ED Disposition   Decision to Admit    Condition   --    Comment   Level of Care: Telemetry [5]   Diagnosis: Chest pain [523505]   Certification: I Certify That Inpatient Hospital Services Are Medically Necessary For Greater Than 2 Midnights               Roxy Deng,   [address]  [facility] [address]  [phone]    Schedule an appointment as soon as possible for a visit   If symptoms worsen         Medication List      ASK your doctor about these medications    metoprolol tartrate 100 MG tablet  Commonly known as: LOPRESSOR  Ask about: Which instructions should I use?     * ondansetron ODT 4 MG disintegrating tablet  Commonly known as:  ZOFRAN-ODT  Ask about: Which instructions should I use?     * ondansetron ODT 4 MG disintegrating tablet  Commonly known as: ZOFRAN-ODT  Place 1 tablet on the tongue Every 8 (Eight) Hours As Needed for Nausea or Vomiting.  Ask about: Which instructions should I use?         * This list has 2 medication(s) that are the same as other medications prescribed for you. Read the directions carefully, and ask your doctor or other care provider to review them with you.               Where to Get Your Medications      These medications were sent to [facility] [address] [place] [address] - [address] [place] [address] - [phone]  - [phone] [facility] [address] [place] [address]    Phone: [phone]   · ondansetron ODT 4 MG disintegrating tablet          Noam Florentino MD  05/22/22 1505      Electronically signed by Noam Florentino MD at 05/22/22 1505     Toyin Ordonez RN at 05/22/22 0250        Patient states her chest pain has came back and is worse than earlier.      Electronically signed by Toyin Ordonez RN at 05/22/22 0407     Symes, Heather at 05/22/22 0307        EKG completed by me @0307, and was given to Dr. Contreras.     Electronically signed by Symes, Heather at 05/22/22 0313     Toyin Ordonez RN at 05/22/22 0520        Patient continues to complain of chest pain with no relief.      Electronically signed by Toyin Ordonez RN at 05/22/22 0521     Jerod Anderson RN at 05/22/22 0710        Received report from JOSEE Deal. Pt resting on ER stretcher. Pt reports nausea and nonspecific chest pain at this time. Dr. Contreras made aware. VSS. NAD noted. IV intact and patent. Updated on plan of  Care. WCTM.    Electronically signed by Jerod Anderson, RN at 05/22/22 0722         Facility-Administered Medications as of 5/23/2022   Medication Dose Route Frequency Provider Last Rate Last Admin   • [COMPLETED] aluminum-magnesium hydroxide-simethicone (MAALOX MAX) 400-400-40 MG/5ML suspension 15 mL  15 mL Oral Once  Ralph Contreras MD   15 mL at 05/22/22 0315   • [COMPLETED] aspirin chewable tablet 324 mg  324 mg Oral Once Ralph Contreras MD   324 mg at 05/21/22 2337   • aspirin chewable tablet 81 mg  81 mg Oral Daily Stacia Angeles PA   81 mg at 05/23/22 0844   • atorvastatin (LIPITOR) tablet 80 mg  80 mg Oral Nightly Stacia Angeles PA   80 mg at 05/22/22 2003   • cetirizine (zyrTEC) tablet 10 mg  10 mg Oral Daily Aaron Wiley MD   10 mg at 05/23/22 0844   • dextrose (D50W) (25 g/50 mL) IV injection 25 g  25 g Intravenous Q15 Min PRN Stacia Angeles PA       • dextrose (GLUTOSE) oral gel 15 g  15 g Oral Q15 Min PRN Stacia Angeles PA       • DULoxetine (CYMBALTA) DR capsule 60 mg  60 mg Oral Daily Aaron Wiley MD   60 mg at 05/23/22 0844   • glucagon (human recombinant) (GLUCAGEN DIAGNOSTIC) injection 1 mg  1 mg Intramuscular Q15 Min PRN Stacia Angeles PA       • heparin (porcine) 5000 UNIT/ML injection 5,000 Units  5,000 Units Subcutaneous Q8H Aaron Wiley MD   5,000 Units at 05/23/22 0539   • Insulin Aspart (novoLOG) injection 0-7 Units  0-7 Units Subcutaneous TID AC Stacia Angeles PA       • [COMPLETED] ketorolac (TORADOL) injection 15 mg  15 mg Intravenous Once Gail Camarillo PA-C   15 mg at 05/22/22 2328   • [COMPLETED] Lidocaine Viscous HCl (XYLOCAINE) 2 % solution 15 mL  15 mL Mouth/Throat Once Ralph Contreras MD   15 mL at 05/22/22 0315   • Magnesium Sulfate 2 gram infusion - Mg less than or equal to 1.5 mg/dL  2 g Intravenous PRN Stacia Angeles PA        Or   • Magnesium Sulfate 1 gram infusion - Mg 1.6-1.9 mg/dL  1 g Intravenous PRN Stacia Angeles PA       • [COMPLETED] Magnesium Sulfate 2 gram infusion - Mg less than or equal to 1.5 mg/dL  2 g Intravenous Once Aaron Wiley MD 25 mL/hr at 05/22/22 1331 2 g at 05/22/22 1331   • [COMPLETED] magnesium sulfate 2g/50 mL (PREMIX) infusion  2 g Intravenous Once Noam Florentino MD   Stopped at 05/22/22 0252   • metoprolol  tartrate (LOPRESSOR) tablet 25 mg  25 mg Oral BID Aaron Wiley MD   25 mg at 05/23/22 0844   • [COMPLETED] morphine injection 4 mg  4 mg Intravenous Once Noam Florentino MD   4 mg at 05/22/22 0109   • [COMPLETED] morphine injection 4 mg  4 mg Intravenous Once Ralph Contreras MD   4 mg at 05/22/22 0744   • nitroglycerin (NITROSTAT) SL tablet 0.4 mg  0.4 mg Sublingual Q5 Min PRN Aaron Wiley MD       • [COMPLETED] ondansetron (ZOFRAN) injection 4 mg  4 mg Intravenous Once Noam Florentino MD   4 mg at 05/22/22 0109   • [COMPLETED] ondansetron (ZOFRAN) injection 4 mg  4 mg Intravenous Once Ralph Contreras MD   4 mg at 05/22/22 0744   • ondansetron (ZOFRAN) injection 4 mg  4 mg Intravenous Q6H PRN Aaron Wiley MD   4 mg at 05/22/22 1530   • pantoprazole (PROTONIX) EC tablet 40 mg  40 mg Oral QAM Aaron Wiley MD   40 mg at 05/22/22 1657   • [COMPLETED] PB-Hyoscy-Atropine-Scopolamine (DONNATAL) per tablet 32.4 mg  2 tablet Oral Once Ralph Contreras MD   32.4 mg at 05/22/22 0315   • prenatal vitamin 27-0.8 tablet 1 tablet  1 tablet Oral Daily Aaron Wiley MD   1 tablet at 05/23/22 0844   • prochlorperazine (COMPAZINE) injection 5 mg  5 mg Intravenous Q6H PRN Noam Florentino MD       • promethazine (PHENERGAN) tablet 12.5 mg  12.5 mg Oral Q6H PRN Gail Camarillo PA-C       • psyllium (METAMUCIL MULTIHEALTH FIBER) 58.12 % packet 1 packet  1 packet Oral Daily Stacia Angeles PA       • ranolazine (RANEXA) 12 hr tablet 500 mg  500 mg Oral BID Aaron Wiley MD   500 mg at 05/23/22 0844   • [START ON 5/25/2022] Semaglutide (1 MG/DOSE) solution pen-injector 1 mg  1 mg Intramuscular Weekly Aaron Wiley MD       • sodium chloride 0.9 % flush 10 mL  10 mL Intravenous PRN Ralph Contreras MD       • sodium chloride 0.9 % flush 10 mL  10 mL Intravenous PRN Noam Florentino MD       • sodium chloride 0.9 % flush 10 mL  10 mL Intravenous Q12H Aaron Wiley MD   10 mL at 05/23/22 0844   • sodium  chloride 0.9 % flush 10 mL  10 mL Intravenous PRN Aaron Wiley MD       • sodium chloride 0.9 % infusion  50 mL/hr Intravenous Continuous LEE'Stacia Lopez PA 50 mL/hr at 22 1033 50 mL/hr at 22 1033   • ticagrelor (BRILINTA) tablet 90 mg  90 mg Oral BID LEE'Stacia Lopez PA   90 mg at 22 0844   • [COMPLETED] traMADol (ULTRAM) tablet 25 mg  25 mg Oral Once Aaron Wiley MD   25 mg at 22 1530            Consult Notes (all)      Connor Chaney MD at 22 1206      Consult Orders    1. Inpatient Interventional Cardiology Consult [[id number]] ordered by Aaron Wiley MD               Inpatient Interventional Cardiology Consult  Consult performed by: Connor Chaney MD  Consult ordered by: Aaron Wiley MD          Patient Identification:    Name:  Lissa Eisenberg  Age:  51 y.o.  Sex:  female  :  1970  MRN:  [id number]  Visit Number:  [id number]  Primary care provider:  Roxy Deng DO    Chief complaint:   Chest pain    History of presenting illness:   Patient is a 51-year-old female who has a history of CAD s/p multiple PCI, recently she had PCI to the mid RCA in-stent restenosis, the angiogram after the PCI did show she had an ostial RPDA lesion which appeared to be 90% after the stents in the mid RCA in overlapping fashion with the previously placed stents, patient did okay after the last PCI in 2022, she was doing cardiac rehab and she said this Tuesday while she was trying to do the rehab she started having chest pain and increasing fatigue and dyspnea that she could not able to complete her rehab and took her more time to do.  She did try rehab again on Thursday but due to her fatigue and chest pain she said it was hard for her to complete the rehab.  Since then she has been having exertional chest pain on less activity.  She said she also felt extremely sick to her stomach with nausea vomiting.  She presented to the ER yesterday,  her troponins have been negative, she did had a new left bundle branch block on the EKG, her troponins have been negative x2 though and she is chest pain-free now.  ---------------------------------------------------------------------------------------------------------------------  Review of Systems   ---------------------------------------------------------------------------------------------------------------------   Past History:   Family History   Problem Relation Age of Onset   • Lung cancer Father    • Thyroid cancer Brother    • Heart attack Maternal Grandfather    • Lung cancer Maternal Grandfather    • Heart attack Paternal Grandmother    • Emphysema Paternal Grandfather      Past Medical History:   Diagnosis Date   • COVID-19    • Diabetes mellitus (HCC)    • Hyperlipidemia    • Hypertension    • Myocardial infarct (HCC)    • PCOS (polycystic ovarian syndrome)    • PTSD (post-traumatic stress disorder)      Past Surgical History:   Procedure Laterality Date   • CARDIAC CATHETERIZATION     • CARDIAC CATHETERIZATION N/A 2022    Procedure: Left Heart Cath;  Surgeon: Alexandr Marquez MD;  Location: [facility] CATH INVASIVE LOCATION;  Service: Cardiology;  Laterality: N/A;   •  SECTION     • CHOLECYSTECTOMY     • CORONARY ARTERY BYPASS GRAFT      x 2   • CORONARY STENT PLACEMENT      x 6 per patient   • DILATATION AND CURETTAGE     • KNEE SURGERY Left    • TONSILLECTOMY       Social History     Socioeconomic History   • Marital status:    Tobacco Use   • Smoking status: Never Smoker   • Smokeless tobacco: Never Used   Vaping Use   • Vaping Use: Never used   Substance and Sexual Activity   • Alcohol use: Not Currently   • Drug use: Never   • Sexual activity: Defer     ---------------------------------------------------------------------------------------------------------------------   Allergies:  Contrast dye, Ciprofloxacin, Sulfa antibiotics, and Drug  [hydrocodone-acetaminophen]  ---------------------------------------------------------------------------------------------------------------------   Prior to Admission Medications     Prescriptions Last Dose Informant Patient Reported? Taking?    aspirin 81 MG EC tablet  Self Yes No    Take 81 mg by mouth Daily.    atorvastatin (LIPITOR) 80 MG tablet   No No    Take 1 tablet by mouth Every Night.    cetirizine (zyrTEC) 10 MG tablet   No No    Take 1 tablet by mouth Daily.    cyclobenzaprine (FLEXERIL) 10 MG tablet  Self Yes No    Take 10 mg by mouth 3 (Three) Times a Day As Needed for Muscle Spasms.    dicyclomine (BENTYL) 10 MG capsule  Self No No    Take 1 capsule by mouth 3 (Three) Times a Day As Needed (abdominal pain).    DULoxetine (CYMBALTA) 30 MG capsule  Self Yes No    Take 30 mg by mouth 2 (Two) Times a Day.    isosorbide mononitrate (IMDUR) 30 MG 24 hr tablet   No No    Take 1 tablet by mouth Daily.    metFORMIN (GLUCOPHAGE) 1000 MG tablet   No No    Take 1 tablet by mouth 2 (Two) Times a Day With Meals.    metoprolol tartrate (LOPRESSOR) 25 MG tablet   No No    Take 1 tablet by mouth 2 (Two) Times a Day.    naproxen (NAPROSYN) 500 MG tablet  Self Yes No    Take 500 mg by mouth 2 (Two) Times a Day With Meals.    nitroglycerin (NITROSTAT) 0.4 MG SL tablet   Yes No    Place 0.4 mg under the tongue Every 5 (Five) Minutes As Needed. do not exceed a total of 3 doses in 15 minutes    nystatin-triamcinolone (MYCOLOG) 062196-3.1 UNIT/GM-% ointment   No No    Apply 1 application topically to the appropriate area as directed 2 (Two) Times a Day As Needed (Rash). Avoid using for more than 2 weeks at a time.    omeprazole (priLOSEC) 20 MG capsule   No No    Take 1 capsule by mouth Daily.    ondansetron (ZOFRAN) 4 MG tablet  Self Yes No    Take 4 mg by mouth Every 8 (Eight) Hours As Needed for Nausea or Vomiting.    Ozempic, 1 MG/DOSE, 4 MG/3ML solution pen-injector   No No    Inject 1 mg into the appropriate muscle  as directed by prescriber 1 (One) Time Per Week.    ranolazine (Ranexa) 500 MG 12 hr tablet   No No    Take 1 tablet by mouth 2 (Two) Times a Day.    Rivaroxaban (Xarelto) 2.5 MG tablet   No No    Take 1 tablet by mouth 2 (Two) Times a Day.    ticagrelor (BRILINTA) 90 MG tablet tablet  Self No No    Take 1 tablet by mouth 2 (Two) Times a Day.        Hospital Meds:  aspirin, 81 mg, Oral, Daily  atorvastatin, 80 mg, Oral, Nightly  heparin (porcine), 5,000 Units, Subcutaneous, Q8H  magnesium sulfate, 2 g, Intravenous, Once  pantoprazole, 40 mg, Oral, Q AM  sodium chloride, 10 mL, Intravenous, Q12H  ticagrelor, 90 mg, Oral, BID         ---------------------------------------------------------------------------------------------------------------------   Vital Signs:  Temp:  [97.9 °F (36.6 °C)-98.9 °F (37.2 °C)] 97.9 °F (36.6 °C)  Heart Rate:  [] 99  Resp:  [18-19] 18  BP: ()/(62-86) 133/80      05/21/22  2258 05/22/22  0959   Weight: 88.5 kg (195 lb) 86.9 kg (191 lb 9.6 oz)     Body mass index is 33.94 kg/m².  ---------------------------------------------------------------------------------------------------------------------   Physical exam:   Constitutional:  Well-developed and well-nourished.  No respiratory distress.      HENT:  Head: Normocephalic and atraumatic.  Mouth:  Moist mucous membranes.    Eyes:  Conjunctivae and EOM are normal.  Pupils are equal, round, and reactive to light.  No scleral icterus.  Neck:  Neck supple.  No JVD present.    Cardiovascular:  Normal rate, regular rhythm and normal heart sounds with no murmur.  Pulmonary/Chest:  No respiratory distress, no wheezes, no crackles, with normal breath sounds and good air movement.  Abdominal:  Soft.  Bowel sounds are normal.  No distension and no tenderness.   Musculoskeletal:  No edema, no tenderness, and no deformity.  No red or swollen joints anywhere.    Neurological:  Alert and oriented to person, place, and time.  No cranial nerve  deficit.  No tongue deviation.  No facial droop.  No slurred speech.   Skin:  Skin is warm and dry.  No rash noted.  No pallor.   Psychiatric:  Normal mood and affect.  Behavior is normal.  Judgment and thought content normal.   Peripheral vascular:  No edema and strong pulses on all 4 extremities.    ---------------------------------------------------------------------------------------------------------------------   EKG: Sinus rhythm, left bundle branch block  Telemetry: Sinus  I have personally looked at both the EKG and the telemetry strips.  Echo:  Results for orders placed during the hospital encounter of 04/02/22    Adult Transthoracic Echo Complete w/ Color, Spectral and Contrast if necessary per protocol    Interpretation Summary  · Left ventricular ejection fraction appears to be 61 - 65%.  · Left ventricular diastolic function is consistent with (grade I) impaired relaxation.  · No significant valvular abnormality  · Moderate sclerosis of the aortic valve  · No pericardial effusion  · No prior study for comparison    ---------------------------------------------------------------------------------------------------------------------   Results from last 7 days   Lab Units 05/21/22  2333   WBC 10*3/mm3 10.24   HEMOGLOBIN g/dL 14.8   HEMATOCRIT % 44.0   MCV fL 81.3   MCHC g/dL 33.6   PLATELETS 10*3/mm3 355   INR  1.06         Results from last 7 days   Lab Units 05/21/22  2333   SODIUM mmol/L 134*   POTASSIUM mmol/L 4.2   MAGNESIUM mg/dL 1.1*   CHLORIDE mmol/L 98   CO2 mmol/L 19.3*   BUN mg/dL 10   CREATININE mg/dL 0.79   CALCIUM mg/dL 9.2   PHOSPHORUS mg/dL 3.8   GLUCOSE mg/dL 101*   ALBUMIN g/dL 4.51   BILIRUBIN mg/dL 0.9   ALK PHOS U/L 106   AST (SGOT) U/L 24   ALT (SGPT) U/L 24   Estimated Creatinine Clearance: 87.1 mL/min (by C-G formula based on SCr of 0.79 mg/dL).  No results found for: AMMONIA  Results from last 7 days   Lab Units 05/22/22  0134 05/21/22  2333   CK TOTAL U/L  --  73   TROPONIN T  ng/mL <0.010 <0.010     Results from last 7 days   Lab Units 05/21/22  2333   PROBNP pg/mL 54.2     Lab Results   Component Value Date    HGBA1C 5.80 (H) 05/21/2022     Lab Results   Component Value Date    TSH 2.410 05/22/2022     No results found for: PREGTESTUR, PREGSERUM, HCG, HCGQUANT  Pain Management Panel     Pain Management Panel Latest Ref Rng & Units 5/22/2022    AMPHETAMINES SCREEN, URINE Negative Negative    BARBITURATES SCREEN Negative Negative    BENZODIAZEPINE SCREEN, URINE Negative Negative    BUPRENORPHINEUR Negative Negative    COCAINE SCREEN, URINE Negative Negative    METHADONE SCREEN, URINE Negative Negative    METHAMPHETAMINEUR Negative Negative        No results found for: BLOODCX  No results found for: URINECX  No results found for: WOUNDCX  No results found for: STOOLCX        ---------------------------------------------------------------------------------------------------------------------   Imaging Results (Last 7 Days)     Procedure Component Value Units Date/Time    XR Chest 1 View [[id number]] Collected: 05/21/22 2300     Updated: 05/21/22 2303    Narrative:      CR Chest 1 Vw    INDICATION:   Chest pain and vomiting     COMPARISON:    4/2/2022    FINDINGS:  Portable AP view(s) of the chest.  Stable cardiac silhouette status post sternotomy. The vascularity is unremarkable. The lungs are clear. There is no effusion or pneumothorax.      Impression:      1. Negative chest.    Signer Name: Edson Mays MD   Signed: 5/21/2022 11:00 PM   Workstation Name: [id number]    [facility]        ----------------------------------------------------------------------------------------------------------------------  Assessment:   Chest pain concerning for unstable angina  CAD s/p multiple PCI, recent mid RCA in-stent restenosis PCI with ostial RPDA high-grade lesion, CAD s/p CABG with a patent LIMA to LAD,, stents from the left main to the diagonal artery appeared patent,  very small left circumflex system.  Diabetes mellitus type 2  Hypertension        Plan:   Patient is currently chest pain-free, her troponins have been negative, I did discuss about the possibility of angiogram with possible balloon angioplasty to the ostial RPDA which did appear to be 90% on the last angiogram to see if that will help with her anginal symptoms.  I would discussed this with Dr. Oliver who will be covering from tomorrow.  Keep her n.p.o. after midnight.  Continue with aspirin and Brilinta, hold on a low-dose Xarelto for now.  Continue with high intensity statin.  Hold off on her other antianginal medication until her blood pressure stabilizes.  Pt does have contrast allergy and will need premedication if decided for angiogram in am  Thank you for the consult.          Connor hCaney MD, [facility]  Interventional Cardiology      05/22/22  12:06 EDT    Electronically signed by Connor Chaney MD at 05/22/22 2023

## 2022-05-23 NOTE — PLAN OF CARE
Goal Outcome Evaluation:  Plan of Care Reviewed With: patient           Outcome Evaluation: Pt resting in bed this shift. VSS at this time. No s/s of acute distress noted at this time.   Fall with Harm Risk

## 2022-05-23 NOTE — PLAN OF CARE
Goal Outcome Evaluation:  Plan of Care Reviewed With: patient        Progress: no change  Outcome Evaluation: Pt resting in bed at this time. Pt c/o pain, PA made aware, see orders. Pt c/o nausea, prn medication given per MAR. Vital signs stable. No complaints or concerns at this time. Will continue to monitor.

## 2022-05-23 NOTE — PHARMACY PATIENT ASSISTANCE
Pharmacy evaluated the cost of Brilinta for patient. Patient last filled a 90 day supply of Brilinta on 3/10 with no copay.    Thank you,    Elaina Pearce, PharmD  05/23/22  16:40 EDT

## 2022-05-23 NOTE — PROGRESS NOTES
[facility] Medicine Services  PROGRESS NOTE     Patient Identification:  Name:  Lissa Eisenberg  Age:  51 y.o.  Sex:  female  :  1970  MRN:  [id number]  Visit Number:  [id number]  Primary Care Provider:  Roxy Deng DO    Length of stay:  1    ----------------------------------------------------------------------------------------------------------------------  Subjective     Chief Complaint:  Follow up for chest pain, new LBBB, abdominal pain, nausea    History of Presenting Illness/Hospital Course:  Patient is a 51 y.o. female with past medical history significant for coronary artery disease s/p CABG x 2 in past, PCI with JAYDA to mid RCA 2021, and arthrotomy of severe ISR and PCI of mid RCA with attempt to stent ostial RPDA unsuccessful 2/2 stent scaffold protruding in overlapping fashion 2022, HFpEF, essential hypertension, hyperlipidemia, non insulin dependent type II diabetes mellitus, GERD, and anxiety/depression/PTSD that presented to the [facility] emergency department for evaluation of chest pain and nausea/vomiting. Please see admitting history and physical for further and complete details. Troponin T remained negative. EKG with new LBBB. Patient was admitted to the telemetry floor for further evaluation and treatment. Cardiologist Dr. Smalls evaluated the patient on day of admission who recommended LHC for possible intervention with balloon angioplasty to ostial RPDA to help anginal symptoms. Patient also with ongoing nausea and vomiting, CT abdomen/pelvis unrevealing of acute process but large stool burden noted that could be contributing. Patient was held NPO ultimately with plans for LHC. Covering cardiologist today opting to treat medically at this time. Dr. Delgado also recommending that if symptoms persist that he recommends evaluation for single vessel bypass.     Subjective:  Today, the patient was sitting up in bed per my  evaluation this afternoon. No acute distress noted. No family present at bedside. No overnight events or issues reported per AM RN. Patient initially to go for LHC this AM, however cardiology has canceled and plans on continuing medical management for now of patient's chest pain. Patient reports being upset about not undergoing LHC today. Patient reports ongoing intermittent chest pains. She reports ongoing nausea, improved with IV zofran. Continues to report generalized abdominal discomfort. Otherwise, she is of no complaints. No further diarrhea. Denies any urinary complaints. No headache or dizziness.     Present during exam: N/A   ----------------------------------------------------------------------------------------------------------------------  Objective     Consults:  • Cardiology     Procedures:  • None     Current Hospital Meds:  aspirin, 81 mg, Oral, Daily  atorvastatin, 80 mg, Oral, Nightly  cetirizine, 10 mg, Oral, Daily  DULoxetine, 60 mg, Oral, Daily  heparin (porcine), 5,000 Units, Subcutaneous, Q8H  Insulin Aspart, 0-7 Units, Subcutaneous, TID AC  metoprolol tartrate, 25 mg, Oral, BID  pantoprazole, 40 mg, Oral, QAM  prenatal vitamin 27-0.8, 1 tablet, Oral, Daily  psyllium, 1 packet, Oral, Daily  ranolazine, 500 mg, Oral, BID  [START ON 5/25/2022] Semaglutide (1 MG/DOSE), 1 mg, Intramuscular, Weekly  sodium chloride, 10 mL, Intravenous, Q12H  ticagrelor, 90 mg, Oral, BID      sodium chloride, 50 mL/hr, Last Rate: 50 mL/hr (05/23/22 1033)      ----------------------------------------------------------------------------------------------------------------------  Vital Signs:  Temp:  [97.5 °F (36.4 °C)-98.3 °F (36.8 °C)] 98 °F (36.7 °C)  Heart Rate:  [83-94] 83  Resp:  [18-20] 20  BP: ()/(57-72) 111/69  Mean Arterial Pressure (Non-Invasive) for the past 24 hrs (Last 3 readings):   Noninvasive MAP (mmHg)   05/23/22 1005 84   05/23/22 0606 72   05/23/22 0023 75     SpO2 Percentage    05/23/22 0606  05/23/22 1005 05/23/22 1102   SpO2: 94% 92% 92%     SpO2:  [92 %-99 %] 92 %  on   ;   Device (Oxygen Therapy): room air    Body mass index is 33.94 kg/m².  Wt Readings from Last 3 Encounters:   05/23/22 86.9 kg (191 lb 9.6 oz)   05/05/22 90.3 kg (199 lb)   04/26/22 90.7 kg (200 lb)        Intake/Output Summary (Last 24 hours) at 5/23/2022 1218  Last data filed at 5/23/2022 1038  Gross per 24 hour   Intake 360 ml   Output 2000 ml   Net -1640 ml     Diet Regular; Cardiac, Consistent Carbohydrate  ----------------------------------------------------------------------------------------------------------------------  Physical exam:  Physical Exam  Nursing note reviewed.   Constitutional:       General: She is awake. She is not in acute distress.     Appearance: She is well-developed. She is obese. She is not toxic-appearing.      Comments: Sitting up in bed. Just ate lunch tray. No acute distress noted. On room air. No family present at bedside.   HENT:      Head: Normocephalic and atraumatic.      Mouth/Throat:      Mouth: Mucous membranes are moist.   Eyes:      Conjunctiva/sclera: Conjunctivae normal.      Pupils: Pupils are equal, round, and reactive to light.   Cardiovascular:      Rate and Rhythm: Normal rate and regular rhythm.      Pulses:           Dorsalis pedis pulses are 2+ on the right side and 2+ on the left side.      Heart sounds: Normal heart sounds. No murmur heard.    No friction rub. No gallop.   Pulmonary:      Effort: Pulmonary effort is normal.      Breath sounds: Normal breath sounds and air entry. No wheezing, rhonchi or rales.   Chest:      Chest wall: No tenderness.   Abdominal:      General: Bowel sounds are normal. There is no distension.      Palpations: Abdomen is soft.      Tenderness: There is generalized abdominal tenderness. There is no guarding or rebound.      Comments: No peritoneal signs appreciated on examination.   Genitourinary:     Comments: No jones catheter in  place.  Musculoskeletal:      Cervical back: Neck supple.      Right lower leg: No edema.      Left lower leg: No edema.   Skin:     General: Skin is warm and dry.      Capillary Refill: Capillary refill takes less than 2 seconds.   Neurological:      General: No focal deficit present.      Mental Status: She is alert and oriented to person, place, and time.      GCS: GCS eye subscore is 4. GCS verbal subscore is 5. GCS motor subscore is 6.      Sensory: Sensation is intact.      Motor: Motor function is intact.      Comments: Awake and alert. Follows commands. Answers questions appropriately. Moves all extremities equally. Strength and sensation intact. No focal neuro deficit on exam.   Psychiatric:         Mood and Affect: Affect is tearful.         Speech: Speech normal.         Behavior: Behavior is cooperative.        ----------------------------------------------------------------------------------------------------------------------  Tele:    Sinus 90s with BBB    I have personally reviewed the EKG/Telemetry strips   ----------------------------------------------------------------------------------------------------------------------  Results from last 7 days   Lab Units 05/22/22 2025 05/22/22  1150 05/22/22  0134 05/21/22  2333   CK TOTAL U/L  --   --   --  73   TROPONIN T ng/mL <0.010 <0.010 <0.010 <0.010     Results from last 7 days   Lab Units 05/21/22  2333   PROBNP pg/mL 54.2         Results from last 7 days   Lab Units 05/23/22  0218 05/21/22  2333   WBC 10*3/mm3 6.34 10.24   HEMOGLOBIN g/dL 12.5 14.8   HEMATOCRIT % 37.7 44.0   MCV fL 82.0 81.3   MCHC g/dL 33.2 33.6   PLATELETS 10*3/mm3 218 355   INR   --  1.06     Results from last 7 days   Lab Units 05/23/22  0218 05/21/22  2333   SODIUM mmol/L 134* 134*   POTASSIUM mmol/L 4.5 4.2   MAGNESIUM mg/dL 2.0 1.1*   CHLORIDE mmol/L 101 98   CO2 mmol/L 23.3 19.3*   BUN mg/dL 9 10   CREATININE mg/dL 0.65 0.79   CALCIUM mg/dL 9.0 9.2   GLUCOSE mg/dL 97 101*    ALBUMIN g/dL  --  4.51   BILIRUBIN mg/dL  --  0.9   ALK PHOS U/L  --  106   AST (SGOT) U/L  --  24   ALT (SGPT) U/L  --  24   Estimated Creatinine Clearance: 105.8 mL/min (by C-G formula based on SCr of 0.65 mg/dL).    Hemoglobin A1C   Date/Time Value Ref Range Status   05/21/2022 2333 5.80 (H) 4.80 - 5.60 % Final     Glucose   Date/Time Value Ref Range Status   05/23/2022 1014 111 70 - 130 mg/dL Final     Comment:     Meter: [id number] : [id number] SURYA CUBA   05/23/2022 0610 95 70 - 130 mg/dL Final     Comment:     Meter: [id number] : [id number] SURYA CUBA   05/22/2022 1927 112 70 - 130 mg/dL Final     Comment:     RN Notified Meter: [id number] : [id number] stacey simeon   05/22/2022 1627 107 70 - 130 mg/dL Final     Comment:     Meter: [id number] : [id number] meggan adams     Lab Results   Component Value Date    TSH 2.410 05/22/2022     Microbiology Results (last 10 days)     Procedure Component Value - Date/Time    COVID PRE-OP / PRE-PROCEDURE SCREENING ORDER (NO ISOLATION) - Swab, Nasopharynx [[id number]]  (Normal) Collected: 05/21/22 2339    Lab Status: Final result Specimen: Swab from Nasopharynx Updated: 05/22/22 0000    Narrative:      The following orders were created for panel order COVID PRE-OP / PRE-PROCEDURE SCREENING ORDER (NO ISOLATION) - Swab, Nasopharynx.  Procedure                               Abnormality         Status                     ---------                               -----------         ------                     COVID-19 and FLU A/B PCR...[[id number]]  Normal              Final result                 Please view results for these tests on the individual orders.    COVID-19 and FLU A/B PCR - Swab, Nasopharynx [[id number]]  (Normal) Collected: 05/21/22 2339    Lab Status: Final result Specimen: Swab from Nasopharynx Updated: 05/22/22 0000     COVID19 Not Detected     Influenza A PCR Not Detected     Influenza B PCR Not Detected    Narrative:      Fact  sheet for providers: https://www.fda.gov/media/027476/download    Fact sheet for patients: https://www.fda.gov/media/225487/download    Test performed by PCR.         Pain Management Panel     Pain Management Panel Latest Ref Rng & Units 5/22/2022    AMPHETAMINES SCREEN, URINE Negative Negative    BARBITURATES SCREEN Negative Negative    BENZODIAZEPINE SCREEN, URINE Negative Negative    BUPRENORPHINEUR Negative Negative    COCAINE SCREEN, URINE Negative Negative    METHADONE SCREEN, URINE Negative Negative    METHAMPHETAMINEUR Negative Negative        I have personally reviewed the above laboratory results.   ----------------------------------------------------------------------------------------------------------------------  Imaging Results (Last 24 Hours)     Procedure Component Value Units Date/Time    CT Abdomen Pelvis Without Contrast [[id number]] Collected: 05/22/22 2011     Updated: 05/22/22 2013    Narrative:      CT Abdomen Pelvis WO    INDICATION: Abdominal pain, nausea, vomiting;Dehydration;Hypomagnesemia;Nausea with vomiting, unspecified; Chest pain, unspecified; Non-ST elevation (NSTEMI) myocardial infarction  Additional Relevant clinical info: Nausea/vomiting    TECHNIQUE:   CT of the abdomen and pelvis without IV contrast. Coronal and sagittal reconstructions were obtained.   Oral contrast is given, using department protocol and dosing.   Radiation dose reduction techniques included automated exposure control or exposure modulation based on body size.  Count of known CT and cardiac nuc med studies performed in previous 12 months: 3.     COMPARISON: 4/4/2022    FINDINGS:  There is limited assessment of the solid viscera and the bowel wall without the use of any IV contrast.    Grossly, no worrisome contour abnormalities of solid viscera or gross low-attenuation lesion seen. Gallbladder absent.    Oral contrast given filling the stomach. Contrast reaches the right colon and thus no evidence of small  bowel obstruction. Appendix is normal and filled with air. No free air, free fluid or focal inflammatory change is present.      Lung bases are clear of infiltrate. However tiny 3 mm nodule seen periphery left lower lobe with no older films available to document stability.  Osseous structures are intact.      Impression:      1. No acute intra-abdominal process within the confines of non-IV contrast exam. No evidence of bowel obstruction.    2. Incidental small 3 mm nodule left lung base warranting six-month follow-up chest CT without contrast.       Signer Name: Obdulia Hopper MD   Signed: 5/22/2022 8:11 PM   Workstation Name: [facility]    [facility]        I have personally reviewed the above radiology results.   ----------------------------------------------------------------------------------------------------------------------  Assessment/Plan     ACUTE HOSPITAL PROBLEMS    -Chest pain, mixed features, concerning for unstable angina   -New LBBB  -Coronary artery disease s/p CABG x 2 in past, PCI with JAYDA to mid RCA 8/2021, and arthrotomy of severe ISR and PCI of mid RCA with attempt to stent ostial RPDA unsuccessful 2/2 stent scaffold protruding in overlapping fashion 4/5/2022  · Troponin T negative x 3   · EKG with new LBBB   · Cardiology consulted initially planning for C today however cardiology today canceled and planning on medical therapy at this time.  · Cardiology recommending single vessel CABG should patient's symptoms progress, ? Radiation should in stent restenosis occur further.  · Cont aspirin, Brilinta, statin, Ranexa   · Closely monitor on telemetry      -Abdominal pain/cramping, nausea, vomiting   -Reported hematemesis PTA   · Patient with mild TTP on exam   · CT abdomen pelvis unremarkable overall, however large stool noted  · No further hematemesis reported, monitor for further episodes. Consider EGD if needed.  · PRN antiemetics   · Gentle IV fluids   · Bowel  regimen, fiber supplementation      -Elevated anion gap   -Mild ketonuria   · Likely due to GI losses and decreased oral intake, likely starvation ketosis   · Anion gap now closed   · PRN antiemetics available   · Gentle IV fluids   · Oral intake improving   · Repeat chem panel in AM      -Hypomagnesemia   · Resolved with supplementation   · Continue to replace electrolytes per protocol as necessary   · Telemetry monitoring   · Repeat mag level in AM      CHRONIC MEDICAL PROBLEMS    -HFpEF: Appears grossly compensated on exam. Monitor volume status closely. Prior TTE reviewed.   -Type II diabetes mellitus, non insulin dependent  · 5.8 HgbA1C  · Overall adequately controlled   · Low dose SSI available, titrate dosage as necessary   · Closely monitor blood glucose levels with accuchecks QAC and QHS  · Hypoglycemia protocol in place should it be necessary  -GERD: PPI  -Anxiety/Depression/PSTD: Supportive care. Cont home medication regimen.   -Obesity by BMI, Body mass index is 33.94 kg/m².: Affecting all aspects of care  --------------------------------------------------  DVT Prophylaxis: SQ heparin   GI Prophylaxis: PPI   FEN: Gentle IV fluids. Replace electrolytes per protocol as necessary. Cardiac/consistent carbohydrate diet.  Activity: Up with assistance as tolerated   --------------------------------------------------  Disposition:  • Plans on home at discharge once medically stable, likely next 24 hours    --------------------------------------------------    I have discussed the patient's assessment and plan with the patient, Lashell MANZO RN, and attending physician Constantine Luong MD Allyson O'Kuma, PA-C  Hospitalist Service -- [facility]   Pager: [phone]    05/23/22  12:18 EDT    Attending Physician: Constantine Jones MD    ----------------------------------------------------------------------------------------------------------------------

## 2022-05-23 NOTE — NURSING NOTE
Contacted Dr. Smalls at this time to discuss whether or not pt needs to be treated with appropriate medications d/t contrast dye allergy for heart cath in AM, as pt stated she usually gets treated at least 12 hrs before contrast with benadryl and steroids. Dr. Smalls stated that no medications needed to be given tonight, and to pass along to dayshift RN about pt's allergy to contrast.

## 2022-05-24 ENCOUNTER — APPOINTMENT (OUTPATIENT)
Dept: CARDIAC REHAB | Facility: HOSPITAL | Age: 52
End: 2022-05-24

## 2022-05-24 ENCOUNTER — APPOINTMENT (OUTPATIENT)
Dept: GENERAL RADIOLOGY | Facility: HOSPITAL | Age: 52
End: 2022-05-24

## 2022-05-24 PROBLEM — E86.0 DEHYDRATION: Status: ACTIVE | Noted: 2022-05-24

## 2022-05-24 LAB
ANION GAP SERPL CALCULATED.3IONS-SCNC: 10.1 MMOL/L (ref 5–15)
ANION GAP SERPL CALCULATED.3IONS-SCNC: 9.8 MMOL/L (ref 5–15)
BUN SERPL-MCNC: 6 MG/DL (ref 6–20)
BUN SERPL-MCNC: 7 MG/DL (ref 6–20)
BUN/CREAT SERPL: 5.1 (ref 7–25)
BUN/CREAT SERPL: 8.6 (ref 7–25)
CALCIUM SPEC-SCNC: 9.1 MG/DL (ref 8.6–10.5)
CALCIUM SPEC-SCNC: 9.3 MG/DL (ref 8.6–10.5)
CHLORIDE SERPL-SCNC: 104 MMOL/L (ref 98–107)
CHLORIDE SERPL-SCNC: 105 MMOL/L (ref 98–107)
CO2 SERPL-SCNC: 21.9 MMOL/L (ref 22–29)
CO2 SERPL-SCNC: 22.2 MMOL/L (ref 22–29)
CREAT SERPL-MCNC: 0.81 MG/DL (ref 0.57–1)
CREAT SERPL-MCNC: 1.18 MG/DL (ref 0.57–1)
DEPRECATED RDW RBC AUTO: 41 FL (ref 37–54)
EGFRCR SERPLBLD CKD-EPI 2021: 55.7 ML/MIN/1.73
EGFRCR SERPLBLD CKD-EPI 2021: 87.5 ML/MIN/1.73
ERYTHROCYTE [DISTWIDTH] IN BLOOD BY AUTOMATED COUNT: 14 % (ref 12.3–15.4)
GLUCOSE BLDC GLUCOMTR-MCNC: 105 MG/DL (ref 70–130)
GLUCOSE BLDC GLUCOMTR-MCNC: 108 MG/DL (ref 70–130)
GLUCOSE BLDC GLUCOMTR-MCNC: 132 MG/DL (ref 70–130)
GLUCOSE BLDC GLUCOMTR-MCNC: 91 MG/DL (ref 70–130)
GLUCOSE SERPL-MCNC: 104 MG/DL (ref 65–99)
GLUCOSE SERPL-MCNC: 95 MG/DL (ref 65–99)
HCT VFR BLD AUTO: 37.6 % (ref 34–46.6)
HGB BLD-MCNC: 12.7 G/DL (ref 12–15.9)
MAGNESIUM SERPL-MCNC: 1.7 MG/DL (ref 1.6–2.6)
MCH RBC QN AUTO: 27.6 PG (ref 26.6–33)
MCHC RBC AUTO-ENTMCNC: 33.8 G/DL (ref 31.5–35.7)
MCV RBC AUTO: 81.7 FL (ref 79–97)
PLATELET # BLD AUTO: 228 10*3/MM3 (ref 140–450)
PMV BLD AUTO: 9.2 FL (ref 6–12)
POTASSIUM SERPL-SCNC: 4.4 MMOL/L (ref 3.5–5.2)
POTASSIUM SERPL-SCNC: 4.5 MMOL/L (ref 3.5–5.2)
RBC # BLD AUTO: 4.6 10*6/MM3 (ref 3.77–5.28)
SODIUM SERPL-SCNC: 136 MMOL/L (ref 136–145)
SODIUM SERPL-SCNC: 137 MMOL/L (ref 136–145)
WBC NRBC COR # BLD: 5.79 10*3/MM3 (ref 3.4–10.8)

## 2022-05-24 PROCEDURE — 96376 TX/PRO/DX INJ SAME DRUG ADON: CPT

## 2022-05-24 PROCEDURE — 99225 PR SBSQ OBSERVATION CARE/DAY 25 MINUTES: CPT | Performed by: PHYSICIAN ASSISTANT

## 2022-05-24 PROCEDURE — 85027 COMPLETE CBC AUTOMATED: CPT | Performed by: PHYSICIAN ASSISTANT

## 2022-05-24 PROCEDURE — 74018 RADEX ABDOMEN 1 VIEW: CPT | Performed by: RADIOLOGY

## 2022-05-24 PROCEDURE — 83735 ASSAY OF MAGNESIUM: CPT | Performed by: PHYSICIAN ASSISTANT

## 2022-05-24 PROCEDURE — 25010000002 PROCHLORPERAZINE 10 MG/2ML SOLUTION: Performed by: EMERGENCY MEDICINE

## 2022-05-24 PROCEDURE — G0378 HOSPITAL OBSERVATION PER HR: HCPCS

## 2022-05-24 PROCEDURE — 82962 GLUCOSE BLOOD TEST: CPT

## 2022-05-24 PROCEDURE — 25010000002 HEPARIN (PORCINE) PER 1000 UNITS: Performed by: INTERNAL MEDICINE

## 2022-05-24 PROCEDURE — 99214 OFFICE O/P EST MOD 30 MIN: CPT | Performed by: INTERNAL MEDICINE

## 2022-05-24 PROCEDURE — 80048 BASIC METABOLIC PNL TOTAL CA: CPT | Performed by: PHYSICIAN ASSISTANT

## 2022-05-24 PROCEDURE — 74018 RADEX ABDOMEN 1 VIEW: CPT

## 2022-05-24 PROCEDURE — 80048 BASIC METABOLIC PNL TOTAL CA: CPT | Performed by: INTERNAL MEDICINE

## 2022-05-24 PROCEDURE — 96361 HYDRATE IV INFUSION ADD-ON: CPT

## 2022-05-24 PROCEDURE — 25010000002 MAGNESIUM SULFATE IN D5W 1G/100ML (PREMIX) 1-5 GM/100ML-% SOLUTION: Performed by: PHYSICIAN ASSISTANT

## 2022-05-24 PROCEDURE — 96372 THER/PROPH/DIAG INJ SC/IM: CPT

## 2022-05-24 PROCEDURE — 94799 UNLISTED PULMONARY SVC/PX: CPT

## 2022-05-24 PROCEDURE — 25010000002 ONDANSETRON PER 1 MG: Performed by: INTERNAL MEDICINE

## 2022-05-24 PROCEDURE — 94761 N-INVAS EAR/PLS OXIMETRY MLT: CPT

## 2022-05-24 RX ORDER — DIGOXIN 125 MCG
125 TABLET ORAL
Status: DISCONTINUED | OUTPATIENT
Start: 2022-05-24 | End: 2022-05-25 | Stop reason: HOSPADM

## 2022-05-24 RX ORDER — RANOLAZINE 500 MG/1
1000 TABLET, EXTENDED RELEASE ORAL 2 TIMES DAILY
Status: DISCONTINUED | OUTPATIENT
Start: 2022-05-24 | End: 2022-05-25 | Stop reason: HOSPADM

## 2022-05-24 RX ORDER — MAGNESIUM SULFATE 1 G/100ML
1 INJECTION INTRAVENOUS ONCE
Status: COMPLETED | OUTPATIENT
Start: 2022-05-24 | End: 2022-05-24

## 2022-05-24 RX ORDER — DIGOXIN 125 MCG
125 TABLET ORAL 3 TIMES WEEKLY
Status: DISCONTINUED | OUTPATIENT
Start: 2022-05-25 | End: 2022-05-24

## 2022-05-24 RX ORDER — TRAMADOL HYDROCHLORIDE 50 MG/1
25 TABLET ORAL ONCE AS NEEDED
Status: COMPLETED | OUTPATIENT
Start: 2022-05-24 | End: 2022-05-24

## 2022-05-24 RX ADMIN — METOPROLOL TARTRATE 25 MG: 25 TABLET, FILM COATED ORAL at 20:28

## 2022-05-24 RX ADMIN — PSYLLIUM HUSK 1 PACKET: 3.4 POWDER ORAL at 08:47

## 2022-05-24 RX ADMIN — SODIUM CHLORIDE 50 ML/HR: 9 INJECTION, SOLUTION INTRAVENOUS at 04:47

## 2022-05-24 RX ADMIN — SODIUM CHLORIDE 500 ML: 9 INJECTION, SOLUTION INTRAVENOUS at 08:49

## 2022-05-24 RX ADMIN — HEPARIN SODIUM 5000 UNITS: 5000 INJECTION INTRAVENOUS; SUBCUTANEOUS at 04:46

## 2022-05-24 RX ADMIN — DIGOXIN 125 MCG: 125 TABLET ORAL at 14:56

## 2022-05-24 RX ADMIN — PANTOPRAZOLE SODIUM 40 MG: 40 TABLET, DELAYED RELEASE ORAL at 04:46

## 2022-05-24 RX ADMIN — RANOLAZINE 1000 MG: 500 TABLET, FILM COATED, EXTENDED RELEASE ORAL at 20:28

## 2022-05-24 RX ADMIN — TICAGRELOR 90 MG: 90 TABLET ORAL at 08:45

## 2022-05-24 RX ADMIN — TRAMADOL HYDROCHLORIDE 25 MG: 50 TABLET, COATED ORAL at 14:56

## 2022-05-24 RX ADMIN — ACETAMINOPHEN 325MG 650 MG: 325 TABLET ORAL at 08:41

## 2022-05-24 RX ADMIN — MAGNESIUM SULFATE HEPTAHYDRATE 1 G: 1 INJECTION, SOLUTION INTRAVENOUS at 04:47

## 2022-05-24 RX ADMIN — ONDANSETRON 4 MG: 2 INJECTION INTRAMUSCULAR; INTRAVENOUS at 14:56

## 2022-05-24 RX ADMIN — ATORVASTATIN CALCIUM 80 MG: 40 TABLET, FILM COATED ORAL at 20:28

## 2022-05-24 RX ADMIN — HEPARIN SODIUM 5000 UNITS: 5000 INJECTION INTRAVENOUS; SUBCUTANEOUS at 20:28

## 2022-05-24 RX ADMIN — RANOLAZINE 500 MG: 500 TABLET, FILM COATED, EXTENDED RELEASE ORAL at 08:45

## 2022-05-24 RX ADMIN — PROCHLORPERAZINE EDISYLATE 5 MG: 5 INJECTION INTRAMUSCULAR; INTRAVENOUS at 20:53

## 2022-05-24 RX ADMIN — TICAGRELOR 90 MG: 90 TABLET ORAL at 20:28

## 2022-05-24 RX ADMIN — DOCUSATE SODIUM 100 MG: 100 CAPSULE ORAL at 20:28

## 2022-05-24 RX ADMIN — CETIRIZINE HYDROCHLORIDE 10 MG: 10 TABLET, FILM COATED ORAL at 08:45

## 2022-05-24 RX ADMIN — PRENATAL VIT W/ FE FUMARATE-FA TAB 27-0.8 MG 1 TABLET: 27-0.8 TAB at 08:45

## 2022-05-24 RX ADMIN — HEPARIN SODIUM 5000 UNITS: 5000 INJECTION INTRAVENOUS; SUBCUTANEOUS at 14:56

## 2022-05-24 RX ADMIN — ASPIRIN 81 MG: 81 TABLET, CHEWABLE ORAL at 08:45

## 2022-05-24 RX ADMIN — DOCUSATE SODIUM 100 MG: 100 CAPSULE ORAL at 08:45

## 2022-05-24 RX ADMIN — ONDANSETRON 4 MG: 2 INJECTION INTRAMUSCULAR; INTRAVENOUS at 08:41

## 2022-05-24 RX ADMIN — DULOXETINE HYDROCHLORIDE 60 MG: 60 CAPSULE, DELAYED RELEASE ORAL at 08:45

## 2022-05-24 NOTE — PROGRESS NOTES
Patient Identification:  Name:  Lissa Eisenberg  Age:  51 y.o.  Sex:  female  :  1970  MRN:  [id number]  Visit Number:  [id number]    Chief Complaint:   Chest pain lasted for 1 hour last night    Subjective: Not in any distress  ----------------------------------------------------------------------------------------------------------------------  Current Hospital Meds:  aspirin, 81 mg, Oral, Daily  atorvastatin, 80 mg, Oral, Nightly  cetirizine, 10 mg, Oral, Daily  digoxin, 125 mcg, Oral, Daily  docusate sodium, 100 mg, Oral, BID  DULoxetine, 60 mg, Oral, Daily  heparin (porcine), 5,000 Units, Subcutaneous, Q8H  Insulin Aspart, 0-7 Units, Subcutaneous, TID AC  metoprolol tartrate, 25 mg, Oral, BID  pantoprazole, 40 mg, Oral, QAM  prenatal vitamin 27-0.8, 1 tablet, Oral, Daily  psyllium, 1 packet, Oral, Daily  ranolazine, 1,000 mg, Oral, BID  [START ON 2022] Semaglutide (1 MG/DOSE), 1 mg, Intramuscular, Weekly  sodium chloride, 10 mL, Intravenous, Q12H  ticagrelor, 90 mg, Oral, BID      sodium chloride, 50 mL/hr, Last Rate: 50 mL/hr (22 0447)      ----------------------------------------------------------------------------------------------------------------------  Vital Signs:  Temp:  [97.6 °F (36.4 °C)-98.3 °F (36.8 °C)] 98.3 °F (36.8 °C)  Heart Rate:  [90-98] 98  Resp:  [18-20] 18  BP: ()/(59-82) 131/82      22  0959 22  0442 22  0500   Weight: 86.9 kg (191 lb 9.6 oz) 86.9 kg (191 lb 9.6 oz) (admit weight, less then 24hrs) 88.1 kg (194 lb 4.8 oz)     Body mass index is 34.42 kg/m².    Intake/Output Summary (Last 24 hours) at 2022 1346  Last data filed at 2022 0300  Gross per 24 hour   Intake 220 ml   Output --   Net 220 ml     Diet Regular; Cardiac, Consistent Carbohydrate  ----------------------------------------------------------------------------------------------------------------------  Physical exam:  Constitutional:  Well-developed and well-nourished.   No respiratory distress.      HENT:  Head:  Normocephalic and atraumatic.  Mouth:  Moist mucous membranes.    Eyes:  Conjunctivae and EOM are normal.  Pupils are equal, round, and reactive to light.  No scleral icterus.    Neck:  Neck supple.  No JVD present.    Cardiovascular:  Normal rate, regular rhythm and normal heart sounds with no murmur.  Pulmonary/Chest:  No respiratory distress, no wheezes, no crackles, with normal breath sounds and good air movement.  Abdominal:  Soft.  Bowel sounds are normal.  No distension and no tenderness.   Musculoskeletal:  No edema, no tenderness, and no deformity.  No red or swollen joints anywhere.    Neurological:  Alert and oriented to person, place, and time.  No cranial nerve deficit.  No tongue deviation.  No facial droop.  No slurred speech.   Skin:  Skin is warm and dry. No rash noted. No pallor.   Peripheral vascular:  Strong pulses in all 4 extremities with no clubbing, no cyanosis, no edema.  ----------------------------------------------------------------------------------------------------------------------  Tele: Normal sinus rhythm  ----------------------------------------------------------------------------------------------------------------------  Results from last 7 days   Lab Units 05/23/22 2043 05/22/22 2025 05/22/22  1150 05/22/22  0134 05/21/22  2333   CK TOTAL U/L  --   --   --   --  73   TROPONIN T ng/mL <0.010 <0.010 <0.010   < > <0.010    < > = values in this interval not displayed.     Results from last 7 days   Lab Units 05/24/22  0041 05/23/22 0218 05/21/22  2333   WBC 10*3/mm3 5.79 6.34 10.24   HEMOGLOBIN g/dL 12.7 12.5 14.8   HEMATOCRIT % 37.6 37.7 44.0   MCV fL 81.7 82.0 81.3   MCHC g/dL 33.8 33.2 33.6   PLATELETS 10*3/mm3 228 218 355   INR   --   --  1.06         Results from last 7 days   Lab Units 05/24/22  1204 05/24/22  0041 05/23/22 0218 05/21/22  2333   SODIUM mmol/L 137 136 134* 134*   POTASSIUM mmol/L 4.4 4.5 4.5 4.2   MAGNESIUM mg/dL   --  1.7 2.0 1.1*   CHLORIDE mmol/L 105 104 101 98   CO2 mmol/L 21.9* 22.2 23.3 19.3*   BUN mg/dL 7 6 9 10   CREATININE mg/dL 0.81 1.18* 0.65 0.79   CALCIUM mg/dL 9.3 9.1 9.0 9.2   GLUCOSE mg/dL 104* 95 97 101*   ALBUMIN g/dL  --   --   --  4.51   BILIRUBIN mg/dL  --   --   --  0.9   ALK PHOS U/L  --   --   --  106   AST (SGOT) U/L  --   --   --  24   ALT (SGPT) U/L  --   --   --  24   Estimated Creatinine Clearance: 85.5 mL/min (by C-G formula based on SCr of 0.81 mg/dL).    No results found for: AMMONIA      No results found for: BLOODCX  No results found for: URINECX  No results found for: WOUNDCX  No results found for: STOOLCX    I have personally looked at the labs and they are summarized above.  ----------------------------------------------------------------------------------------------------------------------  Imaging Results (Last 24 Hours)     ** No results found for the last 24 hours. **        ----------------------------------------------------------------------------------------------------------------------    Assessment:  Chest pain lasting more than 1 hour per patient with negative cardiac markers doubt is due to angina  History of post CABG chest wall pain  History of CAD recent in-stent restenosis and PTCA of right coronary artery  Hypertension  Diabetes  CABG    Plan:  Will increase Ranexa to 1000 mg  Continue current medication  Ambulate in the room  No further cardiac intervention at the present time      Amy Delgado MD  05/24/22  13:46 EDT

## 2022-05-24 NOTE — PROGRESS NOTES
[facility] Medicine Services  PROGRESS NOTE     Patient Identification:  Name:  Lissa Eisenberg  Age:  51 y.o.  Sex:  female  :  1970  MRN:  [id number]  Visit Number:  [id number]  Primary Care Provider:  Roxy Deng DO    Length of stay:  2    ----------------------------------------------------------------------------------------------------------------------  Subjective     Chief Complaint:  Follow up for chest pain, new LBBB, abdominal pain, nausea    History of Presenting Illness/Hospital Course:  Patient is a 51 y.o. female with past medical history significant for coronary artery disease s/p CABG x 2 in past, PCI with JAYDA to mid RCA 2021, and arthrotomy of severe ISR and PCI of mid RCA with attempt to stent ostial RPDA unsuccessful 2/2 stent scaffold protruding in overlapping fashion 2022, HFpEF, essential hypertension, hyperlipidemia, non insulin dependent type II diabetes mellitus, GERD, and anxiety/depression/PTSD that presented to the [facility] emergency department for evaluation of chest pain and nausea/vomiting. Please see admitting history and physical for further and complete details. Troponin T remained negative. EKG with new LBBB. Patient was admitted to the telemetry floor for further evaluation and treatment. Cardiologist Dr. Smalls evaluated the patient on day of admission who recommended LH for possible intervention with balloon angioplasty to ostial RPDA to help anginal symptoms. Patient also with ongoing nausea and vomiting, CT abdomen/pelvis unrevealing of acute process but large stool burden noted that could be contributing. Patient was held NPO ultimately with plans for LHC. Covering cardiologist today opting to treat medically at this time. Dr. Delgado also recommending that if symptoms persist that he recommends evaluation for single vessel bypass. Cardiology has increased dose of Ranexa and added digoxin  5/24/2022.     Subjective:  Today, the patient was in bed. No acute distress noted. Reports of CP overnight, EKG without acute changes, troponin T negative. Cardiology has evaluated the patient, increased Ranexa dosage and added digoxin (as HR was in 90s) per RN. Patient reports ongoing abdominal discomfort, no BM documented. CT earlier in admission with large stool burden noted and bowel regimen was started. KUB pending for today. No other issues or complaints at this time. Creatinine was increased to 1.1 this AM, likely from decreased oral intake and Toradol administration, fluid bolus given with sig improvement in creatinine.     Present during exam: N/A   ----------------------------------------------------------------------------------------------------------------------  Objective     Consults:  • Cardiology     Procedures:  • None     Current Hospital Meds:  aspirin, 81 mg, Oral, Daily  atorvastatin, 80 mg, Oral, Nightly  cetirizine, 10 mg, Oral, Daily  digoxin, 125 mcg, Oral, Daily  docusate sodium, 100 mg, Oral, BID  DULoxetine, 60 mg, Oral, Daily  heparin (porcine), 5,000 Units, Subcutaneous, Q8H  Insulin Aspart, 0-7 Units, Subcutaneous, TID AC  metoprolol tartrate, 25 mg, Oral, BID  pantoprazole, 40 mg, Oral, QAM  prenatal vitamin 27-0.8, 1 tablet, Oral, Daily  psyllium, 1 packet, Oral, Daily  ranolazine, 1,000 mg, Oral, BID  [START ON 5/25/2022] Semaglutide (1 MG/DOSE), 1 mg, Intramuscular, Weekly  sodium chloride, 10 mL, Intravenous, Q12H  ticagrelor, 90 mg, Oral, BID      sodium chloride, 50 mL/hr, Last Rate: 50 mL/hr (05/24/22 1349)      ----------------------------------------------------------------------------------------------------------------------  Vital Signs:  Temp:  [97.6 °F (36.4 °C)-98.7 °F (37.1 °C)] 98.7 °F (37.1 °C)  Heart Rate:  [] 100  Resp:  [16-20] 16  BP: ()/(59-82) 124/75  Mean Arterial Pressure (Non-Invasive) for the past 24 hrs (Last 3 readings):   Noninvasive MAP  (mmHg)   05/24/22 1407 92   05/24/22 1001 96   05/24/22 0011 91     SpO2 Percentage    05/24/22 0936 05/24/22 1001 05/24/22 1407   SpO2: 95% 93% 98%     SpO2:  [93 %-99 %] 98 %  on   ;   Device (Oxygen Therapy): room air    Body mass index is 34.42 kg/m².  Wt Readings from Last 3 Encounters:   05/24/22 88.1 kg (194 lb 4.8 oz)   05/05/22 90.3 kg (199 lb)   04/26/22 90.7 kg (200 lb)        Intake/Output Summary (Last 24 hours) at 5/24/2022 1442  Last data filed at 5/24/2022 0300  Gross per 24 hour   Intake 220 ml   Output --   Net 220 ml     Diet Regular; Cardiac, Consistent Carbohydrate  ----------------------------------------------------------------------------------------------------------------------  Physical exam:  Physical Exam  Nursing note reviewed.   Constitutional:       General: She is awake. She is not in acute distress.     Appearance: She is well-developed. She is obese. She is not toxic-appearing.      Comments: Sitting up in bed. Just ate lunch tray. No acute distress noted. On room air. No family present at bedside.   HENT:      Head: Normocephalic and atraumatic.      Mouth/Throat:      Mouth: Mucous membranes are moist.   Eyes:      Conjunctiva/sclera: Conjunctivae normal.      Pupils: Pupils are equal, round, and reactive to light.   Cardiovascular:      Rate and Rhythm: Normal rate and regular rhythm.      Pulses:           Dorsalis pedis pulses are 2+ on the right side and 2+ on the left side.      Heart sounds: Normal heart sounds. No murmur heard.    No friction rub. No gallop.   Pulmonary:      Effort: Pulmonary effort is normal.      Breath sounds: Normal breath sounds and air entry. No wheezing, rhonchi or rales.   Chest:      Comments: Prior sternotomy scar noted.  Abdominal:      General: Bowel sounds are normal. There is no distension.      Palpations: Abdomen is soft.      Tenderness: There is generalized abdominal tenderness. There is no guarding or rebound.      Comments: No  peritoneal signs appreciated on examination.   Genitourinary:     Comments: No jones catheter in place.  Musculoskeletal:      Cervical back: Neck supple.      Right lower leg: No edema.      Left lower leg: No edema.   Skin:     General: Skin is warm and dry.      Capillary Refill: Capillary refill takes less than 2 seconds.   Neurological:      General: No focal deficit present.      Mental Status: She is alert and oriented to person, place, and time.      GCS: GCS eye subscore is 4. GCS verbal subscore is 5. GCS motor subscore is 6.      Sensory: Sensation is intact.      Motor: Motor function is intact.      Comments: Awake and alert. Follows commands. Answers questions appropriately. Moves all extremities equally. Strength and sensation intact. No focal neuro deficit on exam.   Psychiatric:         Speech: Speech normal.         Behavior: Behavior is cooperative.      Essentially no change on today's bedside evaluation   ----------------------------------------------------------------------------------------------------------------------  Tele:    Sinus 90s with BBB    I have personally reviewed the EKG/Telemetry strips   ----------------------------------------------------------------------------------------------------------------------  Results from last 7 days   Lab Units 05/23/22 2043 05/22/22 2025 05/22/22  1150 05/22/22  0134 05/21/22  2583   CK TOTAL U/L  --   --   --   --  73   TROPONIN T ng/mL <0.010 <0.010 <0.010   < > <0.010    < > = values in this interval not displayed.     Results from last 7 days   Lab Units 05/21/22  2333   PROBNP pg/mL 54.2         Results from last 7 days   Lab Units 05/24/22  0041 05/23/22  0218 05/21/22  2333   WBC 10*3/mm3 5.79 6.34 10.24   HEMOGLOBIN g/dL 12.7 12.5 14.8   HEMATOCRIT % 37.6 37.7 44.0   MCV fL 81.7 82.0 81.3   MCHC g/dL 33.8 33.2 33.6   PLATELETS 10*3/mm3 228 218 355   INR   --   --  1.06     Results from last 7 days   Lab Units 05/24/22  1204  05/24/22  0041 05/23/22  0218 05/21/22  2333   SODIUM mmol/L 137 136 134* 134*   POTASSIUM mmol/L 4.4 4.5 4.5 4.2   MAGNESIUM mg/dL  --  1.7 2.0 1.1*   CHLORIDE mmol/L 105 104 101 98   CO2 mmol/L 21.9* 22.2 23.3 19.3*   BUN mg/dL 7 6 9 10   CREATININE mg/dL 0.81 1.18* 0.65 0.79   CALCIUM mg/dL 9.3 9.1 9.0 9.2   GLUCOSE mg/dL 104* 95 97 101*   ALBUMIN g/dL  --   --   --  4.51   BILIRUBIN mg/dL  --   --   --  0.9   ALK PHOS U/L  --   --   --  106   AST (SGOT) U/L  --   --   --  24   ALT (SGPT) U/L  --   --   --  24   Estimated Creatinine Clearance: 85.5 mL/min (by C-G formula based on SCr of 0.81 mg/dL).    Hemoglobin A1C   Date/Time Value Ref Range Status   05/21/2022 2333 5.80 (H) 4.80 - 5.60 % Final     Glucose   Date/Time Value Ref Range Status   05/24/2022 1000 132 (H) 70 - 130 mg/dL Final     Comment:     Meter: [id number] : [id number] India Sosa   05/24/2022 0632 105 70 - 130 mg/dL Final     Comment:     Meter: [id number] : [id number] HUMA LARA   05/23/2022 1942 91 70 - 130 mg/dL Final     Comment:     Meter: [id number] : [id number] ERIC PUCKETT   05/23/2022 1609 109 70 - 130 mg/dL Final     Comment:     Meter: [id number] : [id number] SURYA CUBA   05/23/2022 1014 111 70 - 130 mg/dL Final     Comment:     Meter: [id number] : [id number] SURYA CUBA   05/23/2022 0610 95 70 - 130 mg/dL Final     Comment:     Meter: [id number] : [id number] SURYA CUBA   05/22/2022 1927 112 70 - 130 mg/dL Final     Comment:     RN Notified Meter: [id number] : [id number] stacey simeon   05/22/2022 1627 107 70 - 130 mg/dL Final     Comment:     Meter: [id number] : [id number] meggan adams     Lab Results   Component Value Date    TSH 2.410 05/22/2022     Microbiology Results (last 10 days)     Procedure Component Value - Date/Time    COVID PRE-OP / PRE-PROCEDURE SCREENING ORDER (NO ISOLATION) - Swab, Nasopharynx [[id number]]  (Normal) Collected: 05/21/22 2339    Lab  Status: Final result Specimen: Swab from Nasopharynx Updated: 05/22/22 0000    Narrative:      The following orders were created for panel order COVID PRE-OP / PRE-PROCEDURE SCREENING ORDER (NO ISOLATION) - Swab, Nasopharynx.  Procedure                               Abnormality         Status                     ---------                               -----------         ------                     COVID-19 and FLU A/B PCR...[[id number]]  Normal              Final result                 Please view results for these tests on the individual orders.    COVID-19 and FLU A/B PCR - Swab, Nasopharynx [[id number]]  (Normal) Collected: 05/21/22 2339    Lab Status: Final result Specimen: Swab from Nasopharynx Updated: 05/22/22 0000     COVID19 Not Detected     Influenza A PCR Not Detected     Influenza B PCR Not Detected    Narrative:      Fact sheet for providers: https://www.fda.gov/media/643328/download    Fact sheet for patients: https://www.Brief[employer].gov/media/590291/download    Test performed by PCR.         Pain Management Panel     Pain Management Panel Latest Ref Rng & Units 5/22/2022    AMPHETAMINES SCREEN, URINE Negative Negative    BARBITURATES SCREEN Negative Negative    BENZODIAZEPINE SCREEN, URINE Negative Negative    BUPRENORPHINEUR Negative Negative    COCAINE SCREEN, URINE Negative Negative    METHADONE SCREEN, URINE Negative Negative    METHAMPHETAMINEUR Negative Negative        I have personally reviewed the above laboratory results.   ----------------------------------------------------------------------------------------------------------------------  Imaging Results (Last 24 Hours)     ** No results found for the last 24 hours. **        I have personally reviewed the above radiology results.   ----------------------------------------------------------------------------------------------------------------------  Assessment/Plan     ACUTE HOSPITAL PROBLEMS    -Chest pain, mixed features, concerning for unstable  angina   -New LBBB  -Coronary artery disease s/p CABG x 2 in past, PCI with JAYDA to mid RCA 8/2021, and arthrotomy of severe ISR and PCI of mid RCA with attempt to stent ostial RPDA unsuccessful 2/2 stent scaffold protruding in overlapping fashion 4/5/2022  · Troponin T negative x 3   · EKG with new LBBB   · Cardiology consulted initially planning for LHC  however cardiology today canceled and planning on medical therapy at this time.  · Cardiology recommending single vessel CABG should patient's symptoms progress, ? Radiation should in stent restenosis occur further.  · Cont aspirin, Brilinta, statin, Ranexa   · Cardiology increased Ranexa dosage today. Also added digoxin, per RN due to HR in the 90s   · Closely monitor on telemetry      -Abdominal pain/cramping, nausea, vomiting   -Reported hematemesis PTA   · Patient with mild TTP on exam   · CT abdomen pelvis unremarkable overall, however large stool noted  · No further hematemesis reported, monitor for further episodes. Consider EGD if needed.  · PRN antiemetics   · Gentle IV fluids   · Bowel regimen, fiber supplementation   · Obtain KUB, no BM documented     -Acute kidney injury  · Creatinine increased overnight to 1.1 from 0.6.   · Likely due to decreased oral intake as well as medication induced (Toradol)  · IV fluid bolus administered this AM, repeat BMP with creatinine 0.8  · Monitor urine output and renal function closely  · Avoid nephrotoxins as much as possible   · Repeat chemistry panel in AM    -Elevated anion gap   -Mild ketonuria   · Likely due to GI losses and decreased oral intake, likely starvation ketosis   · Anion gap now closed   · PRN antiemetics available   · Gentle IV fluids   · Oral intake improving   · Repeat chem panel in AM      -Hypomagnesemia   · Continue to replace electrolytes per protocol as necessary   · Telemetry monitoring   · Repeat mag level in AM      CHRONIC MEDICAL PROBLEMS    -HFpEF: Appears grossly compensated on exam.  Monitor volume status closely. Prior TTE reviewed.   -Type II diabetes mellitus, non insulin dependent  · 5.8 HgbA1C  · Overall adequately controlled   · Low dose SSI available, titrate dosage as necessary   · Closely monitor blood glucose levels with accuchecks QAC and QHS  · Hypoglycemia protocol in place should it be necessary  -GERD: PPI  -Anxiety/Depression/PSTD: Supportive care. Cont home medication regimen.   -Obesity by BMI, Body mass index is 34.42 kg/m².: Affecting all aspects of care  --------------------------------------------------  DVT Prophylaxis: SQ heparin   GI Prophylaxis: PPI   FEN: Gentle IV fluids. Replace electrolytes per protocol as necessary. Cardiac/consistent carbohydrate diet.  Activity: Up with assistance as tolerated   --------------------------------------------------  Disposition:  • Plans on home at discharge once medically stable, likely next 24 hours    --------------------------------------------------    I have discussed the patient's assessment and plan with the patient, Mike TRIPP, and attending physician Constantine Luong MD Allyson O'Kuma, PA-C  Hospitalist Service -- [facility]   Pager: [phone]    05/24/22  14:42 EDT    Attending Physician: Constantine Jones MD    ----------------------------------------------------------------------------------------------------------------------

## 2022-05-24 NOTE — PLAN OF CARE
Goal Outcome Evaluation:  Plan of Care Reviewed With: patient           Outcome Evaluation: Pt resting in bed this shift. Complained of chest pain this shift. STAT EKG and troponin obtained. See results. No other complaints noted at this time. VSS at this time. No s/s of acute distress noted.

## 2022-05-24 NOTE — PLAN OF CARE
Goal Outcome Evaluation:  Progress: no change   Pt resting in bed, watching television. Pt has tolerated all interventions. Pt has had complaints of abd pain. Administered PRN pain medication. No acute distress noted. Will continue to follow the plan of care.

## 2022-05-24 NOTE — PAYOR COMM NOTE
"[facility]  NPI:[id number]    Utilization Review  Contact: Soraya Lawrence RN  Phone: [phone]  Fax:[phone]    CLINICAL UPDATE     ATTENTION VANE  Converted to observation      Carlos Eisenberg (51 y.o. Female)             Date of Birth   1970    Social Security Number       Address   [address] [facility] [address]    Home Phone   [phone]    MRN   [id number]       Taoism   Unknown    Marital Status                               Admission Date   5/21/22    Admission Type   Emergency    Admitting Provider   Constantine Jones MD    Attending Provider   Constantine Jones MD    Department, Room/Bed   [facility], 3313/1S       Discharge Date       Discharge Disposition       Discharge Destination                               Attending Provider: Constantine Jones MD    Allergies: Contrast Dye, Ciprofloxacin, Sulfa Antibiotics, Drug [Hydrocodone-acetaminophen]    Isolation: None   Infection: None   Code Status: CPR   Advance Care Planning Activity    Ht: 160 cm (63\")   Wt: 88.1 kg (194 lb 4.8 oz)    Admission Cmt: None   Principal Problem: NSTEMI (non-ST elevated myocardial infarction) (HCC) [I21.4]                 Active Insurance as of 5/21/2022     Primary Coverage     Payor Plan Insurance Group Employer/Plan Group    [place] [id number]     Payor Plan Address Payor Plan Phone Number Payor Plan Fax Number Effective Dates    [employer] [address] [address] [phone]  1/1/2021 - None Entered    [facility] [id number]       Subscriber Name Subscriber Birth Date Member ID       CARLOS EISENBERG 1970 [id number]                 Emergency Contacts      (Rel.) Home Phone Work Phone Mobile Phone    ZAC EISENBERG (Spouse) -- -- [phone]        From admission, onward)        Start   Ordered   05/24/22 1216  Initiate Observation Status  Once        Comments: Admit date 5/21/2022   Transfer Service: Medicine     "   Question Answer Comment   Level of Care Telemetry    Diagnosis Dehydration    Admitting Physician MARIBEL BATISTA    Attending Physician MARIBEL BATISTA        05/24/22 0400

## 2022-05-25 ENCOUNTER — READMISSION MANAGEMENT (OUTPATIENT)
Dept: CALL CENTER | Facility: HOSPITAL | Age: 52
End: 2022-05-25

## 2022-05-25 VITALS
SYSTOLIC BLOOD PRESSURE: 104 MMHG | HEIGHT: 63 IN | WEIGHT: 196.1 LBS | BODY MASS INDEX: 34.75 KG/M2 | OXYGEN SATURATION: 97 % | TEMPERATURE: 98 F | DIASTOLIC BLOOD PRESSURE: 65 MMHG | HEART RATE: 85 BPM | RESPIRATION RATE: 20 BRPM

## 2022-05-25 LAB
ANION GAP SERPL CALCULATED.3IONS-SCNC: 9.6 MMOL/L (ref 5–15)
BUN SERPL-MCNC: 6 MG/DL (ref 6–20)
BUN/CREAT SERPL: 8 (ref 7–25)
CALCIUM SPEC-SCNC: 8.8 MG/DL (ref 8.6–10.5)
CHLORIDE SERPL-SCNC: 109 MMOL/L (ref 98–107)
CO2 SERPL-SCNC: 20.4 MMOL/L (ref 22–29)
CREAT SERPL-MCNC: 0.75 MG/DL (ref 0.57–1)
DEPRECATED RDW RBC AUTO: 42.3 FL (ref 37–54)
EGFRCR SERPLBLD CKD-EPI 2021: 95.9 ML/MIN/1.73
ERYTHROCYTE [DISTWIDTH] IN BLOOD BY AUTOMATED COUNT: 14.2 % (ref 12.3–15.4)
GLUCOSE BLDC GLUCOMTR-MCNC: 94 MG/DL (ref 70–130)
GLUCOSE SERPL-MCNC: 112 MG/DL (ref 65–99)
HCT VFR BLD AUTO: 36.5 % (ref 34–46.6)
HGB BLD-MCNC: 12 G/DL (ref 12–15.9)
MAGNESIUM SERPL-MCNC: 1.5 MG/DL (ref 1.6–2.6)
MCH RBC QN AUTO: 27.1 PG (ref 26.6–33)
MCHC RBC AUTO-ENTMCNC: 32.9 G/DL (ref 31.5–35.7)
MCV RBC AUTO: 82.6 FL (ref 79–97)
PLATELET # BLD AUTO: 221 10*3/MM3 (ref 140–450)
PMV BLD AUTO: 9.1 FL (ref 6–12)
POTASSIUM SERPL-SCNC: 4.4 MMOL/L (ref 3.5–5.2)
QT INTERVAL: 412 MS
QT INTERVAL: 426 MS
QTC INTERVAL: 509 MS
QTC INTERVAL: 512 MS
RBC # BLD AUTO: 4.42 10*6/MM3 (ref 3.77–5.28)
SODIUM SERPL-SCNC: 139 MMOL/L (ref 136–145)
WBC NRBC COR # BLD: 5.55 10*3/MM3 (ref 3.4–10.8)

## 2022-05-25 PROCEDURE — 82962 GLUCOSE BLOOD TEST: CPT

## 2022-05-25 PROCEDURE — 96366 THER/PROPH/DIAG IV INF ADDON: CPT

## 2022-05-25 PROCEDURE — 99217 PR OBSERVATION CARE DISCHARGE MANAGEMENT: CPT | Performed by: PHYSICIAN ASSISTANT

## 2022-05-25 PROCEDURE — G0378 HOSPITAL OBSERVATION PER HR: HCPCS

## 2022-05-25 PROCEDURE — 96372 THER/PROPH/DIAG INJ SC/IM: CPT

## 2022-05-25 PROCEDURE — 25010000002 PROCHLORPERAZINE 10 MG/2ML SOLUTION: Performed by: EMERGENCY MEDICINE

## 2022-05-25 PROCEDURE — 80048 BASIC METABOLIC PNL TOTAL CA: CPT | Performed by: PHYSICIAN ASSISTANT

## 2022-05-25 PROCEDURE — 83735 ASSAY OF MAGNESIUM: CPT | Performed by: PHYSICIAN ASSISTANT

## 2022-05-25 PROCEDURE — 85027 COMPLETE CBC AUTOMATED: CPT | Performed by: PHYSICIAN ASSISTANT

## 2022-05-25 PROCEDURE — 96376 TX/PRO/DX INJ SAME DRUG ADON: CPT

## 2022-05-25 PROCEDURE — 25010000002 HEPARIN (PORCINE) PER 1000 UNITS: Performed by: INTERNAL MEDICINE

## 2022-05-25 PROCEDURE — 25010000002 MAGNESIUM SULFATE 2 GM/50ML SOLUTION: Performed by: PHYSICIAN ASSISTANT

## 2022-05-25 RX ORDER — ONDANSETRON 4 MG/1
4 TABLET, ORALLY DISINTEGRATING ORAL EVERY 8 HOURS PRN
Qty: 12 TABLET | Refills: 0 | Status: SHIPPED | OUTPATIENT
Start: 2022-05-25 | End: 2022-06-21 | Stop reason: HOSPADM

## 2022-05-25 RX ORDER — DIGOXIN 125 MCG
125 TABLET ORAL
Qty: 30 TABLET | Refills: 0 | Status: SHIPPED | OUTPATIENT
Start: 2022-05-25 | End: 2022-06-21 | Stop reason: HOSPADM

## 2022-05-25 RX ORDER — LACTULOSE 10 G/15ML
30 SOLUTION ORAL ONCE
Status: DISCONTINUED | OUTPATIENT
Start: 2022-05-25 | End: 2022-05-25

## 2022-05-25 RX ORDER — RANOLAZINE 1000 MG/1
1000 TABLET, EXTENDED RELEASE ORAL 2 TIMES DAILY
Qty: 60 TABLET | Refills: 0 | Status: ON HOLD | OUTPATIENT
Start: 2022-05-25 | End: 2022-06-17 | Stop reason: SDUPTHER

## 2022-05-25 RX ORDER — DOCUSATE SODIUM 100 MG/1
100 CAPSULE, LIQUID FILLED ORAL 2 TIMES DAILY
Qty: 60 CAPSULE | Refills: 0 | Status: SHIPPED | OUTPATIENT
Start: 2022-05-25 | End: 2022-06-24

## 2022-05-25 RX ORDER — MAGNESIUM SULFATE HEPTAHYDRATE 40 MG/ML
2 INJECTION, SOLUTION INTRAVENOUS ONCE
Status: COMPLETED | OUTPATIENT
Start: 2022-05-25 | End: 2022-05-25

## 2022-05-25 RX ORDER — NITROGLYCERIN 0.4 MG/1
0.4 TABLET SUBLINGUAL
Qty: 25 TABLET | Refills: 1 | Status: SHIPPED | OUTPATIENT
Start: 2022-05-25

## 2022-05-25 RX ADMIN — RANOLAZINE 1000 MG: 500 TABLET, FILM COATED, EXTENDED RELEASE ORAL at 09:13

## 2022-05-25 RX ADMIN — PRENATAL VIT W/ FE FUMARATE-FA TAB 27-0.8 MG 1 TABLET: 27-0.8 TAB at 09:13

## 2022-05-25 RX ADMIN — ASPIRIN 81 MG: 81 TABLET, CHEWABLE ORAL at 09:13

## 2022-05-25 RX ADMIN — PROCHLORPERAZINE EDISYLATE 5 MG: 5 INJECTION INTRAMUSCULAR; INTRAVENOUS at 06:04

## 2022-05-25 RX ADMIN — PANTOPRAZOLE SODIUM 40 MG: 40 TABLET, DELAYED RELEASE ORAL at 04:57

## 2022-05-25 RX ADMIN — DOCUSATE SODIUM 100 MG: 100 CAPSULE ORAL at 09:13

## 2022-05-25 RX ADMIN — CETIRIZINE HYDROCHLORIDE 10 MG: 10 TABLET, FILM COATED ORAL at 09:13

## 2022-05-25 RX ADMIN — SODIUM CHLORIDE 50 ML/HR: 9 INJECTION, SOLUTION INTRAVENOUS at 06:00

## 2022-05-25 RX ADMIN — MAGNESIUM SULFATE HEPTAHYDRATE 2 G: 40 INJECTION, SOLUTION INTRAVENOUS at 09:14

## 2022-05-25 RX ADMIN — DULOXETINE HYDROCHLORIDE 60 MG: 60 CAPSULE, DELAYED RELEASE ORAL at 09:13

## 2022-05-25 RX ADMIN — TICAGRELOR 90 MG: 90 TABLET ORAL at 09:13

## 2022-05-25 RX ADMIN — METOPROLOL TARTRATE 25 MG: 25 TABLET, FILM COATED ORAL at 09:13

## 2022-05-25 RX ADMIN — HEPARIN SODIUM 5000 UNITS: 5000 INJECTION INTRAVENOUS; SUBCUTANEOUS at 04:57

## 2022-05-25 RX ADMIN — Medication 10 ML: at 09:14

## 2022-05-25 NOTE — PLAN OF CARE
Goal Outcome Evaluation:  Plan of Care Reviewed With: patient           Outcome Evaluation: Pt resting in bed this shift. Complained of stomach hurting. PRN compazine administered as ordered and resolved her stomach hurting. VSS at this time. No s/s of acute distress noted at this time.

## 2022-05-25 NOTE — DISCHARGE SUMMARY
[facility] Medicine Services  DISCHARGE SUMMARY    Date of Admission: 5/21/2022    Date of Discharge:  5/25/2022    PCP: Roxy Deng DO    Discharging Provider: Lucinda Judd PA-C  Attending Physician on day of DC: Dr. Constantine Jones     Admission Diagnosis:   Please see admission H&P    Discharge Diagnosis:   · Chest pain mixed features concerning for unstable angina  · New left bundle branch block  · Coronary artery disease status post CABG with later PCI with drug-eluting stent and later repeat PCI in April 2022  · Abdominal cramping with nausea and vomiting  · Constipation, improved  · KEVEN, improved  · Elevated anion gap  · Cr EMEA, improved  · Heart failure with preserved ejection fraction, compensated  · Type 2 diabetes mellitus non-insulin-dependent  · Gastroesophageal reflux disease  · Anxiety/depression/PTSD  · Obesity by BMI with BMI of 34.74 kg/m²           Consults:   Consults     Date and Time Order Name Status Description    5/22/2022 10:00 AM Inpatient Interventional Cardiology Consult Completed           HPI     History of Present Illness:  Lissa Eisenberg is a 51 y.o. female with past medical history significant for coronary artery disease s/p CABG x 2 in past, PCI with JAYDA to mid RCA 8/2021, and arthrotomy of severe ISR and PCI of mid RCA with attempt to stent ostial RPDA unsuccessful 2/2 stent scaffold protruding in overlapping fashion 4/5/2022, HFpEF, essential hypertension, hyperlipidemia, non insulin dependent type II diabetes mellitus, GERD, and anxiety/depression/PTSD that presented to the [facility] emergency department for evaluation of chest pain and nausea/vomiting. Please see admitting history and physical for further and complete details.        Hospital Course     Hospital Course  Lissa Eisenberg was admitted as outlined in above HPI.     Troponin T remained negative. EKG with new LBBB. Patient was admitted to the telemetry floor for  further evaluation and treatment. Cardiologist Dr. Smalls evaluated the patient on day of admission who recommended LHC for possible intervention with balloon angioplasty to ostial RPDA to help anginal due to concern for possibly single-vessel bypass.  Symptoms. Patient also with ongoing nausea and vomiting, CT abdomen/pelvis unrevealing of acute process but large stool burden noted that could be contributing. Patient was held NPO ultimately with plans for LHC. Covering cardiologist Dr. Delgado later opted to treat medically at this time. Dr. Delgado also recommending that if symptoms persist that he recommends evaluation for single vessel bypass. Cardiology has increased dose of Ranexa and added digoxin 5/24/2022.     On the morning of 5/25/2022, patient was chest pain-free.  Discussed patient with cardiologist Dr. Delgado with recommendation for outpatient follow-up with cardiology as well as continuing Ranexa digoxin in addition to home medications with as needed nitro.  Outpatient follow-up with PCP has been recommended in addition to interventional cardiology.  Have attempted to arrange for 1 week follow-up with interventional cardiology for further evaluation and possible further referral for CT evaluation at the discretion of Interventional Cardiology given recommendation for possible single vessel bypass.      Of note, patient did report some abdominal cramping and vomiting with KUB concerning for constipation.  Bowel regimen was initiated with patient reporting large bowel movement overnight on 5/24/2022.  During bedside evaluation on 5/25/2022, patient was alert and oriented x3 and very pleasant.  She reported she felt significantly improved and wished to return home today.  As it was felt she had maximal benefit of inpatient hospitalization she was then discharged home.    Discussed later in discharge process with new prescription for Metoprolol 25mg arranged, as patient's  reported he had been  quartering her prior 100mg pills.         Pertinent Laboratory and Radiology Results     Pertinent Test Results:          Results from last 7 days   Lab Units 05/25/22  0537 05/24/22  1204 05/24/22  0041 05/23/22 0218 05/21/22  2333   WBC 10*3/mm3 5.55  --  5.79 6.34 10.24   HEMOGLOBIN g/dL 12.0  --  12.7 12.5 14.8   HEMATOCRIT % 36.5  --  37.6 37.7 44.0   PLATELETS 10*3/mm3 221  --  228 218 355   MCV fL 82.6  --  81.7 82.0 81.3   SODIUM mmol/L 139 137 136 134* 134*   POTASSIUM mmol/L 4.4 4.4 4.5 4.5 4.2   CHLORIDE mmol/L 109* 105 104 101 98   CO2 mmol/L 20.4* 21.9* 22.2 23.3 19.3*   BUN mg/dL 6 7 6 9 10   CREATININE mg/dL 0.75 0.81 1.18* 0.65 0.79   GLUCOSE mg/dL 112* 104* 95 97 101*   CALCIUM mg/dL 8.8 9.3 9.1 9.0 9.2        Results from last 7 days   Lab Units 05/23/22  2043 05/22/22  2025 05/22/22  1150 05/22/22  0134 05/21/22  2333   CK TOTAL U/L  --   --   --   --  73   TROPONIN T ng/mL <0.010 <0.010 <0.010 <0.010 <0.010   PROBNP pg/mL  --   --   --   --  54.2     Results from last 7 days   Lab Units 05/25/22  0537 05/24/22  0041 05/23/22 0218 05/21/22  2333   WBC 10*3/mm3 5.55 5.79 6.34 10.24     Results from last 7 days   Lab Units 05/21/22  2333   BILIRUBIN mg/dL 0.9   ALK PHOS U/L 106   ALT (SGPT) U/L 24   AST (SGOT) U/L 24   PROTIME Seconds 14.0   INR  1.06   APTT seconds 31.9           Invalid input(s): TG, LDLCALC, LDLREALC  Results from last 7 days   Lab Units 05/22/22  0134 05/21/22  2333   TSH uIU/mL 2.410  --    HEMOGLOBIN A1C %  --  5.80*     Brief Urine Lab Results  (Last result in the past 365 days)      Color   Clarity   Blood   Leuk Est   Nitrite   Protein   CREAT   Urine HCG        05/22/22 0058 Yellow   Clear   Negative   Trace   Negative   Negative                     Results for orders placed during the hospital encounter of 11/05/21    Duplex Venous Lower Extremity - Bilateral CAR    Interpretation Summary  1.  No evidence of proximal deep venous thrombus in either leg.  There is  complete compressibility with no intraluminal filling defects identified.      Results for orders placed during the hospital encounter of 04/02/22    Adult Transthoracic Echo Complete w/ Color, Spectral and Contrast if necessary per protocol    Interpretation Summary  · Left ventricular ejection fraction appears to be 61 - 65%.  · Left ventricular diastolic function is consistent with (grade I) impaired relaxation.  · No significant valvular abnormality  · Moderate sclerosis of the aortic valve  · No pericardial effusion  · No prior study for comparison            ----------------------------------------------------------------------------------------------------------------------  CT Abdomen Pelvis Without Contrast    Result Date: 5/22/2022  1. No acute intra-abdominal process within the confines of non-IV contrast exam. No evidence of bowel obstruction. 2. Incidental small 3 mm nodule left lung base warranting six-month follow-up chest CT without contrast.  Signer Name: Obdulia Hopper MD  Signed: 5/22/2022 8:11 PM  Workstation Name: [email or web address]  [facility]    XR Chest 1 View    Result Date: 5/21/2022  1. Negative chest. Signer Name: Edson Mays MD  Signed: 5/21/2022 11:00 PM  Workstation Name: [id number]  [facility]iology [facility]    XR Abdomen KUB    Result Date: 5/24/2022    Moderate constipation. No bowel obstruction identified.  This report was finalized on 5/24/2022 4:10 PM by Dr. Jesu Parmar MD.          Microbiology Results (last 10 days)     Procedure Component Value - Date/Time    COVID PRE-OP / PRE-PROCEDURE SCREENING ORDER (NO ISOLATION) - Swab, Nasopharynx [[id number]]  (Normal) Collected: 05/21/22 2369    Lab Status: Final result Specimen: Swab from Nasopharynx Updated: 05/22/22 0000    Narrative:      The following orders were created for panel order COVID PRE-OP / PRE-PROCEDURE SCREENING ORDER (NO ISOLATION) - Swab, Nasopharynx.  Procedure                                Abnormality         Status                     ---------                               -----------         ------                     COVID-19 and FLU A/B PCR...[[id number]]  Normal              Final result                 Please view results for these tests on the individual orders.    COVID-19 and FLU A/B PCR - Swab, Nasopharynx [[id number]]  (Normal) Collected: 05/21/22 2339    Lab Status: Final result Specimen: Swab from Nasopharynx Updated: 05/22/22 0000     COVID19 Not Detected     Influenza A PCR Not Detected     Influenza B PCR Not Detected    Narrative:      Fact sheet for providers: https://www.fda.gov/media/201540/download    Fact sheet for patients: https://www.fda.gov/media/693250/download    Test performed by PCR.          Labs above have been reviewed on the day of discharge.  Radiology images from prior 30 days were reviewed prior to discharge as incorporated into this document.     Discharge Vitals and Physical Examination       Vital Signs  Temp:  [97.8 °F (36.6 °C)-98.7 °F (37.1 °C)] 98 °F (36.7 °C)  Heart Rate:  [] 85  Resp:  [16-20] 20  BP: (104-127)/(58-79) 104/65     PHYSICAL EXAMINATION:   Physical Exam  Vitals and nursing note reviewed.   Constitutional:       Appearance: Normal appearance.   HENT:      Head: Normocephalic and atraumatic.      Nose: Nose normal.      Mouth/Throat:      Mouth: Mucous membranes are moist.   Cardiovascular:      Rate and Rhythm: Normal rate and regular rhythm.      Pulses: Normal pulses.      Heart sounds: Normal heart sounds.   Pulmonary:      Effort: Pulmonary effort is normal.   Abdominal:      General: Bowel sounds are normal.      Palpations: Abdomen is soft.   Musculoskeletal:         General: No swelling. Normal range of motion.      Cervical back: Normal range of motion and neck supple.   Skin:     General: Skin is warm.   Neurological:      Mental Status: She is alert and oriented to person, place, and time.           Discharge  Disposition, Discharge Medications, and Discharge Appointments     Discharge Disposition:   home    Condition on Discharge:  Fair    Discharge Medications:     Discharge Medications      New Medications      Instructions Start Date   digoxin 125 MCG tablet  Commonly known as: LANOXIN   125 mcg, Oral, Daily Digoxin      docusate sodium 100 MG capsule  Commonly known as: COLACE   100 mg, Oral, 2 Times Daily         Changes to Medications      Instructions Start Date   metoprolol tartrate 25 MG tablet  Commonly known as: LOPRESSOR  What changed: medication strength   25 mg, Oral, 2 Times Daily      nitroglycerin 0.4 MG SL tablet  Commonly known as: NITROSTAT  What changed:   · reasons to take this  · additional instructions   Place 1 tablet under the tongue Every 5 minutes as Needed for Chest Pain until chest pain is relieved. If you have to take 3 tabs, take the 3rd and go to the hospital to be evaluated.      ranolazine 1000 MG 12 hr tablet  Commonly known as: Ranexa  What changed:   · medication strength  · how much to take   1,000 mg, Oral, 2 Times Daily         Continue These Medications      Instructions Start Date   aspirin 81 MG EC tablet   81 mg, Oral, Daily      atorvastatin 80 MG tablet  Commonly known as: LIPITOR   80 mg, Oral, Nightly      cetirizine 10 MG tablet  Commonly known as: zyrTEC   10 mg, Oral, Daily      DULoxetine 30 MG capsule  Commonly known as: CYMBALTA   60 mg, Oral, Daily      isosorbide mononitrate 30 MG 24 hr tablet  Commonly known as: IMDUR   30 mg, Oral, Every 24 Hours Scheduled      lisinopril 2.5 MG tablet  Commonly known as: PRINIVIL,ZESTRIL   2.5 mg, Oral, Daily      metFORMIN 1000 MG tablet  Commonly known as: GLUCOPHAGE   1,000 mg, Oral, 2 Times Daily With Meals      omeprazole 20 MG capsule  Commonly known as: priLOSEC   20 mg, Oral, Daily      ondansetron ODT 4 MG disintegrating tablet  Commonly known as: ZOFRAN-ODT   4 mg, Translingual, Every 8 Hours PRN      Ozempic (1  MG/DOSE) 4 MG/3ML solution pen-injector  Generic drug: Semaglutide (1 MG/DOSE)   1 mg, Intramuscular, Weekly      prenatal vitamin 27-0.8 27-0.8 MG tablet tablet   1 tablet, Oral, Daily      Rivaroxaban 2.5 MG tablet  Commonly known as: Xarelto   2.5 mg, Oral, 2 Times Daily      ticagrelor 90 MG tablet tablet  Commonly known as: BRILINTA   90 mg, Oral, 2 Times Daily             Discharged medication regimen discussed with attending physician prior to discharge.     Discharge Diet:   cardiac diet  Dietary Orders (From admission, onward)     Start     Ordered    05/23/22 1200  Diet Regular; Cardiac, Consistent Carbohydrate  Diet Effective Now        Question Answer Comment   Diet Texture / Consistency Regular    Common Modifiers Cardiac    Common Modifiers Consistent Carbohydrate        05/23/22 1159                Activity at Discharge:  activity as tolerated         Discharge Disposition:    Home or Self Care        Follow-up Appointments:  Your Scheduled Appointments    May 26, 2022 11:00 AM  PHASE II with CHAIR 2 [facility] CARDIAC  REHABILITATION ([place]) [address] [facility] [address]  [phone]        May 31, 2022 11:00 AM  PHASE II with CHAIR 2 [facility] CARDIAC  REHABILITATION ([place]) [address] [facility] [address]  [phone]        May 31, 2022  2:00 PM  Hospital Follow Up with Roxy Deng DO  [facility] [address]  [facility] [address]  [phone]        Jun 02, 2022  8:45 AM  Office Visit with Roxy Deng DO  [facility] [address]  [facility] [address]  [phone]   Arrive 15 minutes prior to appointment.  If you are in need of a language or hearing  please call the Department.       Jun 02, 2022 11:00 AM  PHASE II with CHAIR 2 [facility] CARDIAC  REHABILITATION ([place]) [address]  [place] [address]  [phone]        Jun 07, 2022 11:00 AM  PHASE II with CHAIR 2 COR CARD [facility] [place] CARDIAC  REHABILITATION ([place]) [address]  [place] [address]  [phone]        Jun 09, 2022 11:00 AM  PHASE II with CHAIR 2 COR CARD [facility] [place] CARDIAC  REHABILITATION ([place]) [address]  [place] [address]  [phone]        Jun 14, 2022 11:00 AM  PH[facility]IR [facility] [place] CARDIAC  REHABILITATION ([place]) [address]  [place] [address]  [phone]        Jun 16, 2022 11:00 AM  PHASE II with CHAIR 2 COR CARD [facility] [place] CARDIAC  REHABILITATION ([place]) [address]  [place] [address]  [phone]        Jun 21, 2022 11:00 AM  PHASE II with CHAIR 2 COR CARD [facility] [place] CARDIAC  REHABILITATION ([place]) [address]  [place] [address]  [phone]        Jun 23, 2022 10:00 AM  Follow Up with JAVIER Berg  [facility] CARDIOLOGY ([place]) [address] [address]  [place] [address]  [phone]   -Bring photo ID, insurance card, and list of medications to appointment  -If testing was completed outside of [facility] then patient must bring images on a disc  -Copay will be collected at time of appointment  -Established patients should arrive at time of appointment       Jun 23, 2022 11:00 AM  PHASE II with CHAIR 2 COR CARD [facility] [place] CARDIAC  REHABILITATION ([place]) [address]  [place] [address]  [phone]        Jun 28, 2022 11:00 AM  PHASE II with CHAIR 2 COR CARD [facility] [place] CARDIAC  REHABILITATION ([place]) [address]  [place] [address]  [phone]        Jun 30, 2022 11:00 AM  PHASE II with CHAIR 2 COR CARD REHAB  [facility] [place] CARDI[facility]  REHABILITATION ([place]) [address] [facility] [address]  [phone]        Jul 05, 2022  9:15 AM  Follow Up with Carlita POWERS  JAVIER Grider  [facility] CARDIOLOGY ([place]) [address]  [place] [address]  [phone]   -Bring photo ID, insurance card, and list of medications to appointment  -If testing was completed outside of [facility] then patient must bring images on a disc  -Copay will be collected at time of appointment  -Established patients should arrive at time of appointment       Jul 05, 2022 11:00 AM  [facility]IR [facility]  [facility] [place] CARDIAC  REHABILITATION ([place]) [address]  [place] [address]  [phone]        Jul 07, 2022 11:00 AM  PHASE II with CHAIR 2 [facility]  [facility] [place] CARDIAC  REHABILITATION ([place]) [address]  [place] [address]  [phone]        Jul 12, 2022 11:00 AM  PHASE II with CHAIR 2 COR CARD [facility] [place] CARDIAC  REHABILITATION ([place]) [address]  [place] [address]  [phone]        Jul 14, 2022 11:00 AM  PHASE II with CHAIR 2 [facility]  [facility] [place] CARDIAC  REHABILITATION ([place]) [address]  [place] [address]  [phone]        Jul 19, 2022 11:00 AM  PHASE II with CHAIR 2 [facility]  [facility] [place] CARDIAC  REHABILITATION ([place]) [address]  [place] [address]  [phone]        Jul 21, 2022 11:00 AM  PHASE II with CHAIR 2 COR CARD REHAB  [facility] [place] CARDIAC  REHABILITATION ([place]) [address]  [place] [address]  [phone]        Jul 26, 2022 11:00 AM  PHASE II with CHAIR 2 COR CARD REHAB  [facility] [place] CARDIAC  REHABILITATION ([place]) [address]  [place] [address]  [phone]        Jul 28, 2022 11:00 AM  PHASE II with CHAIR 2 COR CARD REHAB  [facility] [place] CARDIAC  REHABILITATION ([place]) [address]  [place] [address]  [phone]        Aug 02, 2022 11:00 AM  PHASE II with CHAIR 2 COR CARD REHAB  [facility] [place] CARDIAC  REHABILITATION ([place]) [address] [facility]  [place]  [address]  [phone]        Aug 04, 2022 11:00 AM  PHASE II with CHAIR 2 COR CARD REHAB  [facility] [place] CARDI[facility]  REHABILITATION ([place]) [address]  [place] [address]  [phone]        Aug 09, 2022 11:00 AM  PHASE II with CHAIR 2 COR CARD REHAB  [facility] [place] [facility]  REHABILITATION ([place]) [address]  [place] [address]  [phone]        Aug 11, 2022 11:00 AM  PHASE II with CHAIR 2 COR CARD REHAB  [facility] [place] [facility]  REHABILITATION ([place]) [address]  [place] [address]  [phone]        Aug 16, 2022 11:00 AM  PHASE II with CHAIR 2 COR CARD REHAB  [facility] [place] [facility]  REHABILITATION ([place]) [address]  [place] [address]  [phone]        Aug 18, 2022 11:00 AM  PHASE II with CHAIR 2 COR CARD REHAB  [facility] [place] [facility]  REHABILITATION ([place]) [address]  [place] [address]  [phone]        Aug 23, 2022 11:00 AM  PHASE II with CHAIR 2 COR CARD REHAB  [facility] [place] [facility]  REHABILITATION ([place]) [address]  [place] [address]  [phone]        Aug 25, 2022 11:00 AM  PHASE II with CHAIR 2 COR CARD REHAB  [facility] [place] [facility]  REHABILITATION ([place]) [address]  [place] [address]  [phone]        Aug 30, 2022 11:00 AM  PHASE II with CHAIR 2 COR CARD REHAB  [facility] [place] [facility]  REHABILITATION ([place]) [address]  [place] [address]  [phone]        Sep 01, 2022 11:00 AM  PHASE II with CHAIR 2 COR CARD REHAB  [facility] [place] [facility]  REHABILITATION ([place]) [address]  [place] [address]  [phone]        Additional instructions:    Pt  has  an  apt  with  roxy palmer  for may 31  at 2  pm  and  meli steward  for June 23   at 10 am           Additional Instructions for the Follow-ups that You Need to Schedule     Discharge Follow-up with PCP   As directed       Currently Documented PCP:    Roxy Palmer DO    PCP Phone Number:     [phone]     Follow Up Details: One week hospital follow-up NSTEMI         Discharge Follow-up with Specified Provider: Carlita De Jesus NP; 1 Week   As directed      To: Carlita De Jesus NP    Follow Up: 1 Week    Follow Up Details: One week hospital follow-up for NSTEMI         Digoxin Level    May 30, 2022 (Approximate)      Send to PCP & Carlita De Jesus NP    Order Comments: Send to PCP & Carlita De Jesus NP     Release to patient: Immediate            Follow-up Information     Roxy Deng DO. Schedule an appointment as soon as possible for a visit .    Specialties: Family Medicine, Hospitalist  Why: If symptoms worsen  Contact information:  [address]  [place] [address]  [phone]             Roxy Deng DO .    Specialties: Family Medicine, Hospitalist  Why: One week hospital follow-up NSTEMI  Contact information:  [address]  [place] [address]  [phone]                         Additional Instructions for the Follow-ups that You Need to Schedule     Discharge Follow-up with PCP   As directed       Currently Documented PCP:    Roxy Deng DO    PCP Phone Number:    [phone]     Follow Up Details: One week hospital follow-up NSTEMI         Discharge Follow-up with Specified Provider: Carlita De Jesus NP; 1 Week   As directed      To: Carlita De Jesus NP    Follow Up: 1 Week    Follow Up Details: One week hospital follow-up for NSTEMI         Digoxin Level    May 30, 2022 (Approximate)      Send to PCP & Carlita De Jesus NP    Order Comments: Send to PCP & Carlita De Jesus NP     Release to patient: Immediate               Test Results Pending at Discharge:           Lucinda Judd PA-C  Hospital Medicine Team  05/25/22  11:19 EDT      Time: Greater than 30 minutes spent on this discharge.  I spent 45 minutes on this discharge activity which included:  Face-to-face encounter with the patient, discussing plan with attending physician, reviewing the data in the system, coordination of the  care with the nursing staff as well as consultations, documentation, and entering orders.

## 2022-05-25 NOTE — CASE MANAGEMENT/SOCIAL WORK
Discharge Planning Assessment  [facility]     Patient Name: Lissa Eisenberg  MRN: [id number]  Today's Date: 5/25/2022    Admit Date: 5/21/2022       Discharge Plan     Row Name 05/25/22 1108       Plan    Final Discharge Disposition Code 01 - home or self-care    Final Note Pt to be discharged home on this date.   arranged [employer] at [phone] for transport home per pt request.  [facility] to call [phone] when they arrive at [facility].                Yesi Tellez, BSW

## 2022-05-25 NOTE — NURSING NOTE
Pt being D/C'd per Dr. Jones. IV and Telemetry D/C'd. D/C education provided. Pt resting in bed, watching television. Pt has tolerated all interventions. No complaints/concerns. No acute distress noted. Will provide care until pt off unit.

## 2022-05-25 NOTE — OUTREACH NOTE
Prep Survey    Flowsheet Row Responses   [facility] patient discharged from? [place]   Is LACE score < 7 ? No   Emergency Room discharge w/ pulse ox? No   Eligibility [facility]   Date of Admission 05/21/22   Date of Discharge 05/25/22   Discharge Disposition Home or Self Care   Discharge diagnosis Chest pain    Does the patient have one of the following disease processes/diagnoses(primary or secondary)? Other   Does the patient have Home health ordered? No   Is there a DME ordered? No   Prep survey completed? Yes          YESSICA HOWARD - Registered Nurse

## 2022-05-25 NOTE — DISCHARGE INSTR - APPOINTMENTS
Pt  has  an  apt  with  jordon palmer  for may 31  at 2  pm  and  meli steward  for June 23   at 10 am

## 2022-05-26 ENCOUNTER — APPOINTMENT (OUTPATIENT)
Dept: CARDIAC REHAB | Facility: HOSPITAL | Age: 52
End: 2022-05-26

## 2022-05-26 ENCOUNTER — TRANSITIONAL CARE MANAGEMENT TELEPHONE ENCOUNTER (OUTPATIENT)
Dept: CALL CENTER | Facility: HOSPITAL | Age: 52
End: 2022-05-26

## 2022-05-26 NOTE — OUTREACH NOTE
Call Center TCM Note    Flowsheet Row Responses   [facility] patient discharged from? [place]   Does the patient have one of the following disease processes/diagnoses(primary or secondary)? Other   TCM attempt successful? Yes   Call start time 1231   Call end time 1232   Discharge diagnosis Chest pain    Meds reviewed with patient/caregiver? Yes   Is the patient having any side effects they believe may be caused by any medication additions or changes? No   Does the patient have all medications ordered at discharge? Yes   Is the patient taking all medications as directed (includes completed medication regime)? Yes   Does the patient have a primary care provider?  Yes   Does the patient have an appointment with their PCP within 7 days of discharge? Yes   Comments regarding PCP 5/31/22   Has the patient kept scheduled appointments due by today? N/A   Has home health visited the patient within 72 hours of discharge? N/A   Psychosocial issues? No   Did the patient receive a copy of their discharge instructions? Yes   Nursing interventions Reviewed instructions with patient   What is the patient's perception of their health status since discharge? Improving   Is the patient/caregiver able to teach back signs and symptoms related to disease process for when to call 911? Yes   If the patient is a current smoker, are they able to teach back resources for cessation? Not a smoker   TCM call completed? Yes          Pilar Lopez RN    5/26/2022, 12:33 EDT

## 2022-05-31 ENCOUNTER — OFFICE VISIT (OUTPATIENT)
Dept: FAMILY MEDICINE CLINIC | Facility: CLINIC | Age: 52
End: 2022-05-31

## 2022-05-31 ENCOUNTER — APPOINTMENT (OUTPATIENT)
Dept: CARDIAC REHAB | Facility: HOSPITAL | Age: 52
End: 2022-05-31

## 2022-05-31 VITALS
TEMPERATURE: 97.1 F | SYSTOLIC BLOOD PRESSURE: 147 MMHG | HEIGHT: 63 IN | HEART RATE: 111 BPM | BODY MASS INDEX: 33.24 KG/M2 | DIASTOLIC BLOOD PRESSURE: 92 MMHG | OXYGEN SATURATION: 98 % | WEIGHT: 187.6 LBS

## 2022-05-31 DIAGNOSIS — E83.42 HYPOMAGNESEMIA: ICD-10-CM

## 2022-05-31 DIAGNOSIS — K59.01 SLOW TRANSIT CONSTIPATION: ICD-10-CM

## 2022-05-31 DIAGNOSIS — E11.65 TYPE 2 DIABETES MELLITUS WITH HYPERGLYCEMIA, WITHOUT LONG-TERM CURRENT USE OF INSULIN: Chronic | ICD-10-CM

## 2022-05-31 DIAGNOSIS — I10 PRIMARY HYPERTENSION: Chronic | ICD-10-CM

## 2022-05-31 DIAGNOSIS — Z12.31 ENCOUNTER FOR SCREENING MAMMOGRAM FOR MALIGNANT NEOPLASM OF BREAST: ICD-10-CM

## 2022-05-31 DIAGNOSIS — I25.118 CORONARY ARTERY DISEASE OF NATIVE ARTERY OF NATIVE HEART WITH STABLE ANGINA PECTORIS: Primary | Chronic | ICD-10-CM

## 2022-05-31 PROCEDURE — 99214 OFFICE O/P EST MOD 30 MIN: CPT | Performed by: INTERNAL MEDICINE

## 2022-05-31 RX ORDER — MAGNESIUM OXIDE 400 MG/1
400 TABLET ORAL 2 TIMES DAILY
Qty: 60 TABLET | Refills: 5 | Status: ON HOLD | OUTPATIENT
Start: 2022-05-31 | End: 2022-06-17 | Stop reason: SDUPTHER

## 2022-05-31 RX ORDER — LEVOCETIRIZINE DIHYDROCHLORIDE 5 MG/1
5 TABLET, FILM COATED ORAL EVERY EVENING
Qty: 30 TABLET | Refills: 5 | Status: SHIPPED | OUTPATIENT
Start: 2022-05-31 | End: 2022-07-06 | Stop reason: SDUPTHER

## 2022-05-31 NOTE — PROGRESS NOTES
Transitional Care Follow Up Visit  Subjective     Lissa Eisenberg is a 51 y.o. female who presents for a transitional care management visit.    Within 48 business hours after discharge our office contacted her via telephone to coordinate her care and needs.      I reviewed and discussed the details of that call along with the discharge summary, hospital problems, inpatient lab results, inpatient diagnostic studies, and consultation reports with Lissa.     Current outpatient and discharge medications have been reconciled for the patient.  Reviewed by: Roxy Deng DO      Date of TCM Phone Call 5/25/2022   [facility]   Date of Admission 5/21/2022   Date of Discharge 5/25/2022   Discharge Disposition Home or Self Care     Risk for Readmission (LACE) Score: 8 (5/25/2022  6:01 AM)      History of Present Illness   Course During Hospital Stay: Lissa presents today for follow-up after being in the hospital recently due to chest pain.  She was admitted on May 21 and discharged on May 25 with chest pain concerning to be unstable angina and new left bundle branch block.  She was evaluated by cardiology while inpatient.  Catheterization was considered by her cardiologist, Dr. Smalls.  The covering cardiologist, Dr. Delgado, opted to treat patient medically.  Ranexa was increased and digoxin was added at that time.  This was continued upon hospital discharge and patient reports tolerating this regimen well.  She is planning to update labs today, including a digoxin level.  She was treated for hypomagnesemia while inpatient, with her mag level being as low as 1.1.  This was corrected to 2.0 while inpatient but again started to decline prior to her discharge.  She has not had this rechecked and has not been taking any magnesium supplements since hospital discharge.  She recalls taking Mag-Ox twice daily in the past but has been off of it for more than a month.    Lissa complains of feeling fatigued,  short of air, and constipated.  She has been taking Colace twice daily without relief of constipation.  She reports aching in her lower abdomen as well as in her back which she attributes to this constipation.  She has had a poor appetite and has not eaten anything today due to this constipation.  She had 1 small bowel movement yesterday and has been able to pass gas.  She has not taken any of her medication today because she has not eaten anything yet.  She is not having chest pain at this time but does report having it intermittently since hospital discharge and relieved with nitroglycerin.   Lissa is concerned about a left breast nodule that she has palpated over the past month.  She denies breast tenderness or nipple discharge.  Her last mammogram was done about 3 years ago.  She reports 2 cousins and her family have had breast cancer but no first-degree relatives.    The following portions of the patient's history were reviewed and updated as appropriate: allergies, current medications, past family history, past medical history, past social history, past surgical history and problem list.    Review of Systems   Constitutional: Positive for fatigue. Negative for fever.   Respiratory: Positive for shortness of breath.    Cardiovascular: Negative for chest pain.        No chest pain at this time but intermittent episodes of chest pain since hospital discharge.   Gastrointestinal: Positive for constipation.   Musculoskeletal: Positive for back pain.       Objective   Physical Exam  Vitals reviewed.   Constitutional:       General: She is not in acute distress.  HENT:      Head: Normocephalic and atraumatic.   Eyes:      General: No scleral icterus.     Extraocular Movements: Extraocular movements intact.      Conjunctiva/sclera: Conjunctivae normal.      Pupils: Pupils are equal, round, and reactive to light.   Cardiovascular:      Rate and Rhythm: Regular rhythm. Tachycardia present.   Pulmonary:      Effort:  Pulmonary effort is normal. No respiratory distress.      Breath sounds: Normal breath sounds. No wheezing or rhonchi.   Abdominal:      Palpations: Abdomen is soft.      Tenderness: There is no abdominal tenderness. There is no guarding or rebound.   Genitourinary:     Comments: Breast exam was performed in the presence of Melvina Grimaldo MA.  There was no axillary lymphadenopathy palpated.  There is no nipple discharge or overlying skin changes on either breast.  I did not palpate any masses on either breast on today's exam.  Musculoskeletal:         General: No swelling.      Cervical back: Neck supple. No tenderness.   Lymphadenopathy:      Cervical: No cervical adenopathy.   Skin:     General: Skin is warm and dry.      Coloration: Skin is not jaundiced.   Neurological:      Mental Status: She is alert.   Psychiatric:         Mood and Affect: Mood normal.         Behavior: Behavior normal.         Assessment & Plan   Problems Addressed this Visit        Cardiac and Vasculature    Coronary artery disease involving native coronary artery of native heart - Primary (Chronic)    Primary hypertension (Chronic)       Endocrine and Metabolic    Type 2 diabetes mellitus with hyperglycemia (HCC) (Chronic)      Other Visit Diagnoses     Slow transit constipation        Hypomagnesemia        Relevant Orders    Comprehensive metabolic panel    Magnesium    Encounter for screening mammogram for malignant neoplasm of breast        Relevant Orders    Mammo Screening Digital Tomosynthesis Bilateral With CAD      Diagnoses       Codes Comments    Coronary artery disease of native artery of native heart with stable angina pectoris (HCC)    -  Primary ICD-10-CM: I25.118  ICD-9-CM: 414.01, 413.9     Primary hypertension     ICD-10-CM: I10  ICD-9-CM: 401.9     Type 2 diabetes mellitus with hyperglycemia, without long-term current use of insulin (HCC)     ICD-10-CM: E11.65  ICD-9-CM: 250.00, 790.29     Slow transit constipation      ICD-10-CM: K59.01  ICD-9-CM: 564.01     Hypomagnesemia     ICD-10-CM: E83.42  ICD-9-CM: 275.2     Encounter for screening mammogram for malignant neoplasm of breast     ICD-10-CM: Z12.31  ICD-9-CM: V76.12           Hospital records were reviewed and discussed with patient today.  I encouraged patient to follow-up with her cardiologist, Dr. Smalls, as planned within the next couple of weeks.  We will update labs today as above.  I encouraged patient to update her mammogram.  Patient was complaining of Zyrtec not controlling her allergies.  We will replace Zyrtec with Xyzal today.  She was also complaining of Colace not controlling her constipation.  We will replace Colace with Linzess today.  Patient was given Linzess samples 72 mcg capsules to take daily #8.  I also sent a prescription of Linzess to the pharmacy for patient.  I recommend patient restart Mag-Ox 400 mg twice daily.  We will recheck her magnesium level today with her labs.       This document has been electronically signed by Roxy Deng DO  May 31, 2022 15:41 EDT

## 2022-06-01 LAB
ALBUMIN SERPL-MCNC: 4.8 G/DL (ref 3.5–5.2)
ALBUMIN/GLOB SERPL: 1.5 G/DL
ALP SERPL-CCNC: 102 U/L (ref 39–117)
ALT SERPL-CCNC: 21 U/L (ref 1–33)
AST SERPL-CCNC: 19 U/L (ref 1–32)
BILIRUB SERPL-MCNC: 0.7 MG/DL (ref 0–1.2)
BUN SERPL-MCNC: 12 MG/DL (ref 6–20)
BUN/CREAT SERPL: 15.6 (ref 7–25)
CALCIUM SERPL-MCNC: 9.6 MG/DL (ref 8.6–10.5)
CHLORIDE SERPL-SCNC: 101 MMOL/L (ref 98–107)
CO2 SERPL-SCNC: 20 MMOL/L (ref 22–29)
CREAT SERPL-MCNC: 0.77 MG/DL (ref 0.57–1)
GLOBULIN SER CALC-MCNC: 3.3 GM/DL
GLUCOSE SERPL-MCNC: 116 MG/DL (ref 65–99)
MAGNESIUM SERPL-MCNC: 1.5 MG/DL (ref 1.6–2.6)
POTASSIUM SERPL-SCNC: 4.2 MMOL/L (ref 3.5–5.2)
PROT SERPL-MCNC: 8.1 G/DL (ref 6–8.5)
SODIUM SERPL-SCNC: 136 MMOL/L (ref 136–145)

## 2022-06-02 ENCOUNTER — TELEPHONE (OUTPATIENT)
Dept: FAMILY MEDICINE CLINIC | Facility: CLINIC | Age: 52
End: 2022-06-02

## 2022-06-02 ENCOUNTER — APPOINTMENT (OUTPATIENT)
Dept: CARDIAC REHAB | Facility: HOSPITAL | Age: 52
End: 2022-06-02

## 2022-06-02 DIAGNOSIS — I25.118 CORONARY ARTERY DISEASE OF NATIVE ARTERY OF NATIVE HEART WITH STABLE ANGINA PECTORIS: Primary | ICD-10-CM

## 2022-06-02 DIAGNOSIS — E86.0 DEHYDRATION: ICD-10-CM

## 2022-06-02 DIAGNOSIS — I10 PRIMARY HYPERTENSION: ICD-10-CM

## 2022-06-02 NOTE — TELEPHONE ENCOUNTER
PATIENT IS WANTING TO LEAVE TWO FAX NUMBERS. FIRST NUMBER IS FOR BLOOD PRESSURE CUFF AT [facility] (FAX) [phone]   SECOND NUMBER IS FOR WHEEL CHAIR AT [facility] (FAX) [phone] INCLUDE HEIGHT AND WEIGHT. 5 FEET 2 INCHES  POUNDS.  ORDER NEEDS TO BE FAXED  TO NUMBERS THAT WERE PROVIDED.    CALL BACK NUMBER [phone]

## 2022-06-07 ENCOUNTER — APPOINTMENT (OUTPATIENT)
Dept: CARDIAC REHAB | Facility: HOSPITAL | Age: 52
End: 2022-06-07

## 2022-06-09 ENCOUNTER — APPOINTMENT (OUTPATIENT)
Dept: CARDIAC REHAB | Facility: HOSPITAL | Age: 52
End: 2022-06-09

## 2022-06-11 ENCOUNTER — APPOINTMENT (OUTPATIENT)
Dept: GENERAL RADIOLOGY | Facility: HOSPITAL | Age: 52
End: 2022-06-11

## 2022-06-11 ENCOUNTER — HOSPITAL ENCOUNTER (INPATIENT)
Facility: HOSPITAL | Age: 52
LOS: 8 days | Discharge: HOME HEALTH | End: 2022-06-21
Attending: EMERGENCY MEDICINE | Admitting: INTERNAL MEDICINE

## 2022-06-11 DIAGNOSIS — R53.1 GENERALIZED WEAKNESS: ICD-10-CM

## 2022-06-11 DIAGNOSIS — R07.9 CHEST PAIN, UNSPECIFIED TYPE: Primary | ICD-10-CM

## 2022-06-11 DIAGNOSIS — I20.8 STABLE ANGINA PECTORIS: ICD-10-CM

## 2022-06-11 LAB
BASOPHILS # BLD AUTO: 0.04 10*3/MM3 (ref 0–0.2)
BASOPHILS NFR BLD AUTO: 0.5 % (ref 0–1.5)
DEPRECATED RDW RBC AUTO: 41 FL (ref 37–54)
EOSINOPHIL # BLD AUTO: 0.16 10*3/MM3 (ref 0–0.4)
EOSINOPHIL NFR BLD AUTO: 2.1 % (ref 0.3–6.2)
ERYTHROCYTE [DISTWIDTH] IN BLOOD BY AUTOMATED COUNT: 14 % (ref 12.3–15.4)
HCT VFR BLD AUTO: 41.1 % (ref 34–46.6)
HGB BLD-MCNC: 13.8 G/DL (ref 12–15.9)
IMM GRANULOCYTES # BLD AUTO: 0.07 10*3/MM3 (ref 0–0.05)
IMM GRANULOCYTES NFR BLD AUTO: 0.9 % (ref 0–0.5)
LYMPHOCYTES # BLD AUTO: 2.57 10*3/MM3 (ref 0.7–3.1)
LYMPHOCYTES NFR BLD AUTO: 33.6 % (ref 19.6–45.3)
MCH RBC QN AUTO: 27.5 PG (ref 26.6–33)
MCHC RBC AUTO-ENTMCNC: 33.6 G/DL (ref 31.5–35.7)
MCV RBC AUTO: 81.9 FL (ref 79–97)
MONOCYTES # BLD AUTO: 0.61 10*3/MM3 (ref 0.1–0.9)
MONOCYTES NFR BLD AUTO: 8 % (ref 5–12)
NEUTROPHILS NFR BLD AUTO: 4.19 10*3/MM3 (ref 1.7–7)
NEUTROPHILS NFR BLD AUTO: 54.9 % (ref 42.7–76)
NRBC BLD AUTO-RTO: 0 /100 WBC (ref 0–0.2)
NT-PROBNP SERPL-MCNC: 60.6 PG/ML (ref 0–900)
PLATELET # BLD AUTO: 309 10*3/MM3 (ref 140–450)
PMV BLD AUTO: 9.1 FL (ref 6–12)
RBC # BLD AUTO: 5.02 10*6/MM3 (ref 3.77–5.28)
WBC NRBC COR # BLD: 7.64 10*3/MM3 (ref 3.4–10.8)

## 2022-06-11 PROCEDURE — 25010000002 ONDANSETRON PER 1 MG: Performed by: EMERGENCY MEDICINE

## 2022-06-11 PROCEDURE — 80053 COMPREHEN METABOLIC PANEL: CPT | Performed by: EMERGENCY MEDICINE

## 2022-06-11 PROCEDURE — 93455 CORONARY ART/GRFT ANGIO S&I: CPT | Performed by: INTERNAL MEDICINE

## 2022-06-11 PROCEDURE — 99284 EMERGENCY DEPT VISIT MOD MDM: CPT

## 2022-06-11 PROCEDURE — 71045 X-RAY EXAM CHEST 1 VIEW: CPT

## 2022-06-11 PROCEDURE — 99223 1ST HOSP IP/OBS HIGH 75: CPT | Performed by: PHYSICIAN ASSISTANT

## 2022-06-11 PROCEDURE — 36415 COLL VENOUS BLD VENIPUNCTURE: CPT

## 2022-06-11 PROCEDURE — 84484 ASSAY OF TROPONIN QUANT: CPT | Performed by: EMERGENCY MEDICINE

## 2022-06-11 PROCEDURE — 85025 COMPLETE CBC W/AUTO DIFF WBC: CPT | Performed by: EMERGENCY MEDICINE

## 2022-06-11 PROCEDURE — 93010 ELECTROCARDIOGRAM REPORT: CPT | Performed by: INTERNAL MEDICINE

## 2022-06-11 PROCEDURE — 83880 ASSAY OF NATRIURETIC PEPTIDE: CPT | Performed by: EMERGENCY MEDICINE

## 2022-06-11 RX ORDER — SODIUM CHLORIDE 0.9 % (FLUSH) 0.9 %
10 SYRINGE (ML) INJECTION AS NEEDED
Status: DISCONTINUED | OUTPATIENT
Start: 2022-06-11 | End: 2022-06-21 | Stop reason: HOSPADM

## 2022-06-11 RX ORDER — ONDANSETRON 2 MG/ML
4 INJECTION INTRAMUSCULAR; INTRAVENOUS ONCE
Status: COMPLETED | OUTPATIENT
Start: 2022-06-11 | End: 2022-06-11

## 2022-06-11 RX ORDER — ASPIRIN 81 MG/1
324 TABLET, CHEWABLE ORAL ONCE
Status: COMPLETED | OUTPATIENT
Start: 2022-06-11 | End: 2022-06-11

## 2022-06-11 RX ADMIN — ASPIRIN 324 MG: 81 TABLET, CHEWABLE ORAL at 23:12

## 2022-06-11 RX ADMIN — ONDANSETRON 4 MG: 2 INJECTION INTRAMUSCULAR; INTRAVENOUS at 23:36

## 2022-06-12 PROBLEM — R07.9 CHEST PAIN, UNSPECIFIED TYPE: Status: ACTIVE | Noted: 2022-06-12

## 2022-06-12 LAB
ALBUMIN SERPL-MCNC: 4.2 G/DL (ref 3.5–5.2)
ALBUMIN SERPL-MCNC: 4.5 G/DL (ref 3.5–5.2)
ALBUMIN/GLOB SERPL: 1.4 G/DL
ALBUMIN/GLOB SERPL: 1.4 G/DL
ALP SERPL-CCNC: 93 U/L (ref 39–117)
ALP SERPL-CCNC: 96 U/L (ref 39–117)
ALT SERPL W P-5'-P-CCNC: 13 U/L (ref 1–33)
ALT SERPL W P-5'-P-CCNC: 16 U/L (ref 1–33)
ANION GAP SERPL CALCULATED.3IONS-SCNC: 14.7 MMOL/L (ref 5–15)
ANION GAP SERPL CALCULATED.3IONS-SCNC: 15.4 MMOL/L (ref 5–15)
AST SERPL-CCNC: 16 U/L (ref 1–32)
AST SERPL-CCNC: 17 U/L (ref 1–32)
BILIRUB SERPL-MCNC: 0.5 MG/DL (ref 0–1.2)
BILIRUB SERPL-MCNC: 0.5 MG/DL (ref 0–1.2)
BUN SERPL-MCNC: 11 MG/DL (ref 6–20)
BUN SERPL-MCNC: 16 MG/DL (ref 6–20)
BUN/CREAT SERPL: 17.7 (ref 7–25)
BUN/CREAT SERPL: 18.4 (ref 7–25)
CALCIUM SPEC-SCNC: 9.3 MG/DL (ref 8.6–10.5)
CALCIUM SPEC-SCNC: 9.6 MG/DL (ref 8.6–10.5)
CHLORIDE SERPL-SCNC: 94 MMOL/L (ref 98–107)
CHLORIDE SERPL-SCNC: 99 MMOL/L (ref 98–107)
CO2 SERPL-SCNC: 20.3 MMOL/L (ref 22–29)
CO2 SERPL-SCNC: 21.6 MMOL/L (ref 22–29)
CREAT SERPL-MCNC: 0.62 MG/DL (ref 0.57–1)
CREAT SERPL-MCNC: 0.87 MG/DL (ref 0.57–1)
EGFRCR SERPLBLD CKD-EPI 2021: 107.3 ML/MIN/1.73
EGFRCR SERPLBLD CKD-EPI 2021: 80.3 ML/MIN/1.73
FLUAV RNA RESP QL NAA+PROBE: NOT DETECTED
FLUBV RNA RESP QL NAA+PROBE: NOT DETECTED
GLOBULIN UR ELPH-MCNC: 3.1 GM/DL
GLOBULIN UR ELPH-MCNC: 3.2 GM/DL
GLUCOSE BLDC GLUCOMTR-MCNC: 110 MG/DL (ref 70–130)
GLUCOSE BLDC GLUCOMTR-MCNC: 98 MG/DL (ref 70–130)
GLUCOSE SERPL-MCNC: 110 MG/DL (ref 65–99)
GLUCOSE SERPL-MCNC: 118 MG/DL (ref 65–99)
HOLD SPECIMEN: NORMAL
HOLD SPECIMEN: NORMAL
POTASSIUM SERPL-SCNC: 3.6 MMOL/L (ref 3.5–5.2)
POTASSIUM SERPL-SCNC: 3.9 MMOL/L (ref 3.5–5.2)
PROT SERPL-MCNC: 7.3 G/DL (ref 6–8.5)
PROT SERPL-MCNC: 7.7 G/DL (ref 6–8.5)
QT INTERVAL: 328 MS
QT INTERVAL: 352 MS
QT INTERVAL: 356 MS
QTC INTERVAL: 427 MS
QTC INTERVAL: 445 MS
QTC INTERVAL: 458 MS
SARS-COV-2 RNA RESP QL NAA+PROBE: NOT DETECTED
SODIUM SERPL-SCNC: 129 MMOL/L (ref 136–145)
SODIUM SERPL-SCNC: 136 MMOL/L (ref 136–145)
TROPONIN T SERPL-MCNC: <0.01 NG/ML (ref 0–0.03)
WHOLE BLOOD HOLD COAG: NORMAL
WHOLE BLOOD HOLD SPECIMEN: NORMAL

## 2022-06-12 PROCEDURE — 82962 GLUCOSE BLOOD TEST: CPT

## 2022-06-12 PROCEDURE — 93005 ELECTROCARDIOGRAM TRACING: CPT | Performed by: PHYSICIAN ASSISTANT

## 2022-06-12 PROCEDURE — 80053 COMPREHEN METABOLIC PANEL: CPT | Performed by: PHYSICIAN ASSISTANT

## 2022-06-12 PROCEDURE — G0378 HOSPITAL OBSERVATION PER HR: HCPCS

## 2022-06-12 PROCEDURE — 93010 ELECTROCARDIOGRAM REPORT: CPT | Performed by: INTERNAL MEDICINE

## 2022-06-12 PROCEDURE — 87636 SARSCOV2 & INF A&B AMP PRB: CPT | Performed by: INTERNAL MEDICINE

## 2022-06-12 PROCEDURE — 93005 ELECTROCARDIOGRAM TRACING: CPT | Performed by: NURSE PRACTITIONER

## 2022-06-12 PROCEDURE — 84484 ASSAY OF TROPONIN QUANT: CPT | Performed by: PHYSICIAN ASSISTANT

## 2022-06-12 PROCEDURE — 84484 ASSAY OF TROPONIN QUANT: CPT | Performed by: EMERGENCY MEDICINE

## 2022-06-12 RX ORDER — ATORVASTATIN CALCIUM 40 MG/1
80 TABLET, FILM COATED ORAL NIGHTLY
Status: DISCONTINUED | OUTPATIENT
Start: 2022-06-12 | End: 2022-06-21 | Stop reason: HOSPADM

## 2022-06-12 RX ORDER — RANOLAZINE 500 MG/1
1000 TABLET, EXTENDED RELEASE ORAL 2 TIMES DAILY
Status: DISCONTINUED | OUTPATIENT
Start: 2022-06-12 | End: 2022-06-21 | Stop reason: HOSPADM

## 2022-06-12 RX ORDER — ATORVASTATIN CALCIUM 40 MG/1
80 TABLET, FILM COATED ORAL NIGHTLY
Status: CANCELLED | OUTPATIENT
Start: 2022-06-12

## 2022-06-12 RX ORDER — ASPIRIN 81 MG/1
81 TABLET ORAL DAILY
Status: CANCELLED | OUTPATIENT
Start: 2022-06-12

## 2022-06-12 RX ORDER — ONDANSETRON 4 MG/1
4 TABLET, ORALLY DISINTEGRATING ORAL EVERY 8 HOURS PRN
Status: CANCELLED | OUTPATIENT
Start: 2022-06-12

## 2022-06-12 RX ORDER — PRENATAL VIT/IRON FUM/FOLIC AC 27MG-0.8MG
1 TABLET ORAL DAILY
Status: CANCELLED | OUTPATIENT
Start: 2022-06-12

## 2022-06-12 RX ORDER — DOCUSATE SODIUM 100 MG/1
100 CAPSULE, LIQUID FILLED ORAL 2 TIMES DAILY
Status: DISCONTINUED | OUTPATIENT
Start: 2022-06-12 | End: 2022-06-21 | Stop reason: HOSPADM

## 2022-06-12 RX ORDER — PANTOPRAZOLE SODIUM 40 MG/1
40 TABLET, DELAYED RELEASE ORAL EVERY MORNING
Refills: 1 | Status: CANCELLED | OUTPATIENT
Start: 2022-06-12

## 2022-06-12 RX ORDER — ISOSORBIDE MONONITRATE 30 MG/1
30 TABLET, EXTENDED RELEASE ORAL
Status: CANCELLED | OUTPATIENT
Start: 2022-06-12

## 2022-06-12 RX ORDER — DULOXETIN HYDROCHLORIDE 60 MG/1
60 CAPSULE, DELAYED RELEASE ORAL DAILY
Status: CANCELLED | OUTPATIENT
Start: 2022-06-12

## 2022-06-12 RX ORDER — DULOXETIN HYDROCHLORIDE 60 MG/1
60 CAPSULE, DELAYED RELEASE ORAL DAILY
Status: DISCONTINUED | OUTPATIENT
Start: 2022-06-12 | End: 2022-06-21 | Stop reason: HOSPADM

## 2022-06-12 RX ORDER — CETIRIZINE HYDROCHLORIDE 10 MG/1
10 TABLET ORAL DAILY
Status: DISCONTINUED | OUTPATIENT
Start: 2022-06-12 | End: 2022-06-21 | Stop reason: HOSPADM

## 2022-06-12 RX ORDER — NITROGLYCERIN 0.4 MG/1
0.4 TABLET SUBLINGUAL
Status: CANCELLED | OUTPATIENT
Start: 2022-06-12

## 2022-06-12 RX ORDER — PRENATAL VIT/IRON FUM/FOLIC AC 27MG-0.8MG
1 TABLET ORAL DAILY
Status: DISCONTINUED | OUTPATIENT
Start: 2022-06-12 | End: 2022-06-21 | Stop reason: HOSPADM

## 2022-06-12 RX ORDER — INSULIN ASPART 100 [IU]/ML
0-7 INJECTION, SOLUTION INTRAVENOUS; SUBCUTANEOUS
Status: DISCONTINUED | OUTPATIENT
Start: 2022-06-12 | End: 2022-06-18

## 2022-06-12 RX ORDER — DIGOXIN 125 MCG
125 TABLET ORAL
Status: CANCELLED | OUTPATIENT
Start: 2022-06-12

## 2022-06-12 RX ORDER — DOCUSATE SODIUM 100 MG/1
100 CAPSULE, LIQUID FILLED ORAL 2 TIMES DAILY
Status: CANCELLED | OUTPATIENT
Start: 2022-06-12 | End: 2022-06-24

## 2022-06-12 RX ORDER — NITROGLYCERIN 0.4 MG/1
0.4 TABLET SUBLINGUAL
Status: DISCONTINUED | OUTPATIENT
Start: 2022-06-12 | End: 2022-06-21 | Stop reason: HOSPADM

## 2022-06-12 RX ORDER — LISINOPRIL 2.5 MG/1
2.5 TABLET ORAL DAILY
Status: CANCELLED | OUTPATIENT
Start: 2022-06-12

## 2022-06-12 RX ORDER — ASPIRIN 81 MG/1
81 TABLET ORAL DAILY
Status: DISCONTINUED | OUTPATIENT
Start: 2022-06-12 | End: 2022-06-21 | Stop reason: HOSPADM

## 2022-06-12 RX ORDER — CETIRIZINE HYDROCHLORIDE 10 MG/1
10 TABLET ORAL DAILY
Refills: 5 | Status: CANCELLED | OUTPATIENT
Start: 2022-06-12

## 2022-06-12 RX ORDER — LISINOPRIL 2.5 MG/1
2.5 TABLET ORAL DAILY
Status: DISCONTINUED | OUTPATIENT
Start: 2022-06-12 | End: 2022-06-13

## 2022-06-12 RX ORDER — RANOLAZINE 500 MG/1
1000 TABLET, EXTENDED RELEASE ORAL 2 TIMES DAILY
Status: CANCELLED | OUTPATIENT
Start: 2022-06-12

## 2022-06-12 RX ORDER — DEXTROSE MONOHYDRATE 25 G/50ML
25 INJECTION, SOLUTION INTRAVENOUS
Status: DISCONTINUED | OUTPATIENT
Start: 2022-06-12 | End: 2022-06-21 | Stop reason: HOSPADM

## 2022-06-12 RX ORDER — NICOTINE POLACRILEX 4 MG
15 LOZENGE BUCCAL
Status: DISCONTINUED | OUTPATIENT
Start: 2022-06-12 | End: 2022-06-21 | Stop reason: HOSPADM

## 2022-06-12 RX ADMIN — RANOLAZINE 1000 MG: 500 TABLET, FILM COATED, EXTENDED RELEASE ORAL at 20:24

## 2022-06-12 RX ADMIN — LISINOPRIL 2.5 MG: 2.5 TABLET ORAL at 12:04

## 2022-06-12 RX ADMIN — TICAGRELOR 90 MG: 90 TABLET ORAL at 09:14

## 2022-06-12 RX ADMIN — DOCUSATE SODIUM 100 MG: 100 CAPSULE ORAL at 09:13

## 2022-06-12 RX ADMIN — ASPIRIN 81 MG: 81 TABLET, COATED ORAL at 09:14

## 2022-06-12 RX ADMIN — METOPROLOL TARTRATE 25 MG: 25 TABLET, FILM COATED ORAL at 12:04

## 2022-06-12 RX ADMIN — DOCUSATE SODIUM 100 MG: 100 CAPSULE ORAL at 20:24

## 2022-06-12 RX ADMIN — CETIRIZINE HYDROCHLORIDE 10 MG: 10 TABLET, FILM COATED ORAL at 09:14

## 2022-06-12 RX ADMIN — TICAGRELOR 90 MG: 90 TABLET ORAL at 20:24

## 2022-06-12 RX ADMIN — NITROGLYCERIN 0.4 MG: 0.4 TABLET, ORALLY DISINTEGRATING SUBLINGUAL at 20:45

## 2022-06-12 RX ADMIN — ATORVASTATIN CALCIUM 80 MG: 40 TABLET, FILM COATED ORAL at 20:24

## 2022-06-12 RX ADMIN — NITROGLYCERIN 1 INCH: 20 OINTMENT TOPICAL at 05:07

## 2022-06-12 RX ADMIN — NITROGLYCERIN 0.4 MG: 0.4 TABLET, ORALLY DISINTEGRATING SUBLINGUAL at 20:50

## 2022-06-12 RX ADMIN — DULOXETINE HYDROCHLORIDE 60 MG: 60 CAPSULE, DELAYED RELEASE ORAL at 09:13

## 2022-06-12 RX ADMIN — PRENATAL VIT W/ FE FUMARATE-FA TAB 27-0.8 MG 1 TABLET: 27-0.8 TAB at 09:14

## 2022-06-12 RX ADMIN — RANOLAZINE 1000 MG: 500 TABLET, FILM COATED, EXTENDED RELEASE ORAL at 09:14

## 2022-06-12 RX ADMIN — RIVAROXABAN 2.5 MG: 2.5 TABLET, FILM COATED ORAL at 20:24

## 2022-06-12 RX ADMIN — MAGNESIUM GLUCONATE 500 MG ORAL TABLET 400 MG: 500 TABLET ORAL at 20:24

## 2022-06-12 RX ADMIN — RIVAROXABAN 2.5 MG: 2.5 TABLET, FILM COATED ORAL at 09:13

## 2022-06-12 NOTE — H&P
[facility] Medicine Services  History & Physical    Patient Identification:  Name:  Lissa Eisenberg  Age:  51 y.o.  Sex:  female  :  1970  MRN:  [id number]   Visit Number:  [id number]  Primary Care Physician:  Roxy Deng DO Subjective     2022   Chief complaint:   Chief Complaint   Patient presents with   • Chest Pain   • Shortness of Breath       History of presenting illness:      Lissa Eisenberg is a 51 y.o. female with past medical history significant for type 2 diabetes mellitus, essential hypertension, hyperlipidemia, coronary artery disease status post CABG x2 with JAYDA to mid RCA 2021 and arthrotomy of severe ISR and PCI of mid RCA with attempt to stent ostial RPDA unsuccessful, left bundle branch block, chronic diastolic heart failure, GERD, anxiety/depression/PTSD, and obesity who presents to the emergency department for evaluation of chest pain and shortness of breath.  Of note, patient was recently admitted to this facility from May 21 to May 25, 2022 for evaluation of chest pain.  She was found to have a new left bundle branch block.  She was admitted to the telemetry floor and evaluated by cardiologist, Dr. Smalls, who recommended [facility] for possible intervention with balloon angioplasty to ostial RPDA to help anginal episode due to concern for possible single-vessel bypass.  Later evaluated by another cardiologist, Dr. Delgado, to treat medically at this time and if symptoms persist then needs evaluation for single-vessel bypass.  Her dose of Ranexa was increased and digoxin was added.  Case was discussed with cardiology again with recommendation for outpatient follow-up with cardiology as well as continuing Ranexa digoxin and other home medications.    Patient states that since she was discharged from the hospital last month that her weakness and fatigue has gradually worsened.  She also has a decreased appetite and reports losing 14 pounds since she was  "last admitted.  She states that her chest pain has worsened in nature since discharge.  She describes the pain as \"dumbo sitting on the middle of her chest\" that radiates from the midsternal area of her chest up into the left side of her neck into the jaw and left shoulder area.  She does report associated symptoms of shortness of breath, diaphoresis, and nausea.  She states that the pain is intermittent.  She states that she has the pain at rest and that it worsens with activity.  She rates her pain as a 7 out of 10 on the pain scale.  She states that nitroglycerin does relieve her pain.    Upon arrival to the ED, vital signs were temperature 96.9, heart rate 99, respirations 20, blood pressure 155/84, SPO2 99% on room air.  CMP with glucose 110, sodium 129, CO2 20.3, chloride 94, otherwise unremarkable.  CBC largely unremarkable.  Troponin T negative x2.  proBNP 60.6.  Chest x-ray with no acute cardiopulmonary findings.    Known Emergency Department medications received prior to my evaluation included aspirin 324 mg, IV Zofran 4 mg. Room location at the time of my evaluation was 205 A.     ---------------------------------------------------------------------------------------------------------------------   Review of Systems   Constitutional: Positive for appetite change (decreased), diaphoresis, fatigue and unexpected weight change (lost 14 lbs within last month). Negative for activity change.   HENT: Negative for congestion, postnasal drip, rhinorrhea and sore throat.    Eyes: Negative for discharge and redness.   Respiratory: Positive for shortness of breath. Negative for apnea, cough and wheezing.    Cardiovascular: Positive for chest pain (midsternal radiating into left jaw, neck, and shoulder; intermittent). Negative for palpitations.   Gastrointestinal: Positive for nausea. Negative for abdominal distention, abdominal pain, diarrhea and vomiting.   Endocrine: Negative for polydipsia, polyphagia and " polyuria.   Genitourinary: Negative for difficulty urinating, dysuria, frequency and urgency.   Musculoskeletal: Negative for arthralgias and myalgias.   Skin: Negative for color change and wound.   Neurological: Positive for weakness (generalized). Negative for dizziness, syncope and light-headedness.   Psychiatric/Behavioral: Negative for agitation and behavioral problems.        ---------------------------------------------------------------------------------------------------------------------   Past Medical History:   Diagnosis Date   • COVID-19    • Diabetes mellitus (HCC)    • Hyperlipidemia    • Hypertension    • Myocardial infarct (HCC)    • PCOS (polycystic ovarian syndrome)    • PTSD (post-traumatic stress disorder)      Past Surgical History:   Procedure Laterality Date   • CARDIAC CATHETERIZATION     • CARDIAC CATHETERIZATION N/A 2022    Procedure: Left Heart Cath;  Surgeon: Alexandr Marquez MD;  Location: [facility] CATH INVASIVE LOCATION;  Service: Cardiology;  Laterality: N/A;   •  SECTION     • CHOLECYSTECTOMY     • CORONARY ARTERY BYPASS GRAFT      x 2   • CORONARY STENT PLACEMENT      x 6 per patient   • DILATATION AND CURETTAGE     • KNEE SURGERY Left    • TONSILLECTOMY       Family History   Problem Relation Age of Onset   • Lung cancer Father    • Thyroid cancer Brother    • Heart attack Maternal Grandfather    • Lung cancer Maternal Grandfather    • Heart attack Paternal Grandmother    • Emphysema Paternal Grandfather      Social History     Socioeconomic History   • Marital status:    Tobacco Use   • Smoking status: Never Smoker   • Smokeless tobacco: Never Used   Vaping Use   • Vaping Use: Never used   Substance and Sexual Activity   • Alcohol use: Not Currently   • Drug use: Never   • Sexual activity: Yes     Partners: Male     Birth control/protection: None      ---------------------------------------------------------------------------------------------------------------------   Allergies:  Contrast dye, Ciprofloxacin, Sulfa antibiotics, Drug [hydrocodone-acetaminophen], and Adhesive tape  ---------------------------------------------------------------------------------------------------------------------   Home medications:    Medications below are reported home medications pulling from within the system; at this time, these medications have not been reconciled unless otherwise specified and are in the verification process for further verifcation as current home medications.  Medications Prior to Admission   Medication Sig Dispense Refill Last Dose   • aspirin 81 MG EC tablet Take 81 mg by mouth Daily.   6/11/2022 at Unknown time   • atorvastatin (LIPITOR) 80 MG tablet Take 1 tablet by mouth Every Night. 90 tablet 1 Past Week at Unknown time   • digoxin (LANOXIN) 125 MCG tablet Take 1 tablet by mouth Daily for 30 days. 30 tablet 0 6/11/2022 at Unknown time   • docusate sodium (COLACE) 100 MG capsule Take 1 capsule by mouth 2 (Two) Times a Day for 30 days. 60 capsule 0 6/11/2022 at Unknown time   • DULoxetine (CYMBALTA) 30 MG capsule Take 60 mg by mouth Daily.   6/11/2022 at Unknown time   • isosorbide mononitrate (IMDUR) 30 MG 24 hr tablet Take 1 tablet by mouth Daily. 90 tablet 1 6/11/2022 at Unknown time   • levocetirizine (XYZAL) 5 MG tablet Take 1 tablet by mouth Every Evening. 30 tablet 5 Past Week at Unknown time   • linaclotide (Linzess) 72 MCG capsule capsule Take 1 capsule by mouth Every Morning Before Breakfast. 30 capsule 2 Past Week at Unknown time   • lisinopril (PRINIVIL,ZESTRIL) 2.5 MG tablet Take 2.5 mg by mouth Daily.   6/11/2022 at Unknown time   • magnesium oxide (MAG-OX) 400 MG tablet Take 1 tablet by mouth 2 (Two) Times a Day. 60 tablet 5 Past Week at Unknown time   • metFORMIN (GLUCOPHAGE) 1000 MG tablet Take 1 tablet by mouth 2 (Two) Times a Day  With Meals. 180 tablet 1 6/11/2022 at Unknown time   • metoprolol tartrate (LOPRESSOR) 25 MG tablet Take 1 tablet by mouth 2 (Two) Times a Day. 60 tablet 0 6/11/2022 at Unknown time   • omeprazole (priLOSEC) 20 MG capsule Take 1 capsule by mouth Daily. 90 capsule 1 6/11/2022 at Unknown time   • Ozempic, 1 MG/DOSE, 4 MG/3ML solution pen-injector Inject 1 mg into the appropriate muscle as directed by prescriber 1 (One) Time Per Week. (Patient taking differently: Inject 1 mg into the appropriate muscle as directed by prescriber 1 (One) Time Per Week. Thursdays.) 9 mL 1 Past Week at Unknown time   • Prenatal Vit-Fe Fumarate-FA (prenatal vitamin 27-0.8) 27-0.8 MG tablet tablet Take 1 tablet by mouth Daily.   6/11/2022 at Unknown time   • ranolazine (Ranexa) 1000 MG 12 hr tablet Take 1 tablet by mouth 2 (Two) Times a Day for 30 days. 60 tablet 0 6/11/2022 at Unknown time   • Rivaroxaban (Xarelto) 2.5 MG tablet Take 1 tablet by mouth 2 (Two) Times a Day. 180 tablet 1 6/11/2022 at Unknown time   • ticagrelor (BRILINTA) 90 MG tablet tablet Take 1 tablet by mouth 2 (Two) Times a Day. 180 tablet 3 6/11/2022 at Unknown time   • nitroglycerin (NITROSTAT) 0.4 MG SL tablet Place 1 tablet under the tongue Every 5 minutes as Needed for Chest Pain until chest pain is relieved. If you have to take 3 tabs, take the 3rd and go to the hospital to be evaluated. 25 tablet 1 Unknown at Unknown time   • ondansetron ODT (ZOFRAN-ODT) 4 MG disintegrating tablet Place 1 tablet on the tongue Every 8 (Eight) Hours As Needed for Nausea or Vomiting. 12 tablet 0 Unknown at Unknown time       Hospital Scheduled Meds:  aspirin, 81 mg, Oral, Daily  atorvastatin, 80 mg, Oral, Nightly  cetirizine, 10 mg, Oral, Daily  docusate sodium, 100 mg, Oral, BID  DULoxetine, 60 mg, Oral, Daily  prenatal vitamin 27-0.8, 1 tablet, Oral, Daily  ranolazine, 1,000 mg, Oral, BID  Rivaroxaban, 2.5 mg, Oral, BID  ticagrelor, 90 mg, Oral, BID           Current listed  hospital scheduled medications may not yet reflect those currently placed in orders that are signed and held awaiting patient's arrival to floor.   ---------------------------------------------------------------------------------------------------------------------     Objective     Vital Signs:  Temp:  [96.9 °F (36.1 °C)] 96.9 °F (36.1 °C)  Heart Rate:  [] 95  Resp:  [20] 20  BP: (103-155)/(66-86) 128/86      06/11/22  2247   Weight: 78.9 kg (174 lb)     Body mass index is 30.82 kg/m².  ---------------------------------------------------------------------------------------------------------------------       Physical Exam  Constitutional:       Appearance: Normal appearance. She is normal weight.      Comments: Resting comfortably in bed. No acute distress noted.    HENT:      Head: Normocephalic and atraumatic.      Right Ear: External ear normal.      Left Ear: External ear normal.      Nose: Nose normal.      Mouth/Throat:      Mouth: Mucous membranes are moist.      Pharynx: Oropharynx is clear.   Eyes:      Extraocular Movements: Extraocular movements intact.      Conjunctiva/sclera: Conjunctivae normal.      Pupils: Pupils are equal, round, and reactive to light.   Cardiovascular:      Rate and Rhythm: Normal rate and regular rhythm.      Pulses: Normal pulses.           Dorsalis pedis pulses are 2+ on the right side and 2+ on the left side.        Posterior tibial pulses are 2+ on the right side and 2+ on the left side.      Heart sounds: Normal heart sounds. No murmur heard.    No friction rub. No gallop.   Pulmonary:      Effort: Pulmonary effort is normal. No respiratory distress.      Breath sounds: Normal breath sounds. No wheezing, rhonchi or rales.   Abdominal:      General: Abdomen is flat. Bowel sounds are normal. There is no distension.      Palpations: Abdomen is soft.      Tenderness: There is no abdominal tenderness. There is no guarding.   Musculoskeletal:         General: No swelling.  Normal range of motion.      Cervical back: Normal range of motion and neck supple.      Right lower leg: No edema.      Left lower leg: No edema.   Skin:     General: Skin is warm and dry.      Findings: No erythema or rash.   Neurological:      General: No focal deficit present.      Mental Status: She is alert and oriented to person, place, and time. Mental status is at baseline.   Psychiatric:         Mood and Affect: Mood normal.         Behavior: Behavior normal.         Thought Content: Thought content normal.           ---------------------------------------------------------------------------------------------------------------------  EKG:    Pending cardiology read.  Per my review, EKG shows normal sinus rhythm with diffuse T wave inversions which is new compared to previous EKG from May 23.    Telemetry:    Telemetry with sinus rhythm with HR 70's    I have personally looked at both the EKG and the telemetry strips.  ---------------------------------------------------------------------------------------------------------------------   Results from last 7 days   Lab Units 06/11/22  2332   WBC 10*3/mm3 7.64   HEMOGLOBIN g/dL 13.8   HEMATOCRIT % 41.1   MCV fL 81.9   MCHC g/dL 33.6   PLATELETS 10*3/mm3 309         Results from last 7 days   Lab Units 06/11/22  2332   SODIUM mmol/L 129*   POTASSIUM mmol/L 3.9   CHLORIDE mmol/L 94*   CO2 mmol/L 20.3*   BUN mg/dL 16   CREATININE mg/dL 0.87   CALCIUM mg/dL 9.6   GLUCOSE mg/dL 110*   ALBUMIN g/dL 4.50   BILIRUBIN mg/dL 0.5   ALK PHOS U/L 93   AST (SGOT) U/L 16   ALT (SGPT) U/L 16   Estimated Creatinine Clearance: 75.2 mL/min (by C-G formula based on SCr of 0.87 mg/dL).  No results found for: AMMONIA  Results from last 7 days   Lab Units 06/12/22  0149 06/11/22  2332   TROPONIN T ng/mL <0.010 <0.010     Results from last 7 days   Lab Units 06/11/22  2332   PROBNP pg/mL 60.6     Lab Results   Component Value Date    HGBA1C 5.80 (H) 05/21/2022     Lab Results    Component Value Date    TSH 2.410 05/22/2022     No results found for: PREGTESTUR, PREGSERUM, HCG, HCGQUANT  Pain Management Panel     Pain Management Panel Latest Ref Rng & Units 5/22/2022    AMPHETAMINES SCREEN, URINE Negative Negative    BARBITURATES SCREEN Negative Negative    BENZODIAZEPINE SCREEN, URINE Negative Negative    BUPRENORPHINEUR Negative Negative    COCAINE SCREEN, URINE Negative Negative    METHADONE SCREEN, URINE Negative Negative    METHAMPHETAMINEUR Negative Negative            ---------------------------------------------------------------------------------------------------------------------  Imaging Results (Last 7 Days)     Procedure Component Value Units Date/Time    XR Chest 1 View [[id number]] Collected: 06/11/22 2345     Updated: 06/11/22 2348    Narrative:      CR Chest 1 Vw    INDICATION:   Chest pain and shortness of air.     COMPARISON:    5/21/2022    FINDINGS:  Portable AP view(s) of the chest.  The heart and mediastinal contours are normal. The lungs are clear. No pneumothorax or pleural effusion. Patient is status post CABG.      Impression:      No acute cardiopulmonary findings.    Signer Name: Alexei Roman MD   Signed: 6/11/2022 11:45 PM   Workstation Name: [facility]    [facility]        Last echocardiogram:  Results for orders placed during the hospital encounter of 04/02/22    Adult Transthoracic Echo Complete w/ Color, Spectral and Contrast if necessary per protocol    Interpretation Summary  · Left ventricular ejection fraction appears to be 61 - 65%.  · Left ventricular diastolic function is consistent with (grade I) impaired relaxation.  · No significant valvular abnormality  · Moderate sclerosis of the aortic valve  · No pericardial effusion  · No prior study for comparison    I have personally reviewed the above radiology images and read the final radiology report on  06/12/22  ---------------------------------------------------------------------------------------------------------------------  Assessment / Plan     Active Hospital Problems    Diagnosis  POA   • Chest pain, unspecified type [R07.9]  Yes       ASSESSMENT/PLAN:  -Chest pain with typical features, POA  -CAD s/p CABG x2 with JAYDA to mid RCA 08/2021 and arthrotomy of severe ISR and PCI of mid RCA with attempt to stent ostial RPDA unsuccessful   -LBBB, present since 05/2022  - Evaluated by cardiology services in 05/2022 during admission with initial plans for one-vessel CABG, but then later recommended to follow-up with interventional cardiology outpatient for possible single vessel bypass and continue medical management.  - Troponin T negative x 2; reports pain 7/10; reports relieved with nitroglycerin.   -EKG with diffuse T wave inversions which is new compared to previous EKG per my review, await final cardiology interpretation.  - Echo from 04/2022 with EF 61-65%, grade 1 diastolic dysfunction, moderate sclerosis AV  - Stress test from 04/4/2022 with intermediate risk study.  - ED provider discussed with on-call interventional cardiologist, Asha Ngo.   - Inpatient interventional cardiology consulted, input and assistance appreciated.  - Received full-dose aspirin in ED; continue daily aspirin, statin, Brilinta, and Ranexa.   - Continuous cardiac monitoring ordered.     -Mild acute hyponatremia  -Hypochloremia  - Likely secondary to poor oral intake.  - Encourage oral intake.   - Repeat a.m. CMP.     -Non-insulin-dependent type 2 diabetes mellitus  - Hemoglobin A1c 05/2022 5.8.  -Accu-Cheks before every meal and nightly.  -Sliding scale insulin ordered; titrate as needed.  -Hypoglycemia protocol in place.    -Chronic diastolic CHF  -Appears euvolemic on exam.  -Echo from 04/2022 reviewed and detailed above.  -Monitor with strict I's and O's and daily weights.    -Essential hypertension  -Hyperlipidemia  -Blood  pressure stable.  -Monitor per hospital protocol.  -Review home medications once reconciled.    -Anxiety/depression  -PTSD  -Supportive care.  -Review home medications once reconciled.    -Obesity, BMI 30.82 kg/m²  -Affects all aspects of care.  ----------  -DVT prophylaxis: Continue Xarelto  -Activity: Up ad reanna  -Expected length of stay:   OBSERVATION status; however, if further evaluation or treatment plans warrant, status may change.  Based upon current information, I predict patient's care encounter to be less than or equal to 2 midnights    High risk secondary to chest pain in setting of coronary artery disease     Disposition: Likely home once medically stable    Aliya Buckner PA-C  06/12/22  03:46 EDT    ---------------------------------------------------------------------------------------------------------------------

## 2022-06-12 NOTE — PLAN OF CARE
Problem: Adult Inpatient Plan of Care  Goal: Plan of Care Review  Outcome: Ongoing, Progressing  Goal: Patient-Specific Goal (Individualized)  Outcome: Ongoing, Progressing  Goal: Absence of Hospital-Acquired Illness or Injury  Outcome: Ongoing, Progressing  Intervention: Identify and Manage Fall Risk  Recent Flowsheet Documentation  Taken 6/12/2022 0900 by Hoa Monteiro RN  Safety Promotion/Fall Prevention:   clutter free environment maintained   safety round/check completed   room organization consistent  Intervention: Prevent Skin Injury  Recent Flowsheet Documentation  Taken 6/12/2022 0900 by Hoa Monteiro RN  Body Position: position changed independently  Skin Protection: adhesive use limited  Intervention: Prevent and Manage VTE (Venous Thromboembolism) Risk  Recent Flowsheet Documentation  Taken 6/12/2022 0900 by Hoa Monteiro RN  Activity Management: up ad reanna  VTE Prevention/Management: bleeding risk factor(s) identified  Goal: Optimal Comfort and Wellbeing  Outcome: Ongoing, Progressing  Intervention: Monitor Pain and Promote Comfort  Recent Flowsheet Documentation  Taken 6/12/2022 0900 by Hoa Monteiro RN  Pain Management Interventions: care clustered  Intervention: Provide Person-Centered Care  Recent Flowsheet Documentation  Taken 6/12/2022 0900 by Hoa Monteiro RN  Trust Relationship/Rapport:   choices provided   questions encouraged   thoughts/feelings acknowledged   reassurance provided   questions answered   empathic listening provided   emotional support provided   care explained  Goal: Readiness for Transition of Care  Outcome: Ongoing, Progressing     Problem: Hypertension Comorbidity  Goal: Blood Pressure in Desired Range  Outcome: Ongoing, Progressing     Problem: Osteoarthritis Comorbidity  Goal: Maintenance of Osteoarthritis Symptom Control  Outcome: Ongoing, Progressing  Intervention: Maintain Osteoarthritis Symptom Control  Recent Flowsheet Documentation  Taken 6/12/2022 0900 by  Hoa Monteiro, RN  Activity Management: up ad reanna     Problem: Chest Pain  Goal: Resolution of Chest Pain Symptoms  Outcome: Ongoing, Progressing     Problem: Cardiac Impairment  Goal: Optimal Activity Tolerance  Outcome: Ongoing, Progressing   Goal Outcome Evaluation:

## 2022-06-12 NOTE — ED PROVIDER NOTES
Subjective   51-year-old female with long past medical history of diabetes hypertension hyperlipidemia MI 2 cardiac bypasses, she states about 18 cardiac catheterizations, multiple stents, coming in with left-sided chest pain rating to left shoulder left jaw 7 out of 10 pressure-like no exacerbating alleviating factors, associated with shortness of breath and nausea.          Review of Systems   Constitutional: Negative for chills and fever.   Respiratory: Negative for shortness of breath.    Cardiovascular: Positive for chest pain.   Gastrointestinal: Negative for abdominal pain, nausea and vomiting.   Genitourinary: Negative for dysuria.   Musculoskeletal: Negative for myalgias and neck stiffness.   Skin: Negative for rash.   Neurological: Negative for headaches.   All other systems reviewed and are negative.      Past Medical History:   Diagnosis Date   • COVID-19    • Diabetes mellitus (HCC)    • Hyperlipidemia    • Hypertension    • Myocardial infarct (HCC)    • PCOS (polycystic ovarian syndrome)    • PTSD (post-traumatic stress disorder)        Allergies   Allergen Reactions   • Contrast Dye Anaphylaxis   • Ciprofloxacin Other (See Comments)     Insomnia   • Sulfa Antibiotics Nausea And Vomiting   • Drug [Hydrocodone-Acetaminophen] Itching   • Adhesive Tape Rash       Past Surgical History:   Procedure Laterality Date   • CARDIAC CATHETERIZATION     • CARDIAC CATHETERIZATION N/A 2022    Procedure: Left Heart Cath;  Surgeon: Alexandr Marquez MD;  Location: [facility] CATH INVASIVE LOCATION;  Service: Cardiology;  Laterality: N/A;   •  SECTION     • CHOLECYSTECTOMY     • CORONARY ARTERY BYPASS GRAFT      x 2   • CORONARY STENT PLACEMENT      x 6 per patient   • DILATATION AND CURETTAGE     • KNEE SURGERY Left    • TONSILLECTOMY         Family History   Problem Relation Age of Onset   • Lung cancer Father    • Thyroid cancer Brother    • Heart attack Maternal Grandfather    • Lung cancer Maternal  Grandfather    • Heart attack Paternal Grandmother    • Emphysema Paternal Grandfather        Social History     Socioeconomic History   • Marital status:    Tobacco Use   • Smoking status: Never Smoker   • Smokeless tobacco: Never Used   Vaping Use   • Vaping Use: Never used   Substance and Sexual Activity   • Alcohol use: Not Currently   • Drug use: Never   • Sexual activity: Yes     Partners: Male     Birth control/protection: None           Objective   Physical Exam  Vitals and nursing note reviewed.   Constitutional:       General: She is not in acute distress.  HENT:      Head: Normocephalic and atraumatic.      Mouth/Throat:      Mouth: Mucous membranes are moist.   Eyes:      Extraocular Movements: Extraocular movements intact.      Pupils: Pupils are equal, round, and reactive to light.   Cardiovascular:      Rate and Rhythm: Normal rate and regular rhythm.      Heart sounds: Normal heart sounds. No murmur heard.    No friction rub. No gallop.   Pulmonary:      Effort: Pulmonary effort is normal. No tachypnea, accessory muscle usage or respiratory distress.      Breath sounds: Normal breath sounds. No stridor. No decreased breath sounds, wheezing, rhonchi or rales.   Chest:      Chest wall: No mass, deformity, tenderness, crepitus or edema. There is no dullness to percussion.   Abdominal:      General: Abdomen is flat. Bowel sounds are normal. There is no distension.      Palpations: Abdomen is soft. There is no mass.      Tenderness: There is no abdominal tenderness.      Hernia: No hernia is present.   Skin:     General: Skin is warm and dry.      Capillary Refill: Capillary refill takes less than 2 seconds.      Findings: No rash.   Neurological:      General: No focal deficit present.      Mental Status: She is alert and oriented to person, place, and time.         Procedures           ED Course  ED Course as of 06/12/22 0351   Sat Jun 11, 2022   2314 EKG sinus tachycardia 102 bpm no acute  ischemic changes no STEMI normal QTC [JM]   Myrna Jun 12, 2022   0144 Discussed with Dr. Barry, he states he will review the imaging in the morning, Dr. Smalls is on on Monday and if wants can proceed with a cath that he was previously planning. [JM]      ED Course User Index  [JM] Ralph Contreras MD                                                 MDM  Number of Diagnoses or Management Options     Amount and/or Complexity of Data Reviewed  Clinical lab tests: reviewed  Tests in the radiology section of CPT®: reviewed    Patient Progress  Patient progress: stable      Final diagnoses:   Chest pain, unspecified type       ED Disposition  ED Disposition     ED Disposition   Decision to Admit    Condition   --    Comment   Level of Care: Telemetry [5]   Diagnosis: Chest pain, unspecified type [8162157]   Bed Request Comments: Okay to place in PCU as a telemetry overflow               No follow-up provider specified.       Medication List      ASK your doctor about these medications    linaclotide 72 MCG capsule capsule  Commonly known as: Linzess  Take 1 capsule by mouth Every Morning Before Breakfast.  Ask about: Which instructions should I use?             Ralph Contreras MD  06/12/22 0352

## 2022-06-12 NOTE — PLAN OF CARE
Goal Outcome Evaluation:  Plan of Care Reviewed With: patient        Progress: no change  Outcome Evaluation: Received pt from the ER. Pt is AOx4, VSS, afebrile on RA. Pt's  to remain at bedside. Pt c/o midsternal chest pain rating it a 9/10 at rest and 10/10 with activity, MD notified, see MAR. Pt currently has 1 inch nitro paste in place. Pt verbalizes no complaints at this time and is currently resting in bed. Call light in reach,  bed in low position, will continue to monitor.

## 2022-06-12 NOTE — PROGRESS NOTES
I have evaluated the patient independently, I have reviewed H&P, all labs and images from today and evaluated the patient at bedside. She is known to me from prior admission, prior admission similar presentation, Cardiology initially planning for LHC but then deferred, started on antianginal therapy, does not appear be completely controlling symptoms, have continued home beta blocker and ACEI on remaining pending medication reconciliation, held recent digoxin and imdur, Cardiology to see and updated recommendations pending.  Currently pain improved, hemodynamically stable.  Hospital Medicine will follow up tomorrow with formal note and updated plan.

## 2022-06-13 ENCOUNTER — APPOINTMENT (OUTPATIENT)
Dept: CARDIOLOGY | Facility: HOSPITAL | Age: 52
End: 2022-06-13

## 2022-06-13 LAB
ALBUMIN SERPL-MCNC: 3.79 G/DL (ref 3.5–5.2)
ALBUMIN/GLOB SERPL: 1.3 G/DL
ALP SERPL-CCNC: 88 U/L (ref 39–117)
ALT SERPL W P-5'-P-CCNC: 12 U/L (ref 1–33)
ANION GAP SERPL CALCULATED.3IONS-SCNC: 10.3 MMOL/L (ref 5–15)
APTT PPP: 32.4 SECONDS (ref 26.5–34.5)
AST SERPL-CCNC: 15 U/L (ref 1–32)
BASOPHILS # BLD AUTO: 0.02 10*3/MM3 (ref 0–0.2)
BASOPHILS NFR BLD AUTO: 0.3 % (ref 0–1.5)
BH CV ECHO MEAS - EDV(CUBED): 100.2 ML
BH CV ECHO MEAS - EDV(MOD-SP4): 50.7 ML
BH CV ECHO MEAS - EF(MOD-SP4): 52.9 %
BH CV ECHO MEAS - ESV(CUBED): 28.1 ML
BH CV ECHO MEAS - ESV(MOD-SP4): 23.9 ML
BH CV ECHO MEAS - FS: 34.6 %
BH CV ECHO MEAS - IVS/LVPW: 0.95 CM
BH CV ECHO MEAS - IVSD: 1.09 CM
BH CV ECHO MEAS - LV DIASTOLIC VOL/BSA (35-75): 27 CM2
BH CV ECHO MEAS - LV MASS(C)D: 188.8 GRAMS
BH CV ECHO MEAS - LV SYSTOLIC VOL/BSA (12-30): 12.7 CM2
BH CV ECHO MEAS - LVIDD: 4.6 CM
BH CV ECHO MEAS - LVIDS: 3 CM
BH CV ECHO MEAS - LVPWD: 1.15 CM
BH CV ECHO MEAS - MV A MAX VEL: 67.7 CM/SEC
BH CV ECHO MEAS - MV E MAX VEL: 56.6 CM/SEC
BH CV ECHO MEAS - MV E/A: 0.84
BH CV ECHO MEAS - SI(MOD-SP4): 14.3 ML/M2
BH CV ECHO MEAS - SV(MOD-SP4): 26.8 ML
BILIRUB SERPL-MCNC: 0.5 MG/DL (ref 0–1.2)
BUN SERPL-MCNC: 7 MG/DL (ref 6–20)
BUN/CREAT SERPL: 12.3 (ref 7–25)
CALCIUM SPEC-SCNC: 9 MG/DL (ref 8.6–10.5)
CHLORIDE SERPL-SCNC: 104 MMOL/L (ref 98–107)
CO2 SERPL-SCNC: 22.7 MMOL/L (ref 22–29)
CREAT SERPL-MCNC: 0.57 MG/DL (ref 0.57–1)
D-LACTATE SERPL-SCNC: 0.9 MMOL/L (ref 0.5–2)
DEPRECATED RDW RBC AUTO: 43.8 FL (ref 37–54)
EGFRCR SERPLBLD CKD-EPI 2021: 109.5 ML/MIN/1.73
EOSINOPHIL # BLD AUTO: 0.27 10*3/MM3 (ref 0–0.4)
EOSINOPHIL NFR BLD AUTO: 4.4 % (ref 0.3–6.2)
ERYTHROCYTE [DISTWIDTH] IN BLOOD BY AUTOMATED COUNT: 14.5 % (ref 12.3–15.4)
GLOBULIN UR ELPH-MCNC: 2.8 GM/DL
GLUCOSE BLDC GLUCOMTR-MCNC: 106 MG/DL (ref 70–130)
GLUCOSE BLDC GLUCOMTR-MCNC: 122 MG/DL (ref 70–130)
GLUCOSE BLDC GLUCOMTR-MCNC: 166 MG/DL (ref 70–130)
GLUCOSE SERPL-MCNC: 134 MG/DL (ref 65–99)
HCT VFR BLD AUTO: 38.6 % (ref 34–46.6)
HGB BLD-MCNC: 12.8 G/DL (ref 12–15.9)
IMM GRANULOCYTES # BLD AUTO: 0.05 10*3/MM3 (ref 0–0.05)
IMM GRANULOCYTES NFR BLD AUTO: 0.8 % (ref 0–0.5)
INR PPP: 1.38 (ref 0.9–1.1)
LYMPHOCYTES # BLD AUTO: 1.97 10*3/MM3 (ref 0.7–3.1)
LYMPHOCYTES NFR BLD AUTO: 31.9 % (ref 19.6–45.3)
MAGNESIUM SERPL-MCNC: 1.6 MG/DL (ref 1.6–2.6)
MAXIMAL PREDICTED HEART RATE: 168 BPM
MCH RBC QN AUTO: 27.6 PG (ref 26.6–33)
MCHC RBC AUTO-ENTMCNC: 33.2 G/DL (ref 31.5–35.7)
MCV RBC AUTO: 83.4 FL (ref 79–97)
MONOCYTES # BLD AUTO: 0.52 10*3/MM3 (ref 0.1–0.9)
MONOCYTES NFR BLD AUTO: 8.4 % (ref 5–12)
NEUTROPHILS NFR BLD AUTO: 3.35 10*3/MM3 (ref 1.7–7)
NEUTROPHILS NFR BLD AUTO: 54.2 % (ref 42.7–76)
NRBC BLD AUTO-RTO: 0 /100 WBC (ref 0–0.2)
PHOSPHATE SERPL-MCNC: 3.8 MG/DL (ref 2.5–4.5)
PLATELET # BLD AUTO: 241 10*3/MM3 (ref 140–450)
PMV BLD AUTO: 9.4 FL (ref 6–12)
POTASSIUM SERPL-SCNC: 3.5 MMOL/L (ref 3.5–5.2)
PROT SERPL-MCNC: 6.6 G/DL (ref 6–8.5)
PROTHROMBIN TIME: 17.3 SECONDS (ref 12.1–14.7)
QT INTERVAL: 326 MS
QT INTERVAL: 330 MS
QTC INTERVAL: 400 MS
QTC INTERVAL: 414 MS
RBC # BLD AUTO: 4.63 10*6/MM3 (ref 3.77–5.28)
SODIUM SERPL-SCNC: 137 MMOL/L (ref 136–145)
STRESS TARGET HR: 143 BPM
TROPONIN T SERPL-MCNC: <0.01 NG/ML (ref 0–0.03)
WBC NRBC COR # BLD: 6.18 10*3/MM3 (ref 3.4–10.8)

## 2022-06-13 PROCEDURE — 93010 ELECTROCARDIOGRAM REPORT: CPT | Performed by: INTERNAL MEDICINE

## 2022-06-13 PROCEDURE — 85610 PROTHROMBIN TIME: CPT | Performed by: INTERNAL MEDICINE

## 2022-06-13 PROCEDURE — 84100 ASSAY OF PHOSPHORUS: CPT | Performed by: INTERNAL MEDICINE

## 2022-06-13 PROCEDURE — 93005 ELECTROCARDIOGRAM TRACING: CPT | Performed by: INTERNAL MEDICINE

## 2022-06-13 PROCEDURE — 82962 GLUCOSE BLOOD TEST: CPT

## 2022-06-13 PROCEDURE — 93321 DOPPLER ECHO F-UP/LMTD STD: CPT | Performed by: INTERNAL MEDICINE

## 2022-06-13 PROCEDURE — 83735 ASSAY OF MAGNESIUM: CPT | Performed by: INTERNAL MEDICINE

## 2022-06-13 PROCEDURE — 93308 TTE F-UP OR LMTD: CPT | Performed by: INTERNAL MEDICINE

## 2022-06-13 PROCEDURE — 80053 COMPREHEN METABOLIC PANEL: CPT | Performed by: INTERNAL MEDICINE

## 2022-06-13 PROCEDURE — 93321 DOPPLER ECHO F-UP/LMTD STD: CPT

## 2022-06-13 PROCEDURE — 85025 COMPLETE CBC W/AUTO DIFF WBC: CPT | Performed by: INTERNAL MEDICINE

## 2022-06-13 PROCEDURE — 0 MAGNESIUM SULFATE 4 GM/100ML SOLUTION: Performed by: INTERNAL MEDICINE

## 2022-06-13 PROCEDURE — 85730 THROMBOPLASTIN TIME PARTIAL: CPT | Performed by: INTERNAL MEDICINE

## 2022-06-13 PROCEDURE — 99222 1ST HOSP IP/OBS MODERATE 55: CPT | Performed by: INTERNAL MEDICINE

## 2022-06-13 PROCEDURE — 93308 TTE F-UP OR LMTD: CPT

## 2022-06-13 PROCEDURE — 99233 SBSQ HOSP IP/OBS HIGH 50: CPT | Performed by: INTERNAL MEDICINE

## 2022-06-13 PROCEDURE — 83605 ASSAY OF LACTIC ACID: CPT | Performed by: INTERNAL MEDICINE

## 2022-06-13 PROCEDURE — 84484 ASSAY OF TROPONIN QUANT: CPT | Performed by: INTERNAL MEDICINE

## 2022-06-13 RX ORDER — MAGNESIUM SULFATE HEPTAHYDRATE 40 MG/ML
2 INJECTION, SOLUTION INTRAVENOUS AS NEEDED
Status: DISCONTINUED | OUTPATIENT
Start: 2022-06-13 | End: 2022-06-21 | Stop reason: HOSPADM

## 2022-06-13 RX ORDER — POTASSIUM CHLORIDE 20 MEQ/1
40 TABLET, EXTENDED RELEASE ORAL ONCE
Status: COMPLETED | OUTPATIENT
Start: 2022-06-13 | End: 2022-06-13

## 2022-06-13 RX ORDER — MAGNESIUM SULFATE HEPTAHYDRATE 40 MG/ML
4 INJECTION, SOLUTION INTRAVENOUS AS NEEDED
Status: DISCONTINUED | OUTPATIENT
Start: 2022-06-13 | End: 2022-06-21 | Stop reason: HOSPADM

## 2022-06-13 RX ORDER — SODIUM CHLORIDE 9 MG/ML
125 INJECTION, SOLUTION INTRAVENOUS CONTINUOUS
Status: DISCONTINUED | OUTPATIENT
Start: 2022-06-13 | End: 2022-06-15

## 2022-06-13 RX ORDER — MAGNESIUM SULFATE HEPTAHYDRATE 40 MG/ML
4 INJECTION, SOLUTION INTRAVENOUS ONCE
Status: COMPLETED | OUTPATIENT
Start: 2022-06-13 | End: 2022-06-13

## 2022-06-13 RX ADMIN — TICAGRELOR 90 MG: 90 TABLET ORAL at 08:52

## 2022-06-13 RX ADMIN — SODIUM CHLORIDE 1000 ML: 9 INJECTION, SOLUTION INTRAVENOUS at 08:50

## 2022-06-13 RX ADMIN — DULOXETINE HYDROCHLORIDE 60 MG: 60 CAPSULE, DELAYED RELEASE ORAL at 08:52

## 2022-06-13 RX ADMIN — MAGNESIUM GLUCONATE 500 MG ORAL TABLET 400 MG: 500 TABLET ORAL at 20:14

## 2022-06-13 RX ADMIN — DOCUSATE SODIUM 100 MG: 100 CAPSULE ORAL at 08:52

## 2022-06-13 RX ADMIN — MAGNESIUM GLUCONATE 500 MG ORAL TABLET 400 MG: 500 TABLET ORAL at 08:52

## 2022-06-13 RX ADMIN — MAGNESIUM SULFATE HEPTAHYDRATE 4 G: 40 INJECTION, SOLUTION INTRAVENOUS at 15:59

## 2022-06-13 RX ADMIN — SODIUM CHLORIDE 125 ML/HR: 9 INJECTION, SOLUTION INTRAVENOUS at 09:54

## 2022-06-13 RX ADMIN — SODIUM CHLORIDE 125 ML/HR: 9 INJECTION, SOLUTION INTRAVENOUS at 18:36

## 2022-06-13 RX ADMIN — RIVAROXABAN 2.5 MG: 2.5 TABLET, FILM COATED ORAL at 08:52

## 2022-06-13 RX ADMIN — ATORVASTATIN CALCIUM 80 MG: 40 TABLET, FILM COATED ORAL at 20:14

## 2022-06-13 RX ADMIN — CETIRIZINE HYDROCHLORIDE 10 MG: 10 TABLET, FILM COATED ORAL at 08:52

## 2022-06-13 RX ADMIN — TICAGRELOR 90 MG: 90 TABLET ORAL at 20:14

## 2022-06-13 RX ADMIN — RANOLAZINE 1000 MG: 500 TABLET, FILM COATED, EXTENDED RELEASE ORAL at 08:52

## 2022-06-13 RX ADMIN — NITROGLYCERIN 0.4 MG: 0.4 TABLET, ORALLY DISINTEGRATING SUBLINGUAL at 13:45

## 2022-06-13 RX ADMIN — ASPIRIN 81 MG: 81 TABLET, COATED ORAL at 08:52

## 2022-06-13 RX ADMIN — PRENATAL VIT W/ FE FUMARATE-FA TAB 27-0.8 MG 1 TABLET: 27-0.8 TAB at 08:52

## 2022-06-13 RX ADMIN — POTASSIUM CHLORIDE 40 MEQ: 20 TABLET, EXTENDED RELEASE ORAL at 15:58

## 2022-06-13 RX ADMIN — DOCUSATE SODIUM 100 MG: 100 CAPSULE ORAL at 20:14

## 2022-06-13 RX ADMIN — RANOLAZINE 1000 MG: 500 TABLET, FILM COATED, EXTENDED RELEASE ORAL at 20:14

## 2022-06-13 NOTE — CASE MANAGEMENT/SOCIAL WORK
Discharge Planning Assessment   [facility]     Patient Name: Lissa Eisenberg  MRN: [id number]  Today's Date: 6/13/2022    Admit Date: 6/11/2022     Discharge Needs Assessment     Row Name 06/13/22 1244       Living Environment    People in Home child(risa), dependent;spouse    Current Living Arrangements other (see comments)    Duration at Residence camper    Primary Care Provided by self    Provides Primary Care For child(risa)    Family Caregiver if Needed spouse    Family Caregiver Names Rob    Quality of Family Relationships helpful;involved;supportive    Able to Return to Prior Arrangements yes       Resource/Environmental Concerns    Home Accessibility Concerns none       Transition Planning    Patient/Family Anticipates Transition to home with family    Patient/Family Anticipated Services at Transition none    Transportation Anticipated public transportation       Discharge Needs Assessment    Equipment Currently Used at Home bath bench;walker, rolling    Concerns to be Addressed no discharge needs identified    Anticipated Changes Related to Illness none    Equipment Needed After Discharge none               Discharge Plan     Row Name 06/13/22 4591       Plan    Plan Pt lives in a pop up camper with spouse and their child. Pt does not utilize home health services at this time. Pt has a bath bench and rolling walker. PCP is Roxy Deng. Pt does not have a POA or living will on file. Pt will need [facility] arranged for transportation home at discharge. SS to follow and assist.    Patient/Family in Agreement with Plan yes               Demographic Summary     Row Name 06/13/22 8983       General Information    Referral Source nursing    Reason for Consult --  homeless              GUADALUPE Sharp

## 2022-06-13 NOTE — CONSULTS
Consults    Patient Identification:    Name:  Lissa Eisenberg  Age:  51 y.o.  Sex:  female  :  1970  MRN:  [id number]  Visit Number:  [id number]  Primary care provider:  Roxy Deng DO    Chief complaint:   Chest pain    History of presenting illness:   Patient is a 51-year-old female with known history of CAD s/p CABG s/p PCI to in-stent restenosis of the mid RCA lesion, known to have ostial RPDA lesion which is jailed across the stents in the distal RCA, failure to deploy a stent in the ostial RPDA during the last intervention to the mid RCA, patient presented a couple of times post intervention with worsening chest pains, last admission showed discharged home on antianginal medications-he presents today again with worsening chest pain started on Saturday, substernal pressure-like radiating to his jaw and shoulders.  She also had some shortness of breath associate with chest pain.  She is currently being admitted for further evaluation of her anginal symptoms.  Her troponins have been negative, EKG did show diffuse ST-T changes which are unchanged from before.  ---------------------------------------------------------------------------------------------------------------------  Review of Systems   Constitutional: Negative.    HENT: Negative.    Eyes: Negative.    Respiratory: Positive for shortness of breath.    Cardiovascular: Positive for chest pain.   Gastrointestinal: Negative.    Endocrine: Negative.    Genitourinary: Negative.    Musculoskeletal: Negative.    Skin: Negative.    Allergic/Immunologic: Negative.    Neurological: Negative.    Hematological: Negative.    Psychiatric/Behavioral: Negative.       ---------------------------------------------------------------------------------------------------------------------   Past History:   Family History   Problem Relation Age of Onset   • Lung cancer Father    • Thyroid cancer Brother    • Heart attack Maternal Grandfather    • Lung cancer  Maternal Grandfather    • Heart attack Paternal Grandmother    • Emphysema Paternal Grandfather      Past Medical History:   Diagnosis Date   • COVID-19    • Diabetes mellitus (HCC)    • Hyperlipidemia    • Hypertension    • Myocardial infarct (HCC)    • PCOS (polycystic ovarian syndrome)    • PTSD (post-traumatic stress disorder)      Past Surgical History:   Procedure Laterality Date   • CARDIAC CATHETERIZATION     • CARDIAC CATHETERIZATION N/A 2022    Procedure: Left Heart Cath;  Surgeon: Alexandr Marquez MD;  Location: [facility] CATH INVASIVE LOCATION;  Service: Cardiology;  Laterality: N/A;   •  SECTION     • CHOLECYSTECTOMY     • CORONARY ARTERY BYPASS GRAFT      x 2   • CORONARY STENT PLACEMENT      x 6 per patient   • DILATATION AND CURETTAGE     • KNEE SURGERY Left    • TONSILLECTOMY       Social History     Socioeconomic History   • Marital status:    Tobacco Use   • Smoking status: Never Smoker   • Smokeless tobacco: Never Used   Vaping Use   • Vaping Use: Never used   Substance and Sexual Activity   • Alcohol use: Not Currently   • Drug use: Never   • Sexual activity: Yes     Partners: Male     Birth control/protection: None     ---------------------------------------------------------------------------------------------------------------------   Allergies:  Contrast dye, Ciprofloxacin, Sulfa antibiotics, Drug [hydrocodone-acetaminophen], and Adhesive tape  ---------------------------------------------------------------------------------------------------------------------   Prior to Admission Medications     Prescriptions Last Dose Informant Patient Reported? Taking?    aspirin 81 MG EC tablet 2022 Self Yes Yes    Take 81 mg by mouth Daily.    atorvastatin (LIPITOR) 80 MG tablet Past Week Self No Yes    Take 1 tablet by mouth Every Night.    digoxin (LANOXIN) 125 MCG tablet 2022 Self No Yes    Take 1 tablet by mouth Daily for 30 days.    docusate sodium (COLACE) 100 MG  capsule 6/11/2022 Self No Yes    Take 1 capsule by mouth 2 (Two) Times a Day for 30 days.    DULoxetine (CYMBALTA) 30 MG capsule 6/11/2022 Self Yes Yes    Take 60 mg by mouth Daily.    isosorbide mononitrate (IMDUR) 30 MG 24 hr tablet 6/11/2022 Self No Yes    Take 1 tablet by mouth Daily.    levocetirizine (XYZAL) 5 MG tablet Past Week Self No Yes    Take 1 tablet by mouth Every Evening.    linaclotide (Linzess) 72 MCG capsule capsule Past Week Self No Yes    Take 1 capsule by mouth Every Morning Before Breakfast.    lisinopril (PRINIVIL,ZESTRIL) 2.5 MG tablet 6/11/2022 Self Yes Yes    Take 2.5 mg by mouth Daily.    magnesium oxide (MAG-OX) 400 MG tablet Past Week Self No Yes    Take 1 tablet by mouth 2 (Two) Times a Day.    metFORMIN (GLUCOPHAGE) 1000 MG tablet 6/11/2022 Self No Yes    Take 1 tablet by mouth 2 (Two) Times a Day With Meals.    metoprolol tartrate (LOPRESSOR) 25 MG tablet 6/11/2022 Self No Yes    Take 1 tablet by mouth 2 (Two) Times a Day.    nitroglycerin (NITROSTAT) 0.4 MG SL tablet Unknown Self No No    Place 1 tablet under the tongue Every 5 minutes as Needed for Chest Pain until chest pain is relieved. If you have to take 3 tabs, take the 3rd and go to the hospital to be evaluated.    omeprazole (priLOSEC) 20 MG capsule 6/11/2022 Self No Yes    Take 1 capsule by mouth Daily.    ondansetron ODT (ZOFRAN-ODT) 4 MG disintegrating tablet Unknown Self No No    Place 1 tablet on the tongue Every 8 (Eight) Hours As Needed for Nausea or Vomiting.    Ozempic, 1 MG/DOSE, 4 MG/3ML solution pen-injector Past Week Self No Yes    Inject 1 mg into the appropriate muscle as directed by prescriber 1 (One) Time Per Week.    Patient taking differently:  Inject 1 mg into the appropriate muscle as directed by prescriber 1 (One) Time Per Week. Thursdays.    Prenatal Vit-Fe Fumarate-FA (prenatal vitamin 27-0.8) 27-0.8 MG tablet tablet 6/11/2022 Self Yes Yes    Take 1 tablet by mouth Daily.    ranolazine (Ranexa) 1000  MG 12 hr tablet 6/11/2022 Self No Yes    Take 1 tablet by mouth 2 (Two) Times a Day for 30 days.    Rivaroxaban (Xarelto) 2.5 MG tablet 6/11/2022 Self No Yes    Take 1 tablet by mouth 2 (Two) Times a Day.    ticagrelor (BRILINTA) 90 MG tablet tablet 6/11/2022 Self No Yes    Take 1 tablet by mouth 2 (Two) Times a Day.        Hospital Meds:  aspirin, 81 mg, Oral, Daily  atorvastatin, 80 mg, Oral, Nightly  cetirizine, 10 mg, Oral, Daily  docusate sodium, 100 mg, Oral, BID  DULoxetine, 60 mg, Oral, Daily  Insulin Aspart, 0-7 Units, Subcutaneous, TID AC  magnesium oxide, 400 mg, Oral, BID  prenatal vitamin 27-0.8, 1 tablet, Oral, Daily  ranolazine, 1,000 mg, Oral, BID  ticagrelor, 90 mg, Oral, BID      sodium chloride, 125 mL/hr, Last Rate: 125 mL/hr (06/13/22 0954)      ---------------------------------------------------------------------------------------------------------------------   Vital Signs:  Temp:  [96.8 °F (36 °C)-98.3 °F (36.8 °C)] 96.8 °F (36 °C)  Heart Rate:  [] 95  Resp:  [12-24] 15  BP: ()/(49-81) 106/75      06/12/22  0351 06/12/22  0500 06/13/22  0600   Weight: 84 kg (185 lb 3 oz) 84 kg (185 lb 3 oz) 85.2 kg (187 lb 13.3 oz)     Body mass index is 33.28 kg/m².  ---------------------------------------------------------------------------------------------------------------------   Physical exam:   Constitutional:  Well-developed and well-nourished.  No respiratory distress.      HENT:  Head: Normocephalic and atraumatic.  Mouth:  Moist mucous membranes.    Eyes:  Conjunctivae and EOM are normal.  Pupils are equal, round, and reactive to light.  No scleral icterus.  Neck:  Neck supple.  No JVD present.    Cardiovascular:  Normal rate, regular rhythm and normal heart sounds with no murmur.  Pulmonary/Chest:  No respiratory distress, no wheezes, no crackles, with normal breath sounds and good air movement.  Abdominal:  Soft.  Bowel sounds are normal.  No distension and no tenderness.    Musculoskeletal:  No edema, no tenderness, and no deformity.  No red or swollen joints anywhere.    Neurological:  Alert and oriented to person, place, and time.  No cranial nerve deficit.  No tongue deviation.  No facial droop.  No slurred speech.   Skin:  Skin is warm and dry.  No rash noted.  No pallor.   Psychiatric:  Normal mood and affect.  Behavior is normal.  Judgment and thought content normal.   Peripheral vascular:  No edema and strong pulses on all 4 extremities.    ---------------------------------------------------------------------------------------------------------------------     I have personally looked at both the EKG and the telemetry strips.  Echo:  Results for orders placed during the hospital encounter of 04/02/22    Adult Transthoracic Echo Complete w/ Color, Spectral and Contrast if necessary per protocol    Interpretation Summary  · Left ventricular ejection fraction appears to be 61 - 65%.  · Left ventricular diastolic function is consistent with (grade I) impaired relaxation.  · No significant valvular abnormality  · Moderate sclerosis of the aortic valve  · No pericardial effusion  · No prior study for comparison    ---------------------------------------------------------------------------------------------------------------------   Results from last 7 days   Lab Units 06/11/22  2332   WBC 10*3/mm3 7.64   HEMOGLOBIN g/dL 13.8   HEMATOCRIT % 41.1   MCV fL 81.9   MCHC g/dL 33.6   PLATELETS 10*3/mm3 309         Results from last 7 days   Lab Units 06/12/22  1000 06/11/22  2332   SODIUM mmol/L 136 129*   POTASSIUM mmol/L 3.6 3.9   CHLORIDE mmol/L 99 94*   CO2 mmol/L 21.6* 20.3*   BUN mg/dL 11 16   CREATININE mg/dL 0.62 0.87   CALCIUM mg/dL 9.3 9.6   GLUCOSE mg/dL 118* 110*   ALBUMIN g/dL 4.20 4.50   BILIRUBIN mg/dL 0.5 0.5   ALK PHOS U/L 96 93   AST (SGOT) U/L 17 16   ALT (SGPT) U/L 13 16   Estimated Creatinine Clearance: 109.8 mL/min (by C-G formula based on SCr of 0.62 mg/dL).  No  results found for: AMMONIA  Results from last 7 days   Lab Units 06/12/22 2029 06/12/22  0959 06/12/22  0149   TROPONIN T ng/mL <0.010 <0.010 <0.010     Results from last 7 days   Lab Units 06/11/22  2332   PROBNP pg/mL 60.6     Lab Results   Component Value Date    HGBA1C 5.80 (H) 05/21/2022     Lab Results   Component Value Date    TSH 2.410 05/22/2022     No results found for: PREGTESTUR, PREGSERUM, HCG, HCGQUANT  Pain Management Panel     Pain Management Panel Latest Ref Rng & Units 5/22/2022    AMPHETAMINES SCREEN, URINE Negative Negative    BARBITURATES SCREEN Negative Negative    BENZODIAZEPINE SCREEN, URINE Negative Negative    BUPRENORPHINEUR Negative Negative    COCAINE SCREEN, URINE Negative Negative    METHADONE SCREEN, URINE Negative Negative    METHAMPHETAMINEUR Negative Negative        No results found for: BLOODCX  No results found for: URINECX  No results found for: WOUNDCX  No results found for: STOOLCX        ---------------------------------------------------------------------------------------------------------------------   Imaging Results (Last 7 Days)     Procedure Component Value Units Date/Time    XR Chest 1 View [[id number]] Collected: 06/11/22 2345     Updated: 06/11/22 2348    Narrative:      CR Chest 1 Vw    INDICATION:   Chest pain and shortness of air.     COMPARISON:    5/21/2022    FINDINGS:  Portable AP view(s) of the chest.  The heart and mediastinal contours are normal. The lungs are clear. No pneumothorax or pleural effusion. Patient is status post CABG.      Impression:      No acute cardiopulmonary findings.    Signer Name: Alexei Roman MD   Signed: 6/11/2022 11:45 PM   Workstation Name: [email or web address]    [facility]        ----------------------------------------------------------------------------------------------------------------------  Assessment:   Chest pain concerning for worsening angina.  CAD s/p CABG with a patent LIMA to LAD and occluded  vein grafts, s/p PCI to mid RCA, known ostial RPDA 90% stenosis which is likely jailed and failed attempt at PCI during the last procedure.  Hypertension  Type 2 diabetes mellitus        Plan:   Patient was on triple therapy with Xarelto 2.5 twice daily along with aspirin and Brilinta, I will hold her Xarelto in anticipation for heart cath tomorrow and try to at least do balloon angioplasty to the ostial RPDA lesion.  She said that her chest pain is mild currently.  Her troponins have been negative.  She has been hypotensive overnight and she is getting IV fluid boluses.  Her beta-blocker and ARB has been held due to hypotension.  We will plan for coronary angiogram in a.m.  Keep her n.p.o. after midnight.  Thank you for the consult.          Connor Chaney MD, [facility]  Interventional Cardiology      06/13/22  10:07 EDT

## 2022-06-13 NOTE — PHARMACY PATIENT ASSISTANCE
Pharmacy looked into cost for Brilinta prescription. According to her insurance, there is a $0 copay for a 90 day supply.     Thank you,  Laith Robledo, Pharmacy Intern  06/13/22  11:38 EDT

## 2022-06-13 NOTE — PLAN OF CARE
Goal Outcome Evaluation:  Plan of Care Reviewed With: patient        Progress: no change  Outcome Evaluation: Pt is AOx4, blood pressure has been borderline hypotensive, on RA, afebrile. Pt has c/o one significant episode of chest pressure at the beginning of the shift rating it an 8/10, midsternal that radiates to the left arm- it required 2 doses of SL nitroglycerin, see MAR. Pt has been resting in bed with eyes closed for the majority of the shift. Pt verbalizes no complaints at this time. Pt's  remains at bedside. Call light in reach, bed in low position, will continue to monitor.

## 2022-06-13 NOTE — PROGRESS NOTES
[facility] HOSPITALIST PROGRESS NOTE     Patient Identification:  Name:  Lissa Eisenberg  Age:  51 y.o.  Sex:  female  :  1970  MRN:  [id number]  Visit Number:  [id number]  ROOM: [address]     Primary Care Provider:  Roxy Deng DO     Date of Admission: 2022    Length of stay in inpatient status:  0    Subjective     Chief Compliant:    Chief Complaint   Patient presents with   • Chest Pain   • Shortness of Breath     History of Presenting Illness:   I saw the patient with bedside nurse Asim today.  The patient states that she is still having substernal chest pressure and that it is still radiating down her left arm.  Its not as severe now as it was but it is constant with varying degrees of severity.  She does have nausea and sweating with the chest pain.  She did not notice the chest pain worsening when she walked to the restroom.  Otherwise, she denies vomiting, diarrhea, coughing, dysuria, hematuria.     Objective     Current Hospital Meds:  aspirin, 81 mg, Oral, Daily  atorvastatin, 80 mg, Oral, Nightly  cetirizine, 10 mg, Oral, Daily  docusate sodium, 100 mg, Oral, BID  DULoxetine, 60 mg, Oral, Daily  Insulin Aspart, 0-7 Units, Subcutaneous, TID AC  magnesium oxide, 400 mg, Oral, BID  prenatal vitamin 27-0.8, 1 tablet, Oral, Daily  ranolazine, 1,000 mg, Oral, BID  ticagrelor, 90 mg, Oral, BID    sodium chloride, 125 mL/hr, Last Rate: 125 mL/hr (22 0954)      Current Antimicrobial Therapy:  Anti-Infectives (From admission, onward)    None        Current Diuretic Therapy:  Diuretics (From admission, onward)    None        ----------------------------------------------------------------------------------------------------------------------  Vital Signs:  Temp:  [96.8 °F (36 °C)-97.9 °F (36.6 °C)] 97.9 °F (36.6 °C)  Heart Rate:  [] 100  Resp:  [12-24] 17  BP: ()/(49-75) 94/52  SpO2:  [92 %-98 %] 96 %  on   ;   Device (Oxygen Therapy): room air  Body mass  index is 33.28 kg/m².    Wt Readings from Last 3 Encounters:   06/13/22 85.2 kg (187 lb 13.3 oz)   05/31/22 85.1 kg (187 lb 9.6 oz)   05/25/22 89 kg (196 lb 1.6 oz)     Intake & Output (last 3 days)       06/10 0701  06/11 0700 06/11 0701  06/12 0700 06/12 0701 06/13 0700 06/13 0701  06/14 0700    P.O.   860 440    Total Intake(mL/kg)   860 (10.1) 440 (5.2)    Net   +860 +440            Urine Unmeasured Occurrence   3 x     Stool Unmeasured Occurrence   1 x         Diet Regular; Cardiac  NPO Diet NPO Type: Sips with Meds  ----------------------------------------------------------------------------------------------------------------------  Physical Exam  Vitals reviewed.   Constitutional:       General: She is awake. She is not in acute distress.     Appearance: She is obese. She is not ill-appearing, toxic-appearing or diaphoretic.   HENT:      Head: Normocephalic and atraumatic.      Right Ear: External ear normal.      Left Ear: External ear normal.   Eyes:      General: No scleral icterus.     Pupils: Pupils are equal, round, and reactive to light.   Cardiovascular:      Rate and Rhythm: Normal rate and regular rhythm.      Pulses: Normal pulses.      Heart sounds: No murmur heard.  Pulmonary:      Effort: Pulmonary effort is normal. No respiratory distress.      Breath sounds: Normal breath sounds. No wheezing or rales.   Musculoskeletal:         General: No swelling or deformity.   Skin:     Capillary Refill: Capillary refill takes less than 2 seconds.      Coloration: Skin is not jaundiced or pale.   Neurological:      Mental Status: She is alert and oriented to person, place, and time. Mental status is at baseline.      Cranial Nerves: No cranial nerve deficit.   Psychiatric:         Mood and Affect: Mood normal.         Behavior: Behavior normal. Behavior is cooperative.       ----------------------------------------------------------------------------------------------------------------------  Tele:  Normal sinus rhythm with rates 80s to 90s.  I personally reviewed the telemetry strips.  ----------------------------------------------------------------------------------------------------------------------  LABS:    CBC and coagulation:  Results from last 7 days   Lab Units 06/13/22  1107 06/13/22 1106 06/11/22  2332   LACTATE mmol/L  --  0.9  --    WBC 10*3/mm3 6.18  --  7.64   HEMOGLOBIN g/dL 12.8  --  13.8   HEMATOCRIT % 38.6  --  41.1   MCV fL 83.4  --  81.9   MCHC g/dL 33.2  --  33.6   PLATELETS 10*3/mm3 241  --  309   INR   --  1.38*  --      Renal and electrolytes:  Results from last 7 days   Lab Units 06/13/22 1106 06/12/22 1000 06/11/22  2332   SODIUM mmol/L 137 136 129*   POTASSIUM mmol/L 3.5 3.6 3.9   MAGNESIUM mg/dL 1.6  --   --    CHLORIDE mmol/L 104 99 94*   CO2 mmol/L 22.7 21.6* 20.3*   BUN mg/dL 7 11 16   CREATININE mg/dL 0.57 0.62 0.87   CALCIUM mg/dL 9.0 9.3 9.6   PHOSPHORUS mg/dL 3.8  --   --    GLUCOSE mg/dL 134* 118* 110*     Estimated Creatinine Clearance: 119.4 mL/min (by C-G formula based on SCr of 0.57 mg/dL).    Liver and pancreatic function:  Results from last 7 days   Lab Units 06/13/22 1106 06/12/22 1000 06/11/22  2332   ALBUMIN g/dL 3.79 4.20 4.50   BILIRUBIN mg/dL 0.5 0.5 0.5   ALK PHOS U/L 88 96 93   AST (SGOT) U/L 15 17 16   ALT (SGPT) U/L 12 13 16     Endocrine function:  Lab Results   Component Value Date    HGBA1C 5.80 (H) 05/21/2022     Point of care bedside glucose levels:  Results from last 7 days   Lab Units 06/13/22  1115 06/13/22  0621 06/12/22  1817 06/12/22  1137   GLUCOSE mg/dL 122 106 110 98     Glucose levels from the [facility]:  Results from last 7 days   Lab Units 06/13/22  1106 06/12/22  1000 06/11/22  2332   GLUCOSE mg/dL 134* 118* 110*     Lab Results   Component Value Date    TSH 2.410 05/22/2022     Cardiac:  Results from last 7 days   Lab Units 06/13/22  1106 06/12/22  2029 06/12/22  0959 06/12/22  0149 06/11/22  2332   TROPONIN T ng/mL <0.010 <0.010 <0.010  <0.010 <0.010   PROBNP pg/mL  --   --   --   --  60.6       Cultures:  Lab Results   Component Value Date    COLORU Yellow 05/22/2022    CLARITYU Clear 05/22/2022    PHUR <=5.0 05/22/2022    GLUCOSEU Negative 05/22/2022    KETONESU 15 mg/dL (1+) (A) 05/22/2022    BLOODU Negative 05/22/2022    NITRITEU Negative 05/22/2022    LEUKOCYTESUR Trace (A) 05/22/2022    BILIRUBINUR Negative 05/22/2022    UROBILINOGEN 1.0 E.U./dL 05/22/2022    RBCUA 0-2 05/22/2022    WBCUA 0-2 05/22/2022    BACTERIA None Seen 05/22/2022     Microbiology Results (last 10 days)     Procedure Component Value - Date/Time    COVID PRE-OP / PRE-PROCEDURE SCREENING ORDER (NO ISOLATION) - Swab, Nasopharynx [[id number]]  (Normal) Collected: 06/12/22 1513    Lab Status: Final result Specimen: Swab from Nasopharynx Updated: 06/12/22 1557    Narrative:      The following orders were created for panel order COVID PRE-OP / PRE-PROCEDURE SCREENING ORDER (NO ISOLATION) - Swab, Nasopharynx.  Procedure                               Abnormality         Status                     ---------                               -----------         ------                     COVID-19 and FLU A/B PCR...[[id number]]  Normal              Final result                 Please view results for these tests on the individual orders.    COVID-19 and FLU A/B PCR - Swab, Nasopharynx [[id number]]  (Normal) Collected: 06/12/22 1513    Lab Status: Final result Specimen: Swab from Nasopharynx Updated: 06/12/22 1557     COVID19 Not Detected     Influenza A PCR Not Detected     Influenza B PCR Not Detected    Narrative:      Fact sheet for providers: https://www.fda.gov/media/943799/download    Fact sheet for patients: https://www.fda.gov/media/464409/download    Test performed by PCR.          I have personally looked at the labs and they are summarized above.    Assessment & Plan      -Chest pain with typical features that was present on admission in a patient with known coronary artery  disease status post two-vessel CABG with drug-eluting stent to the mid RCA in August 2021 and arthrotomy of severe ISR and PCI of mid RCA with attempt to stent ostial RPDA  -As of 6/12/2022, decrease in blood pressure to 88/67 with an increase in respiratory rate  -Acute hypomagnesemia  -History of left bundle branch block that has been present since May 2022  -History of noninsulin-dependent type 2 diabetes mellitus  -History of heart failure with preserved ejection fraction, without any acute exacerbation  -History of essential hypertension  -History of hyperlipidemia  -History of anxiety, depression, and PTSD  -Obesity, BMI 33.28 kg/m2, which complicates all aspects of care    Patient was initially placed in observation on telemetry floor; since no telemetry beds were available, the patient was placed in the PCU as an overflow.  However, since the patient now has low blood pressures, I have discontinued the lisinopril and metoprolol and I have now admitted the patient to the telemetry floor.  Labs ordered for today and they are not concerning for an acute infection.  Thus, I will not start the patient on empiric antibiotics but rather follow her vital signs, labs, and symptoms.  For the low blood pressure, I am giving the patient a liter of normal saline as a bolus and starting her on normal saline at 125 mL/h.  We will continue monitoring her blood pressures closely and if her blood pressure start dropping then we will reassess the patient.  Dr. Chaney with interventional cardiology is going to do a left heart catheterization in the morning.  Currently, the patient's glucose levels are controlled and thus we will continue to monitor these.  I have placed her on the magnesium replacement protocol for her low magnesium level.  I will give her a one-time dose of 40 mEq potassium chloride orally.  The goal magnesium level is 2-2.2 and the goal potassium level is 4-4.5 in order to decrease the chances of  inducing dysrhythmias from electrolyte abnormalities.  We will repeat the blood work in the morning.    VTE Prophylaxis:   Mechanical Order History:     None      Pharmalogical Order History:      Ordered     Dose Route Frequency Stop    06/12/22 0345  Rivaroxaban (XARELTO) tablet 2.5 mg         2.5 mg PO 2 Times Daily --              The patient is high risk due to the following diagnoses/reasons: Typical chest pain in a patient with known coronary artery disease    Disposition: Undetermined at this time.    Teto Sumner MD  [facility]  06/13/22  13:52 EDT

## 2022-06-13 NOTE — PAYOR COMM NOTE
"  [facility]  NPI: [id number]    Utilization Review   Contact:Nemo Flowers MSN, APRN, NP-C  Phone: [phone]  Fax: [phone]      Inpatient Auth Req (converted from obs)  [id number]  Carlos Eisenberg (51 y.o. Female)             Date of Birth   1970    Social Security Number       Address   [address] [facility] [address]    Home Phone   [phone]    MRN   [id number]       Pentecostalism   Unknown    Marital Status                               Admission Date   6/13/22    Admission Type   Emergency    Admitting Provider   Teto Sumner MD    Attending Provider   Teto Sumner MD    Department, Room/Bed   [facility] PROGRESS CARE, P205/1P       Discharge Date       Discharge Disposition       Discharge Destination                               Attending Provider: Teto Sumner MD    Allergies: Contrast Dye, Ciprofloxacin, Sulfa Antibiotics, Drug [Hydrocodone-acetaminophen], Adhesive Tape    Isolation: None   Infection: None   Code Status: CPR   Advance Care Planning Activity    Ht: 160 cm (62.99\")   Wt: 85.2 kg (187 lb 13.3 oz)    Admission Cmt: None   Principal Problem: Chest pain, unspecified type [R07.9]                 Active Insurance as of 6/11/2022     Primary Coverage     Payor Plan Insurance Group Employer/Plan Group    [id number]     Payor Plan Address Payor Plan Phone Number Payor Plan Fax Number Effective Dates    [employer] [address] [address] [phone]  1/1/2021 - None Entered    [facility] [id number]       Subscriber Name Subscriber Birth Date Member ID       CARLOS EISENBERG 1970 [id number]                 Emergency Contacts      (Rel.) Home Phone Work Phone Mobile Phone    ZAC EISENBERG (Spouse) [phone] -- [phone]               History & Physical      Ailya Buckner PA-C at 06/12/22 0346     Attestation signed by Teto Sumner MD at 06/12/22 0648    I have " reviewed this documentation and agree.  I have reviewed the assessment and plan with ALFRED Pisano, and we have developed the assessment and plan together.  I have also discussed patient with Dr. Contreras.  Since the chart had to differing opinions by 2 different interventional cardiologist last time she was hospitalized, I asked him to contact the on-call interventionalists, Dr. Barry, prior to excepting the patient on our service.  Dr. Barry stated that he will reevaluate the patient today as he needs to look at the actual pictures of her prior left heart catheterization studies.  If Dr. Barry thinks that the issue is not immediately life-threatening, then he would like Dr. Chaney with interventional cardiology to look at the patient tomorrow as Dr. Chaney will be on-call.  In the meantime, we will rule out an acute MI per our chest pain protocol.  Please note that the patient is in the progressive care unit as a telemetry overflow because There were no telemetry beds available at the time of her admission.                      [facility] Medicine Services  History & Physical    Patient Identification:  Name:  Lissa Eisenberg  Age:  51 y.o.  Sex:  female  :  1970  MRN:  [id number]   Visit Number:  [id number]  Primary Care Physician:  Roxy Deng DO Subjective     2022   Chief complaint:   Chief Complaint   Patient presents with   • Chest Pain   • Shortness of Breath       History of presenting illness:      Lissa Eisenberg is a 51 y.o. female with past medical history significant for type 2 diabetes mellitus, essential hypertension, hyperlipidemia, coronary artery disease status post CABG x2 with JAYDA to mid RCA 2021 and arthrotomy of severe ISR and PCI of mid RCA with attempt to stent ostial RPDA unsuccessful, left bundle branch block, chronic diastolic heart failure, GERD, anxiety/depression/PTSD, and obesity who presents to the emergency department for  "evaluation of chest pain and shortness of breath.  Of note, patient was recently admitted to this facility from May 21 to May 25, 2022 for evaluation of chest pain.  She was found to have a new left bundle branch block.  She was admitted to the telemetry floor and evaluated by cardiologist, Dr. Smalls, who recommended [facility] for possible intervention with balloon angioplasty to ostial RPDA to help anginal episode due to concern for possible single-vessel bypass.  Later evaluated by another cardiologist, Dr. Delgado, to treat medically at this time and if symptoms persist then needs evaluation for single-vessel bypass.  Her dose of Ranexa was increased and digoxin was added.  Case was discussed with cardiology again with recommendation for outpatient follow-up with cardiology as well as continuing Ranexa digoxin and other home medications.    Patient states that since she was discharged from the hospital last month that her weakness and fatigue has gradually worsened.  She also has a decreased appetite and reports losing 14 pounds since she was last admitted.  She states that her chest pain has worsened in nature since discharge.  She describes the pain as \"dumbo sitting on the middle of her chest\" that radiates from the midsternal area of her chest up into the left side of her neck into the jaw and left shoulder area.  She does report associated symptoms of shortness of breath, diaphoresis, and nausea.  She states that the pain is intermittent.  She states that she has the pain at rest and that it worsens with activity.  She rates her pain as a 7 out of 10 on the pain scale.  She states that nitroglycerin does relieve her pain.    Upon arrival to the ED, vital signs were temperature 96.9, heart rate 99, respirations 20, blood pressure 155/84, SPO2 99% on room air.  CMP with glucose 110, sodium 129, CO2 20.3, chloride 94, otherwise unremarkable.  CBC largely unremarkable.  Troponin T negative x2.  proBNP 60.6.  Chest " x-ray with no acute cardiopulmonary findings.    Known Emergency Department medications received prior to my evaluation included aspirin 324 mg, IV Zofran 4 mg. Room location at the time of my evaluation was 205 A.     ---------------------------------------------------------------------------------------------------------------------   Review of Systems   Constitutional: Positive for appetite change (decreased), diaphoresis, fatigue and unexpected weight change (lost 14 lbs within last month). Negative for activity change.   HENT: Negative for congestion, postnasal drip, rhinorrhea and sore throat.    Eyes: Negative for discharge and redness.   Respiratory: Positive for shortness of breath. Negative for apnea, cough and wheezing.    Cardiovascular: Positive for chest pain (midsternal radiating into left jaw, neck, and shoulder; intermittent). Negative for palpitations.   Gastrointestinal: Positive for nausea. Negative for abdominal distention, abdominal pain, diarrhea and vomiting.   Endocrine: Negative for polydipsia, polyphagia and polyuria.   Genitourinary: Negative for difficulty urinating, dysuria, frequency and urgency.   Musculoskeletal: Negative for arthralgias and myalgias.   Skin: Negative for color change and wound.   Neurological: Positive for weakness (generalized). Negative for dizziness, syncope and light-headedness.   Psychiatric/Behavioral: Negative for agitation and behavioral problems.        ---------------------------------------------------------------------------------------------------------------------   Past Medical History:   Diagnosis Date   • COVID-19    • Diabetes mellitus (HCC)    • Hyperlipidemia    • Hypertension    • Myocardial infarct (HCC)    • PCOS (polycystic ovarian syndrome)    • PTSD (post-traumatic stress disorder)      Past Surgical History:   Procedure Laterality Date   • CARDIAC CATHETERIZATION     • CARDIAC CATHETERIZATION N/A 4/5/2022    Procedure: Left Heart Cath;   Surgeon: Alexandr Marquez MD;  Location: [facility] INVASIVE LOC[facility];  Service: Cardiology;  Laterality: N/A;   •  SECTION     • CHOLECYSTECTOMY     • CORONARY ARTERY BYPASS GRAFT      x 2   • CORONARY STENT PLACEMENT      x 6 per patient   • DILATATION AND CURETTAGE     • KNEE SURGERY Left    • TONSILLECTOMY       Family History   Problem Relation Age of Onset   • Lung cancer Father    • Thyroid cancer Brother    • Heart attack Maternal Grandfather    • Lung cancer Maternal Grandfather    • Heart attack Paternal Grandmother    • Emphysema Paternal Grandfather      Social History     Socioeconomic History   • Marital status:    Tobacco Use   • Smoking status: Never Smoker   • Smokeless tobacco: Never Used   Vaping Use   • Vaping Use: Never used   Substance and Sexual Activity   • Alcohol use: Not Currently   • Drug use: Never   • Sexual activity: Yes     Partners: Male     Birth control/protection: None     ---------------------------------------------------------------------------------------------------------------------   Allergies:  Contrast dye, Ciprofloxacin, Sulfa antibiotics, Drug [hydrocodone-acetaminophen], and Adhesive tape  ---------------------------------------------------------------------------------------------------------------------   Home medications:    Medications below are reported home medications pulling from within the system; at this time, these medications have not been reconciled unless otherwise specified and are in the verification process for further verifcation as current home medications.  Medications Prior to Admission   Medication Sig Dispense Refill Last Dose   • aspirin 81 MG EC tablet Take 81 mg by mouth Daily.   2022 at Unknown time   • atorvastatin (LIPITOR) 80 MG tablet Take 1 tablet by mouth Every Night. 90 tablet 1 Past Week at Unknown time   • digoxin (LANOXIN) 125 MCG tablet Take 1 tablet by mouth Daily for 30 days. 30 tablet 0 2022 at  Unknown time   • docusate sodium (COLACE) 100 MG capsule Take 1 capsule by mouth 2 (Two) Times a Day for 30 days. 60 capsule 0 6/11/2022 at Unknown time   • DULoxetine (CYMBALTA) 30 MG capsule Take 60 mg by mouth Daily.   6/11/2022 at Unknown time   • isosorbide mononitrate (IMDUR) 30 MG 24 hr tablet Take 1 tablet by mouth Daily. 90 tablet 1 6/11/2022 at Unknown time   • levocetirizine (XYZAL) 5 MG tablet Take 1 tablet by mouth Every Evening. 30 tablet 5 Past Week at Unknown time   • linaclotide (Linzess) 72 MCG capsule capsule Take 1 capsule by mouth Every Morning Before Breakfast. 30 capsule 2 Past Week at Unknown time   • lisinopril (PRINIVIL,ZESTRIL) 2.5 MG tablet Take 2.5 mg by mouth Daily.   6/11/2022 at Unknown time   • magnesium oxide (MAG-OX) 400 MG tablet Take 1 tablet by mouth 2 (Two) Times a Day. 60 tablet 5 Past Week at Unknown time   • metFORMIN (GLUCOPHAGE) 1000 MG tablet Take 1 tablet by mouth 2 (Two) Times a Day With Meals. 180 tablet 1 6/11/2022 at Unknown time   • metoprolol tartrate (LOPRESSOR) 25 MG tablet Take 1 tablet by mouth 2 (Two) Times a Day. 60 tablet 0 6/11/2022 at Unknown time   • omeprazole (priLOSEC) 20 MG capsule Take 1 capsule by mouth Daily. 90 capsule 1 6/11/2022 at Unknown time   • Ozempic, 1 MG/DOSE, 4 MG/3ML solution pen-injector Inject 1 mg into the appropriate muscle as directed by prescriber 1 (One) Time Per Week. (Patient taking differently: Inject 1 mg into the appropriate muscle as directed by prescriber 1 (One) Time Per Week. Thursdays.) 9 mL 1 Past Week at Unknown time   • Prenatal Vit-Fe Fumarate-FA (prenatal vitamin 27-0.8) 27-0.8 MG tablet tablet Take 1 tablet by mouth Daily.   6/11/2022 at Unknown time   • ranolazine (Ranexa) 1000 MG 12 hr tablet Take 1 tablet by mouth 2 (Two) Times a Day for 30 days. 60 tablet 0 6/11/2022 at Unknown time   • Rivaroxaban (Xarelto) 2.5 MG tablet Take 1 tablet by mouth 2 (Two) Times a Day. 180 tablet 1 6/11/2022 at Unknown time   •  ticagrelor (BRILINTA) 90 MG tablet tablet Take 1 tablet by mouth 2 (Two) Times a Day. 180 tablet 3 6/11/2022 at Unknown time   • nitroglycerin (NITROSTAT) 0.4 MG SL tablet Place 1 tablet under the tongue Every 5 minutes as Needed for Chest Pain until chest pain is relieved. If you have to take 3 tabs, take the 3rd and go to the hospital to be evaluated. 25 tablet 1 Unknown at Unknown time   • ondansetron ODT (ZOFRAN-ODT) 4 MG disintegrating tablet Place 1 tablet on the tongue Every 8 (Eight) Hours As Needed for Nausea or Vomiting. 12 tablet 0 Unknown at Unknown time       Hospital Scheduled Meds:  aspirin, 81 mg, Oral, Daily  atorvastatin, 80 mg, Oral, Nightly  cetirizine, 10 mg, Oral, Daily  docusate sodium, 100 mg, Oral, BID  DULoxetine, 60 mg, Oral, Daily  prenatal vitamin 27-0.8, 1 tablet, Oral, Daily  ranolazine, 1,000 mg, Oral, BID  Rivaroxaban, 2.5 mg, Oral, BID  ticagrelor, 90 mg, Oral, BID           Current listed hospital scheduled medications may not yet reflect those currently placed in orders that are signed and held awaiting patient's arrival to floor.   ---------------------------------------------------------------------------------------------------------------------     Objective     Vital Signs:  Temp:  [96.9 °F (36.1 °C)] 96.9 °F (36.1 °C)  Heart Rate:  [] 95  Resp:  [20] 20  BP: (103-155)/(66-86) 128/86      06/11/22  6257   Weight: 78.9 kg (174 lb)     Body mass index is 30.82 kg/m².  ---------------------------------------------------------------------------------------------------------------------       Physical Exam  Constitutional:       Appearance: Normal appearance. She is normal weight.      Comments: Resting comfortably in bed. No acute distress noted.    HENT:      Head: Normocephalic and atraumatic.      Right Ear: External ear normal.      Left Ear: External ear normal.      Nose: Nose normal.      Mouth/Throat:      Mouth: Mucous membranes are moist.      Pharynx: Oropharynx  is clear.   Eyes:      Extraocular Movements: Extraocular movements intact.      Conjunctiva/sclera: Conjunctivae normal.      Pupils: Pupils are equal, round, and reactive to light.   Cardiovascular:      Rate and Rhythm: Normal rate and regular rhythm.      Pulses: Normal pulses.           Dorsalis pedis pulses are 2+ on the right side and 2+ on the left side.        Posterior tibial pulses are 2+ on the right side and 2+ on the left side.      Heart sounds: Normal heart sounds. No murmur heard.    No friction rub. No gallop.   Pulmonary:      Effort: Pulmonary effort is normal. No respiratory distress.      Breath sounds: Normal breath sounds. No wheezing, rhonchi or rales.   Abdominal:      General: Abdomen is flat. Bowel sounds are normal. There is no distension.      Palpations: Abdomen is soft.      Tenderness: There is no abdominal tenderness. There is no guarding.   Musculoskeletal:         General: No swelling. Normal range of motion.      Cervical back: Normal range of motion and neck supple.      Right lower leg: No edema.      Left lower leg: No edema.   Skin:     General: Skin is warm and dry.      Findings: No erythema or rash.   Neurological:      General: No focal deficit present.      Mental Status: She is alert and oriented to person, place, and time. Mental status is at baseline.   Psychiatric:         Mood and Affect: Mood normal.         Behavior: Behavior normal.         Thought Content: Thought content normal.           ---------------------------------------------------------------------------------------------------------------------  EKG:    Pending cardiology read.  Per my review, EKG shows normal sinus rhythm with diffuse T wave inversions which is new compared to previous EKG from May 23.    Telemetry:    Telemetry with sinus rhythm with HR 70's    I have personally looked at both the EKG and the telemetry  strips.  ---------------------------------------------------------------------------------------------------------------------   Results from last 7 days   Lab Units 06/11/22 2332   WBC 10*3/mm3 7.64   HEMOGLOBIN g/dL 13.8   HEMATOCRIT % 41.1   MCV fL 81.9   MCHC g/dL 33.6   PLATELETS 10*3/mm3 309         Results from last 7 days   Lab Units 06/11/22 2332   SODIUM mmol/L 129*   POTASSIUM mmol/L 3.9   CHLORIDE mmol/L 94*   CO2 mmol/L 20.3*   BUN mg/dL 16   CREATININE mg/dL 0.87   CALCIUM mg/dL 9.6   GLUCOSE mg/dL 110*   ALBUMIN g/dL 4.50   BILIRUBIN mg/dL 0.5   ALK PHOS U/L 93   AST (SGOT) U/L 16   ALT (SGPT) U/L 16   Estimated Creatinine Clearance: 75.2 mL/min (by C-G formula based on SCr of 0.87 mg/dL).  No results found for: AMMONIA  Results from last 7 days   Lab Units 06/12/22  0149 06/11/22 2332   TROPONIN T ng/mL <0.010 <0.010     Results from last 7 days   Lab Units 06/11/22 2332   PROBNP pg/mL 60.6     Lab Results   Component Value Date    HGBA1C 5.80 (H) 05/21/2022     Lab Results   Component Value Date    TSH 2.410 05/22/2022     No results found for: PREGTESTUR, PREGSERUM, HCG, HCGQUANT  Pain Management Panel     Pain Management Panel Latest Ref Rng & Units 5/22/2022    AMPHETAMINES SCREEN, URINE Negative Negative    BARBITURATES SCREEN Negative Negative    BENZODIAZEPINE SCREEN, URINE Negative Negative    BUPRENORPHINEUR Negative Negative    COCAINE SCREEN, URINE Negative Negative    METHADONE SCREEN, URINE Negative Negative    METHAMPHETAMINEUR Negative Negative            ---------------------------------------------------------------------------------------------------------------------  Imaging Results (Last 7 Days)     Procedure Component Value Units Date/Time    XR Chest 1 View [[id number]] Collected: 06/11/22 2345     Updated: 06/11/22 2348    Narrative:      CR Chest 1 Vw    INDICATION:   Chest pain and shortness of air.     COMPARISON:    5/21/2022    FINDINGS:  Portable AP view(s) of the  chest.  The heart and mediastinal contours are normal. The lungs are clear. No pneumothorax or pleural effusion. Patient is status post CABG.      Impression:      No acute cardiopulmonary findings.    Signer Name: Alexei Roman MD   Signed: 6/11/2022 11:45 PM   Workstation Name: [email or web address]    [facility]        Last echocardiogram:  Results for orders placed during the hospital encounter of 04/02/22    Adult Transthoracic Echo Complete w/ Color, Spectral and Contrast if necessary per protocol    Interpretation Summary  · Left ventricular ejection fraction appears to be 61 - 65%.  · Left ventricular diastolic function is consistent with (grade I) impaired relaxation.  · No significant valvular abnormality  · Moderate sclerosis of the aortic valve  · No pericardial effusion  · No prior study for comparison    I have personally reviewed the above radiology images and read the final radiology report on 06/12/22  ---------------------------------------------------------------------------------------------------------------------  Assessment / Plan     Active Hospital Problems    Diagnosis  POA   • Chest pain, unspecified type [R07.9]  Yes       ASSESSMENT/PLAN:  -Chest pain with typical features, POA  -CAD s/p CABG x2 with JAYDA to mid RCA 08/2021 and arthrotomy of severe ISR and PCI of mid RCA with attempt to stent ostial RPDA unsuccessful   -LBBB, present since 05/2022  - Evaluated by cardiology services in 05/2022 during admission with initial plans for one-vessel CABG, but then later recommended to follow-up with interventional cardiology outpatient for possible single vessel bypass and continue medical management.  - Troponin T negative x 2; reports pain 7/10; reports relieved with nitroglycerin.   -EKG with diffuse T wave inversions which is new compared to previous EKG per my review, await final cardiology interpretation.  - Echo from 04/2022 with EF 61-65%, grade 1 diastolic dysfunction,  moderate sclerosis AV  - Stress test from 04/4/2022 with intermediate risk study.  - ED provider discussed with on-call interventional cardiologist, Asha Ngo.   - Inpatient interventional cardiology consulted, input and assistance appreciated.  - Received full-dose aspirin in ED; continue daily aspirin, statin, Brilinta, and Ranexa.   - Continuous cardiac monitoring ordered.     -Mild acute hyponatremia  -Hypochloremia  - Likely secondary to poor oral intake.  - Encourage oral intake.   - Repeat a.m. CMP.     -Non-insulin-dependent type 2 diabetes mellitus  - Hemoglobin A1c 05/2022 5.8.  -Accu-Cheks before every meal and nightly.  -Sliding scale insulin ordered; titrate as needed.  -Hypoglycemia protocol in place.    -Chronic diastolic CHF  -Appears euvolemic on exam.  -Echo from 04/2022 reviewed and detailed above.  -Monitor with strict I's and O's and daily weights.    -Essential hypertension  -Hyperlipidemia  -Blood pressure stable.  -Monitor per hospital protocol.  -Review home medications once reconciled.    -Anxiety/depression  -PTSD  -Supportive care.  -Review home medications once reconciled.    -Obesity, BMI 30.82 kg/m²  -Affects all aspects of care.  ----------  -DVT prophylaxis: Continue Xarelto  -Activity: Up ad reanna  -Expected length of stay:   OBSERVATION status; however, if further evaluation or treatment plans warrant, status may change.  Based upon current information, I predict patient's care encounter to be less than or equal to 2 midnights    High risk secondary to chest pain in setting of coronary artery disease     Disposition: Likely home once medically stable    Aliya Buckner PA-C  06/12/22  03:46 EDT    ---------------------------------------------------------------------------------------------------------------------           Electronically signed by Teto Sumner MD at 06/12/22 0669          Emergency Department Notes      Heatwole, Lisette B at 06/11/22 2523        EKG Done  2247 Given to Dr. Gallegos     Electronically signed by Lisette Chery at 22 7025     Ralph Contreras MD at 22 9127          Subjective   51-year-old female with long past medical history of diabetes hypertension hyperlipidemia MI 2 cardiac bypasses, she states about 18 cardiac catheterizations, multiple stents, coming in with left-sided chest pain rating to left shoulder left jaw 7 out of 10 pressure-like no exacerbating alleviating factors, associated with shortness of breath and nausea.          Review of Systems   Constitutional: Negative for chills and fever.   Respiratory: Negative for shortness of breath.    Cardiovascular: Positive for chest pain.   Gastrointestinal: Negative for abdominal pain, nausea and vomiting.   Genitourinary: Negative for dysuria.   Musculoskeletal: Negative for myalgias and neck stiffness.   Skin: Negative for rash.   Neurological: Negative for headaches.   All other systems reviewed and are negative.      Past Medical History:   Diagnosis Date   • COVID-19    • Diabetes mellitus (HCC)    • Hyperlipidemia    • Hypertension    • Myocardial infarct (HCC)    • PCOS (polycystic ovarian syndrome)    • PTSD (post-traumatic stress disorder)        Allergies   Allergen Reactions   • Contrast Dye Anaphylaxis   • Ciprofloxacin Other (See Comments)     Insomnia   • Sulfa Antibiotics Nausea And Vomiting   • Drug [Hydrocodone-Acetaminophen] Itching   • Adhesive Tape Rash       Past Surgical History:   Procedure Laterality Date   • CARDIAC CATHETERIZATION     • CARDIAC CATHETERIZATION N/A 2022    Procedure: Left Heart Cath;  Surgeon: Alexandr Marquez MD;  Location: [facility] CATH INVASIVE LOCATION;  Service: Cardiology;  Laterality: N/A;   •  SECTION     • CHOLECYSTECTOMY     • CORONARY ARTERY BYPASS GRAFT      x 2   • CORONARY STENT PLACEMENT      x 6 per patient   • DILATATION AND CURETTAGE     • KNEE SURGERY Left    • TONSILLECTOMY         Family History    Problem Relation Age of Onset   • Lung cancer Father    • Thyroid cancer Brother    • Heart attack Maternal Grandfather    • Lung cancer Maternal Grandfather    • Heart attack Paternal Grandmother    • Emphysema Paternal Grandfather        Social History     Socioeconomic History   • Marital status:    Tobacco Use   • Smoking status: Never Smoker   • Smokeless tobacco: Never Used   Vaping Use   • Vaping Use: Never used   Substance and Sexual Activity   • Alcohol use: Not Currently   • Drug use: Never   • Sexual activity: Yes     Partners: Male     Birth control/protection: None           Objective   Physical Exam  Vitals and nursing note reviewed.   Constitutional:       General: She is not in acute distress.  HENT:      Head: Normocephalic and atraumatic.      Mouth/Throat:      Mouth: Mucous membranes are moist.   Eyes:      Extraocular Movements: Extraocular movements intact.      Pupils: Pupils are equal, round, and reactive to light.   Cardiovascular:      Rate and Rhythm: Normal rate and regular rhythm.      Heart sounds: Normal heart sounds. No murmur heard.    No friction rub. No gallop.   Pulmonary:      Effort: Pulmonary effort is normal. No tachypnea, accessory muscle usage or respiratory distress.      Breath sounds: Normal breath sounds. No stridor. No decreased breath sounds, wheezing, rhonchi or rales.   Chest:      Chest wall: No mass, deformity, tenderness, crepitus or edema. There is no dullness to percussion.   Abdominal:      General: Abdomen is flat. Bowel sounds are normal. There is no distension.      Palpations: Abdomen is soft. There is no mass.      Tenderness: There is no abdominal tenderness.      Hernia: No hernia is present.   Skin:     General: Skin is warm and dry.      Capillary Refill: Capillary refill takes less than 2 seconds.      Findings: No rash.   Neurological:      General: No focal deficit present.      Mental Status: She is alert and oriented to person, place, and  time.         Procedures          ED Course  ED Course as of 06/12/22 0351   Sat Jun 11, 2022   2314 EKG sinus tachycardia 102 bpm no acute ischemic changes no STEMI normal QTC [JM]   Myrna Jun 12, 2022   0144 Discussed with Dr. Barry, he states he will review the imaging in the morning, Dr. Smalls is on on Monday and if wants can proceed with a cath that he was previously planning. [JM]      ED Course User Index  [JM] Ralph Contreras MD                                                 [facility]  Number of Diagnoses or Management Options     Amount and/or Complexity of Data Reviewed  Clinical lab tests: reviewed  Tests in the radiology section of CPT®: reviewed    Patient Progress  Patient progress: stable      Final diagnoses:   Chest pain, unspecified type       ED Disposition  ED Disposition     ED Disposition   Decision to Admit    Condition   --    Comment   Level of Care: Telemetry [5]   Diagnosis: Chest pain, unspecified type [[id number]]   Bed Request Comments: Okay to place in PCU as a telemetry overflow               No follow-up provider specified.       Medication List      ASK your doctor about these medications    linaclotide 72 MCG capsule capsule  Commonly known as: Linzess  Take 1 capsule by mouth Every Morning Before Breakfast.  Ask about: Which instructions should I use?             Ralph Contreras MD  06/12/22 0351      Electronically signed by Ralph Contreras MD at 06/12/22 0351              Physician Progress Notes (last 72 hours)      Constantine Jones MD at 06/12/22 1050        I have evaluated the patient independently, I have reviewed H&P, all labs and images from today and evaluated the patient at bedside. She is known to me from prior admission, prior admission similar presentation, Cardiology initially planning for [facility] but then deferred, started on antianginal therapy, does not appear be completely controlling symptoms, have continued home beta blocker and ACEI on remaining pending medication  reconciliation, held recent digoxin and imdur, Cardiology to see and updated recommendations pending.  Currently pain improved, hemodynamically stable.  Hospital Medicine will follow up tomorrow with formal note and updated plan.     Electronically signed by Constantine Jones MD at 22 1051          Consult Notes (last 72 hours)      Connor Chaney MD at 22 1007      Consult Orders    1. Inpatient Interventional Cardiology Consult [[id number]] ordered by Aliya Buckner PA-C               Consults    Patient Identification:    Name:  Lissa Eisenberg  Age:  51 y.o.  Sex:  female  :  1970  MRN:  [id number]  Visit Number:  [id number]  Primary care provider:  Roxy Deng DO    Chief complaint:   Chest pain    History of presenting illness:   Patient is a 51-year-old female with known history of CAD s/p CABG s/p PCI to in-stent restenosis of the mid RCA lesion, known to have ostial RPDA lesion which is jailed across the stents in the distal RCA, failure to deploy a stent in the ostial RPDA during the last intervention to the mid RCA, patient presented a couple of times post intervention with worsening chest pains, last admission showed discharged home on antianginal medications-he presents today again with worsening chest pain started on Saturday, substernal pressure-like radiating to his jaw and shoulders.  She also had some shortness of breath associate with chest pain.  She is currently being admitted for further evaluation of her anginal symptoms.  Her troponins have been negative, EKG did show diffuse ST-T changes which are unchanged from before.  ---------------------------------------------------------------------------------------------------------------------  Review of Systems   Constitutional: Negative.    HENT: Negative.    Eyes: Negative.    Respiratory: Positive for shortness of breath.    Cardiovascular: Positive for chest pain.   Gastrointestinal: Negative.     Endocrine: Negative.    Genitourinary: Negative.    Musculoskeletal: Negative.    Skin: Negative.    Allergic/Immunologic: Negative.    Neurological: Negative.    Hematological: Negative.    Psychiatric/Behavioral: Negative.       ---------------------------------------------------------------------------------------------------------------------   Past History:   Family History   Problem Relation Age of Onset   • Lung cancer Father    • Thyroid cancer Brother    • Heart attack Maternal Grandfather    • Lung cancer Maternal Grandfather    • Heart attack Paternal Grandmother    • Emphysema Paternal Grandfather      Past Medical History:   Diagnosis Date   • COVID-19    • Diabetes mellitus (HCC)    • Hyperlipidemia    • Hypertension    • Myocardial infarct (HCC)    • PCOS (polycystic ovarian syndrome)    • PTSD (post-traumatic stress disorder)      Past Surgical History:   Procedure Laterality Date   • CARDIAC CATHETERIZATION     • CARDIAC CATHETERIZATION N/A 2022    Procedure: Left Heart Cath;  Surgeon: Alexandr Marquez MD;  Location: [facility] CATH INVASIVE LOCATION;  Service: Cardiology;  Laterality: N/A;   •  SECTION     • CHOLECYSTECTOMY     • CORONARY ARTERY BYPASS GRAFT      x 2   • CORONARY STENT PLACEMENT      x 6 per patient   • DILATATION AND CURETTAGE     • KNEE SURGERY Left    • TONSILLECTOMY       Social History     Socioeconomic History   • Marital status:    Tobacco Use   • Smoking status: Never Smoker   • Smokeless tobacco: Never Used   Vaping Use   • Vaping Use: Never used   Substance and Sexual Activity   • Alcohol use: Not Currently   • Drug use: Never   • Sexual activity: Yes     Partners: Male     Birth control/protection: None     ---------------------------------------------------------------------------------------------------------------------   Allergies:  Contrast dye, Ciprofloxacin, Sulfa antibiotics, Drug [hydrocodone-acetaminophen], and Adhesive  tape  ---------------------------------------------------------------------------------------------------------------------   Prior to Admission Medications     Prescriptions Last Dose Informant Patient Reported? Taking?    aspirin 81 MG EC tablet 6/11/2022 Self Yes Yes    Take 81 mg by mouth Daily.    atorvastatin (LIPITOR) 80 MG tablet Past Week Self No Yes    Take 1 tablet by mouth Every Night.    digoxin (LANOXIN) 125 MCG tablet 6/11/2022 Self No Yes    Take 1 tablet by mouth Daily for 30 days.    docusate sodium (COLACE) 100 MG capsule 6/11/2022 Self No Yes    Take 1 capsule by mouth 2 (Two) Times a Day for 30 days.    DULoxetine (CYMBALTA) 30 MG capsule 6/11/2022 Self Yes Yes    Take 60 mg by mouth Daily.    isosorbide mononitrate (IMDUR) 30 MG 24 hr tablet 6/11/2022 Self No Yes    Take 1 tablet by mouth Daily.    levocetirizine (XYZAL) 5 MG tablet Past Week Self No Yes    Take 1 tablet by mouth Every Evening.    linaclotide (Linzess) 72 MCG capsule capsule Past Week Self No Yes    Take 1 capsule by mouth Every Morning Before Breakfast.    lisinopril (PRINIVIL,ZESTRIL) 2.5 MG tablet 6/11/2022 Self Yes Yes    Take 2.5 mg by mouth Daily.    magnesium oxide (MAG-OX) 400 MG tablet Past Week Self No Yes    Take 1 tablet by mouth 2 (Two) Times a Day.    metFORMIN (GLUCOPHAGE) 1000 MG tablet 6/11/2022 Self No Yes    Take 1 tablet by mouth 2 (Two) Times a Day With Meals.    metoprolol tartrate (LOPRESSOR) 25 MG tablet 6/11/2022 Self No Yes    Take 1 tablet by mouth 2 (Two) Times a Day.    nitroglycerin (NITROSTAT) 0.4 MG SL tablet Unknown Self No No    Place 1 tablet under the tongue Every 5 minutes as Needed for Chest Pain until chest pain is relieved. If you have to take 3 tabs, take the 3rd and go to the hospital to be evaluated.    omeprazole (priLOSEC) 20 MG capsule 6/11/2022 Self No Yes    Take 1 capsule by mouth Daily.    ondansetron ODT (ZOFRAN-ODT) 4 MG disintegrating tablet Unknown Self No No    Place 1  tablet on the tongue Every 8 (Eight) Hours As Needed for Nausea or Vomiting.    Ozempic, 1 MG/DOSE, 4 MG/3ML solution pen-injector Past Week Self No Yes    Inject 1 mg into the appropriate muscle as directed by prescriber 1 (One) Time Per Week.    Patient taking differently:  Inject 1 mg into the appropriate muscle as directed by prescriber 1 (One) Time Per Week. Thursdays.    Prenatal Vit-Fe Fumarate-FA (prenatal vitamin 27-0.8) 27-0.8 MG tablet tablet 6/11/2022 Self Yes Yes    Take 1 tablet by mouth Daily.    ranolazine (Ranexa) 1000 MG 12 hr tablet 6/11/2022 Self No Yes    Take 1 tablet by mouth 2 (Two) Times a Day for 30 days.    Rivaroxaban (Xarelto) 2.5 MG tablet 6/11/2022 Self No Yes    Take 1 tablet by mouth 2 (Two) Times a Day.    ticagrelor (BRILINTA) 90 MG tablet tablet 6/11/2022 Self No Yes    Take 1 tablet by mouth 2 (Two) Times a Day.        Hospital Meds:  aspirin, 81 mg, Oral, Daily  atorvastatin, 80 mg, Oral, Nightly  cetirizine, 10 mg, Oral, Daily  docusate sodium, 100 mg, Oral, BID  DULoxetine, 60 mg, Oral, Daily  Insulin Aspart, 0-7 Units, Subcutaneous, TID AC  magnesium oxide, 400 mg, Oral, BID  prenatal vitamin 27-0.8, 1 tablet, Oral, Daily  ranolazine, 1,000 mg, Oral, BID  ticagrelor, 90 mg, Oral, BID      sodium chloride, 125 mL/hr, Last Rate: 125 mL/hr (06/13/22 0954)      ---------------------------------------------------------------------------------------------------------------------   Vital Signs:  Temp:  [96.8 °F (36 °C)-98.3 °F (36.8 °C)] 96.8 °F (36 °C)  Heart Rate:  [] 95  Resp:  [12-24] 15  BP: ()/(49-81) 106/75      06/12/22  0351 06/12/22  0500 06/13/22  0600   Weight: 84 kg (185 lb 3 oz) 84 kg (185 lb 3 oz) 85.2 kg (187 lb 13.3 oz)     Body mass index is 33.28 kg/m².  ---------------------------------------------------------------------------------------------------------------------   Physical exam:   Constitutional:  Well-developed and well-nourished.  No  respiratory distress.      HENT:  Head: Normocephalic and atraumatic.  Mouth:  Moist mucous membranes.    Eyes:  Conjunctivae and EOM are normal.  Pupils are equal, round, and reactive to light.  No scleral icterus.  Neck:  Neck supple.  No JVD present.    Cardiovascular:  Normal rate, regular rhythm and normal heart sounds with no murmur.  Pulmonary/Chest:  No respiratory distress, no wheezes, no crackles, with normal breath sounds and good air movement.  Abdominal:  Soft.  Bowel sounds are normal.  No distension and no tenderness.   Musculoskeletal:  No edema, no tenderness, and no deformity.  No red or swollen joints anywhere.    Neurological:  Alert and oriented to person, place, and time.  No cranial nerve deficit.  No tongue deviation.  No facial droop.  No slurred speech.   Skin:  Skin is warm and dry.  No rash noted.  No pallor.   Psychiatric:  Normal mood and affect.  Behavior is normal.  Judgment and thought content normal.   Peripheral vascular:  No edema and strong pulses on all 4 extremities.    ---------------------------------------------------------------------------------------------------------------------     I have personally looked at both the EKG and the telemetry strips.  Echo:  Results for orders placed during the hospital encounter of 04/02/22    Adult Transthoracic Echo Complete w/ Color, Spectral and Contrast if necessary per protocol    Interpretation Summary  · Left ventricular ejection fraction appears to be 61 - 65%.  · Left ventricular diastolic function is consistent with (grade I) impaired relaxation.  · No significant valvular abnormality  · Moderate sclerosis of the aortic valve  · No pericardial effusion  · No prior study for comparison    ---------------------------------------------------------------------------------------------------------------------   Results from last 7 days   Lab Units 06/11/22  2332   WBC 10*3/mm3 7.64   HEMOGLOBIN g/dL 13.8   HEMATOCRIT % 41.1   MCV fL  81.9   MCHC g/dL 33.6   PLATELETS 10*3/mm3 309         Results from last 7 days   Lab Units 06/12/22  1000 06/11/22  2332   SODIUM mmol/L 136 129*   POTASSIUM mmol/L 3.6 3.9   CHLORIDE mmol/L 99 94*   CO2 mmol/L 21.6* 20.3*   BUN mg/dL 11 16   CREATININE mg/dL 0.62 0.87   CALCIUM mg/dL 9.3 9.6   GLUCOSE mg/dL 118* 110*   ALBUMIN g/dL 4.20 4.50   BILIRUBIN mg/dL 0.5 0.5   ALK PHOS U/L 96 93   AST (SGOT) U/L 17 16   ALT (SGPT) U/L 13 16   Estimated Creatinine Clearance: 109.8 mL/min (by C-G formula based on SCr of 0.62 mg/dL).  No results found for: AMMONIA  Results from last 7 days   Lab Units 06/12/22 2029 06/12/22  0959 06/12/22  0149   TROPONIN T ng/mL <0.010 <0.010 <0.010     Results from last 7 days   Lab Units 06/11/22  2332   PROBNP pg/mL 60.6     Lab Results   Component Value Date    HGBA1C 5.80 (H) 05/21/2022     Lab Results   Component Value Date    TSH 2.410 05/22/2022     No results found for: PREGTESTUR, PREGSERUM, HCG, HCGQUANT  Pain Management Panel     Pain Management Panel Latest Ref Rng & Units 5/22/2022    AMPHETAMINES SCREEN, URINE Negative Negative    BARBITURATES SCREEN Negative Negative    BENZODIAZEPINE SCREEN, URINE Negative Negative    BUPRENORPHINEUR Negative Negative    COCAINE SCREEN, URINE Negative Negative    METHADONE SCREEN, URINE Negative Negative    METHAMPHETAMINEUR Negative Negative        No results found for: BLOODCX  No results found for: URINECX  No results found for: WOUNDCX  No results found for: STOOLCX        ---------------------------------------------------------------------------------------------------------------------   Imaging Results (Last 7 Days)     Procedure Component Value Units Date/Time    XR Chest 1 View [[id number]] Collected: 06/11/22 2345     Updated: 06/11/22 2348    Narrative:      CR Chest 1 Vw    INDICATION:   Chest pain and shortness of air.     COMPARISON:    5/21/2022    FINDINGS:  Portable AP view(s) of the chest.  The heart and mediastinal  contours are normal. The lungs are clear. No pneumothorax or pleural effusion. Patient is status post CABG.      Impression:      No acute cardiopulmonary findings.    Signer Name: Alexei Roman MD   Signed: 6/11/2022 11:45 PM   Workstation Name: [email or web address]    [facility]        ----------------------------------------------------------------------------------------------------------------------  Assessment:   Chest pain concerning for worsening angina.  CAD s/p CABG with a patent LIMA to LAD and occluded vein grafts, s/p PCI to mid RCA, known ostial RPDA 90% stenosis which is likely jailed and failed attempt at PCI during the last procedure.  Hypertension  Type 2 diabetes mellitus        Plan:   Patient was on triple therapy with Xarelto 2.5 twice daily along with aspirin and Brilinta, I will hold her Xarelto in anticipation for heart cath tomorrow and try to at least do balloon angioplasty to the ostial RPDA lesion.  She said that her chest pain is mild currently.  Her troponins have been negative.  She has been hypotensive overnight and she is getting IV fluid boluses.  Her beta-blocker and ARB has been held due to hypotension.  We will plan for coronary angiogram in a.m.  Keep her n.p.o. after midnight.  Thank you for the consult.          Connor Chaney MD, [facility]  Interventional Cardiology      06/13/22  10:07 EDT    Electronically signed by Connor Chaney MD at 06/13/22 3122

## 2022-06-13 NOTE — PLAN OF CARE
Goal Outcome Evaluation:           Progress: no change  Outcome Evaluation: LADONNA, TEENA. Patient continues to have intermittent chest pain. Cardiology made aware, gave 1 SL nitro. BP remains low but is improving. Patient received a 1000ml bolus and has NS infusing. Mag replacing at this time. Patient is to be NPO at midnight for heart cath. Family at bedside with call light in reach.

## 2022-06-14 ENCOUNTER — APPOINTMENT (OUTPATIENT)
Dept: CARDIAC REHAB | Facility: HOSPITAL | Age: 52
End: 2022-06-14

## 2022-06-14 LAB
ANION GAP SERPL CALCULATED.3IONS-SCNC: 12.5 MMOL/L (ref 5–15)
BASOPHILS # BLD AUTO: 0.05 10*3/MM3 (ref 0–0.2)
BASOPHILS NFR BLD AUTO: 0.8 % (ref 0–1.5)
BUN SERPL-MCNC: 7 MG/DL (ref 6–20)
BUN/CREAT SERPL: 9.6 (ref 7–25)
CALCIUM SPEC-SCNC: 8.9 MG/DL (ref 8.6–10.5)
CHLORIDE SERPL-SCNC: 107 MMOL/L (ref 98–107)
CO2 SERPL-SCNC: 20.5 MMOL/L (ref 22–29)
CREAT SERPL-MCNC: 0.73 MG/DL (ref 0.57–1)
DEPRECATED RDW RBC AUTO: 42 FL (ref 37–54)
EGFRCR SERPLBLD CKD-EPI 2021: 99.1 ML/MIN/1.73
EOSINOPHIL # BLD AUTO: 0.21 10*3/MM3 (ref 0–0.4)
EOSINOPHIL NFR BLD AUTO: 3.5 % (ref 0.3–6.2)
ERYTHROCYTE [DISTWIDTH] IN BLOOD BY AUTOMATED COUNT: 14.4 % (ref 12.3–15.4)
GLUCOSE BLDC GLUCOMTR-MCNC: 103 MG/DL (ref 70–130)
GLUCOSE BLDC GLUCOMTR-MCNC: 110 MG/DL (ref 70–130)
GLUCOSE BLDC GLUCOMTR-MCNC: 140 MG/DL (ref 70–130)
GLUCOSE SERPL-MCNC: 104 MG/DL (ref 65–99)
HCT VFR BLD AUTO: 40 % (ref 34–46.6)
HGB BLD-MCNC: 13.6 G/DL (ref 12–15.9)
IMM GRANULOCYTES # BLD AUTO: 0.16 10*3/MM3 (ref 0–0.05)
IMM GRANULOCYTES NFR BLD AUTO: 2.6 % (ref 0–0.5)
LYMPHOCYTES # BLD AUTO: 2.38 10*3/MM3 (ref 0.7–3.1)
LYMPHOCYTES NFR BLD AUTO: 39.3 % (ref 19.6–45.3)
MAGNESIUM SERPL-MCNC: 2 MG/DL (ref 1.6–2.6)
MCH RBC QN AUTO: 27.5 PG (ref 26.6–33)
MCHC RBC AUTO-ENTMCNC: 34 G/DL (ref 31.5–35.7)
MCV RBC AUTO: 80.8 FL (ref 79–97)
MONOCYTES # BLD AUTO: 0.42 10*3/MM3 (ref 0.1–0.9)
MONOCYTES NFR BLD AUTO: 6.9 % (ref 5–12)
NEUTROPHILS NFR BLD AUTO: 2.84 10*3/MM3 (ref 1.7–7)
NEUTROPHILS NFR BLD AUTO: 46.9 % (ref 42.7–76)
NRBC BLD AUTO-RTO: 0 /100 WBC (ref 0–0.2)
PHOSPHATE SERPL-MCNC: 2.8 MG/DL (ref 2.5–4.5)
PLATELET # BLD AUTO: 241 10*3/MM3 (ref 140–450)
PMV BLD AUTO: 9.5 FL (ref 6–12)
POTASSIUM SERPL-SCNC: 4.6 MMOL/L (ref 3.5–5.2)
QT INTERVAL: 350 MS
QTC INTERVAL: 446 MS
RBC # BLD AUTO: 4.95 10*6/MM3 (ref 3.77–5.28)
SODIUM SERPL-SCNC: 140 MMOL/L (ref 136–145)
TROPONIN T SERPL-MCNC: <0.01 NG/ML (ref 0–0.03)
WBC NRBC COR # BLD: 6.06 10*3/MM3 (ref 3.4–10.8)

## 2022-06-14 PROCEDURE — 93005 ELECTROCARDIOGRAM TRACING: CPT | Performed by: INTERNAL MEDICINE

## 2022-06-14 PROCEDURE — 84100 ASSAY OF PHOSPHORUS: CPT | Performed by: INTERNAL MEDICINE

## 2022-06-14 PROCEDURE — 99232 SBSQ HOSP IP/OBS MODERATE 35: CPT | Performed by: INTERNAL MEDICINE

## 2022-06-14 PROCEDURE — 83735 ASSAY OF MAGNESIUM: CPT | Performed by: INTERNAL MEDICINE

## 2022-06-14 PROCEDURE — 80048 BASIC METABOLIC PNL TOTAL CA: CPT | Performed by: INTERNAL MEDICINE

## 2022-06-14 PROCEDURE — 82962 GLUCOSE BLOOD TEST: CPT

## 2022-06-14 PROCEDURE — 85025 COMPLETE CBC W/AUTO DIFF WBC: CPT | Performed by: INTERNAL MEDICINE

## 2022-06-14 PROCEDURE — 93010 ELECTROCARDIOGRAM REPORT: CPT | Performed by: INTERNAL MEDICINE

## 2022-06-14 PROCEDURE — 84484 ASSAY OF TROPONIN QUANT: CPT | Performed by: INTERNAL MEDICINE

## 2022-06-14 RX ORDER — LORAZEPAM 0.5 MG/1
0.5 TABLET ORAL ONCE
Status: COMPLETED | OUTPATIENT
Start: 2022-06-14 | End: 2022-06-14

## 2022-06-14 RX ADMIN — ATORVASTATIN CALCIUM 80 MG: 40 TABLET, FILM COATED ORAL at 20:18

## 2022-06-14 RX ADMIN — RANOLAZINE 1000 MG: 500 TABLET, FILM COATED, EXTENDED RELEASE ORAL at 08:50

## 2022-06-14 RX ADMIN — PRENATAL VIT W/ FE FUMARATE-FA TAB 27-0.8 MG 1 TABLET: 27-0.8 TAB at 08:50

## 2022-06-14 RX ADMIN — RANOLAZINE 1000 MG: 500 TABLET, FILM COATED, EXTENDED RELEASE ORAL at 20:18

## 2022-06-14 RX ADMIN — MAGNESIUM GLUCONATE 500 MG ORAL TABLET 400 MG: 500 TABLET ORAL at 20:19

## 2022-06-14 RX ADMIN — NITROGLYCERIN 0.4 MG: 0.4 TABLET, ORALLY DISINTEGRATING SUBLINGUAL at 00:06

## 2022-06-14 RX ADMIN — SODIUM CHLORIDE 125 ML/HR: 9 INJECTION, SOLUTION INTRAVENOUS at 01:51

## 2022-06-14 RX ADMIN — MAGNESIUM GLUCONATE 500 MG ORAL TABLET 400 MG: 500 TABLET ORAL at 08:50

## 2022-06-14 RX ADMIN — SODIUM CHLORIDE 125 ML/HR: 9 INJECTION, SOLUTION INTRAVENOUS at 09:59

## 2022-06-14 RX ADMIN — DOCUSATE SODIUM 100 MG: 100 CAPSULE ORAL at 08:50

## 2022-06-14 RX ADMIN — LORAZEPAM 0.5 MG: 0.5 TABLET ORAL at 09:06

## 2022-06-14 RX ADMIN — TICAGRELOR 90 MG: 90 TABLET ORAL at 08:50

## 2022-06-14 RX ADMIN — ASPIRIN 81 MG: 81 TABLET, COATED ORAL at 08:50

## 2022-06-14 RX ADMIN — LORAZEPAM 0.5 MG: 0.5 TABLET ORAL at 00:29

## 2022-06-14 RX ADMIN — TICAGRELOR 90 MG: 90 TABLET ORAL at 20:18

## 2022-06-14 RX ADMIN — CETIRIZINE HYDROCHLORIDE 10 MG: 10 TABLET, FILM COATED ORAL at 08:50

## 2022-06-14 RX ADMIN — DULOXETINE HYDROCHLORIDE 60 MG: 60 CAPSULE, DELAYED RELEASE ORAL at 08:50

## 2022-06-14 RX ADMIN — NITROGLYCERIN 0.4 MG: 0.4 TABLET, ORALLY DISINTEGRATING SUBLINGUAL at 22:55

## 2022-06-14 NOTE — PLAN OF CARE
Problem: Adult Inpatient Plan of Care  Goal: Plan of Care Review  Outcome: Ongoing, Progressing  Flowsheets (Taken 6/14/2022 0200)  Progress: no change  Plan of Care Reviewed With: patient  Goal: Patient-Specific Goal (Individualized)  Outcome: Ongoing, Progressing  Goal: Absence of Hospital-Acquired Illness or Injury  Outcome: Ongoing, Progressing  Intervention: Identify and Manage Fall Risk  Description: Perform standard risk assessment on admission using a validated tool or comprehensive approach appropriate to the patient; reassess fall risk frequently, with change in status or transfer to another level of care.  Communicate fall injury risk to interprofessional healthcare team.  Determine need for increased observation, equipment and environmental modification, such as low bed, signage and supportive, nonskid footwear.  Adjust safety measures to individual developmental age, stage and identified risk factors.  Reinforce the importance of safety and physical activity with patient and family.  Perform regular intentional rounding to assess need for position change, pain assessment and personal needs, including assistance with toileting.  Recent Flowsheet Documentation  Taken 6/14/2022 0100 by Kandy Park RN  Safety Promotion/Fall Prevention: safety round/check completed  Taken 6/13/2022 2300 by Kandy Park RN  Safety Promotion/Fall Prevention: safety round/check completed  Taken 6/13/2022 2100 by Kandy Park RN  Safety Promotion/Fall Prevention: safety round/check completed  Taken 6/13/2022 1900 by Kandy Park RN  Safety Promotion/Fall Prevention:   safety round/check completed   room organization consistent   nonskid shoes/slippers when out of bed   lighting adjusted   clutter free environment maintained   activity supervised  Intervention: Prevent Skin Injury  Description: Perform a screening for skin injury risk, such as pressure or moisture associated skin damage on admission and at  regular intervals throughout hospital stay.  Keep all areas of skin (especially folds) clean and dry.  Maintain adequate skin hydration.  Relieve and redistribute pressure and protect bony prominences; implement measures based on patient-specific risk factors.  Match turning and repositioning schedule to clinical condition.  Encourage weight shift frequently; assist with reposition if unable to complete independently.  Float heels off bed; avoid pressure on the Achilles tendon.  Keep skin free from extended contact with medical devices.  Encourage functional activity and mobility, as early as tolerated.  Use aids (e.g., slide boards, mechanical lift) during transfer.  Recent Flowsheet Documentation  Taken 6/13/2022 2002 by Kandy Park, RN  Body Position: position changed independently  Intervention: Prevent Infection  Description: Maintain skin and mucous membrane integrity; promote hand, oral and pulmonary hygiene.  Optimize fluid balance, nutrition, sleep and glycemic control to maximize infection resistance.  Identify potential sources of infection early to prevent or mitigate progression of infection (e.g., wound, lines, devices).  Evaluate ongoing need for invasive devices; remove promptly when no longer indicated.  Recent Flowsheet Documentation  Taken 6/13/2022 1900 by Kandy Park, RN  Infection Prevention:   rest/sleep promoted   single patient room provided  Goal: Optimal Comfort and Wellbeing  Outcome: Ongoing, Progressing  Goal: Readiness for Transition of Care  Outcome: Ongoing, Progressing     Problem: Chest Pain  Goal: Resolution of Chest Pain Symptoms  Outcome: Ongoing, Progressing     Problem: Fall Injury Risk  Goal: Absence of Fall and Fall-Related Injury  Outcome: Ongoing, Progressing  Intervention: Promote Injury-Free Environment  Description: Provide a safe, barrier-free environment that encourages independent activity.  Keep care area uncluttered and well-lighted.  Determine need for  increased observation or monitoring.  Avoid use of devices that minimize mobility, such as restraints or indwelling urinary catheter.  Recent Flowsheet Documentation  Taken 6/14/2022 0100 by Kandy Park RN  Safety Promotion/Fall Prevention: safety round/check completed  Taken 6/13/2022 2300 by Kandy Park RN  Safety Promotion/Fall Prevention: safety round/check completed  Taken 6/13/2022 2100 by Kandy Park RN  Safety Promotion/Fall Prevention: safety round/check completed  Taken 6/13/2022 1900 by Kandy Park RN  Safety Promotion/Fall Prevention:   safety round/check completed   room organization consistent   nonskid shoes/slippers when out of bed   lighting adjusted   clutter free environment maintained   activity supervised   Goal Outcome Evaluation:  Plan of Care Reviewed With: patient        Progress: no change

## 2022-06-14 NOTE — PLAN OF CARE
Goal Outcome Evaluation:           Progress: no change  Outcome Evaluation: VSS. RA. No complaints of chest pain today. Patient to be NPO again tonight at midnight for heart cath in morning. One time dose of ativan given this morning for anxiety. NS infusing, BP stable today. Patient resting at this time with family at bedside and call light in reach.

## 2022-06-14 NOTE — PROGRESS NOTES
Patient Identification:  Name:  Lissa Eisenberg  Age:  51 y.o.  Sex:  female  :  1970  MRN:  [id number]  Visit Number:  [id number]    Chief Complaint:   Chest pain    Subjective:   Patient had an anxiety episode this morning as his Sonbeing discharged from [facility] and ended up in ER and such and she didn't wanted to go through the invasive procedure.   .  Current Hospital Meds:  aspirin, 81 mg, Oral, Daily  atorvastatin, 80 mg, Oral, Nightly  cetirizine, 10 mg, Oral, Daily  docusate sodium, 100 mg, Oral, BID  DULoxetine, 60 mg, Oral, Daily  Insulin Aspart, 0-7 Units, Subcutaneous, TID AC  magnesium oxide, 400 mg, Oral, BID  prenatal vitamin 27-0.8, 1 tablet, Oral, Daily  ranolazine, 1,000 mg, Oral, BID  ticagrelor, 90 mg, Oral, BID      sodium chloride, 125 mL/hr, Last Rate: 125 mL/hr (22 0959)      ----------------------------------------------------------------------------------------------------------------------  Vital Signs:  Temp:  [97.3 °F (36.3 °C)-98.5 °F (36.9 °C)] 98.5 °F (36.9 °C)  Heart Rate:  [] 89  Resp:  [12-19] 16  BP: ()/() 110/72      22  0500 22  0600 22  0402   Weight: 84 kg (185 lb 3 oz) 85.2 kg (187 lb 13.3 oz) 85.2 kg (187 lb 13.3 oz)     Body mass index is 33.28 kg/m².    Intake/Output Summary (Last 24 hours) at 2022 1701  Last data filed at 2022 1200  Gross per 24 hour   Intake 4412.56 ml   Output --   Net 4412.56 ml     Diet Regular; Cardiac  NPO Diet NPO Type: Sips with Meds  ----------------------------------------------------------------------------------------------------------------------  Physical exam:  Constitutional:    HENT:  Head:  Normocephalic and atraumatic.    Eyes:  Conjunctivae and EOM are normal.  Pupils are equal, round, and reactive to light.  No scleral icterus.    Neck:  Neck supple.  No JVD present.    Cardiovascular: Normal rate, regular rhythm, S1 S2+, NO S3 / S4  Pulmonary/Chest:  Vesicular  breath sounds B/L  Abdominal:  Soft.  Bowel sounds are normal.  No distension and no tenderness.      Neurological:  Alert and oriented to person, place, and time. No focal deficits.  Skin:  Skin is warm and dry. No rash noted. No pallor.   Musculoskeletal:  No edema, no tenderness, and no deformity.  No red or swollen joints anywhere.   Peripheral vascular:  2+ Pulses B/L DP  ----------------------------------------------------------------------------------------------------------------------    ----------------------------------------------------------------------------------------------------------------------  Results from last 7 days   Lab Units 06/13/22  1106 06/12/22 2029 06/12/22  0959   TROPONIN T ng/mL <0.010 <0.010 <0.010     Results from last 7 days   Lab Units 06/14/22  0208 06/13/22  1107 06/13/22  1106 06/11/22  2332   LACTATE mmol/L  --   --  0.9  --    WBC 10*3/mm3 6.06 6.18  --  7.64   HEMOGLOBIN g/dL 13.6 12.8  --  13.8   HEMATOCRIT % 40.0 38.6  --  41.1   MCV fL 80.8 83.4  --  81.9   MCHC g/dL 34.0 33.2  --  33.6   PLATELETS 10*3/mm3 241 241  --  309   INR   --   --  1.38*  --          Results from last 7 days   Lab Units 06/14/22  0208 06/13/22  1106 06/12/22  1000 06/11/22  2332   SODIUM mmol/L 140 137 136 129*   POTASSIUM mmol/L 4.6 3.5 3.6 3.9   MAGNESIUM mg/dL 2.0 1.6  --   --    CHLORIDE mmol/L 107 104 99 94*   CO2 mmol/L 20.5* 22.7 21.6* 20.3*   BUN mg/dL 7 7 11 16   CREATININE mg/dL 0.73 0.57 0.62 0.87   CALCIUM mg/dL 8.9 9.0 9.3 9.6   GLUCOSE mg/dL 104* 134* 118* 110*   ALBUMIN g/dL  --  3.79 4.20 4.50   BILIRUBIN mg/dL  --  0.5 0.5 0.5   ALK PHOS U/L  --  88 96 93   AST (SGOT) U/L  --  15 17 16   ALT (SGPT) U/L  --  12 13 16   Estimated Creatinine Clearance: 93.2 mL/min (by C-G formula based on SCr of 0.73 mg/dL).    No results found for: AMMONIA      No results found for: BLOODCX  No results found for: URINECX  No results found for: WOUNDCX  No results found for: STOOLCX    I have  personally looked at the labs and they are summarized above.  ----------------------------------------------------------------------------------------------------------------------  Imaging Results (Last 24 Hours)     ** No results found for the last 24 hours. **        ----------------------------------------------------------------------------------------------------------------------    Assessment:  Chest pain concerning for worsening angina.  CAD s/p CABG with a patent LIMA to LAD and occluded vein grafts, s/p PCI to mid RCA, known ostial RPDA 90% stenosis which is likely jailed and failed attempt at PCI during the last procedure.  Hypertension  Type 2 diabetes mellitus    Plan:  Cont with current meds   Will plan for cath in am.            Connor Chaney MD, [facility]  Interventional Cardiology        06/14/22  17:06 EDT

## 2022-06-15 LAB
ACT BLD: 242 SECONDS (ref 82–152)
ANION GAP SERPL CALCULATED.3IONS-SCNC: 12.7 MMOL/L (ref 5–15)
BUN SERPL-MCNC: 7 MG/DL (ref 6–20)
BUN/CREAT SERPL: 8.5 (ref 7–25)
CALCIUM SPEC-SCNC: 8.7 MG/DL (ref 8.6–10.5)
CHLORIDE SERPL-SCNC: 102 MMOL/L (ref 98–107)
CO2 SERPL-SCNC: 19.3 MMOL/L (ref 22–29)
CREAT SERPL-MCNC: 0.82 MG/DL (ref 0.57–1)
EGFRCR SERPLBLD CKD-EPI 2021: 86.2 ML/MIN/1.73
GLUCOSE BLDC GLUCOMTR-MCNC: 100 MG/DL (ref 70–130)
GLUCOSE BLDC GLUCOMTR-MCNC: 116 MG/DL (ref 70–130)
GLUCOSE BLDC GLUCOMTR-MCNC: 228 MG/DL (ref 70–130)
GLUCOSE SERPL-MCNC: 203 MG/DL (ref 65–99)
MAGNESIUM SERPL-MCNC: 1.6 MG/DL (ref 1.6–2.6)
POTASSIUM SERPL-SCNC: 4 MMOL/L (ref 3.5–5.2)
QT INTERVAL: 362 MS
QTC INTERVAL: 457 MS
SODIUM SERPL-SCNC: 134 MMOL/L (ref 136–145)

## 2022-06-15 PROCEDURE — 25010000002 DIPHENHYDRAMINE PER 50 MG: Performed by: INTERNAL MEDICINE

## 2022-06-15 PROCEDURE — B2111ZZ FLUOROSCOPY OF MULTIPLE CORONARY ARTERIES USING LOW OSMOLAR CONTRAST: ICD-10-PCS | Performed by: INTERNAL MEDICINE

## 2022-06-15 PROCEDURE — C1887 CATHETER, GUIDING: HCPCS | Performed by: INTERNAL MEDICINE

## 2022-06-15 PROCEDURE — 25010000002 FENTANYL CITRATE (PF) 50 MCG/ML SOLUTION: Performed by: INTERNAL MEDICINE

## 2022-06-15 PROCEDURE — 92920 PRQ TRLUML C ANGIOP 1ART&/BR: CPT | Performed by: INTERNAL MEDICINE

## 2022-06-15 PROCEDURE — 25010000002 HYDROCORTISONE SODIUM SUCCINATE 100 MG RECONSTITUTED SOLUTION: Performed by: INTERNAL MEDICINE

## 2022-06-15 PROCEDURE — 94799 UNLISTED PULMONARY SVC/PX: CPT

## 2022-06-15 PROCEDURE — 4A023N7 MEASUREMENT OF CARDIAC SAMPLING AND PRESSURE, LEFT HEART, PERCUTANEOUS APPROACH: ICD-10-PCS | Performed by: INTERNAL MEDICINE

## 2022-06-15 PROCEDURE — 63710000001 INSULIN ASPART PER 5 UNITS: Performed by: INTERNAL MEDICINE

## 2022-06-15 PROCEDURE — 25010000002 MIDAZOLAM PER 1 MG: Performed by: INTERNAL MEDICINE

## 2022-06-15 PROCEDURE — 99232 SBSQ HOSP IP/OBS MODERATE 35: CPT | Performed by: INTERNAL MEDICINE

## 2022-06-15 PROCEDURE — C1894 INTRO/SHEATH, NON-LASER: HCPCS | Performed by: INTERNAL MEDICINE

## 2022-06-15 PROCEDURE — B2181ZZ FLUOROSCOPY OF LEFT INTERNAL MAMMARY BYPASS GRAFT USING LOW OSMOLAR CONTRAST: ICD-10-PCS | Performed by: INTERNAL MEDICINE

## 2022-06-15 PROCEDURE — C1769 GUIDE WIRE: HCPCS | Performed by: INTERNAL MEDICINE

## 2022-06-15 PROCEDURE — 85347 COAGULATION TIME ACTIVATED: CPT

## 2022-06-15 PROCEDURE — 25010000002 HEPARIN (PORCINE) PER 1000 UNITS: Performed by: INTERNAL MEDICINE

## 2022-06-15 PROCEDURE — C1725 CATH, TRANSLUMIN NON-LASER: HCPCS | Performed by: INTERNAL MEDICINE

## 2022-06-15 PROCEDURE — 0 IOPAMIDOL PER 1 ML: Performed by: INTERNAL MEDICINE

## 2022-06-15 PROCEDURE — 02703ZZ DILATION OF CORONARY ARTERY, ONE ARTERY, PERCUTANEOUS APPROACH: ICD-10-PCS | Performed by: INTERNAL MEDICINE

## 2022-06-15 PROCEDURE — 82962 GLUCOSE BLOOD TEST: CPT

## 2022-06-15 PROCEDURE — 0 MAGNESIUM SULFATE 4 GM/100ML SOLUTION: Performed by: INTERNAL MEDICINE

## 2022-06-15 RX ORDER — PANTOPRAZOLE SODIUM 40 MG/1
40 TABLET, DELAYED RELEASE ORAL EVERY MORNING
Refills: 1 | Status: DISCONTINUED | OUTPATIENT
Start: 2022-06-15 | End: 2022-06-21 | Stop reason: HOSPADM

## 2022-06-15 RX ORDER — DIPHENHYDRAMINE HYDROCHLORIDE 50 MG/ML
INJECTION INTRAMUSCULAR; INTRAVENOUS AS NEEDED
Status: DISCONTINUED | OUTPATIENT
Start: 2022-06-15 | End: 2022-06-15 | Stop reason: HOSPADM

## 2022-06-15 RX ORDER — SODIUM CHLORIDE 0.9 % (FLUSH) 0.9 %
10 SYRINGE (ML) INJECTION AS NEEDED
Status: DISCONTINUED | OUTPATIENT
Start: 2022-06-15 | End: 2022-06-21 | Stop reason: HOSPADM

## 2022-06-15 RX ORDER — HEPARIN SODIUM 1000 [USP'U]/ML
INJECTION, SOLUTION INTRAVENOUS; SUBCUTANEOUS AS NEEDED
Status: DISCONTINUED | OUTPATIENT
Start: 2022-06-15 | End: 2022-06-15 | Stop reason: HOSPADM

## 2022-06-15 RX ORDER — SODIUM CHLORIDE 9 MG/ML
100 INJECTION, SOLUTION INTRAVENOUS CONTINUOUS
Status: DISCONTINUED | OUTPATIENT
Start: 2022-06-15 | End: 2022-06-16

## 2022-06-15 RX ORDER — LIDOCAINE HYDROCHLORIDE 20 MG/ML
INJECTION, SOLUTION INFILTRATION; PERINEURAL AS NEEDED
Status: DISCONTINUED | OUTPATIENT
Start: 2022-06-15 | End: 2022-06-15 | Stop reason: HOSPADM

## 2022-06-15 RX ORDER — SODIUM CHLORIDE 0.9 % (FLUSH) 0.9 %
10 SYRINGE (ML) INJECTION EVERY 12 HOURS SCHEDULED
Status: DISCONTINUED | OUTPATIENT
Start: 2022-06-15 | End: 2022-06-21 | Stop reason: HOSPADM

## 2022-06-15 RX ORDER — ACETAMINOPHEN 325 MG/1
650 TABLET ORAL EVERY 6 HOURS PRN
Status: DISCONTINUED | OUTPATIENT
Start: 2022-06-15 | End: 2022-06-21 | Stop reason: HOSPADM

## 2022-06-15 RX ORDER — BISMUTH SUBSALICYLATE 262 MG/1
524 TABLET, CHEWABLE ORAL
Status: DISCONTINUED | OUTPATIENT
Start: 2022-06-15 | End: 2022-06-21 | Stop reason: HOSPADM

## 2022-06-15 RX ORDER — FENTANYL CITRATE 50 UG/ML
INJECTION, SOLUTION INTRAMUSCULAR; INTRAVENOUS AS NEEDED
Status: DISCONTINUED | OUTPATIENT
Start: 2022-06-15 | End: 2022-06-15 | Stop reason: HOSPADM

## 2022-06-15 RX ORDER — MIDAZOLAM HYDROCHLORIDE 1 MG/ML
INJECTION INTRAMUSCULAR; INTRAVENOUS AS NEEDED
Status: DISCONTINUED | OUTPATIENT
Start: 2022-06-15 | End: 2022-06-15 | Stop reason: HOSPADM

## 2022-06-15 RX ORDER — SODIUM CHLORIDE 9 MG/ML
INJECTION, SOLUTION INTRAVENOUS CONTINUOUS PRN
Status: COMPLETED | OUTPATIENT
Start: 2022-06-15 | End: 2022-06-15

## 2022-06-15 RX ORDER — MAGNESIUM SULFATE HEPTAHYDRATE 40 MG/ML
4 INJECTION, SOLUTION INTRAVENOUS ONCE
Status: COMPLETED | OUTPATIENT
Start: 2022-06-15 | End: 2022-06-15

## 2022-06-15 RX ADMIN — Medication 10 ML: at 09:01

## 2022-06-15 RX ADMIN — INSULIN ASPART 4 UNITS: 100 INJECTION, SOLUTION INTRAVENOUS; SUBCUTANEOUS at 16:50

## 2022-06-15 RX ADMIN — SODIUM CHLORIDE 100 ML/HR: 9 INJECTION, SOLUTION INTRAVENOUS at 13:53

## 2022-06-15 RX ADMIN — RANOLAZINE 1000 MG: 500 TABLET, FILM COATED, EXTENDED RELEASE ORAL at 20:26

## 2022-06-15 RX ADMIN — RANOLAZINE 1000 MG: 500 TABLET, FILM COATED, EXTENDED RELEASE ORAL at 08:56

## 2022-06-15 RX ADMIN — PANTOPRAZOLE SODIUM 40 MG: 40 TABLET, DELAYED RELEASE ORAL at 10:27

## 2022-06-15 RX ADMIN — TICAGRELOR 90 MG: 90 TABLET ORAL at 08:56

## 2022-06-15 RX ADMIN — Medication 10 ML: at 21:38

## 2022-06-15 RX ADMIN — DOCUSATE SODIUM 100 MG: 100 CAPSULE ORAL at 08:56

## 2022-06-15 RX ADMIN — ACETAMINOPHEN 650 MG: 325 TABLET ORAL at 15:34

## 2022-06-15 RX ADMIN — TICAGRELOR 90 MG: 90 TABLET ORAL at 20:26

## 2022-06-15 RX ADMIN — MAGNESIUM SULFATE HEPTAHYDRATE 4 G: 40 INJECTION, SOLUTION INTRAVENOUS at 02:37

## 2022-06-15 RX ADMIN — NITROGLYCERIN 0.4 MG: 0.4 TABLET, ORALLY DISINTEGRATING SUBLINGUAL at 11:20

## 2022-06-15 RX ADMIN — DULOXETINE HYDROCHLORIDE 60 MG: 60 CAPSULE, DELAYED RELEASE ORAL at 08:56

## 2022-06-15 RX ADMIN — SODIUM CHLORIDE 125 ML/HR: 9 INJECTION, SOLUTION INTRAVENOUS at 02:37

## 2022-06-15 RX ADMIN — SODIUM CHLORIDE 100 ML/HR: 9 INJECTION, SOLUTION INTRAVENOUS at 23:53

## 2022-06-15 RX ADMIN — ATORVASTATIN CALCIUM 80 MG: 40 TABLET, FILM COATED ORAL at 20:28

## 2022-06-15 RX ADMIN — MAGNESIUM GLUCONATE 500 MG ORAL TABLET 400 MG: 500 TABLET ORAL at 20:26

## 2022-06-15 RX ADMIN — ASPIRIN 81 MG: 81 TABLET, COATED ORAL at 08:56

## 2022-06-15 RX ADMIN — NITROGLYCERIN 0.4 MG: 0.4 TABLET, ORALLY DISINTEGRATING SUBLINGUAL at 11:15

## 2022-06-15 RX ADMIN — PRENATAL VIT W/ FE FUMARATE-FA TAB 27-0.8 MG 1 TABLET: 27-0.8 TAB at 08:56

## 2022-06-15 RX ADMIN — MAGNESIUM GLUCONATE 500 MG ORAL TABLET 400 MG: 500 TABLET ORAL at 08:56

## 2022-06-15 RX ADMIN — Medication 524 MG: at 10:27

## 2022-06-15 RX ADMIN — SODIUM CHLORIDE 125 ML/HR: 9 INJECTION, SOLUTION INTRAVENOUS at 11:17

## 2022-06-15 RX ADMIN — CETIRIZINE HYDROCHLORIDE 10 MG: 10 TABLET, FILM COATED ORAL at 08:56

## 2022-06-15 NOTE — PROGRESS NOTES
[facility] HOSPITALIST PROGRESS NOTE     Patient Identification:  Name:  Lissa Eisenberg  Age:  51 y.o.  Sex:  female  :  1970  MRN:  [id number]  Visit Number:  [id number]  ROOM: [address]     Primary Care Provider:  Roxy Deng DO     Date of Admission: 2022    Length of stay in inpatient status:  2    Subjective     Chief Compliant:    Chief Complaint   Patient presents with   • Chest Pain   • Shortness of Breath     History of Presenting Illness:  The patient was sitting up in a bed when I saw her.  Her  was at bedside.  She started having chest pain while I was talking to her and while she was still sitting up in the chair.  She states that the pain is a pressure type and radiates down the left arm.  She has nausea, shortness of air, and lightheadedness with this as well.  She denies coughing, emesis, and diarrhea.    Objective     Current Hospital Meds:  aspirin, 81 mg, Oral, Daily  atorvastatin, 80 mg, Oral, Nightly  cetirizine, 10 mg, Oral, Daily  docusate sodium, 100 mg, Oral, BID  DULoxetine, 60 mg, Oral, Daily  Insulin Aspart, 0-7 Units, Subcutaneous, TID AC  magnesium oxide, 400 mg, Oral, BID  pantoprazole, 40 mg, Oral, QAM  prenatal vitamin 27-0.8, 1 tablet, Oral, Daily  ranolazine, 1,000 mg, Oral, BID  sodium chloride, 10 mL, Intravenous, Q12H  ticagrelor, 90 mg, Oral, BID    sodium chloride, 125 mL/hr, Last Rate: 125 mL/hr (06/15/22 1117)  sodium chloride, 100 mL/hr, Last Rate: 100 mL/hr (06/15/22 1353)      Current Antimicrobial Therapy:  Anti-Infectives (From admission, onward)    None        Current Diuretic Therapy:  Diuretics (From admission, onward)    None        ----------------------------------------------------------------------------------------------------------------------  Vital Signs:  Temp:  [97.5 °F (36.4 °C)-98.5 °F (36.9 °C)] 97.5 °F (36.4 °C)  Heart Rate:  [] 97  Resp:  [8-18] 18  BP: ()/(59-87) 163/80  SpO2:  [90 %-100 %] 97 %   on  Flow (L/min):  [2] 2;   Device (Oxygen Therapy): room air  Body mass index is 34.59 kg/m².    Wt Readings from Last 3 Encounters:   06/15/22 88.5 kg (195 lb 3.2 oz)   05/31/22 85.1 kg (187 lb 9.6 oz)   05/25/22 89 kg (196 lb 1.6 oz)     Intake & Output (last 3 days)       06/12 0701 06/13 0700 06/13 0701 06/14 0700 06/14 0701  06/15 0700 06/15 0701 06/16 0700    P.O. 860 640 660     I.V. (mL/kg)  4052.6 (47.6) 4519.6 (51.1) 84 (0.9)    Total Intake(mL/kg) 860 (10.1) 4692.6 (55.1) 5179.6 (58.5) 84 (0.9)    Net +860 +4692.6 +5179.6 +84            Urine Unmeasured Occurrence 3 x 1 x 7 x     Stool Unmeasured Occurrence 1 x 1 x 1 x         Diet Regular; Cardiac  ----------------------------------------------------------------------------------------------------------------------  Physical Exam  Vitals reviewed.   Constitutional:       General: She is awake. She is in acute distress.      Appearance: She is diaphoretic. She is not ill-appearing.      Comments: She appears to be in pain.   HENT:      Head: Normocephalic and atraumatic.   Cardiovascular:      Rate and Rhythm: Normal rate and regular rhythm.      Pulses: Normal pulses.      Heart sounds: No murmur heard.  Pulmonary:      Effort: Pulmonary effort is normal. No respiratory distress.      Breath sounds: Normal breath sounds. No wheezing or rales.   Musculoskeletal:         General: No swelling or deformity.   Skin:     Capillary Refill: Capillary refill takes less than 2 seconds.      Coloration: Skin is not jaundiced or pale.   Neurological:      Mental Status: She is alert and oriented to person, place, and time. Mental status is at baseline.      Cranial Nerves: No cranial nerve deficit.   Psychiatric:         Mood and Affect: Mood normal.         Behavior: Behavior normal. Behavior is cooperative.       ----------------------------------------------------------------------------------------------------------------------  Tele:  Normal sinus rhythm  heart rate 80s to 90s.  I personally reviewed in the telemetry strips.  ----------------------------------------------------------------------------------------------------------------------  LABS:    CBC and coagulation:  Results from last 7 days   Lab Units 06/14/22 0208 06/13/22  1107 06/13/22  1106 06/11/22  2332   LACTATE mmol/L  --   --  0.9  --    WBC 10*3/mm3 6.06 6.18  --  7.64   HEMOGLOBIN g/dL 13.6 12.8  --  13.8   HEMATOCRIT % 40.0 38.6  --  41.1   MCV fL 80.8 83.4  --  81.9   MCHC g/dL 34.0 33.2  --  33.6   PLATELETS 10*3/mm3 241 241  --  309   INR   --   --  1.38*  --      Renal and electrolytes:  Results from last 7 days   Lab Units 06/14/22 2349 06/14/22 0208 06/13/22 1106 06/12/22  1000 06/11/22  2332   SODIUM mmol/L 134* 140 137 136 129*   POTASSIUM mmol/L 4.0 4.6 3.5 3.6 3.9   MAGNESIUM mg/dL 1.6 2.0 1.6  --   --    CHLORIDE mmol/L 102 107 104 99 94*   CO2 mmol/L 19.3* 20.5* 22.7 21.6* 20.3*   BUN mg/dL 7 7 7 11 16   CREATININE mg/dL 0.82 0.73 0.57 0.62 0.87   CALCIUM mg/dL 8.7 8.9 9.0 9.3 9.6   PHOSPHORUS mg/dL  --  2.8 3.8  --   --    GLUCOSE mg/dL 203* 104* 134* 118* 110*     Estimated Creatinine Clearance: 84.6 mL/min (by C-G formula based on SCr of 0.82 mg/dL).    Liver and pancreatic function:  Results from last 7 days   Lab Units 06/13/22  1106 06/12/22  1000 06/11/22  2332   ALBUMIN g/dL 3.79 4.20 4.50   BILIRUBIN mg/dL 0.5 0.5 0.5   ALK PHOS U/L 88 96 93   AST (SGOT) U/L 15 17 16   ALT (SGPT) U/L 12 13 16     Endocrine function:  Point of care bedside glucose levels:  Results from last 7 days   Lab Units 06/15/22  1127 06/15/22  0612 06/14/22  1709 06/14/22  1108 06/14/22  0554 06/13/22  1713 06/13/22  1115 06/13/22  0621 06/12/22  1817 06/12/22  1137   GLUCOSE mg/dL 116 100 140* 110 103 166* 122 106 110 98     Glucose levels from the [facility]:  Results from last 7 days   Lab Units 06/14/22  2349 06/14/22  0208 06/13/22  1106 06/12/22  1000 06/11/22  2332   GLUCOSE mg/dL 203* 104* 134*  118* 110*     Cardiac:  Results from last 7 days   Lab Units 06/14/22  2257 06/13/22  1106 06/12/22 2029 06/12/22  0959 06/12/22  0149 06/11/22  2332   TROPONIN T ng/mL <0.010 <0.010 <0.010 <0.010 <0.010 <0.010   PROBNP pg/mL  --   --   --   --   --  60.6         I have personally looked at the labs and they are summarized above.    Assessment & Plan      -Chest pain with typical features that was present on admission in a patient with known coronary artery disease status post two-vessel CABG with drug-eluting stent to the mid RCA in August 2021 and arthrotomy of severe ISR and PCI of mid RCA with attempt to stent ostial RPDA  -As of 6/12/2022, decrease in blood pressure to 88/67 with an increase in respiratory rate  -Acute hypomagnesemia  -History of left bundle branch block that has been present since May 2022  -History of noninsulin-dependent type 2 diabetes mellitus  -History of heart failure with preserved ejection fraction, without any acute exacerbation  -History of essential hypertension  -History of hyperlipidemia  -History of anxiety, depression, and PTSD  -Obesity, BMI 33.28 kg/m2, which complicates all aspects of care    She had her left heart catheterization today and she received balloon angioplasty of RPDA ostial 90% jailed lesion with a 2.5 x 8 mm balloon and reduction of stenosis to less than 40%.  Dr. Chaney with interventional cardiology has recommended DAPT lifelong.  Will continue to monitor the patient closely and plan to discharge her tomorrow.  I have talked to pharmacy and they will help us secure the DAPT for home use.  Will repeat the labs in the morning.  Please note that the blood pressures and glucose levels are adequate at this time and we will continue to monitor these.      VTE Prophylaxis:   Mechanical Order History:     None      Pharmalogical Order History:      Ordered     Dose Route Frequency Stop    06/15/22 1305  heparin (porcine) injection  Status:  Discontinued          -- -- As Needed 06/15/22 1329    06/15/22 1251  heparin (porcine) injection  Status:  Discontinued         -- -- As Needed 06/15/22 1329    06/12/22 0345  Rivaroxaban (XARELTO) tablet 2.5 mg  Status:  Discontinued         2.5 mg PO 2 Times Daily 06/13/22 1005              Disposition: Undetermined at this time and dependent on the left heart catheterization.    Teto Sumner MD  [facility]  06/15/22  15:23 EDT

## 2022-06-15 NOTE — PLAN OF CARE
Goal Outcome Evaluation:   Pt remains alert and oriented. VSS on RA. Pt complained of indigestion this AM. See MAR. Pt went for heart cath this AM with a stent placed. L radial TR band in place. Removing per protocol. Pt had complaints of pain. See MAR. No further complaints at this time. Call light within reach. Will continue to monitor.         Progress: no change

## 2022-06-15 NOTE — CASE MANAGEMENT/SOCIAL WORK
Discharge Planning Assessment   [place]     Patient Name: Lissa Eisenberg  MRN: [id number]  Today's Date: 6/15/2022    Admit Date: 6/11/2022     Discharge Plan     Row Name 06/15/22 1236       Plan    Plan Pt admitted 6/11/22. Pt lives in a camper with family and does not utilize home health services. Pt has a bath bench and rolling walker. Pt will need [facility] arranged for transportation home at discharge, pending clinical improvement and heart cath today. SS following.              GUADALUPE Sharp

## 2022-06-15 NOTE — PLAN OF CARE
Problem: Adult Inpatient Plan of Care  Goal: Plan of Care Review  Outcome: Ongoing, Progressing  Flowsheets (Taken 6/15/2022 0251)  Progress: no change  Plan of Care Reviewed With: patient  Goal: Patient-Specific Goal (Individualized)  Outcome: Ongoing, Progressing  Goal: Absence of Hospital-Acquired Illness or Injury  Outcome: Ongoing, Progressing  Intervention: Identify and Manage Fall Risk  Description: Perform standard risk assessment on admission using a validated tool or comprehensive approach appropriate to the patient; reassess fall risk frequently, with change in status or transfer to another level of care.  Communicate fall injury risk to interprofessional healthcare team.  Determine need for increased observation, equipment and environmental modification, such as low bed, signage and supportive, nonskid footwear.  Adjust safety measures to individual developmental age, stage and identified risk factors.  Reinforce the importance of safety and physical activity with patient and family.  Perform regular intentional rounding to assess need for position change, pain assessment and personal needs, including assistance with toileting.  Recent Flowsheet Documentation  Taken 6/15/2022 0100 by Kandy Park RN  Safety Promotion/Fall Prevention: safety round/check completed  Taken 6/14/2022 2300 by Kandy Park RN  Safety Promotion/Fall Prevention: safety round/check completed  Taken 6/14/2022 2100 by Kandy Park RN  Safety Promotion/Fall Prevention: safety round/check completed  Taken 6/14/2022 1900 by Kandy Park RN  Safety Promotion/Fall Prevention:   safety round/check completed   activity supervised   clutter free environment maintained   fall prevention program maintained   lighting adjusted   nonskid shoes/slippers when out of bed  Intervention: Prevent Skin Injury  Description: Perform a screening for skin injury risk, such as pressure or moisture associated skin damage on admission and  at regular intervals throughout hospital stay.  Keep all areas of skin (especially folds) clean and dry.  Maintain adequate skin hydration.  Relieve and redistribute pressure and protect bony prominences; implement measures based on patient-specific risk factors.  Match turning and repositioning schedule to clinical condition.  Encourage weight shift frequently; assist with reposition if unable to complete independently.  Float heels off bed; avoid pressure on the Achilles tendon.  Keep skin free from extended contact with medical devices.  Encourage functional activity and mobility, as early as tolerated.  Use aids (e.g., slide boards, mechanical lift) during transfer.  Recent Flowsheet Documentation  Taken 6/14/2022 2004 by Kandy Park, RN  Body Position: position changed independently  Intervention: Prevent Infection  Description: Maintain skin and mucous membrane integrity; promote hand, oral and pulmonary hygiene.  Optimize fluid balance, nutrition, sleep and glycemic control to maximize infection resistance.  Identify potential sources of infection early to prevent or mitigate progression of infection (e.g., wound, lines, devices).  Evaluate ongoing need for invasive devices; remove promptly when no longer indicated.  Recent Flowsheet Documentation  Taken 6/14/2022 1900 by Kandy Park, RN  Infection Prevention:   rest/sleep promoted   single patient room provided  Goal: Optimal Comfort and Wellbeing  Outcome: Ongoing, Progressing  Intervention: Monitor Pain and Promote Comfort  Description: Assess pain level, treatment efficacy and patient response at regular intervals using a consistent pain scale.  Consider the presence and impact of preexisting chronic pain.  Encourage patient and caregiver involvement in pain assessment, interventions and safety measures.  Recent Flowsheet Documentation  Taken 6/14/2022 2004 by Kandy Park, RN  Pain Management Interventions:   care clustered   quiet environment  facilitated  Goal: Readiness for Transition of Care  Outcome: Ongoing, Progressing   Goal Outcome Evaluation:  Plan of Care Reviewed With: patient   Patient c/o cp after taking shower, MD aware, trop negative and EKG no change.  VSS, NPO after midnight for heart cath 6/15/22.     Progress: no change

## 2022-06-16 ENCOUNTER — APPOINTMENT (OUTPATIENT)
Dept: CARDIAC REHAB | Facility: HOSPITAL | Age: 52
End: 2022-06-16

## 2022-06-16 LAB
ANION GAP SERPL CALCULATED.3IONS-SCNC: 10 MMOL/L (ref 5–15)
BUN SERPL-MCNC: 5 MG/DL (ref 6–20)
BUN/CREAT SERPL: 6.9 (ref 7–25)
CALCIUM SPEC-SCNC: 8.6 MG/DL (ref 8.6–10.5)
CHLORIDE SERPL-SCNC: 106 MMOL/L (ref 98–107)
CO2 SERPL-SCNC: 19 MMOL/L (ref 22–29)
CREAT SERPL-MCNC: 0.72 MG/DL (ref 0.57–1)
DEPRECATED RDW RBC AUTO: 44.9 FL (ref 37–54)
EGFRCR SERPLBLD CKD-EPI 2021: 100.7 ML/MIN/1.73
ERYTHROCYTE [DISTWIDTH] IN BLOOD BY AUTOMATED COUNT: 14.8 % (ref 12.3–15.4)
GLUCOSE BLDC GLUCOMTR-MCNC: 105 MG/DL (ref 70–130)
GLUCOSE BLDC GLUCOMTR-MCNC: 124 MG/DL (ref 70–130)
GLUCOSE BLDC GLUCOMTR-MCNC: 130 MG/DL (ref 70–130)
GLUCOSE SERPL-MCNC: 176 MG/DL (ref 65–99)
HCT VFR BLD AUTO: 34.2 % (ref 34–46.6)
HGB BLD-MCNC: 11.6 G/DL (ref 12–15.9)
MAGNESIUM SERPL-MCNC: 1.8 MG/DL (ref 1.6–2.6)
MCH RBC QN AUTO: 28.2 PG (ref 26.6–33)
MCHC RBC AUTO-ENTMCNC: 33.9 G/DL (ref 31.5–35.7)
MCV RBC AUTO: 83.2 FL (ref 79–97)
PHOSPHATE SERPL-MCNC: 2.9 MG/DL (ref 2.5–4.5)
PLATELET # BLD AUTO: 280 10*3/MM3 (ref 140–450)
PMV BLD AUTO: 9.4 FL (ref 6–12)
POTASSIUM SERPL-SCNC: 4.1 MMOL/L (ref 3.5–5.2)
RBC # BLD AUTO: 4.11 10*6/MM3 (ref 3.77–5.28)
SODIUM SERPL-SCNC: 135 MMOL/L (ref 136–145)
TROPONIN T SERPL-MCNC: <0.01 NG/ML (ref 0–0.03)
WBC NRBC COR # BLD: 6.39 10*3/MM3 (ref 3.4–10.8)

## 2022-06-16 PROCEDURE — 83735 ASSAY OF MAGNESIUM: CPT | Performed by: INTERNAL MEDICINE

## 2022-06-16 PROCEDURE — 99232 SBSQ HOSP IP/OBS MODERATE 35: CPT | Performed by: INTERNAL MEDICINE

## 2022-06-16 PROCEDURE — 80048 BASIC METABOLIC PNL TOTAL CA: CPT | Performed by: INTERNAL MEDICINE

## 2022-06-16 PROCEDURE — 93005 ELECTROCARDIOGRAM TRACING: CPT | Performed by: INTERNAL MEDICINE

## 2022-06-16 PROCEDURE — 84100 ASSAY OF PHOSPHORUS: CPT | Performed by: INTERNAL MEDICINE

## 2022-06-16 PROCEDURE — 0 MAGNESIUM SULFATE 4 GM/100ML SOLUTION: Performed by: INTERNAL MEDICINE

## 2022-06-16 PROCEDURE — 93010 ELECTROCARDIOGRAM REPORT: CPT | Performed by: INTERNAL MEDICINE

## 2022-06-16 PROCEDURE — 82962 GLUCOSE BLOOD TEST: CPT

## 2022-06-16 PROCEDURE — 25010000002 PROCHLORPERAZINE 10 MG/2ML SOLUTION: Performed by: INTERNAL MEDICINE

## 2022-06-16 PROCEDURE — 85027 COMPLETE CBC AUTOMATED: CPT | Performed by: INTERNAL MEDICINE

## 2022-06-16 PROCEDURE — 84484 ASSAY OF TROPONIN QUANT: CPT | Performed by: INTERNAL MEDICINE

## 2022-06-16 RX ORDER — MAGNESIUM SULFATE HEPTAHYDRATE 40 MG/ML
4 INJECTION, SOLUTION INTRAVENOUS ONCE
Status: COMPLETED | OUTPATIENT
Start: 2022-06-16 | End: 2022-06-16

## 2022-06-16 RX ORDER — PROCHLORPERAZINE EDISYLATE 5 MG/ML
5 INJECTION INTRAMUSCULAR; INTRAVENOUS ONCE
Status: COMPLETED | OUTPATIENT
Start: 2022-06-16 | End: 2022-06-16

## 2022-06-16 RX ORDER — ISOSORBIDE MONONITRATE 30 MG/1
30 TABLET, EXTENDED RELEASE ORAL
Status: DISCONTINUED | OUTPATIENT
Start: 2022-06-16 | End: 2022-06-17

## 2022-06-16 RX ADMIN — CETIRIZINE HYDROCHLORIDE 10 MG: 10 TABLET, FILM COATED ORAL at 10:22

## 2022-06-16 RX ADMIN — RIVAROXABAN 2.5 MG: 2.5 TABLET, FILM COATED ORAL at 18:20

## 2022-06-16 RX ADMIN — DULOXETINE HYDROCHLORIDE 60 MG: 60 CAPSULE, DELAYED RELEASE ORAL at 10:22

## 2022-06-16 RX ADMIN — TICAGRELOR 90 MG: 90 TABLET ORAL at 20:35

## 2022-06-16 RX ADMIN — RIVAROXABAN 2.5 MG: 2.5 TABLET, FILM COATED ORAL at 10:20

## 2022-06-16 RX ADMIN — PRENATAL VIT W/ FE FUMARATE-FA TAB 27-0.8 MG 1 TABLET: 27-0.8 TAB at 10:22

## 2022-06-16 RX ADMIN — ACETAMINOPHEN 650 MG: 325 TABLET ORAL at 20:57

## 2022-06-16 RX ADMIN — Medication 10 ML: at 20:36

## 2022-06-16 RX ADMIN — RANOLAZINE 1000 MG: 500 TABLET, FILM COATED, EXTENDED RELEASE ORAL at 10:21

## 2022-06-16 RX ADMIN — MAGNESIUM GLUCONATE 500 MG ORAL TABLET 400 MG: 500 TABLET ORAL at 10:22

## 2022-06-16 RX ADMIN — PANTOPRAZOLE SODIUM 40 MG: 40 TABLET, DELAYED RELEASE ORAL at 06:15

## 2022-06-16 RX ADMIN — RANOLAZINE 1000 MG: 500 TABLET, FILM COATED, EXTENDED RELEASE ORAL at 20:35

## 2022-06-16 RX ADMIN — PROCHLORPERAZINE EDISYLATE 5 MG: 5 INJECTION INTRAMUSCULAR; INTRAVENOUS at 22:58

## 2022-06-16 RX ADMIN — NITROGLYCERIN 0.4 MG: 0.4 TABLET, ORALLY DISINTEGRATING SUBLINGUAL at 21:39

## 2022-06-16 RX ADMIN — TICAGRELOR 90 MG: 90 TABLET ORAL at 10:21

## 2022-06-16 RX ADMIN — NITROGLYCERIN 0.4 MG: 0.4 TABLET, ORALLY DISINTEGRATING SUBLINGUAL at 21:32

## 2022-06-16 RX ADMIN — SODIUM CHLORIDE 100 ML/HR: 9 INJECTION, SOLUTION INTRAVENOUS at 10:28

## 2022-06-16 RX ADMIN — ASPIRIN 81 MG: 81 TABLET, COATED ORAL at 10:22

## 2022-06-16 RX ADMIN — NITROGLYCERIN 0.4 MG: 0.4 TABLET, ORALLY DISINTEGRATING SUBLINGUAL at 21:46

## 2022-06-16 RX ADMIN — ATORVASTATIN CALCIUM 80 MG: 40 TABLET, FILM COATED ORAL at 20:35

## 2022-06-16 RX ADMIN — DOCUSATE SODIUM 100 MG: 100 CAPSULE ORAL at 10:22

## 2022-06-16 RX ADMIN — MAGNESIUM SULFATE HEPTAHYDRATE 4 G: 40 INJECTION, SOLUTION INTRAVENOUS at 10:23

## 2022-06-16 RX ADMIN — ISOSORBIDE MONONITRATE 30 MG: 30 TABLET, EXTENDED RELEASE ORAL at 20:35

## 2022-06-16 RX ADMIN — DOCUSATE SODIUM 100 MG: 100 CAPSULE ORAL at 20:35

## 2022-06-16 RX ADMIN — MAGNESIUM GLUCONATE 500 MG ORAL TABLET 400 MG: 500 TABLET ORAL at 20:35

## 2022-06-16 NOTE — NURSING NOTE
Gave patient IS, she had used before and was familiar with.  Instructed on using q2h WA.  Volume achieved was 1200ml.

## 2022-06-16 NOTE — PROGRESS NOTES
[facility] HOSPITALIST PROGRESS NOTE     Patient Identification:  Name:  Lissa Eisenberg  Age:  51 y.o.  Sex:  female  :  1970  MRN:  [id number]  Visit Number:  [id number]  ROOM: [address]     Primary Care Provider:  Roxy Deng DO     Date of Admission: 2022    Length of stay in inpatient status:  3    Subjective     Chief Compliant:    Chief Complaint   Patient presents with   • Chest Pain   • Shortness of Breath     History of Presenting Illness: The patient underwent angioplasty yesterday.  She states that her pain went from a 6 out of 10 down to a 3 out of 10.  She is still having wrist pain.  Overall though, the patient does think that she is feeling better.  She denies nausea at this time.  She did have an episode of sweating and chest pain earlier.  Otherwise, she is not coughing and denies any emesis or diarrhea.  She also denies edema.    Objective     Current Hospital Meds:  aspirin, 81 mg, Oral, Daily  atorvastatin, 80 mg, Oral, Nightly  cetirizine, 10 mg, Oral, Daily  docusate sodium, 100 mg, Oral, BID  DULoxetine, 60 mg, Oral, Daily  Insulin Aspart, 0-7 Units, Subcutaneous, TID AC  magnesium oxide, 400 mg, Oral, BID  pantoprazole, 40 mg, Oral, QAM  prenatal vitamin 27-0.8, 1 tablet, Oral, Daily  ranolazine, 1,000 mg, Oral, BID  Rivaroxaban, 2.5 mg, Oral, BID With Meals  sodium chloride, 10 mL, Intravenous, Q12H  ticagrelor, 90 mg, Oral, BID    Pharmacy Consult,   sodium chloride, 100 mL/hr, Last Rate: 100 mL/hr (22 1028)      Current Antimicrobial Therapy:  Anti-Infectives (From admission, onward)    None        Current Diuretic Therapy:  Diuretics (From admission, onward)    None        ----------------------------------------------------------------------------------------------------------------------  Vital Signs:  Temp:  [97 °F (36.1 °C)-98.4 °F (36.9 °C)] 97 °F (36.1 °C)  Heart Rate:  [] 93  Resp:  [12-22] 12  BP: (111-136)/(46-94) 122/62  SpO2:  [94  %-99 %] 98 %  on   ;   Device (Oxygen Therapy): room air  Body mass index is 32.89 kg/m².    Wt Readings from Last 3 Encounters:   06/16/22 84.2 kg (185 lb 10 oz)   05/31/22 85.1 kg (187 lb 9.6 oz)   05/25/22 89 kg (196 lb 1.6 oz)     Intake & Output (last 3 days)       06/13 0701 06/14 0700 06/14 0701  06/15 0700 06/15 0701 06/16 0700 06/16 0701 06/17 0700    P.O. 640 660 840     I.V. (mL/kg) 4052.6 (47.6) 4519.6 (51.1) 2887.7 (34.3) 343.3 (4.1)    Total Intake(mL/kg) 4692.6 (55.1) 5179.6 (58.5) 3727.7 (44.3) 343.3 (4.1)    Net +4692.6 +5179.6 +3727.7 +343.3            Urine Unmeasured Occurrence 1 x 7 x 4 x 2 x    Stool Unmeasured Occurrence 1 x 1 x          Diet Regular; Cardiac  ----------------------------------------------------------------------------------------------------------------------  Physical Exam   Vitals reviewed.   Constitutional:       General: She is awake. She is  not in acute distress today.     Appearance: She is not diaphoretic today. She is not ill-appearing.      Comments: She appears to be in less pain than yesterday.   HENT:      Head: Normocephalic and atraumatic.   Cardiovascular:      Rate and Rhythm: Normal rate and regular rhythm.      Pulses: Normal pulses.      Heart sounds: No murmur heard.  Pulmonary:      Effort: Pulmonary effort is normal. No respiratory distress.      Breath sounds: Normal breath sounds. No wheezing or rales.   Musculoskeletal:         General: No swelling or deformity.   Skin:     Capillary Refill: Capillary refill takes less than 2 seconds.      Coloration: Skin is not jaundiced or pale.   Neurological:      Mental Status: She is alert and oriented to person, place, and time. Mental status is at baseline.      Cranial Nerves: No cranial nerve deficit.   Psychiatric:         Mood and Affect: Mood normal.         Behavior: Behavior normal. Behavior is cooperative.       ----------------------------------------------------------------------------------------------------------------------  Tele:  NS with heart rates 80-90's.  I have personally reviewed/looked at the telemetry strips.  ----------------------------------------------------------------------------------------------------------------------  LABS:    CBC and coagulation:  Results from last 7 days   Lab Units 06/16/22 0009 06/14/22 0208 06/13/22 1107 06/13/22  1106 06/11/22  2332   LACTATE mmol/L  --   --   --  0.9  --    WBC 10*3/mm3 6.39 6.06 6.18  --  7.64   HEMOGLOBIN g/dL 11.6* 13.6 12.8  --  13.8   HEMATOCRIT % 34.2 40.0 38.6  --  41.1   MCV fL 83.2 80.8 83.4  --  81.9   MCHC g/dL 33.9 34.0 33.2  --  33.6   PLATELETS 10*3/mm3 280 241 241  --  309   INR   --   --   --  1.38*  --      Renal and electrolytes:  Results from last 7 days   Lab Units 06/16/22 0009 06/14/22  2349 06/14/22 0208 06/13/22 1106 06/12/22  1000 06/11/22  2332   SODIUM mmol/L 135* 134* 140 137 136 129*   POTASSIUM mmol/L 4.1 4.0 4.6 3.5 3.6 3.9   MAGNESIUM mg/dL 1.8 1.6 2.0 1.6  --   --    CHLORIDE mmol/L 106 102 107 104 99 94*   CO2 mmol/L 19.0* 19.3* 20.5* 22.7 21.6* 20.3*   BUN mg/dL 5* 7 7 7 11 16   CREATININE mg/dL 0.72 0.82 0.73 0.57 0.62 0.87   CALCIUM mg/dL 8.6 8.7 8.9 9.0 9.3 9.6   PHOSPHORUS mg/dL 2.9  --  2.8 3.8  --   --    GLUCOSE mg/dL 176* 203* 104* 134* 118* 110*     Estimated Creatinine Clearance: 93.9 mL/min (by C-G formula based on SCr of 0.72 mg/dL).    Liver and pancreatic function:  Results from last 7 days   Lab Units 06/13/22  1106 06/12/22  1000 06/11/22  2332   ALBUMIN g/dL 3.79 4.20 4.50   BILIRUBIN mg/dL 0.5 0.5 0.5   ALK PHOS U/L 88 96 93   AST (SGOT) U/L 15 17 16   ALT (SGPT) U/L 12 13 16     Endocrine function:  Lab Results   Component Value Date    HGBA1C 5.80 (H) 05/21/2022     Point of care bedside glucose levels:  Results from last 7 days   Lab Units 06/16/22  1344 06/16/22  0618 06/15/22  1632  06/15/22  1127 06/15/22  0612 06/14/22  1709 06/14/22  1108 06/14/22  0554 06/13/22  1713 06/13/22  1115 06/13/22  0621 06/12/22  1817 06/12/22  1137   GLUCOSE mg/dL 124 105 228* 116 100 140* 110 103 166* 122 106 110 98     Glucose levels from the CMP:  Results from last 7 days   Lab Units 06/16/22  0009 06/14/22  2349 06/14/22  0208 06/13/22  1106 06/12/22  1000 06/11/22  2332   GLUCOSE mg/dL 176* 203* 104* 134* 118* 110*     Cardiac:  Results from last 7 days   Lab Units 06/14/22  2257 06/13/22  1106 06/12/22 2029 06/12/22  0959 06/12/22  0149 06/11/22  2332   TROPONIN T ng/mL <0.010 <0.010 <0.010 <0.010 <0.010 <0.010   PROBNP pg/mL  --   --   --   --   --  60.6         I have personally looked at the labs and they are summarized above.    Assessment & Plan      -Chest pain with typical features that was present on admission in a patient with known coronary artery disease status post two-vessel CABG with drug-eluting stent to the mid RCA in August 2021 and arthrotomy of severe ISR and PCI of mid RCA with attempt to stent ostial RPDA  -As of 6/12/2022, decrease in blood pressure to 88/67 with an increase in respiratory rate, no infection found and now resolved  -Acute hypomagnesemia  -History of left bundle branch block that has been present since May 2022  -History of noninsulin-dependent type 2 diabetes mellitus  -History of heart failure with preserved ejection fraction, without any acute exacerbation  -History of essential hypertension  -History of hyperlipidemia  -History of anxiety, depression, and PTSD  -Obesity, BMI 33.28 kg/m2, which complicates all aspects of care    She is now on triple therapy with 2 antiplatelets and an anticoagulant.  Also, I will add Imdur as recommended by cardiology for anginal pain at rest; her blood pressures seem improved at this time and we will monitor closely for hypotension while starting this medication.  I have discussed the patient with  as the patient may  have some limitations to going home, like lack of an AC or electricity.  Will discuss dispositions with the patient and .  In the meantime, will start the Imdur and monitor the patient for any bleeding in light of the triple therapy.    VTE Prophylaxis:   Mechanical Order History:     None      Pharmalogical Order History:      Ordered     Dose Route Frequency Stop    06/16/22 0710  Rivaroxaban (XARELTO) tablet 2.5 mg         2.5 mg PO 2 Times Daily With Meals --    06/15/22 1305  heparin (porcine) injection  Status:  Discontinued         -- -- As Needed 06/15/22 1329    06/15/22 1251  heparin (porcine) injection  Status:  Discontinued         -- -- As Needed 06/15/22 1329    06/12/22 0345  Rivaroxaban (XARELTO) tablet 2.5 mg  Status:  Discontinued         2.5 mg PO 2 Times Daily 06/13/22 1005            Disposition: Undetermined at this time and dependent on the left heart catheterization.    Teto Sumenr MD  [facility]  06/16/22  17:27 EDT

## 2022-06-16 NOTE — PLAN OF CARE
Problem: Adult Inpatient Plan of Care  Goal: Plan of Care Review  Outcome: Ongoing, Progressing  Flowsheets (Taken 6/16/2022 0016)  Progress: improving  Plan of Care Reviewed With:   patient   spouse  Goal: Patient-Specific Goal (Individualized)  Outcome: Ongoing, Progressing  Goal: Absence of Hospital-Acquired Illness or Injury  Outcome: Ongoing, Progressing  Intervention: Identify and Manage Fall Risk  Description: Perform standard risk assessment on admission using a validated tool or comprehensive approach appropriate to the patient; reassess fall risk frequently, with change in status or transfer to another level of care.  Communicate fall injury risk to interprofessional healthcare team.  Determine need for increased observation, equipment and environmental modification, such as low bed, signage and supportive, nonskid footwear.  Adjust safety measures to individual developmental age, stage and identified risk factors.  Reinforce the importance of safety and physical activity with patient and family.  Perform regular intentional rounding to assess need for position change, pain assessment and personal needs, including assistance with toileting.  Recent Flowsheet Documentation  Taken 6/15/2022 2300 by Kandy Park RN  Safety Promotion/Fall Prevention: safety round/check completed  Taken 6/15/2022 2100 by Kandy Pakr RN  Safety Promotion/Fall Prevention: safety round/check completed  Taken 6/15/2022 2002 by Kandy Park, RN  Safety Promotion/Fall Prevention: safety round/check completed  Taken 6/15/2022 1900 by Kandy Park, RN  Safety Promotion/Fall Prevention:   safety round/check completed   nonskid shoes/slippers when out of bed   lighting adjusted   clutter free environment maintained   activity supervised  Intervention: Prevent Skin Injury  Description: Perform a screening for skin injury risk, such as pressure or moisture associated skin damage on admission and at regular intervals  throughout hospital stay.  Keep all areas of skin (especially folds) clean and dry.  Maintain adequate skin hydration.  Relieve and redistribute pressure and protect bony prominences; implement measures based on patient-specific risk factors.  Match turning and repositioning schedule to clinical condition.  Encourage weight shift frequently; assist with reposition if unable to complete independently.  Float heels off bed; avoid pressure on the Achilles tendon.  Keep skin free from extended contact with medical devices.  Encourage functional activity and mobility, as early as tolerated.  Use aids (e.g., slide boards, mechanical lift) during transfer.  Recent Flowsheet Documentation  Taken 6/15/2022 2002 by Kandy Park, RN  Body Position: position changed independently  Intervention: Prevent Infection  Description: Maintain skin and mucous membrane integrity; promote hand, oral and pulmonary hygiene.  Optimize fluid balance, nutrition, sleep and glycemic control to maximize infection resistance.  Identify potential sources of infection early to prevent or mitigate progression of infection (e.g., wound, lines, devices).  Evaluate ongoing need for invasive devices; remove promptly when no longer indicated.  Recent Flowsheet Documentation  Taken 6/15/2022 1900 by Kandy Park, RN  Infection Prevention:   single patient room provided   rest/sleep promoted  Goal: Optimal Comfort and Wellbeing  Outcome: Ongoing, Progressing  Goal: Readiness for Transition of Care  Outcome: Ongoing, Progressing     Problem: Hypertension Comorbidity  Goal: Blood Pressure in Desired Range  Outcome: Ongoing, Progressing  Intervention: Maintain Blood Pressure Management  Description: Evaluate adherence to home antihypertensive regimen (e.g., exercise and activity, diet modification, medication).  Provide scheduled antihypertensive medication; consider administration time and effects (e.g., avoid giving diuretic prior to bedtime).  Monitor  response to antihypertensive medication therapy (e.g., blood pressure, electrolyte levels, medication effects).  Minimize risk of orthostatic hypotension; encourage caution with position changes, particularly if elderly.  Recent Flowsheet Documentation  Taken 6/15/2022 1900 by Kandy Park RN  Medication Review/Management: medications reviewed     Problem: Osteoarthritis Comorbidity  Goal: Maintenance of Osteoarthritis Symptom Control  Outcome: Ongoing, Progressing  Intervention: Maintain Osteoarthritis Symptom Control  Description: Evaluate adherence to self-management plan, such as medication, exercise and weight management.  Advocate for continuation of home regimen, such as medication, physical activity and thermal agents; monitor response.  Encourage participation in functional activities, such as mobility and ADLs (activities of daily living) to minimize decline associated with inactivity.  Facilitate use of patient-specific assistive devices, equipment or orthoses.  Evaluate effectiveness of coping skills; encourage expression of feelings, expectations and concerns related to disease management and quality of life; reinforce education to enhance management plan and wellbeing.  Recent Flowsheet Documentation  Taken 6/15/2022 1900 by Kandy Park RN  Medication Review/Management: medications reviewed     Problem: Chest Pain  Goal: Resolution of Chest Pain Symptoms  Outcome: Ongoing, Progressing     Problem: Cardiac Impairment  Goal: Optimal Activity Tolerance  Outcome: Ongoing, Progressing     Problem: Fall Injury Risk  Goal: Absence of Fall and Fall-Related Injury  Outcome: Ongoing, Progressing  Intervention: Identify and Manage Contributors  Description: Develop a fall prevention plan with the patient and caregiver/family.  Provide reorientation, appropriate sensory stimulation and routines with changes in mental status to decrease risk of fall.  Promote use of personal vision and auditory  aids.  Assess assistance level required for safe and effective self-care; provide support as needed, such as toileting, mobilization. For age 65 and older, implement timed toileting with assistance.  Encourage physical activity, such as performance of mobility and self-care at highest level of patient ability, multicomponent exercise program and provision of appropriate assistive devices.  If fall occurs, assess the severity of injury; implement fall injury protocol. Determine the cause and revise fall injury prevention plan.  Regularly review medication contribution to fall risk; adjust medication administration times to minimize risk of falling.  Consider risk related to polypharmacy and age.  Balance adequate pain management with potential for oversedation.  Recent Flowsheet Documentation  Taken 6/15/2022 1900 by Kandy Park RN  Medication Review/Management: medications reviewed  Intervention: Promote Injury-Free Environment  Description: Provide a safe, barrier-free environment that encourages independent activity.  Keep care area uncluttered and well-lighted.  Determine need for increased observation or monitoring.  Avoid use of devices that minimize mobility, such as restraints or indwelling urinary catheter.  Recent Flowsheet Documentation  Taken 6/15/2022 2300 by Kandy Park RN  Safety Promotion/Fall Prevention: safety round/check completed  Taken 6/15/2022 2100 by Kandy Park RN  Safety Promotion/Fall Prevention: safety round/check completed  Taken 6/15/2022 2002 by Kandy Park RN  Safety Promotion/Fall Prevention: safety round/check completed  Taken 6/15/2022 1900 by Kandy Park RN  Safety Promotion/Fall Prevention:   safety round/check completed   nonskid shoes/slippers when out of bed   lighting adjusted   clutter free environment maintained   activity supervised   Goal Outcome Evaluation:  Plan of Care Reviewed With: patient, spouse   VSS, Patient c/o cp rates as a 3  occasionally since heart cath. Patient given IS to use and instructed to use Q2h WA.     Progress: improving

## 2022-06-16 NOTE — PAYOR COMM NOTE
"[facility]  NPI: [id number]    Utilization Review   Contact:Nemo Flowers MSN, APRN, NP-C  Phone: [phone]  Fax: [phone]      REF# [id number]  Cont stay update     Carlos Eisenberg (51 y.o. Female)             Date of Birth   1970    Social Security Number       Address   [address] [facility] [address]    Home Phone   [phone]    MRN   [id number]       Hoahaoism   Unknown    Marital Status                               Admission Date   6/11/22    Admission Type   Emergency    Admitting Provider   Teto Sumner MD    Attending Provider   Teto Sumner MD    Department, Room/Bed   [facility] PROGRESS CARE, P205/1P       Discharge Date       Discharge Disposition       Discharge Destination                               Attending Provider: Teto Sumner MD    Allergies: Contrast Dye, Ciprofloxacin, Sulfa Antibiotics, Drug [Hydrocodone-acetaminophen], Adhesive Tape    Isolation: None   Infection: None   Code Status: CPR   Advance Care Planning Activity    Ht: 160 cm (62.99\")   Wt: 84.2 kg (185 lb 10 oz)    Admission Cmt: None   Principal Problem: Chest pain, unspecified type [R07.9]                 Active Insurance as of 6/11/2022     Primary Coverage     Payor Plan Insurance Group Employer/Plan Group    [id number]     Payor Plan Address Payor Plan Phone Number Payor Plan Fax Number Effective Dates    [employer] [address] [address] [phone]  1/1/2021 - None Entered    [facility] [id number]       Subscriber Name Subscriber Birth Date Member ID       CARLOS EISENBERG 1970 [id number]                 Emergency Contacts      (Rel.) Home Phone Work Phone Mobile Phone    ZAC EISENBERG (Spouse) [phone] -- [phone]            Teto Sumner MD    Physician   Medicine   Progress Notes      Signed   Date of Service:  06/15/22 1523   Creation Time:  06/15/22 1523              Signed      "           Show:Clear all  [x]Manual[x]Template[x]Copied    Added by:  [x]Teto Sumner MD      []Tanja for details         [facility]     Patient Identification:  Name:  Lissa Eisenberg  Age:  51 y.o.  Sex:  female  :  1970  MRN:  [id number]  Visit Number:  [id number]  ROOM: [address]      Primary Care Provider:  Roxy Deng DO     Date of Admission: 2022     Length of stay in inpatient status:  2     Subjective      Chief Compliant:        Chief Complaint   Patient presents with   • Chest Pain   • Shortness of Breath      History of Presenting Illness:  The patient was sitting up in a bed when I saw her.  Her  was at bedside.  She started having chest pain while I was talking to her and while she was still sitting up in the chair.  She states that the pain is a pressure type and radiates down the left arm.  She has nausea, shortness of air, and lightheadedness with this as well.  She denies coughing, emesis, and diarrhea.     Objective      Current Hospital Meds:  aspirin, 81 mg, Oral, Daily  atorvastatin, 80 mg, Oral, Nightly  cetirizine, 10 mg, Oral, Daily  docusate sodium, 100 mg, Oral, BID  DULoxetine, 60 mg, Oral, Daily  Insulin Aspart, 0-7 Units, Subcutaneous, TID AC  magnesium oxide, 400 mg, Oral, BID  pantoprazole, 40 mg, Oral, QAM  prenatal vitamin 27-0.8, 1 tablet, Oral, Daily  ranolazine, 1,000 mg, Oral, BID  sodium chloride, 10 mL, Intravenous, Q12H  ticagrelor, 90 mg, Oral, BID     sodium chloride, 125 mL/hr, Last Rate: 125 mL/hr (06/15/22 1117)  sodium chloride, 100 mL/hr, Last Rate: 100 mL/hr (06/15/22 1353)        Current Antimicrobial Therapy:      Anti-Infectives (From admission, onward)     None          Current Diuretic Therapy:      Diuretics (From admission, onward)     None          ----------------------------------------------------------------------------------------------------------------------  Vital Signs:  Temp:   [97.5 °F (36.4 °C)-98.5 °F (36.9 °C)] 97.5 °F (36.4 °C)  Heart Rate:  [] 97  Resp:  [8-18] 18  BP: ()/(59-87) 163/80  SpO2:  [90 %-100 %] 97 %  on  Flow (L/min):  [2] 2;   Device (Oxygen Therapy): room air  Body mass index is 34.59 kg/m².         Wt Readings from Last 3 Encounters:   06/15/22 88.5 kg (195 lb 3.2 oz)   05/31/22 85.1 kg (187 lb 9.6 oz)   05/25/22 89 kg (196 lb 1.6 oz)               Intake & Output (last 3 days)        06/12 0701  06/13 0700 06/13 0701  06/14 0700 06/14 0701  06/15 0700 06/15 0701  06/16 0700     P.O. 860 640 660       I.V. (mL/kg)   4052.6 (47.6) 4519.6 (51.1) 84 (0.9)     Total Intake(mL/kg) 860 (10.1) 4692.6 (55.1) 5179.6 (58.5) 84 (0.9)     Net +860 +4692.6 +5179.6 +84                   Urine Unmeasured Occurrence 3 x 1 x 7 x       Stool Unmeasured Occurrence 1 x 1 x 1 x            Diet Regular; Cardiac  ----------------------------------------------------------------------------------------------------------------------  Physical Exam  Vitals reviewed.   Constitutional:       General: She is awake. She is in acute distress.      Appearance: She is diaphoretic. She is not ill-appearing.      Comments: She appears to be in pain.   HENT:      Head: Normocephalic and atraumatic.   Cardiovascular:      Rate and Rhythm: Normal rate and regular rhythm.      Pulses: Normal pulses.      Heart sounds: No murmur heard.  Pulmonary:      Effort: Pulmonary effort is normal. No respiratory distress.      Breath sounds: Normal breath sounds. No wheezing or rales.   Musculoskeletal:         General: No swelling or deformity.   Skin:     Capillary Refill: Capillary refill takes less than 2 seconds.      Coloration: Skin is not jaundiced or pale.   Neurological:      Mental Status: She is alert and oriented to person, place, and time. Mental status is at baseline.      Cranial Nerves: No cranial nerve deficit.   Psychiatric:         Mood and Affect: Mood normal.         Behavior:  Behavior normal. Behavior is cooperative.         ----------------------------------------------------------------------------------------------------------------------  Tele:  Normal sinus rhythm heart rate 80s to 90s.  I personally reviewed in the telemetry strips.  ----------------------------------------------------------------------------------------------------------------------  LABS:     CBC and coagulation:          Results from last 7 days   Lab Units 06/14/22 0208 06/13/22 1107 06/13/22  1106 06/11/22  2332   LACTATE mmol/L  --   --  0.9  --    WBC 10*3/mm3 6.06 6.18  --  7.64   HEMOGLOBIN g/dL 13.6 12.8  --  13.8   HEMATOCRIT % 40.0 38.6  --  41.1   MCV fL 80.8 83.4  --  81.9   MCHC g/dL 34.0 33.2  --  33.6   PLATELETS 10*3/mm3 241 241  --  309   INR    --   --  1.38*  --       Renal and electrolytes:           Results from last 7 days   Lab Units 06/14/22 2349 06/14/22 0208 06/13/22 1106 06/12/22  1000 06/11/22  2332   SODIUM mmol/L 134* 140 137 136 129*   POTASSIUM mmol/L 4.0 4.6 3.5 3.6 3.9   MAGNESIUM mg/dL 1.6 2.0 1.6  --   --    CHLORIDE mmol/L 102 107 104 99 94*   CO2 mmol/L 19.3* 20.5* 22.7 21.6* 20.3*   BUN mg/dL 7 7 7 11 16   CREATININE mg/dL 0.82 0.73 0.57 0.62 0.87   CALCIUM mg/dL 8.7 8.9 9.0 9.3 9.6   PHOSPHORUS mg/dL  --  2.8 3.8  --   --    GLUCOSE mg/dL 203* 104* 134* 118* 110*      Estimated Creatinine Clearance: 84.6 mL/min (by C-G formula based on SCr of 0.82 mg/dL).     Liver and pancreatic function:         Results from last 7 days   Lab Units 06/13/22  1106 06/12/22  1000 06/11/22  2332   ALBUMIN g/dL 3.79 4.20 4.50   BILIRUBIN mg/dL 0.5 0.5 0.5   ALK PHOS U/L 88 96 93   AST (SGOT) U/L 15 17 16   ALT (SGPT) U/L 12 13 16      Endocrine function:  Point of care bedside glucose levels:                Results from last 7 days   Lab Units 06/15/22  1127 06/15/22  0612 06/14/22  1709 06/14/22  1108 06/14/22  0554 06/13/22  1713 06/13/22  1115 06/13/22  0621 06/12/22  1817  06/12/22  1137   GLUCOSE mg/dL 116 100 140* 110 103 166* 122 106 110 98      Glucose levels from the CMP:           Results from last 7 days   Lab Units 06/14/22  2349 06/14/22  0208 06/13/22  1106 06/12/22  1000 06/11/22  2332   GLUCOSE mg/dL 203* 104* 134* 118* 110*      Cardiac:            Results from last 7 days   Lab Units 06/14/22  2257 06/13/22  1106 06/12/22 2029 06/12/22  0959 06/12/22  0149 06/11/22  2332   TROPONIN T ng/mL <0.010 <0.010 <0.010 <0.010 <0.010 <0.010   PROBNP pg/mL  --   --   --   --   --  60.6          I have personally looked at the labs and they are summarized above.     Assessment & Plan       -Chest pain with typical features that was present on admission in a patient with known coronary artery disease status post two-vessel CABG with drug-eluting stent to the mid RCA in August 2021 and arthrotomy of severe ISR and PCI of mid RCA with attempt to stent ostial RPDA  -As of 6/12/2022, decrease in blood pressure to 88/67 with an increase in respiratory rate  -Acute hypomagnesemia  -History of left bundle branch block that has been present since May 2022  -History of noninsulin-dependent type 2 diabetes mellitus  -History of heart failure with preserved ejection fraction, without any acute exacerbation  -History of essential hypertension  -History of hyperlipidemia  -History of anxiety, depression, and PTSD  -Obesity, BMI 33.28 kg/m2, which complicates all aspects of care     She had her left heart catheterization today and she received balloon angioplasty of RPDA ostial 90% jailed lesion with a 2.5 x 8 mm balloon and reduction of stenosis to less than 40%.  Dr. Chaney with interventional cardiology has recommended DAPT lifelong.  Will continue to monitor the patient closely and plan to discharge her tomorrow.  I have talked to pharmacy and they will help us secure the DAPT for home use.  Will repeat the labs in the morning.  Please note that the blood pressures and glucose levels  are adequate at this time and we will continue to monitor these.        VTE Prophylaxis:       Mechanical Order History:      None               Pharmalogical Order History:                   Ordered     Dose Route Frequency Stop      06/15/22 1305   heparin (porcine) injection  Status:  Discontinued         -- -- As Needed 06/15/22 1329      06/15/22 1251   heparin (porcine) injection  Status:  Discontinued         -- -- As Needed 06/15/22 1329      06/12/22 0345   Rivaroxaban (XARELTO) tablet 2.5 mg  Status:  Discontinued         2.5 mg PO 2 Times Daily 06/13/22 1005                  Disposition: Undetermined at this time and dependent on the left heart catheterization.     Teto Sumner MD  [facility]  06/15/22  15:23 EDT

## 2022-06-16 NOTE — CASE MANAGEMENT/SOCIAL WORK
Discharge Planning Assessment  DAVID Ruggiero     Patient Name: Lissa Eisenberg  MRN: [id number]  Today's Date: 6/16/2022    Admit Date: 6/11/2022     Discharge Plan     Row Name 06/16/22 1450       Plan    Plan Pt discussed in multidisciplinary rounds on this date, requested SS address pt's issue of no air conditioning in [facility]. SS spoke with pt who confirmed plan was to return to [facility] with family at discharge. Pt also confirmed the [facility] does not have AC and is not near anywhere with electricity for an extension cord. SS has provided resource lists to pt during previous encounters.  Director updated. SS following.              GUADALUPE Sharp

## 2022-06-16 NOTE — PROGRESS NOTES
Patient Identification:  Name:  Lissa Eisenberg  Age:  51 y.o.  Sex:  female  :  1970  MRN:  [id number]  Visit Number:  [id number]    Chief Complaint:   Chest pain    Subjective:    Pt seen and examined, she had POBA to her Ostial R PDA lesion and doing well. No issues with access site, SW working on home situation.   ----------------------------------------------------------------------------------------------------------------------  Current Hospital Meds:  aspirin, 81 mg, Oral, Daily  atorvastatin, 80 mg, Oral, Nightly  cetirizine, 10 mg, Oral, Daily  docusate sodium, 100 mg, Oral, BID  DULoxetine, 60 mg, Oral, Daily  Insulin Aspart, 0-7 Units, Subcutaneous, TID AC  magnesium oxide, 400 mg, Oral, BID  pantoprazole, 40 mg, Oral, QAM  prenatal vitamin 27-0.8, 1 tablet, Oral, Daily  ranolazine, 1,000 mg, Oral, BID  Rivaroxaban, 2.5 mg, Oral, BID With Meals  sodium chloride, 10 mL, Intravenous, Q12H  ticagrelor, 90 mg, Oral, BID      Pharmacy Consult,   sodium chloride, 100 mL/hr, Last Rate: 100 mL/hr (22 1028)      ----------------------------------------------------------------------------------------------------------------------  Vital Signs:  Temp:  [97 °F (36.1 °C)-98.4 °F (36.9 °C)] 97 °F (36.1 °C)  Heart Rate:  [] 93  Resp:  [-] 12  BP: (111-136)/(46-94) 122/62      22  0402 06/15/22  0400 22  0503   Weight: 85.2 kg (187 lb 13.3 oz) 88.5 kg (195 lb 3.2 oz) 84.2 kg (185 lb 10 oz)     Body mass index is 32.89 kg/m².    Intake/Output Summary (Last 24 hours) at 2022 1802  Last data filed at 2022 1028  Gross per 24 hour   Intake 1543.34 ml   Output --   Net 1543.34 ml     Diet Regular; Cardiac  ----------------------------------------------------------------------------------------------------------------------  Physical exam:  Constitutional:    HENT:  Head:  Normocephalic and atraumatic.    Eyes:  Conjunctivae and EOM are normal.  Pupils are equal, round, and  reactive to light.  No scleral icterus.    Neck:  Neck supple.  No JVD present.    Cardiovascular: Normal rate, regular rhythm, S1 S2+, NO S3 / S4  Pulmonary/Chest:  Vesicular breath sounds B/L  Abdominal:  Soft.  Bowel sounds are normal.  No distension and no tenderness.      Neurological:  Alert and oriented to person, place, and time. No focal deficits.  Skin:  Skin is warm and dry. No rash noted. No pallor.   Musculoskeletal:  No edema, no tenderness, and no deformity.  No red or swollen joints anywhere.   Peripheral vascular:  2+ Pulses B/L DP  ----------------------------------------------------------------------------------------------------------------------    ----------------------------------------------------------------------------------------------------------------------  Results from last 7 days   Lab Units 06/14/22 2257 06/13/22  1106 06/12/22 2029   TROPONIN T ng/mL <0.010 <0.010 <0.010     Results from last 7 days   Lab Units 06/16/22  0009 06/14/22  0208 06/13/22  1107 06/13/22  1106   LACTATE mmol/L  --   --   --  0.9   WBC 10*3/mm3 6.39 6.06 6.18  --    HEMOGLOBIN g/dL 11.6* 13.6 12.8  --    HEMATOCRIT % 34.2 40.0 38.6  --    MCV fL 83.2 80.8 83.4  --    MCHC g/dL 33.9 34.0 33.2  --    PLATELETS 10*3/mm3 280 241 241  --    INR   --   --   --  1.38*         Results from last 7 days   Lab Units 06/16/22  0009 06/14/22  2349 06/14/22  0208 06/13/22  1106 06/12/22  1000 06/12/22  1000 06/11/22  2332   SODIUM mmol/L 135* 134* 140 137  --  136 129*   POTASSIUM mmol/L 4.1 4.0 4.6 3.5  --  3.6 3.9   MAGNESIUM mg/dL 1.8 1.6 2.0 1.6   < >  --   --    CHLORIDE mmol/L 106 102 107 104  --  99 94*   CO2 mmol/L 19.0* 19.3* 20.5* 22.7  --  21.6* 20.3*   BUN mg/dL 5* 7 7 7  --  11 16   CREATININE mg/dL 0.72 0.82 0.73 0.57  --  0.62 0.87   CALCIUM mg/dL 8.6 8.7 8.9 9.0  --  9.3 9.6   GLUCOSE mg/dL 176* 203* 104* 134*  --  118* 110*   ALBUMIN g/dL  --   --   --  3.79  --  4.20 4.50   BILIRUBIN mg/dL  --   --    --  0.5  --  0.5 0.5   ALK PHOS U/L  --   --   --  88  --  96 93   AST (SGOT) U/L  --   --   --  15  --  17 16   ALT (SGPT) U/L  --   --   --  12  --  13 16    < > = values in this interval not displayed.   Estimated Creatinine Clearance: 93.9 mL/min (by C-G formula based on SCr of 0.72 mg/dL).    No results found for: AMMONIA      No results found for: BLOODCX  No results found for: URINECX  No results found for: WOUNDCX  No results found for: STOOLCX    I have personally looked at the labs and they are summarized above.  ----------------------------------------------------------------------------------------------------------------------  Imaging Results (Last 24 Hours)     ** No results found for the last 24 hours. **        ----------------------------------------------------------------------------------------------------------------------    Assessment:  Unstable angina s/p POBA to R PDA   CAD s/p CABG with LCA and LIMA anatomy unchanged  DMT2  HTN    Plan:  Cont with current meds, resume with Xarelto 2.5 mg bid along with Imdur   Plan for D/C in am.  .             Connor Chaney MD, [facility]  Interventional Cardiology        06/16/22  18:02 EDT

## 2022-06-16 NOTE — PLAN OF CARE
Goal Outcome Evaluation:  Plan of Care Reviewed With: patient, spouse        Progress: improving  Outcome Evaluation: Resting quietly VSS c/o chest pain provider aware. refuses nitro NS @ 100 cc/hr.  BP stable pt family sleeping at bedside call light in reach will conitnue to monitor per provider order.

## 2022-06-17 LAB
ALBUMIN SERPL-MCNC: 3.29 G/DL (ref 3.5–5.2)
ALBUMIN/GLOB SERPL: 1.1 G/DL
ALP SERPL-CCNC: 93 U/L (ref 39–117)
ALT SERPL W P-5'-P-CCNC: 16 U/L (ref 1–33)
ANION GAP SERPL CALCULATED.3IONS-SCNC: 10.5 MMOL/L (ref 5–15)
APTT PPP: 22.9 SECONDS (ref 26.5–34.5)
AST SERPL-CCNC: 18 U/L (ref 1–32)
BILIRUB SERPL-MCNC: 0.3 MG/DL (ref 0–1.2)
BUN SERPL-MCNC: 7 MG/DL (ref 6–20)
BUN/CREAT SERPL: 11.5 (ref 7–25)
CALCIUM SPEC-SCNC: 9 MG/DL (ref 8.6–10.5)
CHLORIDE SERPL-SCNC: 108 MMOL/L (ref 98–107)
CO2 SERPL-SCNC: 20.5 MMOL/L (ref 22–29)
CREAT SERPL-MCNC: 0.61 MG/DL (ref 0.57–1)
DEPRECATED RDW RBC AUTO: 46 FL (ref 37–54)
EGFRCR SERPLBLD CKD-EPI 2021: 107.7 ML/MIN/1.73
ERYTHROCYTE [DISTWIDTH] IN BLOOD BY AUTOMATED COUNT: 15.2 % (ref 12.3–15.4)
GLOBULIN UR ELPH-MCNC: 2.9 GM/DL
GLUCOSE BLDC GLUCOMTR-MCNC: 100 MG/DL (ref 70–130)
GLUCOSE BLDC GLUCOMTR-MCNC: 107 MG/DL (ref 70–130)
GLUCOSE BLDC GLUCOMTR-MCNC: 114 MG/DL (ref 70–130)
GLUCOSE SERPL-MCNC: 116 MG/DL (ref 65–99)
HCT VFR BLD AUTO: 35.3 % (ref 34–46.6)
HGB BLD-MCNC: 11.7 G/DL (ref 12–15.9)
INR PPP: 1.09 (ref 0.9–1.1)
MAGNESIUM SERPL-MCNC: 2.1 MG/DL (ref 1.6–2.6)
MCH RBC QN AUTO: 27.9 PG (ref 26.6–33)
MCHC RBC AUTO-ENTMCNC: 33.1 G/DL (ref 31.5–35.7)
MCV RBC AUTO: 84 FL (ref 79–97)
PHOSPHATE SERPL-MCNC: 4.6 MG/DL (ref 2.5–4.5)
PLATELET # BLD AUTO: 267 10*3/MM3 (ref 140–450)
PMV BLD AUTO: 9.3 FL (ref 6–12)
POTASSIUM SERPL-SCNC: 4.2 MMOL/L (ref 3.5–5.2)
PROT SERPL-MCNC: 6.2 G/DL (ref 6–8.5)
PROTHROMBIN TIME: 14.3 SECONDS (ref 12.1–14.7)
QT INTERVAL: 376 MS
QTC INTERVAL: 470 MS
RBC # BLD AUTO: 4.2 10*6/MM3 (ref 3.77–5.28)
SODIUM SERPL-SCNC: 139 MMOL/L (ref 136–145)
WBC NRBC COR # BLD: 6.43 10*3/MM3 (ref 3.4–10.8)

## 2022-06-17 PROCEDURE — 85610 PROTHROMBIN TIME: CPT | Performed by: INTERNAL MEDICINE

## 2022-06-17 PROCEDURE — 82962 GLUCOSE BLOOD TEST: CPT

## 2022-06-17 PROCEDURE — 94799 UNLISTED PULMONARY SVC/PX: CPT

## 2022-06-17 PROCEDURE — 94761 N-INVAS EAR/PLS OXIMETRY MLT: CPT

## 2022-06-17 PROCEDURE — 85027 COMPLETE CBC AUTOMATED: CPT | Performed by: INTERNAL MEDICINE

## 2022-06-17 PROCEDURE — 80053 COMPREHEN METABOLIC PANEL: CPT | Performed by: INTERNAL MEDICINE

## 2022-06-17 PROCEDURE — 99232 SBSQ HOSP IP/OBS MODERATE 35: CPT | Performed by: INTERNAL MEDICINE

## 2022-06-17 PROCEDURE — 83735 ASSAY OF MAGNESIUM: CPT | Performed by: INTERNAL MEDICINE

## 2022-06-17 PROCEDURE — 84100 ASSAY OF PHOSPHORUS: CPT | Performed by: INTERNAL MEDICINE

## 2022-06-17 PROCEDURE — 85730 THROMBOPLASTIN TIME PARTIAL: CPT | Performed by: INTERNAL MEDICINE

## 2022-06-17 RX ORDER — ISOSORBIDE MONONITRATE 60 MG/1
60 TABLET, EXTENDED RELEASE ORAL
Status: DISCONTINUED | OUTPATIENT
Start: 2022-06-17 | End: 2022-06-21 | Stop reason: HOSPADM

## 2022-06-17 RX ORDER — METOPROLOL SUCCINATE 50 MG/1
50 TABLET, EXTENDED RELEASE ORAL
Qty: 30 TABLET | Refills: 0 | Status: SHIPPED | OUTPATIENT
Start: 2022-06-18 | End: 2022-06-23 | Stop reason: SDUPTHER

## 2022-06-17 RX ORDER — LANOLIN ALCOHOL/MO/W.PET/CERES
800 CREAM (GRAM) TOPICAL 2 TIMES DAILY
Qty: 60 TABLET | Refills: 5 | Status: SHIPPED | OUTPATIENT
Start: 2022-06-17 | End: 2022-06-23 | Stop reason: SDUPTHER

## 2022-06-17 RX ORDER — METOPROLOL SUCCINATE 50 MG/1
50 TABLET, EXTENDED RELEASE ORAL
Status: DISCONTINUED | OUTPATIENT
Start: 2022-06-18 | End: 2022-06-21 | Stop reason: HOSPADM

## 2022-06-17 RX ORDER — ISOSORBIDE MONONITRATE 60 MG/1
60 TABLET, EXTENDED RELEASE ORAL
Qty: 60 TABLET | Refills: 0 | Status: SHIPPED | OUTPATIENT
Start: 2022-06-18 | End: 2022-06-23 | Stop reason: SDUPTHER

## 2022-06-17 RX ORDER — HYDRALAZINE HYDROCHLORIDE 25 MG/1
25 TABLET, FILM COATED ORAL EVERY 8 HOURS SCHEDULED
Qty: 60 TABLET | Refills: 0 | Status: SHIPPED | OUTPATIENT
Start: 2022-06-17 | End: 2022-06-23 | Stop reason: SDUPTHER

## 2022-06-17 RX ORDER — RANOLAZINE 1000 MG/1
1000 TABLET, EXTENDED RELEASE ORAL 2 TIMES DAILY
Qty: 60 TABLET | Refills: 0 | Status: SHIPPED | OUTPATIENT
Start: 2022-06-17 | End: 2022-06-23 | Stop reason: SDUPTHER

## 2022-06-17 RX ORDER — HYDRALAZINE HYDROCHLORIDE 25 MG/1
25 TABLET, FILM COATED ORAL EVERY 8 HOURS SCHEDULED
Status: DISCONTINUED | OUTPATIENT
Start: 2022-06-17 | End: 2022-06-21 | Stop reason: HOSPADM

## 2022-06-17 RX ADMIN — NITROGLYCERIN 0.4 MG: 0.4 TABLET, ORALLY DISINTEGRATING SUBLINGUAL at 19:31

## 2022-06-17 RX ADMIN — Medication 10 ML: at 09:15

## 2022-06-17 RX ADMIN — PANTOPRAZOLE SODIUM 40 MG: 40 TABLET, DELAYED RELEASE ORAL at 06:51

## 2022-06-17 RX ADMIN — DULOXETINE HYDROCHLORIDE 60 MG: 60 CAPSULE, DELAYED RELEASE ORAL at 09:14

## 2022-06-17 RX ADMIN — CETIRIZINE HYDROCHLORIDE 10 MG: 10 TABLET, FILM COATED ORAL at 09:14

## 2022-06-17 RX ADMIN — RANOLAZINE 1000 MG: 500 TABLET, FILM COATED, EXTENDED RELEASE ORAL at 21:19

## 2022-06-17 RX ADMIN — MAGNESIUM GLUCONATE 500 MG ORAL TABLET 400 MG: 500 TABLET ORAL at 09:13

## 2022-06-17 RX ADMIN — ISOSORBIDE MONONITRATE 60 MG: 60 TABLET ORAL at 09:14

## 2022-06-17 RX ADMIN — TICAGRELOR 90 MG: 90 TABLET ORAL at 09:13

## 2022-06-17 RX ADMIN — TICAGRELOR 90 MG: 90 TABLET ORAL at 21:19

## 2022-06-17 RX ADMIN — ATORVASTATIN CALCIUM 80 MG: 40 TABLET, FILM COATED ORAL at 21:20

## 2022-06-17 RX ADMIN — RIVAROXABAN 2.5 MG: 2.5 TABLET, FILM COATED ORAL at 09:14

## 2022-06-17 RX ADMIN — Medication 10 ML: at 21:20

## 2022-06-17 RX ADMIN — RANOLAZINE 1000 MG: 500 TABLET, FILM COATED, EXTENDED RELEASE ORAL at 09:13

## 2022-06-17 RX ADMIN — MAGNESIUM GLUCONATE 500 MG ORAL TABLET 400 MG: 500 TABLET ORAL at 21:19

## 2022-06-17 RX ADMIN — DOCUSATE SODIUM 100 MG: 100 CAPSULE ORAL at 21:20

## 2022-06-17 RX ADMIN — METOPROLOL TARTRATE 25 MG: 25 TABLET, FILM COATED ORAL at 09:14

## 2022-06-17 RX ADMIN — METOPROLOL TARTRATE 25 MG: 25 TABLET, FILM COATED ORAL at 16:18

## 2022-06-17 RX ADMIN — DOCUSATE SODIUM 100 MG: 100 CAPSULE ORAL at 09:13

## 2022-06-17 RX ADMIN — ASPIRIN 81 MG: 81 TABLET, COATED ORAL at 09:14

## 2022-06-17 RX ADMIN — HYDRALAZINE HYDROCHLORIDE 25 MG: 25 TABLET, FILM COATED ORAL at 21:19

## 2022-06-17 RX ADMIN — METOPROLOL TARTRATE 25 MG: 25 TABLET, FILM COATED ORAL at 21:19

## 2022-06-17 RX ADMIN — RIVAROXABAN 2.5 MG: 2.5 TABLET, FILM COATED ORAL at 17:30

## 2022-06-17 RX ADMIN — ACETAMINOPHEN 650 MG: 325 TABLET ORAL at 13:25

## 2022-06-17 RX ADMIN — PRENATAL VIT W/ FE FUMARATE-FA TAB 27-0.8 MG 1 TABLET: 27-0.8 TAB at 09:13

## 2022-06-17 NOTE — PROGRESS NOTES
[facility] HOSPITALIST PROGRESS NOTE     Patient Identification:  Name:  Lissa Eisenberg  Age:  51 y.o.  Sex:  female  :  1970  MRN:  [id number]  Visit Number:  [id number]  ROOM: [address]     Primary Care Provider:  Roxy Deng DO     Date of Admission: 2022    Length of stay in inpatient status:  4    Subjective     Chief Compliant:    Chief Complaint   Patient presents with   • Chest Pain   • Shortness of Breath     History of Presenting Illness: The patient is still having chest pain at rest.  Now, she is having panic attack like symptoms with it.  The patient states that she lives in a camper without electricity and running water.  She will be moving to [place] in 18 days as her in-laws have secured them a camper that does have electricity.  The goal is for them to live in the camper while they search for a more permanent residence.  Currently though, the patient is nervous about the upcoming heat wave and what it will do with her chest pain.  The patient did state that she has been considering staying at a homeless shelter with her son in order to avoid the heat.  When I was in the room with her, she just had a severe episode of chest pain that was pressure-like and ran down her left arm.  She denies any nausea.    Objective     Current Hospital Meds:  aspirin, 81 mg, Oral, Daily  atorvastatin, 80 mg, Oral, Nightly  cetirizine, 10 mg, Oral, Daily  docusate sodium, 100 mg, Oral, BID  DULoxetine, 60 mg, Oral, Daily  Insulin Aspart, 0-7 Units, Subcutaneous, TID AC  isosorbide mononitrate, 60 mg, Oral, Q24H  magnesium oxide, 400 mg, Oral, BID  [START ON 2022] metoprolol succinate XL, 50 mg, Oral, Q24H  metoprolol tartrate, 25 mg, Oral, TID  pantoprazole, 40 mg, Oral, QAM  prenatal vitamin 27-0.8, 1 tablet, Oral, Daily  ranolazine, 1,000 mg, Oral, BID  Rivaroxaban, 2.5 mg, Oral, BID With Meals  sodium chloride, 10 mL, Intravenous, Q12H  ticagrelor, 90 mg, Oral,  BID    Pharmacy Consult,       Current Antimicrobial Therapy:  Anti-Infectives (From admission, onward)    None        Current Diuretic Therapy:  Diuretics (From admission, onward)    None        ----------------------------------------------------------------------------------------------------------------------  Vital Signs:  Temp:  [97.6 °F (36.4 °C)-98.6 °F (37 °C)] 98.1 °F (36.7 °C)  Heart Rate:  [84-97] 97  Resp:  [12-24] 12  BP: (114-169)/(65-94) 121/65  SpO2:  [93 %-99 %] 99 %  on  Flow (L/min):  [2] 2;   Device (Oxygen Therapy): room air  Body mass index is 33.2 kg/m².    Wt Readings from Last 3 Encounters:   06/17/22 85 kg (187 lb 6.3 oz)   05/31/22 85.1 kg (187 lb 9.6 oz)   05/25/22 89 kg (196 lb 1.6 oz)     Intake & Output (last 3 days)       06/14 0701  06/15 0700 06/15 0701  06/16 0700 06/16 0701  06/17 0700 06/17 0701  06/18 0700    P.O. 660 840 480 200    I.V. (mL/kg) 4519.6 (51.1) 2887.7 (34.3) 1240.4 (14.6)     Total Intake(mL/kg) 5179.6 (58.5) 3727.7 (44.3) 1720.4 (20.2) 200 (2.4)    Net +5179.6 +3727.7 +1720.4 +200            Urine Unmeasured Occurrence 7 x 4 x 4 x     Stool Unmeasured Occurrence 1 x           Diet Regular; Cardiac  ----------------------------------------------------------------------------------------------------------------------  Physical Exam; her exam is basically the same as yesterday.  Vitals reviewed.   Constitutional:       General: She is awake. She is  not in acute distress today.     Appearance: She is not diaphoretic today. She is not ill-appearing.   HENT:      Head: Normocephalic and atraumatic.   Cardiovascular:      Rate and Rhythm: Normal rate and regular rhythm.      Pulses: Normal pulses.      Heart sounds: No murmur heard.  Pulmonary:      Effort: Pulmonary effort is normal. No respiratory distress.      Breath sounds: Normal breath sounds. No wheezing or rales.   Musculoskeletal:         General: No swelling or deformity.   Skin:     Capillary Refill:  Capillary refill takes less than 2 seconds.      Coloration: Skin is not jaundiced or pale.   Neurological:      Mental Status: She is alert and oriented to person, place, and time. Mental status is at baseline.      Cranial Nerves: No cranial nerve deficit.   Psychiatric:         Mood and Affect: Mood normal.         Behavior: Behavior normal. Behavior is cooperative.   ----------------------------------------------------------------------------------------------------------------------  Tele:  NS with heart rates 80-90's.  I have personally reviewed/looked at the telemetry strips.  ----------------------------------------------------------------------------------------------------------------------  LABS:    CBC and coagulation:  Results from last 7 days   Lab Units 06/17/22  0007 06/16/22  0009 06/14/22  0208 06/13/22  1107 06/13/22  1106 06/11/22  2332   LACTATE mmol/L  --   --   --   --  0.9  --    WBC 10*3/mm3 6.43 6.39 6.06 6.18  --  7.64   HEMOGLOBIN g/dL 11.7* 11.6* 13.6 12.8  --  13.8   HEMATOCRIT % 35.3 34.2 40.0 38.6  --  41.1   MCV fL 84.0 83.2 80.8 83.4  --  81.9   MCHC g/dL 33.1 33.9 34.0 33.2  --  33.6   PLATELETS 10*3/mm3 267 280 241 241  --  309   INR  1.09  --   --   --  1.38*  --      Renal and electrolytes:  Results from last 7 days   Lab Units 06/17/22  0007 06/16/22  0009 06/14/22  2349 06/14/22  0208 06/13/22  1106 06/12/22  1000 06/11/22  2332   SODIUM mmol/L 139 135* 134* 140 137 136 129*   POTASSIUM mmol/L 4.2 4.1 4.0 4.6 3.5 3.6 3.9   MAGNESIUM mg/dL 2.1 1.8 1.6 2.0 1.6  --   --    CHLORIDE mmol/L 108* 106 102 107 104 99 94*   CO2 mmol/L 20.5* 19.0* 19.3* 20.5* 22.7 21.6* 20.3*   BUN mg/dL 7 5* 7 7 7 11 16   CREATININE mg/dL 0.61 0.72 0.82 0.73 0.57 0.62 0.87   CALCIUM mg/dL 9.0 8.6 8.7 8.9 9.0 9.3 9.6   PHOSPHORUS mg/dL 4.6* 2.9  --  2.8 3.8  --   --    GLUCOSE mg/dL 116* 176* 203* 104* 134* 118* 110*     Estimated Creatinine Clearance: 111.4 mL/min (by C-G formula based on SCr of 0.61  mg/dL).    Liver and pancreatic function:  Results from last 7 days   Lab Units 06/17/22  0007 06/13/22  1106 06/12/22  1000 06/11/22  2332   ALBUMIN g/dL 3.29* 3.79 4.20 4.50   BILIRUBIN mg/dL 0.3 0.5 0.5 0.5   ALK PHOS U/L 93 88 96 93   AST (SGOT) U/L 18 15 17 16   ALT (SGPT) U/L 16 12 13 16     Endocrine function:  Lab Results   Component Value Date    HGBA1C 5.80 (H) 05/21/2022     Point of care bedside glucose levels:  Results from last 7 days   Lab Units 06/17/22  1123 06/17/22  0651 06/16/22  1821 06/16/22  1344 06/16/22  0618 06/15/22  1632 06/15/22  1127 06/15/22  0612 06/14/22  1709 06/14/22  1108 06/14/22  0554 06/13/22  1713 06/13/22  1115 06/13/22  0621   GLUCOSE mg/dL 107 114 130 124 105 228* 116 100 140* 110 103 166* 122 106     Glucose levels from the [facility]:  Results from last 7 days   Lab Units 06/17/22  0007 06/16/22  0009 06/14/22  2349 06/14/22  0208 06/13/22  1106 06/12/22  1000 06/11/22  2332   GLUCOSE mg/dL 116* 176* 203* 104* 134* 118* 110*     Lab Results   Component Value Date    TSH 2.410 05/22/2022     Cardiac:  Results from last 7 days   Lab Units 06/16/22  2217 06/14/22  2257 06/13/22  1106 06/12/22  2029 06/12/22  0959 06/12/22  0149 06/11/22  2332   TROPONIN T ng/mL <0.010 <0.010 <0.010 <0.010 <0.010 <0.010 <0.010   PROBNP pg/mL  --   --   --   --   --   --  60.6       Cultures:  Lab Results   Component Value Date    COLORU Yellow 05/22/2022    CLARITYU Clear 05/22/2022    PHUR <=5.0 05/22/2022    GLUCOSEU Negative 05/22/2022    KETONESU 15 mg/dL (1+) (A) 05/22/2022    BLOODU Negative 05/22/2022    NITRITEU Negative 05/22/2022    LEUKOCYTESUR Trace (A) 05/22/2022    BILIRUBINUR Negative 05/22/2022    UROBILINOGEN 1.0 E.U./dL 05/22/2022    RBCUA 0-2 05/22/2022    WBCUA 0-2 05/22/2022    BACTERIA None Seen 05/22/2022     Microbiology Results (last 10 days)     Procedure Component Value - Date/Time    COVID PRE-OP / PRE-PROCEDURE SCREENING ORDER (NO ISOLATION) - Swab, Nasopharynx  [[id number]]  (Normal) Collected: 06/12/22 1513    Lab Status: Final result Specimen: Swab from Nasopharynx Updated: 06/12/22 1557    Narrative:      The following orders were created for panel order COVID PRE-OP / PRE-PROCEDURE SCREENING ORDER (NO ISOLATION) - Swab, Nasopharynx.  Procedure                               Abnormality         Status                     ---------                               -----------         ------                     COVID-19 and FLU A/B PCR...[[id number]]  Normal              Final result                 Please view results for these tests on the individual orders.    COVID-19 and FLU A/B PCR - Swab, Nasopharynx [[id number]]  (Normal) Collected: 06/12/22 1513    Lab Status: Final result Specimen: Swab from Nasopharynx Updated: 06/12/22 1557     COVID19 Not Detected     Influenza A PCR Not Detected     Influenza B PCR Not Detected    Narrative:      Fact sheet for providers: https://www.fda.gov/media/204490/download    Fact sheet for patients: https://www.fda.gov/media/109360/download    Test performed by PCR.        I have personally looked at the labs and they are summarized above.    Assessment & Plan      -Chest pain with typical features that was present on admission in a patient with known coronary artery disease status post two-vessel CABG with drug-eluting stent to the mid RCA in August 2021 and arthrotomy of severe ISR and PCI of mid RCA with attempt to stent ostial RPDA  -As of 6/12/2022, decrease in blood pressure to 88/67 with an increase in respiratory rate, no infection found and now resolved  -Acute hypomagnesemia, resolved with supplementation  -History of left bundle branch block that has been present since May 2022  -History of noninsulin-dependent type 2 diabetes mellitus  -History of heart failure with preserved ejection fraction, without any acute exacerbation  -History of essential hypertension  -History of hyperlipidemia  -History of anxiety, depression, and  PTSD  -Obesity, BMI 33.28 kg/m2, which complicates all aspects of care    Discussed with Dr. Chaney with interventional cardiology; will try to make her medication regimen as simple as possible.  Thus, will change the metoprolol to daily dosing.  The patient received her home dose of Imdur yesterday and Dr. Chaney has doubled the dose for today; will monitor her symptoms and vital signs closely while she takes these doses.  I suspect that the patient is having anxiety attacks and that some of her chest pain may be attributable to this.  Thus, I will start her on scheduled hydralazine and see if this helps.  We will repeat her blood work drawn in the morning.    VTE Prophylaxis:   Mechanical Order History:     None      Pharmalogical Order History:      Ordered     Dose Route Frequency Stop    06/16/22 0710  Rivaroxaban (XARELTO) tablet 2.5 mg         2.5 mg PO 2 Times Daily With Meals --    06/15/22 1305  heparin (porcine) injection  Status:  Discontinued         -- -- As Needed 06/15/22 1329    06/15/22 1251  heparin (porcine) injection  Status:  Discontinued         -- -- As Needed 06/15/22 1329    06/12/22 0345  Rivaroxaban (XARELTO) tablet 2.5 mg  Status:  Discontinued         2.5 mg PO 2 Times Daily 06/13/22 1005                 Disposition: Undetermined at this time and dependent on the left heart catheterization.    Teto Sumner MD  [facility]  06/17/22  16:43 EDT

## 2022-06-17 NOTE — CASE MANAGEMENT/SOCIAL WORK
Discharge Planning Assessment   [facility]     Patient Name: Lissa Eisenberg  MRN: [id number]  Today's Date: 6/17/2022    Admit Date: 6/11/2022       Discharge Plan     Row Name 06/17/22 1437       Plan    Plan Pt admitted 6/11/22. Pt plans to return home with family at discharge. Pt does not utilize home health services. Pt has a bath bench and walker. SS to continue to follow and assist.              GUADALUPE Sharp     room air

## 2022-06-17 NOTE — PLAN OF CARE
Goal Outcome Evaluation:  Plan of Care Reviewed With: patient, spouse        Progress: improving  Outcome Evaluation: Pt resting quietly VSS no nausea reported meds administered per provider orders.  Ambulated pt to bathroom per provider orders O2 sat 99%.  call light in reach bed in lowest position. Will continue to monitor per provider orders.

## 2022-06-17 NOTE — NURSING NOTE
Ambulated pt to bathroom with oxygen sensor she did not experience any shortness of breath and she ran 99% Oxygen saturation with ambulation per provider order

## 2022-06-17 NOTE — PLAN OF CARE
Goal Outcome Evaluation:           Progress: no change  Outcome Evaluation: Pt reported CP, and nausea earlier in shift, meds administered per provider orders. Dr. English was notified, orders were rec'd. Troponin WNL. Pt has since been resting well with no complaints. VSS. Bed in low position, call light in reach.

## 2022-06-18 LAB
GLUCOSE BLDC GLUCOMTR-MCNC: 112 MG/DL (ref 70–130)
GLUCOSE BLDC GLUCOMTR-MCNC: 99 MG/DL (ref 70–130)

## 2022-06-18 PROCEDURE — 94761 N-INVAS EAR/PLS OXIMETRY MLT: CPT

## 2022-06-18 PROCEDURE — 99232 SBSQ HOSP IP/OBS MODERATE 35: CPT | Performed by: INTERNAL MEDICINE

## 2022-06-18 PROCEDURE — 94799 UNLISTED PULMONARY SVC/PX: CPT

## 2022-06-18 PROCEDURE — 82962 GLUCOSE BLOOD TEST: CPT

## 2022-06-18 RX ORDER — SODIUM CHLORIDE 0.9 % (FLUSH) 0.9 %
10 SYRINGE (ML) INJECTION EVERY 12 HOURS SCHEDULED
Status: DISCONTINUED | OUTPATIENT
Start: 2022-06-18 | End: 2022-06-21 | Stop reason: HOSPADM

## 2022-06-18 RX ORDER — SODIUM CHLORIDE 0.9 % (FLUSH) 0.9 %
10 SYRINGE (ML) INJECTION AS NEEDED
Status: DISCONTINUED | OUTPATIENT
Start: 2022-06-18 | End: 2022-06-21 | Stop reason: HOSPADM

## 2022-06-18 RX ADMIN — Medication 10 ML: at 08:37

## 2022-06-18 RX ADMIN — ATORVASTATIN CALCIUM 80 MG: 40 TABLET, FILM COATED ORAL at 21:11

## 2022-06-18 RX ADMIN — TICAGRELOR 90 MG: 90 TABLET ORAL at 08:36

## 2022-06-18 RX ADMIN — RIVAROXABAN 2.5 MG: 2.5 TABLET, FILM COATED ORAL at 17:33

## 2022-06-18 RX ADMIN — RANOLAZINE 1000 MG: 500 TABLET, FILM COATED, EXTENDED RELEASE ORAL at 08:35

## 2022-06-18 RX ADMIN — PRENATAL VIT W/ FE FUMARATE-FA TAB 27-0.8 MG 1 TABLET: 27-0.8 TAB at 08:36

## 2022-06-18 RX ADMIN — MAGNESIUM GLUCONATE 500 MG ORAL TABLET 400 MG: 500 TABLET ORAL at 08:36

## 2022-06-18 RX ADMIN — Medication 10 ML: at 21:12

## 2022-06-18 RX ADMIN — ASPIRIN 81 MG: 81 TABLET, COATED ORAL at 08:36

## 2022-06-18 RX ADMIN — DOCUSATE SODIUM 100 MG: 100 CAPSULE ORAL at 21:12

## 2022-06-18 RX ADMIN — HYDRALAZINE HYDROCHLORIDE 25 MG: 25 TABLET, FILM COATED ORAL at 21:12

## 2022-06-18 RX ADMIN — Medication 10 ML: at 14:31

## 2022-06-18 RX ADMIN — ISOSORBIDE MONONITRATE 60 MG: 60 TABLET ORAL at 08:36

## 2022-06-18 RX ADMIN — RIVAROXABAN 2.5 MG: 2.5 TABLET, FILM COATED ORAL at 08:35

## 2022-06-18 RX ADMIN — DOCUSATE SODIUM 100 MG: 100 CAPSULE ORAL at 08:35

## 2022-06-18 RX ADMIN — PANTOPRAZOLE SODIUM 40 MG: 40 TABLET, DELAYED RELEASE ORAL at 06:22

## 2022-06-18 RX ADMIN — METOPROLOL SUCCINATE 50 MG: 50 TABLET, EXTENDED RELEASE ORAL at 08:36

## 2022-06-18 RX ADMIN — DULOXETINE HYDROCHLORIDE 60 MG: 60 CAPSULE, DELAYED RELEASE ORAL at 08:41

## 2022-06-18 RX ADMIN — RANOLAZINE 1000 MG: 500 TABLET, FILM COATED, EXTENDED RELEASE ORAL at 21:12

## 2022-06-18 RX ADMIN — HYDRALAZINE HYDROCHLORIDE 25 MG: 25 TABLET, FILM COATED ORAL at 14:29

## 2022-06-18 RX ADMIN — MAGNESIUM GLUCONATE 500 MG ORAL TABLET 400 MG: 500 TABLET ORAL at 21:11

## 2022-06-18 RX ADMIN — HYDRALAZINE HYDROCHLORIDE 25 MG: 25 TABLET, FILM COATED ORAL at 06:22

## 2022-06-18 RX ADMIN — CETIRIZINE HYDROCHLORIDE 10 MG: 10 TABLET, FILM COATED ORAL at 08:36

## 2022-06-18 RX ADMIN — TICAGRELOR 90 MG: 90 TABLET ORAL at 21:12

## 2022-06-18 NOTE — PROGRESS NOTES
[facility] HOSPITALIST PROGRESS NOTE     Patient Identification:  Name:  Lissa Eisenberg  Age:  51 y.o.  Sex:  female  :  1970  MRN:  [id number]  Visit Number:  [id number]  ROOM: [address]     Primary Care Provider:  Roxy Deng DO     Date of Admission: 2022    Length of stay in inpatient status:  5    Subjective     Chief Compliant:    Chief Complaint   Patient presents with   • Chest Pain   • Shortness of Breath     History of Presenting Illness:  With the addition of the hydroxyzine, she has experienced less chest pain yesterday.  She did have chest pain last night when her  called at 10 pm to tell her that the landlord of the land that their pop-up camper currently resides on is not allowing the patient to come back to the camper due her medical issues.  The patient is to move to [place] in 17 days as the patient and her family are to live in a new[facility] camper that has AC and electricity.  However, she no longer has a home and a heat wave is going to occur in our area in the next few days.  Otherwise, the patient denies any nausea, arm pain, shortness of air, edema.    Objective     Current Hospital Meds:  aspirin, 81 mg, Oral, Daily  atorvastatin, 80 mg, Oral, Nightly  cetirizine, 10 mg, Oral, Daily  docusate sodium, 100 mg, Oral, BID  DULoxetine, 60 mg, Oral, Daily  hydrALAZINE, 25 mg, Oral, Q8H  Insulin Aspart, 0-7 Units, Subcutaneous, TID AC  isosorbide mononitrate, 60 mg, Oral, Q24H  magnesium oxide, 400 mg, Oral, BID  metoprolol succinate XL, 50 mg, Oral, Q24H  pantoprazole, 40 mg, Oral, QAM  prenatal vitamin 27-0.8, 1 tablet, Oral, Daily  ranolazine, 1,000 mg, Oral, BID  Rivaroxaban, 2.5 mg, Oral, BID With Meals  sodium chloride, 10 mL, Intravenous, Q12H  sodium chloride, 10 mL, Intravenous, Q12H  ticagrelor, 90 mg, Oral, BID    Pharmacy Consult,       Current Antimicrobial Therapy:  Anti-Infectives (From admission, onward)    None        Current Diuretic  Therapy:  Diuretics (From admission, onward)    None        ----------------------------------------------------------------------------------------------------------------------  Vital Signs:  Temp:  [97.9 °F (36.6 °C)-98.6 °F (37 °C)] 98.6 °F (37 °C)  Heart Rate:  [82-98] 92  Resp:  [12-22] 12  BP: ()/(55-88) 110/79  SpO2:  [92 %-100 %] 92 %  on  Flow (L/min):  [2] 2;   Device (Oxygen Therapy): room air  Body mass index is 33.2 kg/m².    Wt Readings from Last 3 Encounters:   06/17/22 85 kg (187 lb 6.3 oz)   05/31/22 85.1 kg (187 lb 9.6 oz)   05/25/22 89 kg (196 lb 1.6 oz)     Intake & Output (last 3 days)       06/15 0701  06/16 0700 06/16 0701  06/17 0700 06/17 0701  06/18 0700 06/18 0701  06/19 0700    P.O. 840 480 440 480    I.V. (mL/kg) 2887.7 (34.3) 1240.4 (14.6)      Total Intake(mL/kg) 3727.7 (44.3) 1720.4 (20.2) 440 (5.2) 480 (5.6)    Net +3727.7 +1720.4 +440 +480            Urine Unmeasured Occurrence 4 x 4 x 4 x 3 x        Diet Regular; Cardiac  ----------------------------------------------------------------------------------------------------------------------  Physical Exam; her exam is the same as the last 2 days but she does appear less anxious today.   Vitals reviewed.   Constitutional:       General: She is awake.     Appearance: She is not diaphoretic today. She is not ill-appearing.   HENT:      Head: Normocephalic and atraumatic.   Cardiovascular:      Rate and Rhythm: Normal rate and regular rhythm.      Pulses: Normal pulses.      Heart sounds: No murmur heard.  Pulmonary:      Effort: Pulmonary effort is normal. No respiratory distress.      Breath sounds: Normal breath sounds. No wheezing or rales.   Musculoskeletal:         General: No swelling or deformity.   Skin:     Capillary Refill: Capillary refill takes less than 2 seconds.      Coloration: Skin is not jaundiced or pale.   Neurological:      Mental Status: She is alert and oriented to person, place, and time. Mental status  is at baseline.      Cranial Nerves: No cranial nerve deficit.   Psychiatric:         Mood and Affect: Mood normal.         Behavior: Behavior normal. Behavior is cooperative.   ----------------------------------------------------------------------------------------------------------------------  Tele:  NS with heart rates 80-90's.  I have personally reviewed/looked at the telemetry strips.  ----------------------------------------------------------------------------------------------------------------------  LABS:    CBC and coagulation:  Results from last 7 days   Lab Units 06/17/22  0007 06/16/22  0009 06/14/22  0208 06/13/22  1107 06/13/22  1106 06/11/22  2332   LACTATE mmol/L  --   --   --   --  0.9  --    WBC 10*3/mm3 6.43 6.39 6.06 6.18  --  7.64   HEMOGLOBIN g/dL 11.7* 11.6* 13.6 12.8  --  13.8   HEMATOCRIT % 35.3 34.2 40.0 38.6  --  41.1   MCV fL 84.0 83.2 80.8 83.4  --  81.9   MCHC g/dL 33.1 33.9 34.0 33.2  --  33.6   PLATELETS 10*3/mm3 267 280 241 241  --  309   INR  1.09  --   --   --  1.38*  --      Renal and electrolytes:  Results from last 7 days   Lab Units 06/17/22  0007 06/16/22  0009 06/14/22  2349 06/14/22  0208 06/13/22  1106 06/12/22  1000 06/11/22  2332   SODIUM mmol/L 139 135* 134* 140 137 136 129*   POTASSIUM mmol/L 4.2 4.1 4.0 4.6 3.5 3.6 3.9   MAGNESIUM mg/dL 2.1 1.8 1.6 2.0 1.6  --   --    CHLORIDE mmol/L 108* 106 102 107 104 99 94*   CO2 mmol/L 20.5* 19.0* 19.3* 20.5* 22.7 21.6* 20.3*   BUN mg/dL 7 5* 7 7 7 11 16   CREATININE mg/dL 0.61 0.72 0.82 0.73 0.57 0.62 0.87   CALCIUM mg/dL 9.0 8.6 8.7 8.9 9.0 9.3 9.6   PHOSPHORUS mg/dL 4.6* 2.9  --  2.8 3.8  --   --    GLUCOSE mg/dL 116* 176* 203* 104* 134* 118* 110*     Estimated Creatinine Clearance: 111.4 mL/min (by C-G formula based on SCr of 0.61 mg/dL).    Liver and pancreatic function:  Results from last 7 days   Lab Units 06/17/22  0007 06/13/22  1106 06/12/22  1000 06/11/22  2332   ALBUMIN g/dL 3.29* 3.79 4.20 4.50   BILIRUBIN mg/dL 0.3  0.5 0.5 0.5   ALK PHOS U/L 93 88 96 93   AST (SGOT) U/L 18 15 17 16   ALT (SGPT) U/L 16 12 13 16     Endocrine function:  Lab Results   Component Value Date    HGBA1C 5.80 (H) 05/21/2022     Point of care bedside glucose levels:  Results from last 7 days   Lab Units 06/18/22  1300 06/18/22  0624 06/17/22  1729 06/17/22  1123 06/17/22  0651 06/16/22  1821 06/16/22  1344 06/16/22  0618 06/15/22  1632 06/15/22  1127 06/15/22  0612 06/14/22  1709 06/14/22  1108 06/14/22  0554   GLUCOSE mg/dL 112 99 100 107 114 130 124 105 228* 116 100 140* 110 103     Glucose levels from the CMP:  Results from last 7 days   Lab Units 06/17/22  0007 06/16/22  0009 06/14/22  2349 06/14/22  0208 06/13/22  1106 06/12/22  1000 06/11/22  2332   GLUCOSE mg/dL 116* 176* 203* 104* 134* 118* 110*     Lab Results   Component Value Date    TSH 2.410 05/22/2022     Cardiac:  Results from last 7 days   Lab Units 06/16/22  2217 06/14/22  2257 06/13/22  1106 06/12/22  2029 06/12/22  0959 06/12/22  0149 06/11/22  2332   TROPONIN T ng/mL <0.010 <0.010 <0.010 <0.010 <0.010 <0.010 <0.010   PROBNP pg/mL  --   --   --   --   --   --  60.6          I have personally looked at the labs and they are summarized above.    Assessment & Plan      -Chest pain with typical features that was present on admission in a patient with known coronary artery disease status post two-vessel CABG with drug-eluting stent to the mid RCA in August 2021 and arthrotomy of severe ISR and PCI of mid RCA with attempt to stent ostial RPDA  -As of 6/12/2022, decrease in blood pressure to 88/67 with an increase in respiratory rate, no infection found and now resolved  -Acute hypomagnesemia, resolved with supplementation  -History of left bundle branch block that has been present since May 2022  -History of noninsulin-dependent type 2 diabetes mellitus  -History of heart failure with preserved ejection fraction, without any acute exacerbation  -History of essential hypertension  -History  of hyperlipidemia  -History of anxiety, depression, and PTSD  -Obesity, BMI 33.28 kg/m2, which complicates all aspects of care    Will continue with the hydroxyzine scheduled for her anxiety.  We will also continue her on the goal-directed medical therapy as prescribed by the cardiology team.  As far as her social situation, Amber was  contacted the homeless shelter in [place] and at this time they do not have any beds available, but they did encourage us to call back on Monday, 6/20/2022 to see if a bed had become available.  Bedside nurse Silvia is attempting to see if her [employer] has resources for this patient and her family.  I will have our  see the patient on Monday as well to see if she knows of any community resources.  The patient states that her in-laws do not have a enough money to come pick her up from [place] at this time but that they would have those resources at the end of this month.  The patient spent her last money on groceries prior to being admitted.  She does not have a car to move the pop-up trailer, nor does the pop-up camper have any wheels on it.  Thus, due to an unsafe discharge plan at this time, will continue to monitor the patient closely.    VTE Prophylaxis:   Mechanical Order History:     None      Pharmalogical Order History:      Ordered     Dose Route Frequency Stop    06/16/22 0710  Rivaroxaban (XARELTO) tablet 2.5 mg         2.5 mg PO 2 Times Daily With Meals --    06/15/22 1305  heparin (porcine) injection  Status:  Discontinued         -- -- As Needed 06/15/22 1329    06/15/22 1251  heparin (porcine) injection  Status:  Discontinued         -- -- As Needed 06/15/22 1329    06/12/22 0345  Rivaroxaban (XARELTO) tablet 2.5 mg  Status:  Discontinued         2.5 mg PO 2 Times Daily 06/13/22 1005            Disposition:  undetermined    Teto Sumner MD  [facility]  06/18/22  17:54 EDT

## 2022-06-18 NOTE — PLAN OF CARE
Goal Outcome Evaluation:  Plan of Care Reviewed With: patient        Progress: improving  Outcome Evaluation: Pt resting well throughout shift, VSS. Pt had one c/o CP at beginning of shift, which she said has improved with no further complaints. Bed in low position. Call light in reach.

## 2022-06-18 NOTE — PLAN OF CARE
Goal Outcome Evaluation:  Plan of Care Reviewed With: patient        Progress: improving  Outcome Evaluation: VSS pt resting anxious about living situation no C/O chest pain. Bed in lowest position. Call bell in reach.

## 2022-06-18 NOTE — CASE MANAGEMENT/SOCIAL WORK
"Case Management/Social Work    Patient Name:  Lissa Eisenberg  YOB: 1970  MRN: [id number]  Admit Date:  6/11/2022    SS discussed pt with Dr. Sumner. Pt is informing Dr. Sumner that she can't return to her prior living arrangements due to her, \"landlord not wanting her to die there\". SS spoke to pt and pt voices wanting to go to the homeless shelter in [place]. Pt voices wanting to take her 15 Y/O son to the shelter with her because he is currently attending summer school in [place]. SS contacted [facility] [phone] per Kimberly who states she does not have availability today. Kimberly request for SS to call back on Monday, 6/20/22. Kimberly anticipates room will be available on Monday, 6/20/22. SS notified Dr. Sumner and pt. SS to follow.        Electronically signed by:  GUADALUPE Chavez  06/18/22 10:53 EDT  "

## 2022-06-19 LAB
ANION GAP SERPL CALCULATED.3IONS-SCNC: 11.3 MMOL/L (ref 5–15)
BUN SERPL-MCNC: 10 MG/DL (ref 6–20)
BUN/CREAT SERPL: 13.2 (ref 7–25)
CALCIUM SPEC-SCNC: 9.5 MG/DL (ref 8.6–10.5)
CHLORIDE SERPL-SCNC: 104 MMOL/L (ref 98–107)
CO2 SERPL-SCNC: 20.7 MMOL/L (ref 22–29)
CREAT SERPL-MCNC: 0.76 MG/DL (ref 0.57–1)
EGFRCR SERPLBLD CKD-EPI 2021: 94.4 ML/MIN/1.73
GLUCOSE SERPL-MCNC: 115 MG/DL (ref 65–99)
HCT VFR BLD AUTO: 40.3 % (ref 34–46.6)
HGB BLD-MCNC: 13.5 G/DL (ref 12–15.9)
MAGNESIUM SERPL-MCNC: 1.9 MG/DL (ref 1.6–2.6)
PHOSPHATE SERPL-MCNC: 4.3 MG/DL (ref 2.5–4.5)
POTASSIUM SERPL-SCNC: 4.2 MMOL/L (ref 3.5–5.2)
QT INTERVAL: 376 MS
QTC INTERVAL: 494 MS
SODIUM SERPL-SCNC: 136 MMOL/L (ref 136–145)
TROPONIN T SERPL-MCNC: <0.01 NG/ML (ref 0–0.03)
TROPONIN T SERPL-MCNC: <0.01 NG/ML (ref 0–0.03)

## 2022-06-19 PROCEDURE — 84100 ASSAY OF PHOSPHORUS: CPT | Performed by: INTERNAL MEDICINE

## 2022-06-19 PROCEDURE — 93005 ELECTROCARDIOGRAM TRACING: CPT | Performed by: INTERNAL MEDICINE

## 2022-06-19 PROCEDURE — 84484 ASSAY OF TROPONIN QUANT: CPT | Performed by: INTERNAL MEDICINE

## 2022-06-19 PROCEDURE — 25010000002 PROCHLORPERAZINE 10 MG/2ML SOLUTION: Performed by: INTERNAL MEDICINE

## 2022-06-19 PROCEDURE — 94761 N-INVAS EAR/PLS OXIMETRY MLT: CPT

## 2022-06-19 PROCEDURE — 80048 BASIC METABOLIC PNL TOTAL CA: CPT | Performed by: INTERNAL MEDICINE

## 2022-06-19 PROCEDURE — 99232 SBSQ HOSP IP/OBS MODERATE 35: CPT | Performed by: INTERNAL MEDICINE

## 2022-06-19 PROCEDURE — 85014 HEMATOCRIT: CPT | Performed by: INTERNAL MEDICINE

## 2022-06-19 PROCEDURE — 93010 ELECTROCARDIOGRAM REPORT: CPT | Performed by: SPECIALIST

## 2022-06-19 PROCEDURE — 93010 ELECTROCARDIOGRAM REPORT: CPT | Performed by: INTERNAL MEDICINE

## 2022-06-19 PROCEDURE — 83735 ASSAY OF MAGNESIUM: CPT | Performed by: INTERNAL MEDICINE

## 2022-06-19 PROCEDURE — 85018 HEMOGLOBIN: CPT | Performed by: INTERNAL MEDICINE

## 2022-06-19 PROCEDURE — 94799 UNLISTED PULMONARY SVC/PX: CPT

## 2022-06-19 RX ORDER — PROCHLORPERAZINE EDISYLATE 5 MG/ML
10 INJECTION INTRAMUSCULAR; INTRAVENOUS EVERY 6 HOURS PRN
Status: DISCONTINUED | OUTPATIENT
Start: 2022-06-19 | End: 2022-06-21 | Stop reason: HOSPADM

## 2022-06-19 RX ORDER — PROCHLORPERAZINE EDISYLATE 5 MG/ML
5 INJECTION INTRAMUSCULAR; INTRAVENOUS ONCE
Status: COMPLETED | OUTPATIENT
Start: 2022-06-19 | End: 2022-06-19

## 2022-06-19 RX ADMIN — RIVAROXABAN 2.5 MG: 2.5 TABLET, FILM COATED ORAL at 08:51

## 2022-06-19 RX ADMIN — RIVAROXABAN 2.5 MG: 2.5 TABLET, FILM COATED ORAL at 17:58

## 2022-06-19 RX ADMIN — RANOLAZINE 1000 MG: 500 TABLET, FILM COATED, EXTENDED RELEASE ORAL at 21:20

## 2022-06-19 RX ADMIN — ASPIRIN 81 MG: 81 TABLET, COATED ORAL at 08:51

## 2022-06-19 RX ADMIN — PANTOPRAZOLE SODIUM 40 MG: 40 TABLET, DELAYED RELEASE ORAL at 06:03

## 2022-06-19 RX ADMIN — METOPROLOL SUCCINATE 50 MG: 50 TABLET, EXTENDED RELEASE ORAL at 08:50

## 2022-06-19 RX ADMIN — Medication 10 ML: at 08:52

## 2022-06-19 RX ADMIN — ISOSORBIDE MONONITRATE 60 MG: 60 TABLET ORAL at 08:50

## 2022-06-19 RX ADMIN — HYDRALAZINE HYDROCHLORIDE 25 MG: 25 TABLET, FILM COATED ORAL at 15:12

## 2022-06-19 RX ADMIN — TICAGRELOR 90 MG: 90 TABLET ORAL at 21:20

## 2022-06-19 RX ADMIN — NITROGLYCERIN 0.4 MG: 0.4 TABLET, ORALLY DISINTEGRATING SUBLINGUAL at 17:35

## 2022-06-19 RX ADMIN — PROCHLORPERAZINE EDISYLATE 5 MG: 5 INJECTION INTRAMUSCULAR; INTRAVENOUS at 01:10

## 2022-06-19 RX ADMIN — ATORVASTATIN CALCIUM 80 MG: 40 TABLET, FILM COATED ORAL at 21:20

## 2022-06-19 RX ADMIN — PRENATAL VIT W/ FE FUMARATE-FA TAB 27-0.8 MG 1 TABLET: 27-0.8 TAB at 08:51

## 2022-06-19 RX ADMIN — TICAGRELOR 90 MG: 90 TABLET ORAL at 08:51

## 2022-06-19 RX ADMIN — RANOLAZINE 1000 MG: 500 TABLET, FILM COATED, EXTENDED RELEASE ORAL at 08:51

## 2022-06-19 RX ADMIN — MAGNESIUM GLUCONATE 500 MG ORAL TABLET 400 MG: 500 TABLET ORAL at 21:21

## 2022-06-19 RX ADMIN — NITROGLYCERIN 0.4 MG: 0.4 TABLET, ORALLY DISINTEGRATING SUBLINGUAL at 00:24

## 2022-06-19 RX ADMIN — MAGNESIUM GLUCONATE 500 MG ORAL TABLET 400 MG: 500 TABLET ORAL at 08:50

## 2022-06-19 RX ADMIN — Medication 10 ML: at 21:22

## 2022-06-19 RX ADMIN — HYDRALAZINE HYDROCHLORIDE 25 MG: 25 TABLET, FILM COATED ORAL at 06:03

## 2022-06-19 RX ADMIN — PROCHLORPERAZINE EDISYLATE 10 MG: 5 INJECTION INTRAMUSCULAR; INTRAVENOUS at 20:06

## 2022-06-19 RX ADMIN — DULOXETINE HYDROCHLORIDE 60 MG: 60 CAPSULE, DELAYED RELEASE ORAL at 08:51

## 2022-06-19 RX ADMIN — NITROGLYCERIN 0.4 MG: 0.4 TABLET, ORALLY DISINTEGRATING SUBLINGUAL at 17:58

## 2022-06-19 RX ADMIN — CETIRIZINE HYDROCHLORIDE 10 MG: 10 TABLET, FILM COATED ORAL at 08:51

## 2022-06-19 NOTE — PLAN OF CARE
Problem: Adult Inpatient Plan of Care  Goal: Plan of Care Review  Outcome: Ongoing, Progressing  Flowsheets  Taken 6/19/2022 1743 by Elissa Davila RN  Progress: improving  Outcome Evaluation: VSS. Patient is on room air. Patient seems to be in good spirits today. Patient having some anxiety throughout shift. Nitro given once for CP, EKG and troponin pending. No other issues reported at this time. Call light within reach, bed in the lowest position.  Taken 6/18/2022 1649 by Silvia Rosado RN  Plan of Care Reviewed With: patient  Goal: Patient-Specific Goal (Individualized)  Outcome: Ongoing, Progressing  Goal: Absence of Hospital-Acquired Illness or Injury  Outcome: Ongoing, Progressing  Intervention: Identify and Manage Fall Risk  Recent Flowsheet Documentation  Taken 6/19/2022 1700 by Elissa Davila RN  Safety Promotion/Fall Prevention: safety round/check completed  Taken 6/19/2022 1500 by Elissa Davila RN  Safety Promotion/Fall Prevention: safety round/check completed  Taken 6/19/2022 0700 by Elissa Davila RN  Safety Promotion/Fall Prevention: safety round/check completed  Intervention: Prevent Infection  Recent Flowsheet Documentation  Taken 6/19/2022 1700 by Elissa Davila RN  Infection Prevention:   rest/sleep promoted   single patient room provided  Taken 6/19/2022 1500 by Elissa Davila RN  Infection Prevention:   rest/sleep promoted   single patient room provided  Taken 6/19/2022 0700 by Elissa Davila RN  Infection Prevention:   rest/sleep promoted   single patient room provided  Goal: Optimal Comfort and Wellbeing  Outcome: Ongoing, Progressing  Goal: Readiness for Transition of Care  Outcome: Ongoing, Progressing     Problem: Hypertension Comorbidity  Goal: Blood Pressure in Desired Range  Outcome: Ongoing, Progressing  Intervention: Maintain Blood Pressure Management  Recent Flowsheet Documentation  Taken 6/19/2022 1700 by Elissa Davila RN  Medication Review/Management: medications reviewed  Taken  6/19/2022 1500 by Elissa Davila RN  Medication Review/Management: medications reviewed  Taken 6/19/2022 0700 by Elissa Davila RN  Medication Review/Management: medications reviewed     Problem: Osteoarthritis Comorbidity  Goal: Maintenance of Osteoarthritis Symptom Control  Outcome: Ongoing, Progressing  Intervention: Maintain Osteoarthritis Symptom Control  Recent Flowsheet Documentation  Taken 6/19/2022 1700 by Elissa Davila RN  Medication Review/Management: medications reviewed  Taken 6/19/2022 1500 by Elissa Davila RN  Medication Review/Management: medications reviewed  Taken 6/19/2022 0700 by Elissa Davila RN  Medication Review/Management: medications reviewed     Problem: Chest Pain  Goal: Resolution of Chest Pain Symptoms  Outcome: Ongoing, Progressing     Problem: Cardiac Impairment  Goal: Optimal Activity Tolerance  Outcome: Ongoing, Progressing     Problem: Fall Injury Risk  Goal: Absence of Fall and Fall-Related Injury  Outcome: Ongoing, Progressing  Intervention: Identify and Manage Contributors  Recent Flowsheet Documentation  Taken 6/19/2022 1700 by Elissa Davila RN  Medication Review/Management: medications reviewed  Taken 6/19/2022 1500 by Elissa Davila RN  Medication Review/Management: medications reviewed  Taken 6/19/2022 0700 by Elissa Davila RN  Medication Review/Management: medications reviewed  Intervention: Promote Injury-Free Environment  Recent Flowsheet Documentation  Taken 6/19/2022 1700 by Elissa Davila RN  Safety Promotion/Fall Prevention: safety round/check completed  Taken 6/19/2022 1500 by Elissa Davila RN  Safety Promotion/Fall Prevention: safety round/check completed  Taken 6/19/2022 0700 by Elissa Davila RN  Safety Promotion/Fall Prevention: safety round/check completed   Goal Outcome Evaluation:           Progress: improving  Outcome Evaluation: VSS. Patient is on room air. Patient seems to be in good spirits today. Patient having some anxiety throughout shift. Nitro  given once for CP, EKG and troponin pending. No other issues reported at this time. Call light within reach, bed in the lowest position.

## 2022-06-19 NOTE — PLAN OF CARE
Problem: Adult Inpatient Plan of Care  Goal: Plan of Care Review  Outcome: Ongoing, Progressing  Flowsheets (Taken 6/18/2022 1649 by Silvia Rosado, RN)  Progress: improving  Plan of Care Reviewed With: patient  Outcome Evaluation: VSS pt resting anxious about living situation no C/O chest pain. Bed in lowest position. Call bell in reach.  Goal: Patient-Specific Goal (Individualized)  Outcome: Ongoing, Progressing  Goal: Absence of Hospital-Acquired Illness or Injury  Outcome: Ongoing, Progressing  Intervention: Identify and Manage Fall Risk  Flowsheets  Taken 6/19/2022 0000 by Glo Carnes PCT  Safety Promotion/Fall Prevention:   activity supervised   clutter free environment maintained   assistive device/personal items within reach   fall prevention program maintained   nonskid shoes/slippers when out of bed   room organization consistent   safety round/check completed  Taken 6/18/2022 2300 by Jammie Vargas RN  Safety Promotion/Fall Prevention:   activity supervised   safety round/check completed  Taken 6/18/2022 2100 by aJmmie Vargas RN  Safety Promotion/Fall Prevention:   activity supervised   safety round/check completed  Taken 6/18/2022 1948 by Jammie Vargas RN  Safety Promotion/Fall Prevention:   activity supervised   safety round/check completed  Taken 6/18/2022 1900 by Jammie Vargas RN  Safety Promotion/Fall Prevention:   activity supervised   safety round/check completed  Intervention: Prevent Skin Injury  Flowsheets  Taken 6/19/2022 0000 by Glo Carnes PCT  Body Position: position changed independently  Taken 6/18/2022 1948 by Jammie Vargas RN  Skin Protection: adhesive use limited  Intervention: Prevent and Manage VTE (Venous Thromboembolism) Risk  Flowsheets  Taken 6/19/2022 0000 by Glo Carnes PCT  Activity Management: activity adjusted per tolerance  Taken 6/18/2022 1948 by Jammie Vargas RN  Activity Management: activity adjusted per tolerance  Taken  6/18/2022 1620 by Silvia Rosado RN  Range of Motion: active ROM (range of motion) encouraged  Taken 6/18/2022 0835 by Silvia Rosado RN  VTE Prevention/Management: bleeding risk factor(s) identified  Intervention: Prevent Infection  Flowsheets (Taken 6/18/2022 1948)  Infection Prevention:   rest/sleep promoted   single patient room provided  Goal: Optimal Comfort and Wellbeing  Outcome: Ongoing, Progressing  Intervention: Monitor Pain and Promote Comfort  Flowsheets (Taken 6/18/2022 0835 by Silvia Rosado RN)  Pain Management Interventions:   see MAR   care clustered   quiet environment facilitated  Intervention: Provide Person-Centered Care  Flowsheets (Taken 6/18/2022 1620 by Silvia Rosado RN)  Trust Relationship/Rapport:   care explained   choices provided   thoughts/feelings acknowledged  Goal: Readiness for Transition of Care  Outcome: Ongoing, Progressing  Intervention: Mutually Develop Transition Plan  Flowsheets  Taken 6/13/2022 1244 by Lena Watt MSW  Equipment Needed After Discharge: none  Equipment Currently Used at Home:   bath bench   walker, rolling  Anticipated Changes Related to Illness: none  Transportation Anticipated: public transportation  Concerns to be Addressed: no discharge needs identified  Patient/Family Anticipated Services at Transition: none  Patient/Family Anticipates Transition to: home with family  Taken 6/12/2022 0419 by Tigist Cedillo RN  Transportation Concerns: no car     Problem: Hypertension Comorbidity  Goal: Blood Pressure in Desired Range  Outcome: Ongoing, Progressing  Intervention: Maintain Blood Pressure Management  Flowsheets  Taken 6/18/2022 2300 by Jammie Vargas RN  Medication Review/Management: medications reviewed  Taken 6/18/2022 2100 by Jammie Vargas RN  Medication Review/Management: medications reviewed  Taken 6/18/2022 1948 by Jammie Vargas RN  Medication Review/Management: medications reviewed  Taken 6/18/2022 1900 by Jammie Vargas  RN  Medication Review/Management: medications reviewed  Taken 6/18/2022 0835 by Silvia Rosado RN  Syncope Management: position changed slowly     Problem: Osteoarthritis Comorbidity  Goal: Maintenance of Osteoarthritis Symptom Control  Outcome: Ongoing, Progressing  Intervention: Maintain Osteoarthritis Symptom Control  Flowsheets  Taken 6/19/2022 0000 by Glo Carnes, PCT  Activity Management: activity adjusted per tolerance  Taken 6/18/2022 2300 by Jammie Vargas RN  Medication Review/Management: medications reviewed  Taken 6/18/2022 2100 by Jammie Vargas RN  Medication Review/Management: medications reviewed  Taken 6/18/2022 1948 by Jammie Vargas RN  Activity Management: activity adjusted per tolerance  Medication Review/Management: medications reviewed  Taken 6/18/2022 1900 by Jammie Vargas RN  Medication Review/Management: medications reviewed     Problem: Chest Pain  Goal: Resolution of Chest Pain Symptoms  Outcome: Ongoing, Progressing  Intervention: Manage Acute Chest Pain  Flowsheets (Taken 6/18/2022 0835 by Silvia Rosado RN)  Chest Pain Intervention: cardiac monitoring continued     Problem: Cardiac Impairment  Goal: Optimal Activity Tolerance  Outcome: Ongoing, Progressing     Problem: Fall Injury Risk  Goal: Absence of Fall and Fall-Related Injury  Outcome: Ongoing, Progressing  Intervention: Identify and Manage Contributors  Flowsheets  Taken 6/18/2022 2300 by Jammie Vargas RN  Medication Review/Management: medications reviewed  Taken 6/18/2022 2100 by Jammie Vargas RN  Medication Review/Management: medications reviewed  Taken 6/18/2022 1948 by Jammie Vargas RN  Medication Review/Management: medications reviewed  Taken 6/18/2022 1900 by Jammie Vargas RN  Medication Review/Management: medications reviewed  Taken 6/15/2022 1400 by Martha Hua RN  Self-Care Promotion:   independence encouraged   BADL personal objects within reach  Intervention: Promote  Injury-Free Environment  Flowsheets  Taken 6/19/2022 0000 by Glo Carnes, PCT  Safety Promotion/Fall Prevention:   activity supervised   clutter free environment maintained   assistive device/personal items within reach   fall prevention program maintained   nonskid shoes/slippers when out of bed   room organization consistent   safety round/check completed  Taken 6/18/2022 2300 by Jammie Vargas RN  Safety Promotion/Fall Prevention:   activity supervised   safety round/check completed  Taken 6/18/2022 2100 by Jammie Vargas, RN  Safety Promotion/Fall Prevention:   activity supervised   safety round/check completed  Taken 6/18/2022 1948 by Jammie Vargas RN  Safety Promotion/Fall Prevention:   activity supervised   safety round/check completed  Taken 6/18/2022 1900 by Jammie Vargas, RN  Safety Promotion/Fall Prevention:   activity supervised   safety round/check completed   Goal Outcome Evaluation:

## 2022-06-19 NOTE — PROGRESS NOTES
[facility] HOSPITALIST PROGRESS NOTE     Patient Identification:  Name:  Lissa Eisenberg  Age:  51 y.o.  Sex:  female  :  1970  MRN:  [id number]  Visit Number:  [id number]  ROOM: [address]     Primary Care Provider:  Roxy Deng DO     Date of Admission: 2022    Length of stay in inpatient status:  6    Subjective     Chief Compliant:    Chief Complaint   Patient presents with   • Chest Pain   • Shortness of Breath     History of Presenting Illness: The patient was alone today in her room when I saw her.  The patient states that she had 1 episode of chest pain last night; she was awakened from sleep by a sharp substernal pressure type chest pain that radiated to her left shoulder and arm.  Since then, she has had no further chest pain.  She did have some nausea and shortness of air with the chest pain but she currently does not have nausea.  She denies any emesis.  The patient currently does not have shortness of air.    Objective     Current Hospital Meds:  aspirin, 81 mg, Oral, Daily  atorvastatin, 80 mg, Oral, Nightly  cetirizine, 10 mg, Oral, Daily  docusate sodium, 100 mg, Oral, BID  DULoxetine, 60 mg, Oral, Daily  hydrALAZINE, 25 mg, Oral, Q8H  isosorbide mononitrate, 60 mg, Oral, Q24H  magnesium oxide, 400 mg, Oral, BID  metoprolol succinate XL, 50 mg, Oral, Q24H  pantoprazole, 40 mg, Oral, QAM  prenatal vitamin 27-0.8, 1 tablet, Oral, Daily  ranolazine, 1,000 mg, Oral, BID  Rivaroxaban, 2.5 mg, Oral, BID With Meals  sodium chloride, 10 mL, Intravenous, Q12H  sodium chloride, 10 mL, Intravenous, Q12H  ticagrelor, 90 mg, Oral, BID    Pharmacy Consult,       Current Antimicrobial Therapy:  Anti-Infectives (From admission, onward)    None        Current Diuretic Therapy:  Diuretics (From admission, onward)    None        ----------------------------------------------------------------------------------------------------------------------  Vital Signs:  Temp:  [97.8 °F (36.6  °C)-98.7 °F (37.1 °C)] 98.5 °F (36.9 °C)  Heart Rate:  [] 92  Resp:  [12-22] 15  BP: (102-135)/(54-87) 103/58  SpO2:  [92 %-100 %] 97 %  on  Flow (L/min):  [2] 2;   Device (Oxygen Therapy): room air  Body mass index is 27.23 kg/m².    Wt Readings from Last 3 Encounters:   06/19/22 69.7 kg (153 lb 10.6 oz)   05/31/22 85.1 kg (187 lb 9.6 oz)   05/25/22 89 kg (196 lb 1.6 oz)     Intake & Output (last 3 days)       06/16 0701  06/17 0700 06/17 0701 06/18 0700 06/18 0701 06/19 0700 06/19 0701  06/20 0700    P.O. 480 440 960 240    I.V. (mL/kg) 1240.4 (14.6)       Total Intake(mL/kg) 1720.4 (20.2) 440 (5.2) 960 (13.8) 240 (3.4)    Net +1720.4 +440 +960 +240            Urine Unmeasured Occurrence 4 x 4 x 5 x         Diet Regular; Cardiac  ----------------------------------------------------------------------------------------------------------------------  Physical Exam   Physical Exam; her exam is the same as the last 3 days but she does appear less anxious today.   Vitals reviewed.   Constitutional:       General: She is awake.     Appearance: She is not diaphoretic today. She is not ill-appearing.   HENT:      Head: Normocephalic and atraumatic.   Cardiovascular:      Rate and Rhythm: Normal rate and regular rhythm.      Pulses: Normal pulses.      Heart sounds: No murmur heard.  Pulmonary:      Effort: Pulmonary effort is normal. No respiratory distress.      Breath sounds: Normal breath sounds. No wheezing or rales.   Musculoskeletal:         General: No swelling or deformity.   Skin:     Capillary Refill: Capillary refill takes less than 2 seconds.      Coloration: Skin is not jaundiced or pale.   Neurological:      Mental Status: She is alert and oriented to person, place, and time. Mental status is at baseline.      Cranial Nerves: No cranial nerve deficit.   Psychiatric:         Mood and Affect: Mood normal.         Behavior: Behavior normal. Behavior is  cooperative.   ----------------------------------------------------------------------------------------------------------------------  Tele:  NS with heart rates in the 80-90's.  I have personally reviewed/looked at the telemetry strips.  ----------------------------------------------------------------------------------------------------------------------  LABS:    CBC and coagulation:  Results from last 7 days   Lab Units 06/19/22 0026 06/17/22 0007 06/16/22 0009 06/14/22  0208 06/13/22  1107 06/13/22  1106   LACTATE mmol/L  --   --   --   --   --  0.9   WBC 10*3/mm3  --  6.43 6.39 6.06 6.18  --    HEMOGLOBIN g/dL 13.5 11.7* 11.6* 13.6 12.8  --    HEMATOCRIT % 40.3 35.3 34.2 40.0 38.6  --    MCV fL  --  84.0 83.2 80.8 83.4  --    MCHC g/dL  --  33.1 33.9 34.0 33.2  --    PLATELETS 10*3/mm3  --  267 280 241 241  --    INR   --  1.09  --   --   --  1.38*     Renal and electrolytes:  Results from last 7 days   Lab Units 06/19/22 0026 06/17/22 0007 06/16/22 0009 06/14/22  2349 06/14/22  0208 06/13/22  1106   SODIUM mmol/L 136 139 135* 134* 140 137   POTASSIUM mmol/L 4.2 4.2 4.1 4.0 4.6 3.5   MAGNESIUM mg/dL 1.9 2.1 1.8 1.6 2.0 1.6   CHLORIDE mmol/L 104 108* 106 102 107 104   CO2 mmol/L 20.7* 20.5* 19.0* 19.3* 20.5* 22.7   BUN mg/dL 10 7 5* 7 7 7   CREATININE mg/dL 0.76 0.61 0.72 0.82 0.73 0.57   CALCIUM mg/dL 9.5 9.0 8.6 8.7 8.9 9.0   PHOSPHORUS mg/dL 4.3 4.6* 2.9  --  2.8 3.8   GLUCOSE mg/dL 115* 116* 176* 203* 104* 134*     Estimated Creatinine Clearance: 81.1 mL/min (by C-G formula based on SCr of 0.76 mg/dL).    Liver and pancreatic function:  Results from last 7 days   Lab Units 06/17/22  0007 06/13/22  1106   ALBUMIN g/dL 3.29* 3.79   BILIRUBIN mg/dL 0.3 0.5   ALK PHOS U/L 93 88   AST (SGOT) U/L 18 15   ALT (SGPT) U/L 16 12     Endocrine function:  Lab Results   Component Value Date    HGBA1C 5.80 (H) 05/21/2022     Point of care bedside glucose levels:  Results from last 7 days   Lab Units 06/18/22  1300  06/18/22  0624 06/17/22  1729 06/17/22  1123 06/17/22  0651 06/16/22  1821 06/16/22  1344 06/16/22  0618 06/15/22  1632 06/15/22  1127 06/15/22  0612 06/14/22  1709 06/14/22  1108 06/14/22  0554   GLUCOSE mg/dL 112 99 100 107 114 130 124 105 228* 116 100 140* 110 103     Glucose levels from the CMP:  Results from last 7 days   Lab Units 06/19/22  0026 06/17/22  0007 06/16/22  0009 06/14/22  2349 06/14/22  0208 06/13/22  1106   GLUCOSE mg/dL 115* 116* 176* 203* 104* 134*     Lab Results   Component Value Date    TSH 2.410 05/22/2022     Cardiac:  Results from last 7 days   Lab Units 06/19/22  0026 06/16/22  2217 06/14/22  2257 06/13/22  1106 06/12/22 2029   TROPONIN T ng/mL <0.010 <0.010 <0.010 <0.010 <0.010         I have personally looked at the labs and they are summarized above.    Assessment & Plan      -Chest pain with typical features that was present on admission in a patient with known coronary artery disease status post two-vessel CABG with drug-eluting stent to the mid RCA in August 2021 and arthrotomy of severe ISR and PCI of mid RCA with attempt to stent ostial RPDA  -As of 6/12/2022, decrease in blood pressure to 88/67 with an increase in respiratory rate, no infection found and now resolved  -Acute hypomagnesemia, resolved with supplementation  -History of left bundle branch block that has been present since May 2022  -History of noninsulin-dependent type 2 diabetes mellitus  -History of heart failure with preserved ejection fraction, without any acute exacerbation  -History of essential hypertension  -History of hyperlipidemia  -History of anxiety, depression, and PTSD  -Obesity, BMI 33.28 kg/m2, which complicates all aspects of care     and  services to return to service tomorrow.  We will make phone calls to see if the homeless shelter has an opening.  Yesterday, bedside nurse Silvia mention to the patient about a [employer] [facility] that she is affiliated with that may able to help  the patient; it is located in [place], [place].  I will have  reach out to Silvia to see if this is an option for the patient as the patient has multiple animals and this [facility] accepts animals.  The patient plans to move to [place] in the next 16 days or so in order to live near her in-laws.  I have discussed the patient with Dr. Smalls.  At this time, the Imdur appears to be at the maximum safe dose based on her blood pressures.  She is on dual antiplatelet therapy in addition to anticoagulation.  She is also on a beta-blocker and Lipitor.  The patient will likely have chronic angina from here on her out due to the severity of her coronary artery disease.  I encouraged her to try to notice differences between her chronic anginal pain and acute chest pain that could be an MI.  Since the blood work has been about the same for the last couple of days, we will not obtain blood work tomorrow.    VTE Prophylaxis:   Mechanical Order History:     None      Pharmalogical Order History:      Ordered     Dose Route Frequency Stop    06/16/22 0710  Rivaroxaban (XARELTO) tablet 2.5 mg         2.5 mg PO 2 Times Daily With Meals --    06/15/22 1305  heparin (porcine) injection  Status:  Discontinued         -- -- As Needed 06/15/22 1329    06/15/22 1251  heparin (porcine) injection  Status:  Discontinued         -- -- As Needed 06/15/22 1329    06/12/22 0345  Rivaroxaban (XARELTO) tablet 2.5 mg  Status:  Discontinued         2.5 mg PO 2 Times Daily 06/13/22 1005            Disposition:  undetermined    Teto Sumner MD  [facility]  06/19/22  10:56 EDT

## 2022-06-20 LAB
QT INTERVAL: 374 MS
QTC INTERVAL: 460 MS

## 2022-06-20 PROCEDURE — 99231 SBSQ HOSP IP/OBS SF/LOW 25: CPT | Performed by: HOSPITALIST

## 2022-06-20 RX ADMIN — Medication 10 ML: at 21:03

## 2022-06-20 RX ADMIN — TICAGRELOR 90 MG: 90 TABLET ORAL at 21:02

## 2022-06-20 RX ADMIN — DOCUSATE SODIUM 100 MG: 100 CAPSULE ORAL at 09:10

## 2022-06-20 RX ADMIN — PRENATAL VIT W/ FE FUMARATE-FA TAB 27-0.8 MG 1 TABLET: 27-0.8 TAB at 09:10

## 2022-06-20 RX ADMIN — HYDRALAZINE HYDROCHLORIDE 25 MG: 25 TABLET, FILM COATED ORAL at 21:02

## 2022-06-20 RX ADMIN — METOPROLOL SUCCINATE 50 MG: 50 TABLET, EXTENDED RELEASE ORAL at 09:10

## 2022-06-20 RX ADMIN — RIVAROXABAN 2.5 MG: 2.5 TABLET, FILM COATED ORAL at 09:10

## 2022-06-20 RX ADMIN — RANOLAZINE 1000 MG: 500 TABLET, FILM COATED, EXTENDED RELEASE ORAL at 21:02

## 2022-06-20 RX ADMIN — MAGNESIUM GLUCONATE 500 MG ORAL TABLET 400 MG: 500 TABLET ORAL at 09:10

## 2022-06-20 RX ADMIN — DULOXETINE HYDROCHLORIDE 60 MG: 60 CAPSULE, DELAYED RELEASE ORAL at 09:10

## 2022-06-20 RX ADMIN — HYDRALAZINE HYDROCHLORIDE 25 MG: 25 TABLET, FILM COATED ORAL at 06:01

## 2022-06-20 RX ADMIN — DOCUSATE SODIUM 100 MG: 100 CAPSULE ORAL at 21:02

## 2022-06-20 RX ADMIN — HYDRALAZINE HYDROCHLORIDE 25 MG: 25 TABLET, FILM COATED ORAL at 14:12

## 2022-06-20 RX ADMIN — TICAGRELOR 90 MG: 90 TABLET ORAL at 09:10

## 2022-06-20 RX ADMIN — ATORVASTATIN CALCIUM 80 MG: 40 TABLET, FILM COATED ORAL at 21:02

## 2022-06-20 RX ADMIN — PANTOPRAZOLE SODIUM 40 MG: 40 TABLET, DELAYED RELEASE ORAL at 06:01

## 2022-06-20 RX ADMIN — CETIRIZINE HYDROCHLORIDE 10 MG: 10 TABLET, FILM COATED ORAL at 09:10

## 2022-06-20 RX ADMIN — Medication 10 ML: at 09:11

## 2022-06-20 RX ADMIN — Medication 10 ML: at 09:12

## 2022-06-20 RX ADMIN — ISOSORBIDE MONONITRATE 60 MG: 60 TABLET ORAL at 09:10

## 2022-06-20 RX ADMIN — RIVAROXABAN 2.5 MG: 2.5 TABLET, FILM COATED ORAL at 17:56

## 2022-06-20 RX ADMIN — RANOLAZINE 1000 MG: 500 TABLET, FILM COATED, EXTENDED RELEASE ORAL at 09:10

## 2022-06-20 RX ADMIN — MAGNESIUM GLUCONATE 500 MG ORAL TABLET 400 MG: 500 TABLET ORAL at 21:02

## 2022-06-20 RX ADMIN — ASPIRIN 81 MG: 81 TABLET, COATED ORAL at 09:10

## 2022-06-20 NOTE — CASE MANAGEMENT/SOCIAL WORK
Case Management/Social Work    Patient Name:  Lissa Eisenberg  YOB: 1970  MRN: [id number]  Admit Date:  6/11/2022    SS noted that over the weekend, on call SW was notified that pt is now homeless and cannot return to her camper. SS left voicemail for [facility] director Kimberly. SS contacted [facility], state they are checking with director for any bed availability for pt and 15 y/o son.     11:09: SS attempted to contact [facility] again at this time without success. SS left voicemail for [facility] [place], [phone].    11:38: SS spoke with Della at [facility] [phone], no beds available at this time.     12:07: SS notified by RN that pt is saying she won't have medication or food until Saturday if hospital were to pay for hotel room.     13:06: SS followed up with [facility], states they have pt's information written down but no answer on bed availability yet.     16:11: SS received call back from [facility], no beds available.     16:17: RN provided SS with contact information for pt's  through [facility], left voicemail at [phone] ext [phone].     16:21: SS received call back from [facility] , pt does not qualify for inpatient rehab services. CM states she will having  contact SS for possible emergency housing.     Electronically signed by:  GUADALUPE Sharp  06/20/22 09:05 EDT

## 2022-06-20 NOTE — PLAN OF CARE
Goal Outcome Evaluation:           Progress: no change  Outcome Evaluation: VSS. Patient is on room air. Patient is in good spirits today, patient his having severe anxiety throughout the shift. No other issues reported at this time. Call light within reach, bed in the lowest position.

## 2022-06-20 NOTE — PROGRESS NOTES
[facility] HOSPITALIST PROGRESS NOTE     Patient Identification:  Name:  Lissa Eisenberg  Age:  51 y.o.  Sex:  female  :  1970  MRN:  [id number]  Visit Number:  [id number]  Primary Care Provider:  Roxy Deng DO    Length of stay:  7    Subjective: Patient seen and examined assisted by Elissa Davila RN.  Patient in bed resting no complaints, he reports brief episode of chest pain yesterday EKG was unremarkable.  Patient with significant anxiety.  Chart reviewed patient is cleared for discharge but no place to go, she is homeless.  Currently I am in discussion with case management and  for options, vital signs stable.    Chief Complaint: Chest pain  ----------------------------------------------------------------------------------------------------------------------  Current Hospital Meds:  aspirin, 81 mg, Oral, Daily  atorvastatin, 80 mg, Oral, Nightly  cetirizine, 10 mg, Oral, Daily  docusate sodium, 100 mg, Oral, BID  DULoxetine, 60 mg, Oral, Daily  hydrALAZINE, 25 mg, Oral, Q8H  isosorbide mononitrate, 60 mg, Oral, Q24H  magnesium oxide, 400 mg, Oral, BID  metoprolol succinate XL, 50 mg, Oral, Q24H  pantoprazole, 40 mg, Oral, QAM  prenatal vitamin 27-0.8, 1 tablet, Oral, Daily  ranolazine, 1,000 mg, Oral, BID  Rivaroxaban, 2.5 mg, Oral, BID With Meals  sodium chloride, 10 mL, Intravenous, Q12H  sodium chloride, 10 mL, Intravenous, Q12H  ticagrelor, 90 mg, Oral, BID      Pharmacy Consult,       ----------------------------------------------------------------------------------------------------------------------  Vital Signs:  Temp:  [98 °F (36.7 °C)-98.4 °F (36.9 °C)] 98.3 °F (36.8 °C)  Heart Rate:  [] 100  Resp:  [18] 16  BP: ()/(44-85) 113/85       Tele: Sinus rhythm 90 bpm O2 saturation 98%      22  0503 22  0500 22  0402   Weight: 85 kg (187 lb 6.3 oz) 69.7 kg (153 lb 10.6 oz) 69.7 kg (153 lb 10.6 oz)     Body mass index is 27.23  kg/m².    Intake/Output Summary (Last 24 hours) at 6/20/2022 1147  Last data filed at 6/19/2022 1705  Gross per 24 hour   Intake 840 ml   Output --   Net 840 ml     Diet Regular; Cardiac  ----------------------------------------------------------------------------------------------------------------------  Physical exam:  General: Comfortable,awake, alert, oriented to self, place, and time, well-developed and well-nourished.  No respiratory distress.    Skin:  Skin is warm and dry. No rash noted. No pallor.    HENT:  Head:  Normocephalic and atraumatic.  Mouth:  Moist mucous membranes.    Eyes:  Conjunctivae and EOM are normal.  Pupils are equal, round, and reactive to light.  No scleral icterus.    Neck:  Neck supple.  No JVD present.    Pulmonary/Chest:  No respiratory distress, no wheezes, no crackles, with normal breath sounds and good air movement.  Cardiovascular:  Normal rate, regular rhythm and normal heart sounds with no murmur.  Abdominal: Flabby and obese, soft.  Bowel sounds are normal.  No distension and no tenderness.   Extremities:  No edema, no tenderness, and no deformity.  No red or swollen joints anywhere.  Strong pulses in all 4 extremities with no clubbing, no cyanosis, no edema.  Neurological:  Motor strength equal no obvious deficit, sensory grossly intact.   No cranial nerve deficit.  No tongue deviation.  No facial droop.  No slurred speech.    Genitourinary: No Tidwell catheter  Back:  ----------------------------------------------------------------------------------------------------------------------  ----------------------------------------------------------------------------------------------------------------------  Results from last 7 days   Lab Units 06/19/22  1744 06/19/22  0026 06/16/22  2217   TROPONIN T ng/mL <0.010 <0.010 <0.010     Results from last 7 days   Lab Units 06/19/22  0026 06/17/22  0007 06/16/22  0009 06/14/22  0208   WBC 10*3/mm3  --  6.43 6.39 6.06   HEMOGLOBIN g/dL  13.5 11.7* 11.6* 13.6   HEMATOCRIT % 40.3 35.3 34.2 40.0   MCV fL  --  84.0 83.2 80.8   MCHC g/dL  --  33.1 33.9 34.0   PLATELETS 10*3/mm3  --  267 280 241   INR   --  1.09  --   --          Results from last 7 days   Lab Units 06/19/22  0026 06/17/22  0007 06/16/22  0009   SODIUM mmol/L 136 139 135*   POTASSIUM mmol/L 4.2 4.2 4.1   MAGNESIUM mg/dL 1.9 2.1 1.8   CHLORIDE mmol/L 104 108* 106   CO2 mmol/L 20.7* 20.5* 19.0*   BUN mg/dL 10 7 5*   CREATININE mg/dL 0.76 0.61 0.72   CALCIUM mg/dL 9.5 9.0 8.6   GLUCOSE mg/dL 115* 116* 176*   ALBUMIN g/dL  --  3.29*  --    BILIRUBIN mg/dL  --  0.3  --    ALK PHOS U/L  --  93  --    AST (SGOT) U/L  --  18  --    ALT (SGPT) U/L  --  16  --    Estimated Creatinine Clearance: 81.1 mL/min (by C-G formula based on SCr of 0.76 mg/dL).    No results found for: AMMONIA      No results found for: BLOODCX  No results found for: URINECX  No results found for: WOUNDCX  No results found for: STOOLCX    I have personally looked at the labs and they are summarized above.  ----------------------------------------------------------------------------------------------------------------------  Imaging Results (Last 24 Hours)     ** No results found for the last 24 hours. **        ----------------------------------------------------------------------------------------------------------------------  Assessment and Plan:  -Chest pain MI was ruled out  -Status post POB of right PDA, history of CABG three-vessel and JAYDA stent to mid RCA, arthrotomy of severe ISR and PCI of mid RCA in 2021  -Diabetes type 2 non-insulin-requiring  -Obesity with a BMI of 33  -Dyslipidemia  -History of hypertension-history of heart failure with preserved ejection fraction compensated  -Chronic left bundle branch block    Patient is medically stable and cleared from medical standpoint, patient is apparently homeless, /social service actively working on place to stay.  Once available can be  discharged.    Disposition discharge when patient has a place to go      Maty Tubbs MD  06/20/22  11:47 EDT

## 2022-06-20 NOTE — PLAN OF CARE
Problem: Adult Inpatient Plan of Care  Goal: Plan of Care Review  Outcome: Ongoing, Progressing  Flowsheets  Taken 6/19/2022 2320 by Kandy Park RN  Plan of Care Reviewed With: patient  Taken 6/19/2022 1743 by Elissa Davila RN  Progress: improving  Goal: Patient-Specific Goal (Individualized)  Outcome: Ongoing, Progressing  Goal: Absence of Hospital-Acquired Illness or Injury  Outcome: Ongoing, Progressing  Intervention: Identify and Manage Fall Risk  Description: Perform standard risk assessment on admission using a validated tool or comprehensive approach appropriate to the patient; reassess fall risk frequently, with change in status or transfer to another level of care.  Communicate fall injury risk to interprofessional healthcare team.  Determine need for increased observation, equipment and environmental modification, such as low bed, signage and supportive, nonskid footwear.  Adjust safety measures to individual developmental age, stage and identified risk factors.  Reinforce the importance of safety and physical activity with patient and family.  Perform regular intentional rounding to assess need for position change, pain assessment and personal needs, including assistance with toileting.  Recent Flowsheet Documentation  Taken 6/19/2022 2300 by Kandy Park, RN  Safety Promotion/Fall Prevention: safety round/check completed  Taken 6/19/2022 2100 by Kandy Park RN  Safety Promotion/Fall Prevention: safety round/check completed  Taken 6/19/2022 2005 by Kandy Park RN  Safety Promotion/Fall Prevention: safety round/check completed  Taken 6/19/2022 1900 by Kandy Park RN  Safety Promotion/Fall Prevention:   safety round/check completed   activity supervised   clutter free environment maintained   lighting adjusted   nonskid shoes/slippers when out of bed  Intervention: Prevent Skin Injury  Description: Perform a screening for skin injury risk, such as pressure or moisture associated  skin damage on admission and at regular intervals throughout hospital stay.  Keep all areas of skin (especially folds) clean and dry.  Maintain adequate skin hydration.  Relieve and redistribute pressure and protect bony prominences; implement measures based on patient-specific risk factors.  Match turning and repositioning schedule to clinical condition.  Encourage weight shift frequently; assist with reposition if unable to complete independently.  Float heels off bed; avoid pressure on the Achilles tendon.  Keep skin free from extended contact with medical devices.  Encourage functional activity and mobility, as early as tolerated.  Use aids (e.g., slide boards, mechanical lift) during transfer.  Recent Flowsheet Documentation  Taken 6/19/2022 2005 by Kandy Park, RN  Body Position: position changed independently  Intervention: Prevent Infection  Description: Maintain skin and mucous membrane integrity; promote hand, oral and pulmonary hygiene.  Optimize fluid balance, nutrition, sleep and glycemic control to maximize infection resistance.  Identify potential sources of infection early to prevent or mitigate progression of infection (e.g., wound, lines, devices).  Evaluate ongoing need for invasive devices; remove promptly when no longer indicated.  Recent Flowsheet Documentation  Taken 6/19/2022 1900 by Kandy Park, RN  Infection Prevention:   single patient room provided   rest/sleep promoted  Goal: Optimal Comfort and Wellbeing  Outcome: Ongoing, Progressing  Intervention: Monitor Pain and Promote Comfort  Description: Assess pain level, treatment efficacy and patient response at regular intervals using a consistent pain scale.  Consider the presence and impact of preexisting chronic pain.  Encourage patient and caregiver involvement in pain assessment, interventions and safety measures.  Recent Flowsheet Documentation  Taken 6/19/2022 2005 by Kandy Park, RN  Pain Management Interventions:   quiet  environment facilitated   position adjusted   pillow support provided   diversional activity provided   care clustered  Goal: Readiness for Transition of Care  Outcome: Ongoing, Progressing     Problem: Osteoarthritis Comorbidity  Goal: Maintenance of Osteoarthritis Symptom Control  Outcome: Ongoing, Progressing  Intervention: Maintain Osteoarthritis Symptom Control  Description: Evaluate adherence to self-management plan, such as medication, exercise and weight management.  Advocate for continuation of home regimen, such as medication, physical activity and thermal agents; monitor response.  Encourage participation in functional activities, such as mobility and ADLs (activities of daily living) to minimize decline associated with inactivity.  Facilitate use of patient-specific assistive devices, equipment or orthoses.  Evaluate effectiveness of coping skills; encourage expression of feelings, expectations and concerns related to disease management and quality of life; reinforce education to enhance management plan and wellbeing.  Recent Flowsheet Documentation  Taken 6/19/2022 1900 by Kandy Park RN  Medication Review/Management: medications reviewed     Problem: Hypertension Comorbidity  Goal: Blood Pressure in Desired Range  Outcome: Ongoing, Progressing  Intervention: Maintain Blood Pressure Management  Description: Evaluate adherence to home antihypertensive regimen (e.g., exercise and activity, diet modification, medication).  Provide scheduled antihypertensive medication; consider administration time and effects (e.g., avoid giving diuretic prior to bedtime).  Monitor response to antihypertensive medication therapy (e.g., blood pressure, electrolyte levels, medication effects).  Minimize risk of orthostatic hypotension; encourage caution with position changes, particularly if elderly.  Recent Flowsheet Documentation  Taken 6/19/2022 1900 by Kandy Park RN  Medication Review/Management: medications  reviewed     Problem: Chest Pain  Goal: Resolution of Chest Pain Symptoms  Outcome: Ongoing, Progressing     Problem: Fall Injury Risk  Goal: Absence of Fall and Fall-Related Injury  Outcome: Ongoing, Progressing  Intervention: Identify and Manage Contributors  Description: Develop a fall prevention plan with the patient and caregiver/family.  Provide reorientation, appropriate sensory stimulation and routines with changes in mental status to decrease risk of fall.  Promote use of personal vision and auditory aids.  Assess assistance level required for safe and effective self-care; provide support as needed, such as toileting, mobilization. For age 65 and older, implement timed toileting with assistance.  Encourage physical activity, such as performance of mobility and self-care at highest level of patient ability, multicomponent exercise program and provision of appropriate assistive devices.  If fall occurs, assess the severity of injury; implement fall injury protocol. Determine the cause and revise fall injury prevention plan.  Regularly review medication contribution to fall risk; adjust medication administration times to minimize risk of falling.  Consider risk related to polypharmacy and age.  Balance adequate pain management with potential for oversedation.  Recent Flowsheet Documentation  Taken 6/19/2022 1900 by Kandy Park RN  Medication Review/Management: medications reviewed  Intervention: Promote Injury-Free Environment  Description: Provide a safe, barrier-free environment that encourages independent activity.  Keep care area uncluttered and well-lighted.  Determine need for increased observation or monitoring.  Avoid use of devices that minimize mobility, such as restraints or indwelling urinary catheter.  Recent Flowsheet Documentation  Taken 6/19/2022 2300 by Kandy Park, RN  Safety Promotion/Fall Prevention: safety round/check completed  Taken 6/19/2022 2100 by Kandy Park RN  Safety  Promotion/Fall Prevention: safety round/check completed  Taken 6/19/2022 2005 by Kandy Park, RN  Safety Promotion/Fall Prevention: safety round/check completed  Taken 6/19/2022 1900 by Kandy Park, RN  Safety Promotion/Fall Prevention:   safety round/check completed   activity supervised   clutter free environment maintained   lighting adjusted   nonskid shoes/slippers when out of bed   Goal Outcome Evaluation:  Plan of Care Reviewed With: patient   VSS, no c/o cp this shift thus far, will continue to monitor.     Progress: improving

## 2022-06-21 ENCOUNTER — READMISSION MANAGEMENT (OUTPATIENT)
Dept: CALL CENTER | Facility: HOSPITAL | Age: 52
End: 2022-06-21

## 2022-06-21 ENCOUNTER — TELEPHONE (OUTPATIENT)
Dept: FAMILY MEDICINE CLINIC | Facility: CLINIC | Age: 52
End: 2022-06-21

## 2022-06-21 ENCOUNTER — APPOINTMENT (OUTPATIENT)
Dept: CARDIAC REHAB | Facility: HOSPITAL | Age: 52
End: 2022-06-21

## 2022-06-21 VITALS
DIASTOLIC BLOOD PRESSURE: 90 MMHG | BODY MASS INDEX: 27.23 KG/M2 | HEIGHT: 63 IN | HEART RATE: 98 BPM | SYSTOLIC BLOOD PRESSURE: 144 MMHG | WEIGHT: 153.66 LBS | OXYGEN SATURATION: 99 % | TEMPERATURE: 98.9 F | RESPIRATION RATE: 13 BRPM

## 2022-06-21 PROBLEM — R07.9 CHEST PAIN: Status: RESOLVED | Noted: 2022-05-22 | Resolved: 2022-06-21

## 2022-06-21 PROBLEM — R07.9 CHEST PAIN, UNSPECIFIED TYPE: Status: RESOLVED | Noted: 2022-06-12 | Resolved: 2022-06-21

## 2022-06-21 PROCEDURE — 97166 OT EVAL MOD COMPLEX 45 MIN: CPT

## 2022-06-21 PROCEDURE — 97162 PT EVAL MOD COMPLEX 30 MIN: CPT

## 2022-06-21 PROCEDURE — 99239 HOSP IP/OBS DSCHRG MGMT >30: CPT | Performed by: HOSPITALIST

## 2022-06-21 RX ADMIN — ASPIRIN 81 MG: 81 TABLET, COATED ORAL at 09:07

## 2022-06-21 RX ADMIN — ISOSORBIDE MONONITRATE 60 MG: 60 TABLET ORAL at 09:07

## 2022-06-21 RX ADMIN — RIVAROXABAN 2.5 MG: 2.5 TABLET, FILM COATED ORAL at 09:09

## 2022-06-21 RX ADMIN — MAGNESIUM GLUCONATE 500 MG ORAL TABLET 400 MG: 500 TABLET ORAL at 09:08

## 2022-06-21 RX ADMIN — TICAGRELOR 90 MG: 90 TABLET ORAL at 09:08

## 2022-06-21 RX ADMIN — CETIRIZINE HYDROCHLORIDE 10 MG: 10 TABLET, FILM COATED ORAL at 09:08

## 2022-06-21 RX ADMIN — Medication 10 ML: at 09:09

## 2022-06-21 RX ADMIN — PRENATAL VIT W/ FE FUMARATE-FA TAB 27-0.8 MG 1 TABLET: 27-0.8 TAB at 09:08

## 2022-06-21 RX ADMIN — METOPROLOL SUCCINATE 50 MG: 50 TABLET, EXTENDED RELEASE ORAL at 09:07

## 2022-06-21 RX ADMIN — RANOLAZINE 1000 MG: 500 TABLET, FILM COATED, EXTENDED RELEASE ORAL at 09:08

## 2022-06-21 RX ADMIN — PANTOPRAZOLE SODIUM 40 MG: 40 TABLET, DELAYED RELEASE ORAL at 06:40

## 2022-06-21 RX ADMIN — DULOXETINE HYDROCHLORIDE 60 MG: 60 CAPSULE, DELAYED RELEASE ORAL at 09:08

## 2022-06-21 RX ADMIN — DOCUSATE SODIUM 100 MG: 100 CAPSULE ORAL at 09:08

## 2022-06-21 NOTE — TELEPHONE ENCOUNTER
Home health services were ordered by the hospitalist upon hospital discharge.  I will plan to discuss this with her at her hospital follow-up appointment this week.

## 2022-06-21 NOTE — PLAN OF CARE
Problem: Adult Inpatient Plan of Care  Goal: Plan of Care Review  Outcome: Ongoing, Progressing  Flowsheets (Taken 6/21/2022 0242)  Progress: improving  Plan of Care Reviewed With: patient  Goal: Patient-Specific Goal (Individualized)  Outcome: Ongoing, Progressing  Goal: Absence of Hospital-Acquired Illness or Injury  Outcome: Ongoing, Progressing  Intervention: Identify and Manage Fall Risk  Description: Perform standard risk assessment on admission using a validated tool or comprehensive approach appropriate to the patient; reassess fall risk frequently, with change in status or transfer to another level of care.  Communicate fall injury risk to interprofessional healthcare team.  Determine need for increased observation, equipment and environmental modification, such as low bed, signage and supportive, nonskid footwear.  Adjust safety measures to individual developmental age, stage and identified risk factors.  Reinforce the importance of safety and physical activity with patient and family.  Perform regular intentional rounding to assess need for position change, pain assessment and personal needs, including assistance with toileting.  Recent Flowsheet Documentation  Taken 6/21/2022 0100 by Kandy Park RN  Safety Promotion/Fall Prevention: safety round/check completed  Taken 6/20/2022 2300 by Kandy Park RN  Safety Promotion/Fall Prevention: safety round/check completed  Taken 6/20/2022 2100 by Kandy Park RN  Safety Promotion/Fall Prevention: safety round/check completed  Taken 6/20/2022 1931 by Kandy Park RN  Safety Promotion/Fall Prevention: safety round/check completed  Taken 6/20/2022 1900 by Kandy Park RN  Safety Promotion/Fall Prevention:   safety round/check completed   nonskid shoes/slippers when out of bed   lighting adjusted   clutter free environment maintained   activity supervised  Intervention: Prevent Skin Injury  Description: Perform a screening for skin injury  risk, such as pressure or moisture associated skin damage on admission and at regular intervals throughout hospital stay.  Keep all areas of skin (especially folds) clean and dry.  Maintain adequate skin hydration.  Relieve and redistribute pressure and protect bony prominences; implement measures based on patient-specific risk factors.  Match turning and repositioning schedule to clinical condition.  Encourage weight shift frequently; assist with reposition if unable to complete independently.  Float heels off bed; avoid pressure on the Achilles tendon.  Keep skin free from extended contact with medical devices.  Encourage functional activity and mobility, as early as tolerated.  Use aids (e.g., slide boards, mechanical lift) during transfer.  Recent Flowsheet Documentation  Taken 6/20/2022 1931 by Kandy Park, RN  Body Position: position changed independently  Intervention: Prevent Infection  Description: Maintain skin and mucous membrane integrity; promote hand, oral and pulmonary hygiene.  Optimize fluid balance, nutrition, sleep and glycemic control to maximize infection resistance.  Identify potential sources of infection early to prevent or mitigate progression of infection (e.g., wound, lines, devices).  Evaluate ongoing need for invasive devices; remove promptly when no longer indicated.  Recent Flowsheet Documentation  Taken 6/20/2022 1900 by Kandy Park, RN  Infection Prevention: rest/sleep promoted  Goal: Optimal Comfort and Wellbeing  Outcome: Ongoing, Progressing  Goal: Readiness for Transition of Care  Outcome: Ongoing, Progressing     Problem: Hypertension Comorbidity  Goal: Blood Pressure in Desired Range  Outcome: Ongoing, Progressing  Intervention: Maintain Blood Pressure Management  Description: Evaluate adherence to home antihypertensive regimen (e.g., exercise and activity, diet modification, medication).  Provide scheduled antihypertensive medication; consider administration time and  effects (e.g., avoid giving diuretic prior to bedtime).  Monitor response to antihypertensive medication therapy (e.g., blood pressure, electrolyte levels, medication effects).  Minimize risk of orthostatic hypotension; encourage caution with position changes, particularly if elderly.  Recent Flowsheet Documentation  Taken 6/20/2022 1931 by Kandy Park RN  Medication Review/Management: medications reviewed  Taken 6/20/2022 1900 by Kandy Park RN  Medication Review/Management: medications reviewed     Problem: Fall Injury Risk  Goal: Absence of Fall and Fall-Related Injury  Outcome: Ongoing, Progressing  Intervention: Identify and Manage Contributors  Description: Develop a fall prevention plan with the patient and caregiver/family.  Provide reorientation, appropriate sensory stimulation and routines with changes in mental status to decrease risk of fall.  Promote use of personal vision and auditory aids.  Assess assistance level required for safe and effective self-care; provide support as needed, such as toileting, mobilization. For age 65 and older, implement timed toileting with assistance.  Encourage physical activity, such as performance of mobility and self-care at highest level of patient ability, multicomponent exercise program and provision of appropriate assistive devices.  If fall occurs, assess the severity of injury; implement fall injury protocol. Determine the cause and revise fall injury prevention plan.  Regularly review medication contribution to fall risk; adjust medication administration times to minimize risk of falling.  Consider risk related to polypharmacy and age.  Balance adequate pain management with potential for oversedation.  Recent Flowsheet Documentation  Taken 6/20/2022 1931 by Kandy Park RN  Medication Review/Management: medications reviewed  Taken 6/20/2022 1900 by Kandy Park RN  Medication Review/Management: medications reviewed  Intervention: Promote  "Injury-Free Environment  Description: Provide a safe, barrier-free environment that encourages independent activity.  Keep care area uncluttered and well-lighted.  Determine need for increased observation or monitoring.  Avoid use of devices that minimize mobility, such as restraints or indwelling urinary catheter.  Recent Flowsheet Documentation  Taken 6/21/2022 0100 by Kandy Park RN  Safety Promotion/Fall Prevention: safety round/check completed  Taken 6/20/2022 2300 by Kandy Park RN  Safety Promotion/Fall Prevention: safety round/check completed  Taken 6/20/2022 2100 by Kandy Park RN  Safety Promotion/Fall Prevention: safety round/check completed  Taken 6/20/2022 1931 by Kandy Park RN  Safety Promotion/Fall Prevention: safety round/check completed  Taken 6/20/2022 1900 by Kandy Park RN  Safety Promotion/Fall Prevention:   safety round/check completed   nonskid shoes/slippers when out of bed   lighting adjusted   clutter free environment maintained   activity supervised     Problem: Chest Pain  Goal: Resolution of Chest Pain Symptoms  Outcome: Ongoing, Progressing   Goal Outcome Evaluation:  Plan of Care Reviewed With: patient   VSS this shift, patient has had no c/o cp this shift.  Patient is resting well in bed.  Patient verbalized concern that she \"needs a place to stay for 2 weeks.\"  Patient is hopeful to be placed in a rehab for the two weeks before she and her family moves to [place].       Progress: improving     "

## 2022-06-21 NOTE — THERAPY EVALUATION
Patient Name: Lissa Eisenberg  : 1970    MRN: [id number]                              Today's Date: 2022       Admit Date: 2022    Visit Dx:     ICD-10-CM ICD-9-CM   1. Chest pain, unspecified type  R07.9 786.50   2. Stable angina pectoris (HCC)  I20.8 413.9     Patient Active Problem List   Diagnosis   • NSTEMI (non-ST elevated myocardial infarction) (HCC)   • Abnormal nuclear stress test   • Coronary artery disease involving native coronary artery of native heart   • Legally blind in right eye, as defined in [place]   • Primary hypertension   • Gastroesophageal reflux disease without esophagitis   • Type 2 diabetes mellitus with hyperglycemia (HCC)   • Dyslipidemia, goal LDL below 70   • Chest pain   • Dehydration   • Chest pain, unspecified type     Past Medical History:   Diagnosis Date   • COVID-19    • Diabetes mellitus (HCC)    • Hyperlipidemia    • Hypertension    • Myocardial infarct (HCC)    • PCOS (polycystic ovarian syndrome)    • PTSD (post-traumatic stress disorder)      Past Surgical History:   Procedure Laterality Date   • CARDIAC CATHETERIZATION     • CARDIAC CATHETERIZATION N/A 2022    Procedure: Left Heart Cath;  Surgeon: Alexandr Marquez MD;  Location:  [facility];  Service: Cardiology;  Laterality: N/A;   • CARDIAC CATHETERIZATION N/A 6/15/2022    Procedure: Coronary angiography;  Surgeon: Connor Chaney MD;  Location:  [facility];  Service: Cardiology;  Laterality: N/A;   •  SECTION     • CHOLECYSTECTOMY     • CORONARY ARTERY BYPASS GRAFT      x 2   • CORONARY STENT PLACEMENT      x 6 per patient   • DILATATION AND CURETTAGE     • KNEE SURGERY Left    • TONSILLECTOMY        General Information     Row Name 22 0941          OT Time and Intention    Document Type evaluation  -LM     Mode of Treatment occupational therapy  -LM     Row Name 22 0942 22 0941       General Information    Patient Profile  Reviewed -- yes  -LM    Prior Level of Function ADL's;mod assist:;all household mobility;transfer  patient reports that spouse assists with bathing, dressing, and shower transfers.  uses rw, bsc  - ADL's;transfer;all household mobility  -    Existing Precautions/Restrictions -- fall  -LM    Barriers to Rehab -- previous functional deficit;medically complex  -    Row Name 06/21/22 0941          Living Environment    People in Home child(risa), dependent;spouse  -     Row Name 06/21/22 0941          Cognition    Orientation Status (Cognition) oriented x 4  -LM     Row Name 06/21/22 0941          Safety Issues, Functional Mobility    Impairments Affecting Function (Mobility) balance;endurance/activity tolerance;strength;range of motion (ROM)  -           User Key  (r) = Recorded By, (t) = Taken By, (c) = Cosigned By    Initials Name Provider Type    LM Beena Goldberg, OT Occupational Therapist                 Mobility/ADL's     Row Name 06/21/22 0943          Transfers    Transfers toilet transfer  -     [place] Level (Toilet Transfer) contact guard;minimum assist (75% patient effort)  -     Assistive Device (Toilet Transfer) commode, 3-in-1  -     Row Name 06/21/22 0943          Toilet Transfer    Type (Toilet Transfer) stand pivot/stand step  -     Row Name 06/21/22 0943          Activities of Daily Living    BADL Assessment/Intervention bathing;upper body dressing;lower body dressing;grooming;feeding;toileting  -     Row Name 06/21/22 0943          Bathing Assessment/Intervention    [place] Level (Bathing) minimum assist (75% patient effort);moderate assist (50% patient effort)  -     Row Name 06/21/22 0943          Upper Body Dressing Assessment/Training    [place] Level (Upper Body Dressing) minimum assist (75% patient effort)  -     Row Name 06/21/22 0943          Lower Body Dressing Assessment/Training    [place] Level (Lower Body Dressing) minimum assist (75% patient  effort);moderate assist (50% patient effort)  -LM     Row Name 06/21/22 0943          Grooming Assessment/Training    [place] Level (Grooming) set up  -LM     Row Name 06/21/22 0943          Self-Feeding Assessment/Training    [place] Level (Feeding) set up  -LM     Row Name 06/21/22 0943          Toileting Assessment/Training    [place] Level (Toileting) minimum assist (75% patient effort)  -LM           User Key  (r) = Recorded By, (t) = Taken By, (c) = Cosigned By    Initials Name Provider Type    LM Beena Goldberg, OT Occupational Therapist               Obj/Interventions     Row Name 06/21/22 0944          Sensory Assessment (Somatosensory)    Sensory Assessment (Somatosensory) sensation intact  -LM     Row Name 06/21/22 0944          Vision Assessment/Intervention    Visual Impairment/Limitations other (see comments)  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  -LM     Row Name 06/21/22 0944          Range of Motion Comprehensive    General Range of Motion upper extremity range of motion deficits identified  -LM     Comment, General Range of Motion bue shoulder arom 1/2, reports rotator cuff injuries  -LM     Row Name 06/21/22 0944          Strength Comprehensive (MMT)    General Manual Muscle Testing (MMT) Assessment upper extremity strength deficits identified  -LM     Comment, General Manual Muscle Testing (MMT) Assessment bue shoulders 3-/5  -LM     Row Name 06/21/22 0944          Motor Skills    Motor Skills functional endurance  -LM     Functional Endurance F-  -LM           User Key  (r) = Recorded By, (t) = Taken By, (c) = Cosigned By    Initials Name Provider Type    LM Beena Goldberg OT Occupational Therapist               Goals/Plan     Row Name 06/21/22 0947          Transfer Goal 1 (OT)    Activity/Assistive Device (Transfer Goal 1, OT) transfers, all;commode, 3-in-1;walker, rolling  -LM     [place] Level/Cues Needed (Transfer Goal 1, OT) supervision required;standby assist  -LM     Time  Frame (Transfer Goal 1, OT) by discharge  -LM     Row Name 06/21/22 0947          Bathing Goal 1 (OT)    Activity/Device (Bathing Goal 1, OT) bathing skills, all  -LM     [place] Level/Cues Needed (Bathing Goal 1, OT) supervision required;contact guard required  -LM     Time Frame (Bathing Goal 1, OT) by discharge  -LM     Row Name 06/21/22 0947          Therapy Assessment/Plan (OT)    Planned Therapy Interventions (OT) activity tolerance training;adaptive equipment training;BADL retraining;patient/caregiver education/training;transfer/mobility retraining;strengthening exercise;ROM/therapeutic exercise  -LM           User Key  (r) = Recorded By, (t) = Taken By, (c) = Cosigned By    Initials Name Provider Type    Beena Ruiz, OT Occupational Therapist               Clinical Impression     Row Name 06/21/22 0946          Plan of Care Review    Plan of Care Reviewed With patient  -LM     Row Name 06/21/22 0946          Therapy Assessment/Plan (OT)    Patient/Family Therapy Goal Statement (OT) return to plof  -LM     Rehab Potential (OT) good, to achieve stated therapy goals  -LM     Criteria for Skilled Therapeutic Interventions Met (OT) yes;meets criteria;skilled treatment is necessary  -LM     Therapy Frequency (OT) other (see comments)  prn and/or to monitor fxl progress  -LM     Row Name 06/21/22 0946          Therapy Plan Review/Discharge Plan (OT)    Anticipated Discharge Disposition (OT) home with home health  patient requests inpatient rehab to continue cardiac rehab  -LM     Row Name 06/21/22 0946          Positioning and Restraints    Post Treatment Position chair  -LM     In Chair call light within reach;encouraged to call for assist  -LM           User Key  (r) = Recorded By, (t) = Taken By, (c) = Cosigned By    Initials Name Provider Type    Beena Ruiz, OT Occupational Therapist               Outcome Measures    No documentation.                   OT Recommendation and Plan  Planned  Therapy Interventions (OT): activity tolerance training, adaptive equipment training, BADL retraining, patient/caregiver education/training, transfer/mobility retraining, strengthening exercise, ROM/therapeutic exercise  Therapy Frequency (OT): other (see comments) (prn and/or to monitor fxl progress)  Plan of Care Review  Plan of Care Reviewed With: patient     Time Calculation:     Therapy Charges for Today     Code Description Service Date Service Provider Modifiers Qty    90166599081 HC OT EVAL MOD COMPLEXITY 4 6/21/2022 Beena Goldberg, OT GO 1               Beena Goldberg OT  6/21/2022

## 2022-06-21 NOTE — THERAPY EVALUATION
Acute Care - Physical Therapy Initial Evaluation   [facility]     Patient Name: Lissa Eisenberg  : 1970  MRN: [id number]  Today's Date: 2022   Onset of Illness/Injury or Date of Surgery: 22  Visit Dx:     ICD-10-CM ICD-9-CM   1. Chest pain, unspecified type  R07.9 786.50   2. Stable angina pectoris (HCC)  I20.8 413.9     Patient Active Problem List   Diagnosis   • NSTEMI (non-ST elevated myocardial infarction) ([facility])   • Abnormal nuclear stress test   • Coronary artery disease involving native coronary artery of native heart   • Legally blind in right eye, as defined in [place]   • Primary hypertension   • Gastroesophageal reflux disease without esophagitis   • Type 2 diabetes mellitus with hyperglycemia (HCC)   • Dyslipidemia, goal LDL below 70   • Chest pain   • Dehydration   • Chest pain, unspecified type     Past Medical History:   Diagnosis Date   • COVID-19    • Diabetes mellitus (HCC)    • Hyperlipidemia    • Hypertension    • Myocardial infarct (HCC)    • PCOS (polycystic ovarian syndrome)    • PTSD (post-traumatic stress disorder)      Past Surgical History:   Procedure Laterality Date   • CARDIAC CATHETERIZATION     • CARDIAC CATHETERIZATION N/A 2022    Procedure: Left Heart Cath;  Surgeon: Alexandr Marquez MD;  Location:  [facility];  Service: Cardiology;  Laterality: N/A;   • CARDIAC CATHETERIZATION N/A 6/15/2022    Procedure: Coronary angiography;  Surgeon: Connor Chaney MD;  Location:  [facility];  Service: Cardiology;  Laterality: N/A;   •  SECTION     • CHOLECYSTECTOMY     • CORONARY ARTERY BYPASS GRAFT      x 2   • CORONARY STENT PLACEMENT      x 6 per patient   • DILATATION AND CURETTAGE     • KNEE SURGERY Left    • TONSILLECTOMY       PT Assessment (last 12 hours)     PT Evaluation and Treatment     Row Name 22 0935          Physical Therapy Time and Intention    Subjective Information complains of;weakness  -CT      Document Type evaluation  -CT     Mode of Treatment physical therapy  -CT     Patient Effort good  -CT     Symptoms Noted During/After Treatment fatigue  -CT     Comment Pt tolerated evaluation well. Pt reports she has required assist from family for the last 6 months d/t recent heart surgery. Pt reports decreased endurance and activity tolerance that are interfering with her quality of life. Pt is CGA for all mobility at time of eval. Pt ambualted 8 ft x 3 with 2 standing rest breaks. Anticipated d/c inpatient rehab vs home with assist and HHPT.  -CT     Row Name 06/21/22 0935          General Information    Patient Profile Reviewed yes  -CT     Onset of Illness/Injury or Date of Surgery 06/11/22  -CT     Referring Physician O[place]am  -CT     Patient Observations alert;cooperative;agree to therapy  -CT     Prior Level of Function min assist:;gait;transfer  -CT     Equipment Currently Used at Home walker, rolling;commode, bedside  -CT     Existing Precautions/Restrictions fall  -CT     Equipment Issued to Patient gait belt  -CT     Risks Reviewed patient:;LOB;nausea/vomiting;dizziness;increased discomfort;change in vital signs;increased drainage;lines disloged  -CT     Benefits Reviewed patient:;improve function;increase independence;increase strength;increase balance;decrease pain;decrease risk of DVT;improve skin integrity;increase knowledge  -CT     Barriers to Rehab previous functional deficit;medically complex  -CT     Row Name 06/21/22 0997          Living Environment    Current Living Arrangements other (see comments)  [place]  -CT     People in Home child(risa), dependent;spouse  -CT     Row Name 06/21/22 0935          Cognition    Affect/Mental Status (Cognition) WFL;sad/depressed affect  -CT     Orientation Status (Cognition) oriented x 4  -CT     Follows Commands (Cognition) WFL  -CT     Row Name 06/21/22 0945          Range of Motion Comprehensive    Comment, General Range of  Motion BLE grossly WFL  -CT     Row Name 06/21/22 0935          Strength Comprehensive (MMT)    Comment, General Manual Muscle Testing (MMT) Assessment BLE grossly 3/5  -CT     Row Name 06/21/22 0935          Bed Mobility    Bed Mobility bed mobility (all) activities  -CT     All Activities, [place] (Bed Mobility) contact guard  -CT     Bed Mobility, Safety Issues decreased use of arms for pushing/pulling;decreased use of legs for bridging/pushing  -CT     Assistive Device (Bed Mobility) bed rails  -CT     Row Name 06/21/22 0935          Transfers    Transfers stand-sit transfer;sit-stand transfer  -CT     Sit-Stand [place] (Transfers) contact guard  -CT     Stand-Sit [place] (Transfers) contact guard  -CT     Row Name 06/21/22 0935          Sit-Stand Transfer    Assistive Device (Sit-Stand Transfers) walker, front-wheeled  -CT     Row Name 06/21/22 0935          Stand-Sit Transfer    Assistive Device (Stand-Sit Transfers) walker, front-wheeled  -CT     Row Name 06/21/22 0935          Gait/Stairs (Locomotion)    [place] Level (Gait) contact guard  -CT     Assistive Device (Gait) walker, front-wheeled  -CT     Distance in Feet (Gait) 8 ft x 3 with 2 standing rest breaks  -CT     Pattern (Gait) swing-to  -CT     Deviations/Abnormal Patterns (Gait) gait speed decreased  -CT     Bilateral Gait Deviations forward flexed posture  -CT     Row Name 06/21/22 0935          Balance    Balance Assessment sitting static balance;standing static balance;standing dynamic balance  -CT     Static Sitting Balance modified independence  -CT     Position, Sitting Balance sitting in chair  -CT     Static Standing Balance contact guard  -CT     Dynamic Standing Balance minimal assist  -CT     Position/Device Used, Standing Balance walker, front-wheeled  -CT     Row Name 06/21/22 0935          Coping    Observed Emotional State calm;cooperative  -CT     Verbalized Emotional State acceptance  -CT     Row Name 06/21/22  0935          Plan of Care Review    Plan of Care Reviewed With patient  -CT     Row Name 06/21/22 0935          Positioning and Restraints    Pre-Treatment Position in bed  -CT     Post Treatment Position chair  -CT     In Chair sitting;call light within reach;encouraged to call for assist;notified nsg  -CT     Row Name 06/21/22 0935          Therapy Assessment/Plan (PT)    Patient/Family Therapy Goals Statement (PT) Pt goals are to increase activity and decrease assist needed from family  -CT     Functional Level at Time of Evaluation (PT) CGA  -CT     PT Diagnosis (PT) decreased functional mobility and activity tolerance  -CT     Rehab Potential (PT) good, to achieve stated therapy goals  -CT     Criteria for Skilled Interventions Met (PT) yes;skilled treatment is necessary  -CT     Therapy Frequency (PT) 2 times/wk  2-5 times/wk  -CT     Predicted Duration of Therapy Intervention (PT) length of stay  -CT     Row Name 06/21/22 0935          Therapy Plan Review/Discharge Plan (PT)    Therapy Plan Review (PT) evaluation/treatment results reviewed;care plan/treatment goals reviewed;risks/benefits reviewed;current/potential barriers reviewed;participants voiced agreement with care plan;participants included;patient  -CT     Row Name 06/21/22 0935          Physical Therapy Goals    Transfer Goal Selection (PT) transfer, PT goal 1  -CT     Gait Training Goal Selection (PT) gait training, PT goal 1  -CT     Row Name 06/21/22 0935          Transfer Goal 1 (PT)    Activity/Assistive Device (Transfer Goal 1, PT) sit-to-stand/stand-to-sit;bed-to-chair/chair-to-bed  -CT     [place] Level/Cues Needed (Transfer Goal 1, PT) supervision required  -CT     Time Frame (Transfer Goal 1, PT) by discharge  -CT     Row Name 06/21/22 0935          Gait Training Goal 1 (PT)    Activity/Assistive Device (Gait Training Goal 1, PT) gait (walking locomotion);assistive device use  -CT     [place] Level (Gait Training Goal 1, PT)  supervision required  -CT     Distance (Gait Training Goal 1, PT) 50  -CT     Time Frame (Gait Training Goal 1, PT) by discharge  -CT           User Key  (r) = Recorded By, (t) = Taken By, (c) = Cosigned By    Initials Name Provider Type    CT Carolann Oneill PT Physical Therapist                  PT Recommendation and Plan  Anticipated Discharge Disposition (PT): inpatient rehabilitation facility, home with assist, home with home health  Planned Therapy Interventions (PT): balance training, bed mobility training, gait training, home exercise program, manual therapy techniques, motor coordination training, neuromuscular re-education, patient/family education, postural re-education, strengthening, transfer training  Therapy Frequency (PT): 2 times/wk (2-5 times/wk)  Plan of Care Reviewed With: patient       Time Calculation:    PT Charges     Row Name 06/21/22 0951             Time Calculation    PT Received On 06/21/22  -CT      PT Goal Re-Cert Due Date 07/05/22  -CT            User Key  (r) = Recorded By, (t) = Taken By, (c) = Cosigned By    Initials Name Provider Type    Carolann Scanlon PT Physical Therapist              Therapy Charges for Today     Code Description Service Date Service Provider Modifiers Qty    50905586347 HC PT EVAL MOD COMPLEXITY 4 6/21/2022 Carolann Oneill, PT GP 1               Carolann Oneill PT  6/21/2022

## 2022-06-21 NOTE — DISCHARGE PLACEMENT REQUEST
"Carlos Eisenberg (51 y.o. Female)             Date of Birth   1970    Social Security Number       Address   [address] [place] [id number]    Home Phone   [phone]    MRN   [id number]       [employer]   Unknown    Marital Status                               Admission Date   22    Admission Type   Emergency    Admitting Provider   Teto Sumner MD    Attending Provider   Maty Tubbs MD    Department, Room/Bed   [facility], P205/1P       Discharge Date       Discharge Disposition   Home or Self Care    Discharge Destination                               Attending Provider: Maty Tubbs MD    Allergies: Contrast Dye, Ciprofloxacin, Sulfa Antibiotics, Drug [Hydrocodone-acetaminophen], Adhesive Tape    Isolation: None   Infection: None   Code Status: CPR   Advance Care Planning Activity    Ht: 160 cm (62.99\")   Wt: 69.7 kg (153 lb 10.6 oz)    Admission Cmt: None   Principal Problem: Chest pain, unspecified type [R07.9]                 Active Insurance as of 2022     Primary Coverage     Payor Plan Insurance Group Employer/Plan Group    [place] [id number]     Payor Plan Address Payor Plan Phone Number Payor Plan Fax Number Effective Dates    [employer] [address] [address] [phone]  2021 - None Entered    [facility] [id number]       Subscriber Name Subscriber Birth Date Member ID       CARLOS EISENBERG 1970 [id number]                 Emergency Contacts      (Rel.) Home Phone Work Phone Mobile Phone    ZAC EISENBERG (Spouse) [phone] -- [phone]        [facility]  [address] [facility] [address]  Phone:  [phone]  Fax:  [phone] Date: 2022      Ambulatory Referral to Home Health     Patient:  Carlos Eisenberg MRN:  [id number]   [address]  [facility] [address] :  1970  SSN:    Phone: [phone] Sex:  F    "   INSURANCE PAYOR PLAN GROUP # SUBSCRIBER ID   Primary:    [place] [id number]      Referring Provider Information:  KJ TUBBS Phone: [phone] Fax: [phone]       Referral Information:   # Visits:  999 Referral Type: Home Health [42]   Urgency:  Routine Referral Reason: Specialty Services Required   Start Date: Jun 21, 2022 End Date:  To be determined by Insurer   Diagnosis: Generalized weakness (R53.1 [ICD-10-CM] 780.79 [ICD-9-CM])      Refer to Dept:   Refer to Provider:   Refer to Provider Phone:   Refer to Facility:       Face to Face Visit Date: 6/21/2022  Follow-up provider for Plan of Care? I treated the patient in an acute care facility and will not continue treatment after discharge.  Follow-up provider: FARHAT AU [[id number]]  Reason/Clinical Findings: S/P angioplasty  Describe mobility limitations that make leaving home difficult: Patient is status post balloon angioplasty, physical deconditioning requires PT OT at home  Nursing/Therapeutic Services Requested: Physical Therapy  PT orders: Home safety assessment  PT orders: Strengthening  PT orders: Gait Training  PT orders: Therapeutic exercise  Weight Bearing Status: As Tolerated  Frequency: 1 Week 1     This document serves as a request of services and does not constitute Insurance authorization or approval of services.  To determine eligibility, please contact the members Insurance carrier to verify and review coverage.     If you have medical questions regarding this request for services. Please contact [facility] at [phone] during normal business hours.        Authorizing Provider:Kj Tubbs MD  Authorizing Provider's NPI: [id number]  Order Entered By: Kj Tubbs MD 6/21/2022 10:30 AM     Electronically signed by: Kj Tubbs MD 6/21/2022 10:30 AM       Discharge Order (From admission, onward)     Start     Ordered    06/21/22 1017  Discharge  patient  Once        Expected Discharge Date: 06/21/22    Discharge Disposition: Home or Self Care    Physician of Record for Attribution - Please select from Treatment Team: LARRY PEOPLSE [[id number]]    Review needed by CMO to determine Physician of Record: No       Question Answer Comment   Physician of Record for Attribution - Please select from Treatment Team LARRY PEOPLES    Review needed by CMO to determine Physician of Record No        06/21/22 1037

## 2022-06-21 NOTE — DISCHARGE INSTR - LAB
Follow up appointments;     Follow up with PCP Roxy Deng on June 24 @ 11:00     Follow up with Cardiology Carlita Grider on June 23 @ 10:00

## 2022-06-21 NOTE — CASE MANAGEMENT/SOCIAL WORK
Discharge Planning Assessment   [place]     Patient Name: Lissa Eisenberg  MRN: [id number]  Today's Date: 6/21/2022    Admit Date: 6/11/2022     Discharge Plan     Row Name 06/21/22 0845       Plan    Plan SS spoke with shelter in [place], no beds available at this time. SS notified by [facility] that as of 6/20/22 pt's insurance will no longer cover hospital stay due to pt being medically stable for discharge. SS expecting call back from [facility]  today. Pt does not qualify for physical rehab.     8:52: SS spoke with [facility] shelter in [facility], state they will be able to take pt and son today if they can be there by 15:30. SS notified Dr. Tubbs, will assist with transportation as needed.     9:06: SS spoke with pt who is still requesting inpatient rehab, explained pt would have to have a need for rehab services to be admitted. SS spoke with [facility] per Evelyn, would be able to see pt at shelter. PT/OT to be ordered.     9:18: SS notified PT/OT staff of consults.     9:51: PT/OT recommending home with home health. SS spoke with Abilio at [facility] again to ask questions that pt had, confirmed shelter has men and women and that they do not require residents to leave during the day. Shelter address is [address] [place] [address]. SS to fax pt information to [facility], [phone].    10:26: [facility] sent form 113, SS filled out and emailed completed form back to Abilio Monteiro.     10:32: Discharge and home health order faxed to [facility].     11:30: AVS and discharge summary faxed to [facility], notified Evelyn of discharge. Pt will need [facility] arranged for transportation. SS spoke with Mary at [facility] [phone] and discussed pt's transportation needs. Pt will need to go by her previous residence to get her belongings, to  her son, and then both will go to [facility]. [facility] states pt's insurance will cover initial ride and after first destination they would charge by mile.    has approved for hospital to cover additional cost of transportation.     12:08: SS arranged transportation for pt via [facility] at [facility], ETA is 12:30. Pt to be transported home to get belongings then transported to shelter. [facility] cannot  pt's son, cannot go on school grounds. SS spoke with pt who is agreeable to her son returning home after school and having [facility] transport him to the Shelter. SS spoke with [facility] to confirm that they would be able to transport a minor with parental consent. SS faxed pt's discharge order and face sheet to [facility] and requested bed letter from shelter per [facility]'s request.     12:20: SS confirmed pt's transportation plan with Kristin at [facility]. SS faxed bed letter to RT.     12:58: SS arranged transportation for son via [facility] for 16:00. Son will be transported to shelter where pt is located.     15:55: SS notified by [facility] that when they arrived to pick pt's son as scheduled, they were informed transportation was no longer needed.                Row Name 06/21/22 1034       Plan    Final Discharge Disposition Code 06 - home with home health care     Continued Care and Services - Admitted Since 6/11/2022       Home Medical Care       Service Provider Request Status Selected Services Address Phone Fax Patient Preferred    [facility]  Pending - No Request Sent N/A [address] [place] [address] [address] [facility] [place] [address] [phone] --             GUADALUPE Sharp

## 2022-06-21 NOTE — OUTREACH NOTE
Prep Survey    Flowsheet Row Responses   [facility] patient discharged from? [facility]   Is LACE score < 7 ? No   Emergency Room discharge w/ pulse ox? No   Eligibility [facility]   Date of Admission 06/11/22   Date of Discharge 06/21/22   Discharge Disposition Home or Self Care   Discharge diagnosis chest pain s/p Heart cath with Angioplasty    Does the patient have one of the following disease processes/diagnoses(primary or secondary)? Other   Does the patient have Home health ordered? No   Is there a DME ordered? No   General alerts for this patient homeless-SW/CM facilitating placement   Prep survey completed? Yes          STEVEN JUAREZ - Registered Nurse

## 2022-06-21 NOTE — DISCHARGE SUMMARY
[facility] HOSPITALIST MEDICINE DISCHARGE SUMMARY    Patient Identification:  Name:  Lissa Eisenberg  Age:  51 y.o.  Sex:  female  :  1970  MRN:  [id number]  Visit Number:  [id number]    Date of Admission: 2022  Date of Discharge: 2022  DISCHARGE DISPOSITION   Stable  PCP: Roxy Deng, DO    DISCHARGE DIAGNOSIS : Chest pain MI was ruled out, status post POBA right PDA, history of atherosclerotic heart disease status post CABG x2 vessel with JAYDA to mid RCA on 2021, status post in the arthrotomy of severe in-stent restenosis and PCI of mid RCA with attempt to stent ostial RPDA unsuccessful in the past.  Severe anxiety disorder with PTSD and depression, mild physical deconditioning, chronic diastolic heart failure compensated, chronic left bundle branch block, diabetes type 2 non-insulin-requiring, essential hypertension, obesity with a BMI of 30.8, homeless patient will be discharged to shelter until her family who is working on new residence [place]state will be ready within 2 weeks.  History of GERD.  Right eye blind chronic.    HOSPITAL COURSE  Patient is a 51 y.o. female presented to [facility] complaining of chest pain described as heavy on her chest, EKG shows chronic left bundle branch block, she was admitted here way back to May of this year for similar complaints, interventional radiology was able to evaluate, recommended medical treatment due to the location of the lesion.  Because of her ongoing symptoms she was readmitted, interventional cardiology again was able to evaluate, patient underwent cardiac cath with successful POBA of right PDA.  During her entire stay her blood pressure was on the low side hence beta-blocker and antihypertensive medication was held.  Interventional cardiology recommended to continue triple antiplatelet and low-dose anticoagulation.  High intensity statins.  Her stay was plagued with frequent anxiety, chest pain  which described as sharp occurs at rest not related to activity or intake of food, EKG or cardiac enzymes were all negative she also report that she is weak hence physical therapy and OT was consulted.  Patient is not a candidate for inpatient PT/rehab and recommended home health.  She has been ready for discharge post POBA but patient is homeless and / is actively facilitating placement.  Patient plans to move out of state, family is also working on this and the place is not ready until 2 weeks from now.   was able to place her on a shelter for now until her new residence will be ready.  Rest of her stay was uneventful.  She is eating well, she is able to ambulate and go to the bathroom without difficulties.  Plan is to discharge home today.  She will follow-up with her primary provider in 1 week's time posthospitalization, Dr. Smalls in 2 weeks time, counseled regarding to monitor for bleeding including melena hematochezia hematemesis or hematuria to call for medical help immediately.  Also for recurrence of chest pain to seek medical help.    VITAL SIGNS:      06/19/22  0500 06/20/22  0402 06/21/22  0400   Weight: 69.7 kg (153 lb 10.6 oz) 69.7 kg (153 lb 10.6 oz) 69.7 kg (153 lb 10.6 oz)     Body mass index is 27.23 kg/m².  Vitals:    06/21/22 1003   BP: 151/93   Pulse: 98   Resp: 13   Temp:    SpO2: 99%     PHYSICAL EXAM:  General: Comfortable,awake, alert, oriented to self, place, and time, well-developed and well-nourished.  No respiratory distress.  Conversant, pleasant.   Skin:  Skin is warm and dry. No rash noted. No pallor.    HENT:  Head:  Normocephalic and atraumatic.  Mouth:  Moist mucous membranes.    Eyes:  Conjunctivae and EOM are normal.  Pupils are equal, round, and reactive to light.  No scleral icterus.    Neck:  Neck supple.  No JVD present.    Pulmonary/Chest:  No respiratory distress, no wheezes, no crackles, with normal breath sounds and good air  movement.  Cardiovascular:  Normal rate, regular rhythm and normal heart sounds with no murmur.  Abdominal: Obese soft.  Bowel sounds are normal.  No distension and no tenderness.   Extremities:  No edema, no tenderness, and no deformity.  No red or swollen joints anywhere.  Strong pulses in all 4 extremities with no clubbing, no cyanosis, no edema.  Neurological:  Motor strength equal no obvious deficit, sensory grossly intact.   No cranial nerve deficit.  No tongue deviation.  No facial droop.  No slurred speech.    Genitourinary: No Tidwell catheter  Back:  ----------    DISCHARGE MEDICATIONS:     Discharge Medications      New Medications      Instructions Start Date   hydrALAZINE 25 MG tablet  Commonly known as: APRESOLINE   25 mg, Oral, Every 8 Hours Scheduled      Magnesium Oxide 400 (240 Mg) MG tablet  Replaces: magnesium oxide 400 MG tablet   800 mg, Oral, 2 Times Daily      metoprolol succinate XL 50 MG 24 hr tablet  Commonly known as: TOPROL-XL   50 mg, Oral, Every 24 Hours Scheduled         Changes to Medications      Instructions Start Date   isosorbide mononitrate 60 MG 24 hr tablet  Commonly known as: IMDUR  What changed:   · medication strength  · how much to take   60 mg, Oral, Every 24 Hours Scheduled      Ozempic (1 MG/DOSE) 4 MG/3ML solution pen-injector  Generic drug: Semaglutide (1 MG/DOSE)  What changed: additional instructions   1 mg, Intramuscular, Weekly         Continue These Medications      Instructions Start Date   aspirin 81 MG EC tablet   81 mg, Oral, Daily      atorvastatin 80 MG tablet  Commonly known as: LIPITOR   80 mg, Oral, Nightly      docusate sodium 100 MG capsule  Commonly known as: COLACE   100 mg, Oral, 2 Times Daily      DULoxetine 30 MG capsule  Commonly known as: CYMBALTA   60 mg, Oral, Daily      levocetirizine 5 MG tablet  Commonly known as: XYZAL   5 mg, Oral, Every Evening      linaclotide 72 MCG capsule capsule  Commonly known as: Linzess   72 mcg, Oral, Every  Morning Before Breakfast      nitroglycerin 0.4 MG SL tablet  Commonly known as: NITROSTAT   Place 1 tablet under the tongue Every 5 minutes as Needed for Chest Pain until chest pain is relieved. If you have to take 3 tabs, take the 3rd and go to the hospital to be evaluated.      omeprazole 20 MG capsule  Commonly known as: priLOSEC   20 mg, Oral, Daily      prenatal vitamin 27-0.8 27-0.8 MG tablet tablet   1 tablet, Oral, Daily      ranolazine 1000 MG 12 hr tablet  Commonly known as: Ranexa   1,000 mg, Oral, 2 Times Daily      Rivaroxaban 2.5 MG tablet  Commonly known as: Xarelto   2.5 mg, Oral, 2 Times Daily      ticagrelor 90 MG tablet tablet  Commonly known as: BRILINTA   90 mg, Oral, 2 Times Daily         Stop These Medications    digoxin 125 MCG tablet  Commonly known as: LANOXIN     lisinopril 2.5 MG tablet  Commonly known as: PRINIVIL,ZESTRIL     magnesium oxide 400 MG tablet  Commonly known as: MAG-OX  Replaced by: Magnesium Oxide 400 (240 Mg) MG tablet     metFORMIN 1000 MG tablet  Commonly known as: GLUCOPHAGE     metoprolol tartrate 25 MG tablet  Commonly known as: LOPRESSOR     ondansetron ODT 4 MG disintegrating tablet  Commonly known as: ZOFRAN-ODT              Your Scheduled Appointments    Jun 23, 2022 10:00 AM  Follow Up with JAVIER Berg  [facility] CARDIOLOGY ([place]) [address]  [facility] [address]  [phone]   -Bring photo ID, insurance card, and list of medications to appointment  -If testing was completed outside of [facility] then patient must bring images on a disc  -Copay will be collected at time of appointment  -Established patients should arrive at time of appointment       Jul 05, 2022  9:15 AM  Follow Up with JAVIER Berg  [facility] CARDIOLOGY ([place]) [address] [facility] [address]  [facility] [address]  [phone]   -Bring photo ID, insurance card, and list of medications to appointment  -If testing was completed  outside of [facility] then patient must bring images on a disc  -Copay will be collected at time of appointment  -Established patients should arrive at time of appointment       Jul 06, 2022  1:30 PM  Office Visit with Roxy Deng DO  [facility] MEDICAL [facility] FAMILY MEDICINE ([place]) [address]  [facility] [id number] [phone]   Arrive 15 minutes prior to appointment.  If you are in need of a language or hearing  please call the Department.       Jul 08, 2022  9:00 AM  Mammo cor screen reginaldo bilateral with COR BR CARE STEREO 1  [facility] ([facility]) [address] [facility] [address] [phone]   Bring any films/reports from outside facilities.  Do not apply any powders, perfumes, deodorants, or lotions on the day of the exam.  Arrive 15 minutes prior to exam time.              Additional Instructions for the Follow-ups that You Need to Schedule     Ambulatory Referral to Home Health   As directed      Face to Face Visit Date: 6/21/2022    Follow-up provider for Plan of Care?: I treated the patient in an acute care facility and will not continue treatment after discharge.    Follow-up provider: ROXY DENG [[id number]]    Reason/Clinical Findings: S/P angioplasty    Describe mobility limitations that make leaving home difficult: Patient is status post balloon angioplasty, physical deconditioning requires PT OT at home    Nursing/Therapeutic Services Requested: Physical Therapy    PT orders: Home safety assessment Strengthening Gait Training Therapeutic exercise    Weight Bearing Status: As Tolerated    Frequency: 1 Week 1         Discharge Follow-up with PCP   As directed       Currently Documented PCP:    Roxy Deng DO    PCP Phone Number:    [phone]     Follow Up Details: Roxy Deng DO (posthospitalization follow-up 1 week)         Discharge Follow-up with Specified Provider: Dr. Smalls; 2 Weeks   As directed      To: Dr. Smalls     Follow Up: 2 Weeks            Follow-up Information     Roxy Deng DO .    Specialties: Family Medicine, Hospitalist  Why: Roxy Deng DO (posthospitalization follow-up 1 week)  Contact information:  [address] [facility] [address]  [facility] [address] [phone]                              The ASCVD Risk score (Griffinlion CONTRERAS Jr., et al., 2013) failed to calculate for the following reasons:    The patient has a prior MI or stroke diagnosis     Maty Tubbs MD  06/21/22  10:42 EDT    Please note that this discharge summary required more than 30 minutes to complete.

## 2022-06-21 NOTE — DISCHARGE PLACEMENT REQUEST
"Carlos Barry (51 y.o. Female)             Date of Birth   1970    Social Security Number       Address   [address] [facility] [address]    Home Phone   [phone]    MRN   [id number]       [place]   Unknown    Marital Status                               Admission Date   6/11/22    Admission Type   Emergency    Admitting Provider   Larry Peoples MD    Attending Provider   Maty Tubbs MD    Department, Room/Bed   [facility], P205/1P       Discharge Date       Discharge Disposition   Home or Self Care    Discharge Destination                               Attending Provider: Maty Tubbs MD    Allergies: Contrast Dye, Ciprofloxacin, Sulfa Antibiotics, Drug [Hydrocodone-acetaminophen], Adhesive Tape    Isolation: None   Infection: None   Code Status: CPR   Advance Care Planning Activity    Ht: 160 cm (62.99\")   Wt: 69.7 kg (153 lb 10.6 oz)    Admission Cmt: None   Principal Problem: Chest pain, unspecified type [R07.9]                 Active Insurance as of 6/11/2022     Primary Coverage     Payor Plan Insurance Group Employer/Plan Group    [place] [id number]     Payor Plan Address Payor Plan Phone Number Payor Plan Fax Number Effective Dates    [employer] [address] [address] [phone]  1/1/2021 - None Entered    [facility] [id number]       Subscriber Name Subscriber Birth Date Member ID       CARLOS BARRY 1970 [id number]                 Emergency Contacts      (Rel.) Home Phone Work Phone Mobile Phone    ZAC BARRY (Spouse) [phone] -- [phone]            Discharge Order (From admission, onward)     Start     Ordered    06/21/22 1017  Discharge patient  Once        Expected Discharge Date: 06/21/22    Discharge Disposition: Home or Self Care    Physician of Record for Attribution - Please select from Treatment Team: LARRY PEOPLES [[id number]]    Review needed by CMO to determine " Physician of Record: No       Question Answer Comment   Physician of Record for Attribution - Please select from Treatment Team LARRY PEOPLES    Review needed by CMO to determine Physician of Record No        06/21/22 8180

## 2022-06-21 NOTE — DISCHARGE PLACEMENT REQUEST
"Carlos Barry (51 y.o. Female)             Date of Birth   1970    Social Security Number       Address   [address] [facility] [address]    Home Phone   [phone]    MRN   [id number]       Mandaeism   Unknown    Marital Status                               Admission Date   6/11/22    Admission Type   Emergency    Admitting Provider   Teto Sumner MD    Attending Provider   Maty Tubbs MD    Department, Room/Bed   [facility], P205/1P       Discharge Date       Discharge Disposition       Discharge Destination                               Attending Provider: Maty Tubbs MD    Allergies: Contrast Dye, Ciprofloxacin, Sulfa Antibiotics, Drug [Hydrocodone-acetaminophen], Adhesive Tape    Isolation: None   Infection: None   Code Status: CPR   Advance Care Planning Activity    Ht: 160 cm (62.99\")   Wt: 69.7 kg (153 lb 10.6 oz)    Admission Cmt: None   Principal Problem: Chest pain, unspecified type [R07.9]                 Active Insurance as of 6/11/2022     Primary Coverage     Payor Plan Insurance Group Employer/Plan Group    [place] [id number]     Payor Plan Address Payor Plan Phone Number Payor Plan Fax Number Effective Dates    [employer] [address] [address] [phone]  1/1/2021 - None Entered    [facility] [id number]       Subscriber Name Subscriber Birth Date Member ID       CARLOS BARRY 1970 [id number]                 Emergency Contacts      (Rel.) Home Phone Work Phone Mobile Phone    ZCA BARRY (Spouse) [phone] -- [phone]               History & Physical      Aliya Buckner PA-C at 06/12/22 0346     Attestation signed by Teto Sumner MD at 06/12/22 0693    I have reviewed this documentation and agree.  I have reviewed the assessment and plan with ALFRED Pisano, and we have developed the assessment and plan together.  I have also discussed patient with Dr." Ben.  Since the chart had to differing opinions by 2 different interventional cardiologist last time she was hospitalized, I asked him to contact the on-call interventionalists, Dr. Barry, prior to excepting the patient on our service.  Dr. Barry stated that he will reevaluate the patient today as he needs to look at the actual pictures of her prior left heart catheterization studies.  If Dr. Barry thinks that the issue is not immediately life-threatening, then he would like Dr. Chaney with interventional cardiology to look at the patient tomorrow as Dr. Chaney will be on-call.  In the meantime, we will rule out an acute MI per our chest pain protocol.  Please note that the patient is in the progressive care unit as a telemetry overflow because There were no telemetry beds available at the time of her admission.                      [facility] Medicine Services  History & Physical    Patient Identification:  Name:  Lissa Eisenberg  Age:  51 y.o.  Sex:  female  :  1970  MRN:  [id number]   Visit Number:  [id number]  Primary Care Physician:  Roxy Deng DO Subjective     2022   Chief complaint:   Chief Complaint   Patient presents with   • Chest Pain   • Shortness of Breath       History of presenting illness:      Lissa Eisenberg is a 51 y.o. female with past medical history significant for type 2 diabetes mellitus, essential hypertension, hyperlipidemia, coronary artery disease status post CABG x2 with JAYDA to mid RCA 2021 and arthrotomy of severe ISR and PCI of mid RCA with attempt to stent ostial RPDA unsuccessful, left bundle branch block, chronic diastolic heart failure, GERD, anxiety/depression/PTSD, and obesity who presents to the emergency department for evaluation of chest pain and shortness of breath.  Of note, patient was recently admitted to this facility from May 21 to May 25, 2022 for evaluation of chest pain.  She was found to have a new left  "bundle branch block.  She was admitted to the telemetry floor and evaluated by cardiologist, Dr. Smalls, who recommended [facility] for possible intervention with balloon angioplasty to ostial RPDA to help anginal episode due to concern for possible single-vessel bypass.  Later evaluated by another cardiologist, Dr. Delgado, to treat medically at this time and if symptoms persist then needs evaluation for single-vessel bypass.  Her dose of Ranexa was increased and digoxin was added.  Case was discussed with cardiology again with recommendation for outpatient follow-up with cardiology as well as continuing Ranexa digoxin and other home medications.    Patient states that since she was discharged from the hospital last month that her weakness and fatigue has gradually worsened.  She also has a decreased appetite and reports losing 14 pounds since she was last admitted.  She states that her chest pain has worsened in nature since discharge.  She describes the pain as \"dumbo sitting on the middle of her chest\" that radiates from the midsternal area of her chest up into the left side of her neck into the jaw and left shoulder area.  She does report associated symptoms of shortness of breath, diaphoresis, and nausea.  She states that the pain is intermittent.  She states that she has the pain at rest and that it worsens with activity.  She rates her pain as a 7 out of 10 on the pain scale.  She states that nitroglycerin does relieve her pain.    Upon arrival to the ED, vital signs were temperature 96.9, heart rate 99, respirations 20, blood pressure 155/84, SPO2 99% on room air.  CMP with glucose 110, sodium 129, CO2 20.3, chloride 94, otherwise unremarkable.  CBC largely unremarkable.  Troponin T negative x2.  proBNP 60.6.  Chest x-ray with no acute cardiopulmonary findings.    Known Emergency Department medications received prior to my evaluation included aspirin 324 mg, IV Zofran 4 mg. Room location at the time of my evaluation " was 205 A.     ---------------------------------------------------------------------------------------------------------------------   Review of Systems   Constitutional: Positive for appetite change (decreased), diaphoresis, fatigue and unexpected weight change (lost 14 lbs within last month). Negative for activity change.   HENT: Negative for congestion, postnasal drip, rhinorrhea and sore throat.    Eyes: Negative for discharge and redness.   Respiratory: Positive for shortness of breath. Negative for apnea, cough and wheezing.    Cardiovascular: Positive for chest pain (midsternal radiating into left jaw, neck, and shoulder; intermittent). Negative for palpitations.   Gastrointestinal: Positive for nausea. Negative for abdominal distention, abdominal pain, diarrhea and vomiting.   Endocrine: Negative for polydipsia, polyphagia and polyuria.   Genitourinary: Negative for difficulty urinating, dysuria, frequency and urgency.   Musculoskeletal: Negative for arthralgias and myalgias.   Skin: Negative for color change and wound.   Neurological: Positive for weakness (generalized). Negative for dizziness, syncope and light-headedness.   Psychiatric/Behavioral: Negative for agitation and behavioral problems.        ---------------------------------------------------------------------------------------------------------------------   Past Medical History:   Diagnosis Date   • COVID-19    • Diabetes mellitus (HCC)    • Hyperlipidemia    • Hypertension    • Myocardial infarct (HCC)    • PCOS (polycystic ovarian syndrome)    • PTSD (post-traumatic stress disorder)      Past Surgical History:   Procedure Laterality Date   • CARDIAC CATHETERIZATION     • CARDIAC CATHETERIZATION N/A 2022    Procedure: Left Heart Cath;  Surgeon: Aelxandr Marquez MD;  Location: [facility] CATH INVASIVE LOCATION;  Service: Cardiology;  Laterality: N/A;   •  SECTION     • CHOLECYSTECTOMY     • CORONARY ARTERY BYPASS GRAFT      x 2   •  CORONARY STENT PLACEMENT      x 6 per patient   • DILATATION AND CURETTAGE     • KNEE SURGERY Left    • TONSILLECTOMY       Family History   Problem Relation Age of Onset   • Lung cancer Father    • Thyroid cancer Brother    • Heart attack Maternal Grandfather    • Lung cancer Maternal Grandfather    • Heart attack Paternal Grandmother    • Emphysema Paternal Grandfather      Social History     Socioeconomic History   • Marital status:    Tobacco Use   • Smoking status: Never Smoker   • Smokeless tobacco: Never Used   Vaping Use   • Vaping Use: Never used   Substance and Sexual Activity   • Alcohol use: Not Currently   • Drug use: Never   • Sexual activity: Yes     Partners: Male     Birth control/protection: None     ---------------------------------------------------------------------------------------------------------------------   Allergies:  Contrast dye, Ciprofloxacin, Sulfa antibiotics, Drug [hydrocodone-acetaminophen], and Adhesive tape  ---------------------------------------------------------------------------------------------------------------------   Home medications:    Medications below are reported home medications pulling from within the system; at this time, these medications have not been reconciled unless otherwise specified and are in the verification process for further verifcation as current home medications.  Medications Prior to Admission   Medication Sig Dispense Refill Last Dose   • aspirin 81 MG EC tablet Take 81 mg by mouth Daily.   6/11/2022 at Unknown time   • atorvastatin (LIPITOR) 80 MG tablet Take 1 tablet by mouth Every Night. 90 tablet 1 Past Week at Unknown time   • digoxin (LANOXIN) 125 MCG tablet Take 1 tablet by mouth Daily for 30 days. 30 tablet 0 6/11/2022 at Unknown time   • docusate sodium (COLACE) 100 MG capsule Take 1 capsule by mouth 2 (Two) Times a Day for 30 days. 60 capsule 0 6/11/2022 at Unknown time   • DULoxetine (CYMBALTA) 30 MG capsule Take 60 mg by  mouth Daily.   6/11/2022 at Unknown time   • isosorbide mononitrate (IMDUR) 30 MG 24 hr tablet Take 1 tablet by mouth Daily. 90 tablet 1 6/11/2022 at Unknown time   • levocetirizine (XYZAL) 5 MG tablet Take 1 tablet by mouth Every Evening. 30 tablet 5 Past Week at Unknown time   • linaclotide (Linzess) 72 MCG capsule capsule Take 1 capsule by mouth Every Morning Before Breakfast. 30 capsule 2 Past Week at Unknown time   • lisinopril (PRINIVIL,ZESTRIL) 2.5 MG tablet Take 2.5 mg by mouth Daily.   6/11/2022 at Unknown time   • magnesium oxide (MAG-OX) 400 MG tablet Take 1 tablet by mouth 2 (Two) Times a Day. 60 tablet 5 Past Week at Unknown time   • metFORMIN (GLUCOPHAGE) 1000 MG tablet Take 1 tablet by mouth 2 (Two) Times a Day With Meals. 180 tablet 1 6/11/2022 at Unknown time   • metoprolol tartrate (LOPRESSOR) 25 MG tablet Take 1 tablet by mouth 2 (Two) Times a Day. 60 tablet 0 6/11/2022 at Unknown time   • omeprazole (priLOSEC) 20 MG capsule Take 1 capsule by mouth Daily. 90 capsule 1 6/11/2022 at Unknown time   • Ozempic, 1 MG/DOSE, 4 MG/3ML solution pen-injector Inject 1 mg into the appropriate muscle as directed by prescriber 1 (One) Time Per Week. (Patient taking differently: Inject 1 mg into the appropriate muscle as directed by prescriber 1 (One) Time Per Week. Thursdays.) 9 mL 1 Past Week at Unknown time   • Prenatal Vit-Fe Fumarate-FA (prenatal vitamin 27-0.8) 27-0.8 MG tablet tablet Take 1 tablet by mouth Daily.   6/11/2022 at Unknown time   • ranolazine (Ranexa) 1000 MG 12 hr tablet Take 1 tablet by mouth 2 (Two) Times a Day for 30 days. 60 tablet 0 6/11/2022 at Unknown time   • Rivaroxaban (Xarelto) 2.5 MG tablet Take 1 tablet by mouth 2 (Two) Times a Day. 180 tablet 1 6/11/2022 at Unknown time   • ticagrelor (BRILINTA) 90 MG tablet tablet Take 1 tablet by mouth 2 (Two) Times a Day. 180 tablet 3 6/11/2022 at Unknown time   • nitroglycerin (NITROSTAT) 0.4 MG SL tablet Place 1 tablet under the tongue  Every 5 minutes as Needed for Chest Pain until chest pain is relieved. If you have to take 3 tabs, take the 3rd and go to the hospital to be evaluated. 25 tablet 1 Unknown at Unknown time   • ondansetron ODT (ZOFRAN-ODT) 4 MG disintegrating tablet Place 1 tablet on the tongue Every 8 (Eight) Hours As Needed for Nausea or Vomiting. 12 tablet 0 Unknown at Unknown time       Hospital Scheduled Meds:  aspirin, 81 mg, Oral, Daily  atorvastatin, 80 mg, Oral, Nightly  cetirizine, 10 mg, Oral, Daily  docusate sodium, 100 mg, Oral, BID  DULoxetine, 60 mg, Oral, Daily  prenatal vitamin 27-0.8, 1 tablet, Oral, Daily  ranolazine, 1,000 mg, Oral, BID  Rivaroxaban, 2.5 mg, Oral, BID  ticagrelor, 90 mg, Oral, BID           Current listed hospital scheduled medications may not yet reflect those currently placed in orders that are signed and held awaiting patient's arrival to floor.   ---------------------------------------------------------------------------------------------------------------------     Objective     Vital Signs:  Temp:  [96.9 °F (36.1 °C)] 96.9 °F (36.1 °C)  Heart Rate:  [] 95  Resp:  [20] 20  BP: (103-155)/(66-86) 128/86      06/11/22  1637   Weight: 78.9 kg (174 lb)     Body mass index is 30.82 kg/m².  ---------------------------------------------------------------------------------------------------------------------       Physical Exam  Constitutional:       Appearance: Normal appearance. She is normal weight.      Comments: Resting comfortably in bed. No acute distress noted.    HENT:      Head: Normocephalic and atraumatic.      Right Ear: External ear normal.      Left Ear: External ear normal.      Nose: Nose normal.      Mouth/Throat:      Mouth: Mucous membranes are moist.      Pharynx: Oropharynx is clear.   Eyes:      Extraocular Movements: Extraocular movements intact.      Conjunctiva/sclera: Conjunctivae normal.      Pupils: Pupils are equal, round, and reactive to light.   Cardiovascular:       Rate and Rhythm: Normal rate and regular rhythm.      Pulses: Normal pulses.           Dorsalis pedis pulses are 2+ on the right side and 2+ on the left side.        Posterior tibial pulses are 2+ on the right side and 2+ on the left side.      Heart sounds: Normal heart sounds. No murmur heard.    No friction rub. No gallop.   Pulmonary:      Effort: Pulmonary effort is normal. No respiratory distress.      Breath sounds: Normal breath sounds. No wheezing, rhonchi or rales.   Abdominal:      General: Abdomen is flat. Bowel sounds are normal. There is no distension.      Palpations: Abdomen is soft.      Tenderness: There is no abdominal tenderness. There is no guarding.   Musculoskeletal:         General: No swelling. Normal range of motion.      Cervical back: Normal range of motion and neck supple.      Right lower leg: No edema.      Left lower leg: No edema.   Skin:     General: Skin is warm and dry.      Findings: No erythema or rash.   Neurological:      General: No focal deficit present.      Mental Status: She is alert and oriented to person, place, and time. Mental status is at baseline.   Psychiatric:         Mood and Affect: Mood normal.         Behavior: Behavior normal.         Thought Content: Thought content normal.           ---------------------------------------------------------------------------------------------------------------------  EKG:    Pending cardiology read.  Per my review, EKG shows normal sinus rhythm with diffuse T wave inversions which is new compared to previous EKG from May 23.    Telemetry:    Telemetry with sinus rhythm with HR 70's    I have personally looked at both the EKG and the telemetry strips.  ---------------------------------------------------------------------------------------------------------------------   Results from last 7 days   Lab Units 06/11/22  2332   WBC 10*3/mm3 7.64   HEMOGLOBIN g/dL 13.8   HEMATOCRIT % 41.1   MCV fL 81.9   MCHC g/dL 33.6   PLATELETS  10*3/mm3 309         Results from last 7 days   Lab Units 06/11/22 2332   SODIUM mmol/L 129*   POTASSIUM mmol/L 3.9   CHLORIDE mmol/L 94*   CO2 mmol/L 20.3*   BUN mg/dL 16   CREATININE mg/dL 0.87   CALCIUM mg/dL 9.6   GLUCOSE mg/dL 110*   ALBUMIN g/dL 4.50   BILIRUBIN mg/dL 0.5   ALK PHOS U/L 93   AST (SGOT) U/L 16   ALT (SGPT) U/L 16   Estimated Creatinine Clearance: 75.2 mL/min (by C-G formula based on SCr of 0.87 mg/dL).  No results found for: AMMONIA  Results from last 7 days   Lab Units 06/12/22  0149 06/11/22 2332   TROPONIN T ng/mL <0.010 <0.010     Results from last 7 days   Lab Units 06/11/22 2332   PROBNP pg/mL 60.6     Lab Results   Component Value Date    HGBA1C 5.80 (H) 05/21/2022     Lab Results   Component Value Date    TSH 2.410 05/22/2022     No results found for: PREGTESTUR, PREGSERUM, HCG, HCGQUANT  Pain Management Panel     Pain Management Panel Latest Ref Rng & Units 5/22/2022    AMPHETAMINES SCREEN, URINE Negative Negative    BARBITURATES SCREEN Negative Negative    BENZODIAZEPINE SCREEN, URINE Negative Negative    BUPRENORPHINEUR Negative Negative    COCAINE SCREEN, URINE Negative Negative    METHADONE SCREEN, URINE Negative Negative    METHAMPHETAMINEUR Negative Negative            ---------------------------------------------------------------------------------------------------------------------  Imaging Results (Last 7 Days)     Procedure Component Value Units Date/Time    XR Chest 1 View [[id number]] Collected: 06/11/22 2345     Updated: 06/11/22 2348    Narrative:      CR Chest 1 Vw    INDICATION:   Chest pain and shortness of air.     COMPARISON:    5/21/2022    FINDINGS:  Portable AP view(s) of the chest.  The heart and mediastinal contours are normal. The lungs are clear. No pneumothorax or pleural effusion. Patient is status post CABG.      Impression:      No acute cardiopulmonary findings.    Signer Name: Alexei Roman MD   Signed: 6/11/2022 11:45 PM   Workstation Name:  [email or web address]    [facility]        Last echocardiogram:  Results for orders placed during the hospital encounter of 04/02/22    Adult Transthoracic Echo Complete w/ Color, Spectral and Contrast if necessary per protocol    Interpretation Summary  · Left ventricular ejection fraction appears to be 61 - 65%.  · Left ventricular diastolic function is consistent with (grade I) impaired relaxation.  · No significant valvular abnormality  · Moderate sclerosis of the aortic valve  · No pericardial effusion  · No prior study for comparison    I have personally reviewed the above radiology images and read the final radiology report on 06/12/22  ---------------------------------------------------------------------------------------------------------------------  Assessment / Plan     Active Hospital Problems    Diagnosis  POA   • Chest pain, unspecified type [R07.9]  Yes       ASSESSMENT/PLAN:  -Chest pain with typical features, POA  -CAD s/p CABG x2 with JAYDA to mid RCA 08/2021 and arthrotomy of severe ISR and PCI of mid RCA with attempt to stent ostial RPDA unsuccessful   -LBBB, present since 05/2022  - Evaluated by cardiology services in 05/2022 during admission with initial plans for one-vessel CABG, but then later recommended to follow-up with interventional cardiology outpatient for possible single vessel bypass and continue medical management.  - Troponin T negative x 2; reports pain 7/10; reports relieved with nitroglycerin.   -EKG with diffuse T wave inversions which is new compared to previous EKG per my review, await final cardiology interpretation.  - Echo from 04/2022 with EF 61-65%, grade 1 diastolic dysfunction, moderate sclerosis AV  - Stress test from 04/4/2022 with intermediate risk study.  - ED provider discussed with on-call interventional cardiologist, Asha Ngo.   - Inpatient interventional cardiology consulted, input and assistance appreciated.  - Received full-dose aspirin in ED;  continue daily aspirin, statin, Brilinta, and Ranexa.   - Continuous cardiac monitoring ordered.     -Mild acute hyponatremia  -Hypochloremia  - Likely secondary to poor oral intake.  - Encourage oral intake.   - Repeat a.m. CMP.     -Non-insulin-dependent type 2 diabetes mellitus  - Hemoglobin A1c 05/2022 5.8.  -Accu-Cheks before every meal and nightly.  -Sliding scale insulin ordered; titrate as needed.  -Hypoglycemia protocol in place.    -Chronic diastolic CHF  -Appears euvolemic on exam.  -Echo from 04/2022 reviewed and detailed above.  -Monitor with strict I's and O's and daily weights.    -Essential hypertension  -Hyperlipidemia  -Blood pressure stable.  -Monitor per hospital protocol.  -Review home medications once reconciled.    -Anxiety/depression  -PTSD  -Supportive care.  -Review home medications once reconciled.    -Obesity, BMI 30.82 kg/m²  -Affects all aspects of care.  ----------  -DVT prophylaxis: Continue Xarelto  -Activity: Up ad reanna  -Expected length of stay:   OBSERVATION status; however, if further evaluation or treatment plans warrant, status may change.  Based upon current information, I predict patient's care encounter to be less than or equal to 2 midnights    High risk secondary to chest pain in setting of coronary artery disease     Disposition: Likely home once medically stable    Aliya Buckner PA-C  06/12/22  03:46 EDT    ---------------------------------------------------------------------------------------------------------------------           Electronically signed by Teto Sumner MD at 06/12/22 0648       Vital Signs (last day)     Date/Time Temp Temp src Pulse Resp BP Patient Position SpO2    06/21/22 0907 -- -- 95 20 137/84 -- --    06/21/22 0803 -- -- 89 20 110/66 -- 99    06/21/22 0800 98 (36.7) Oral -- -- -- -- --    06/21/22 0700 -- -- 89 14 114/53 -- 98    06/21/22 0600 -- -- 84 12 106/60 -- 98    06/21/22 0502 -- -- 87 14 110/58 -- 99    06/21/22 0400 98 (36.7)  Oral 86 14 104/56 -- 99    06/21/22 0300 -- -- 86 -- 103/64 -- 97    06/21/22 0200 -- -- 87 -- 101/57 -- 97    06/21/22 0102 -- -- 87 -- 117/67 -- 98    06/21/22 0002 -- -- 86 12 113/70 -- 100    06/21/22 0000 98.7 (37.1) Oral -- -- -- -- --    06/20/22 2303 -- -- 90 -- 121/89 -- 98    06/20/22 2203 -- -- 90 14 116/63 -- 100    06/20/22 2103 -- -- 94 18 131/78 -- 100    06/20/22 2102 -- -- 91 -- 131/78 -- --    06/20/22 2003 98.2 (36.8) Oral 92 16 119/65 -- 99    06/20/22 1903 -- -- 94 18 114/67 -- 99    06/20/22 1705 -- -- 96 18 122/75 -- 98    06/20/22 1605 -- -- 86 -- 115/68 -- 97    06/20/22 1600 98.7 (37.1) Oral -- -- -- -- --    06/20/22 1505 -- -- 93 16 115/73 -- 99    06/20/22 1412 -- -- 96 -- 109/63 -- --    06/20/22 1405 -- -- 94 -- 109/63 -- 99    06/20/22 1300 -- -- 96 16 119/70 -- 98    06/20/22 1200 98.5 (36.9) Oral 92 -- 106/74 -- 98    06/20/22 1100 -- -- 93 16 109/69 -- 97    06/20/22 1000 -- -- 96 14 125/70 -- 99    06/20/22 0910 -- -- 100 -- 113/85 -- --    06/20/22 0900 -- -- 91 16 105/63 -- 95    06/20/22 0800 98.3 (36.8) Oral 98 14 111/74 -- 97    06/20/22 0705 -- -- 87 16 101/60 -- 96    06/20/22 0600 -- -- 96 -- 120/65 -- 97    06/20/22 0502 -- -- 90 -- 102/60 -- 94    06/20/22 0402 98 (36.7) Oral 88 -- 105/62 -- 95    06/20/22 0302 -- -- 87 -- 102/62 -- 95    06/20/22 0202 -- -- 84 -- 94/44 -- 96    06/20/22 0102 -- -- 89 -- 104/61 -- 94    06/20/22 0002 98.2 (36.8) Oral 89 14 94/60 -- 96          Intake & Output (last day)       06/20 0701 06/21 0700 06/21 0701 06/22 0700    P.O. 360     Total Intake(mL/kg) 360 (5.2)     Net +360           Urine Unmeasured Occurrence 6 x     Stool Unmeasured Occurrence 1 x           Current Facility-Administered Medications   Medication Dose Route Frequency Provider Last Rate Last Admin   • acetaminophen (TYLENOL) tablet 650 mg  650 mg Oral Q6H PRN Teto Sumner MD   650 mg at 06/17/22 1325   • aspirin EC tablet 81 mg  81 mg Oral Daily Subramaniyam,  Connor Krueger MD   81 mg at 06/21/22 0907   • atorvastatin (LIPITOR) tablet 80 mg  80 mg Oral Nightly Subramaniyam, Connor Krueger MD   80 mg at 06/20/22 2102   • bismuth subsalicylate (PEPTO BISMOL) chewable tablet 524 mg  524 mg Oral Q1H PRN Subramaniyam, Connor Krueger MD   524 mg at 06/15/22 1027   • cetirizine (zyrTEC) tablet 10 mg  10 mg Oral Daily Subramaniyam, Connor Krueger MD   10 mg at 06/21/22 0908   • dextrose (D50W) (25 g/50 mL) IV injection 25 g  25 g Intravenous Q15 Min PRN Subramaniyam, Connor Krueger MD       • dextrose (GLUTOSE) oral gel 15 g  15 g Oral Q15 Min PRN Subramaniyam, Connor Krueger MD       • docusate sodium (COLACE) capsule 100 mg  100 mg Oral BID Subramaniyam, Connor Krueger MD   100 mg at 06/21/22 0908   • DULoxetine (CYMBALTA) DR capsule 60 mg  60 mg Oral Daily Subramaniyam, Connor Krueger MD   60 mg at 06/21/22 0908   • glucagon (human recombinant) (GLUCAGEN DIAGNOSTIC) injection 1 mg  1 mg Intramuscular Q15 Min PRN Subramaniyam, Connor Krueger MD       • hydrALAZINE (APRESOLINE) tablet 25 mg  25 mg Oral Q8H Teto Sumner MD   25 mg at 06/20/22 2102   • isosorbide mononitrate (IMDUR) 24 hr tablet 60 mg  60 mg Oral Q24H Subramaniyam, Connor Krueger MD   60 mg at 06/21/22 0907   • magnesium oxide (MAG-OX) tablet 400 mg  400 mg Oral BID Subramaniyam, Connor Krueger MD   400 mg at 06/21/22 0908   • Magnesium Sulfate 2 gram Bolus, followed by 8 gram infusion (total Mg dose 10 grams)- Mg less than or equal to 1mg/dL  2 g Intravenous PRN Subramaniyam, Connor Krueger MD        Or   • Magnesium Sulfate 2 gram / 50mL Infusion (GIVE X 3 BAGS TO EQUAL 6GM TOTAL DOSE) - Mg 1.1 - 1.5 mg/dl  2 g Intravenous PRN Subramaniyam, Connor MD Evans        Or   • Magnesium Sulfate 4 gram infusion- Mg 1.6-1.9 mg/dL  4 g Intravenous PRN Subramaniyam, Connor MD Evans       • metoprolol succinate XL (TOPROL-XL) 24 hr tablet 50 mg  50 mg Oral Q24H Teto Sumner MD   50 mg at 06/21/22 0907   •  nitroglycerin (NITROSTAT) SL tablet 0.4 mg  0.4 mg Sublingual Q5 Min PRN Connor Chaney MD   0.4 mg at 06/19/22 1758   • pantoprazole (PROTONIX) EC tablet 40 mg  40 mg Oral QAM Connor Chaney MD   40 mg at 06/21/22 0640   • Pharmacy Consult   Does not apply Continuous PRN Teto Sumner MD       • prenatal vitamin 27-0.8 tablet 1 tablet  1 tablet Oral Daily Subramaniyam, Connor Krueger MD   1 tablet at 06/21/22 0908   • prochlorperazine (COMPAZINE) injection 10 mg  10 mg Intravenous Q6H PRN Teto Sumner MD   10 mg at 06/19/22 2006   • ranolazine (RANEXA) 12 hr tablet 1,000 mg  1,000 mg Oral BID SubgertrudeyaConnor verduzco MD   1,000 mg at 06/21/22 0908   • Rivaroxaban (XARELTO) tablet 2.5 mg  2.5 mg Oral BID With Meals SubConnor henson MD   2.5 mg at 06/21/22 0909   • sodium chloride 0.9 % flush 10 mL  10 mL Intravenous PRN KandimConnor MD       • sodium chloride 0.9 % flush 10 mL  10 mL Intravenous Q12H KandimConnor MD   10 mL at 06/20/22 2103   • sodium chloride 0.9 % flush 10 mL  10 mL Intravenous PRN Connro Chaney MD       • sodium chloride 0.9 % flush 10 mL  10 mL Intravenous Q12H Teto Sumner MD   10 mL at 06/21/22 0909   • sodium chloride 0.9 % flush 10 mL  10 mL Intravenous PRN Teto Sumner MD       • ticagrelor (BRILINTA) tablet 90 mg  90 mg Oral BID SubConnor henson MD   90 mg at 06/21/22 0908     Lab Results (most recent)     Procedure Component Value Units Date/Time    Troponin [[id number]]  (Normal) Collected: 06/19/22 1744    Specimen: Blood Updated: 06/19/22 1823     Troponin T <0.010 ng/mL     Narrative:      Troponin T Reference Range:  <= 0.03 ng/mL-   Negative for AMI  >0.03 ng/mL-     Abnormal for myocardial necrosis.  Clinicians would have to utilize clinical acumen, EKG, Troponin and serial changes to determine if it is an Acute Myocardial Infarction or myocardial injury due  to an underlying chronic condition.       Results may be falsely decreased if patient taking Biotin.      Basic Metabolic Panel [[id number]]  (Abnormal) Collected: 06/19/22 0026    Specimen: Blood Updated: 06/19/22 0050     Glucose 115 mg/dL      BUN 10 mg/dL      Creatinine 0.76 mg/dL      Sodium 136 mmol/L      Potassium 4.2 mmol/L      Comment: Slight hemolysis detected by analyzer. Results may be affected.        Chloride 104 mmol/L      CO2 20.7 mmol/L      Calcium 9.5 mg/dL      BUN/Creatinine Ratio 13.2     Anion Gap 11.3 mmol/L      eGFR 94.4 mL/min/1.73      Comment: National Kidney Foundation and American Society of Nephrology (ASN) Task Force recommended calculation based on the Chronic Kidney Disease Epidemiology Collaboration (CKD-EPI) equation refit without adjustment for race.       Narrative:      GFR Normal >60  Chronic Kidney Disease <60  Kidney Failure <15      Troponin [[id number]]  (Normal) Collected: 06/19/22 0026    Specimen: Blood Updated: 06/19/22 0050     Troponin T <0.010 ng/mL     Narrative:      Troponin T Reference Range:  <= 0.03 ng/mL-   Negative for AMI  >0.03 ng/mL-     Abnormal for myocardial necrosis.  Clinicians would have to utilize clinical acumen, EKG, Troponin and serial changes to determine if it is an Acute Myocardial Infarction or myocardial injury due to an underlying chronic condition.       Results may be falsely decreased if patient taking Biotin.      Phosphorus [[id number]]  (Normal) Collected: 06/19/22 0026    Specimen: Blood Updated: 06/19/22 0050     Phosphorus 4.3 mg/dL     Magnesium [[id number]]  (Normal) Collected: 06/19/22 0026    Specimen: Blood Updated: 06/19/22 0050     Magnesium 1.9 mg/dL     Hemoglobin & Hematocrit, Blood [[id number]]  (Normal) Collected: 06/19/22 0026    Specimen: Blood Updated: 06/19/22 0031     Hemoglobin 13.5 g/dL      Hematocrit 40.3 %     POC Glucose Once [[id number]]  (Normal) Collected: 06/18/22 1300    Specimen: Blood Updated: 06/18/22  1309     Glucose 112 mg/dL      Comment: Meter: [id number] : [id number] AIDA KERNS       POC Glucose Once [[id number]]  (Normal) Collected: 06/18/22 0624    Specimen: Blood Updated: 06/18/22 0636     Glucose 99 mg/dL      Comment: Meter: [id number] : [id number] CIELO MITCHELL       Comprehensive Metabolic Panel [[id number]]  (Abnormal) Collected: 06/17/22 0007    Specimen: Blood Updated: 06/17/22 0103     Glucose 116 mg/dL      BUN 7 mg/dL      Creatinine 0.61 mg/dL      Sodium 139 mmol/L      Potassium 4.2 mmol/L      Comment: Slight hemolysis detected by analyzer. Results may be affected.        Chloride 108 mmol/L      CO2 20.5 mmol/L      Calcium 9.0 mg/dL      Total Protein 6.2 g/dL      Albumin 3.29 g/dL      ALT (SGPT) 16 U/L      AST (SGOT) 18 U/L      Alkaline Phosphatase 93 U/L      Total Bilirubin 0.3 mg/dL      Globulin 2.9 gm/dL      A/G Ratio 1.1 g/dL      BUN/Creatinine Ratio 11.5     Anion Gap 10.5 mmol/L      eGFR 107.7 mL/min/1.73      Comment: National Kidney Foundation and American Society of Nephrology (ASN) Task Force recommended calculation based on the Chronic Kidney Disease Epidemiology Collaboration (CKD-EPI) equation refit without adjustment for race.       Narrative:      GFR Normal >60  Chronic Kidney Disease <60  Kidney Failure <15      Phosphorus [[id number]]  (Abnormal) Collected: 06/17/22 0007    Specimen: Blood Updated: 06/17/22 0103     Phosphorus 4.6 mg/dL     Magnesium [[id number]]  (Normal) Collected: 06/17/22 0007    Specimen: Blood Updated: 06/17/22 0057     Magnesium 2.1 mg/dL     Protime-INR [[id number]]  (Normal) Collected: 06/17/22 0007    Specimen: Blood Updated: 06/17/22 0043     Protime 14.3 Seconds      INR 1.09    Narrative:      Suggested INR therapeutic range for stable oral anticoagulant therapy:    Low Intensity therapy:   1.5-2.0  Moderate Intensity therapy:   2.0-3.0  High Intensity therapy:   2.5-4.0    aPTT [[id number]]  (Abnormal) Collected: 06/17/22 0007     Specimen: Blood Updated: 06/17/22 0043     PTT 22.9 seconds     Narrative:      PTT Heparin Therapeutic Range:  59 - 95 seconds      CBC (No Diff) [[id number]]  (Abnormal) Collected: 06/17/22 0007    Specimen: Blood Updated: 06/17/22 0035     WBC 6.43 10*3/mm3      RBC 4.20 10*6/mm3      Hemoglobin 11.7 g/dL      Hematocrit 35.3 %      MCV 84.0 fL      MCH 27.9 pg      MCHC 33.1 g/dL      RDW 15.2 %      RDW-SD 46.0 fl      MPV 9.3 fL      Platelets 267 10*3/mm3     Basic Metabolic Panel [[id number]]  (Abnormal) Collected: 06/16/22 0009    Specimen: Blood Updated: 06/16/22 0042     Glucose 176 mg/dL      BUN 5 mg/dL      Creatinine 0.72 mg/dL      Sodium 135 mmol/L      Potassium 4.1 mmol/L      Chloride 106 mmol/L      CO2 19.0 mmol/L      Calcium 8.6 mg/dL      BUN/Creatinine Ratio 6.9     Anion Gap 10.0 mmol/L      eGFR 100.7 mL/min/1.73      Comment: National Kidney Foundation and American Society of Nephrology (ASN) Task Force recommended calculation based on the Chronic Kidney Disease Epidemiology Collaboration (CKD-EPI) equation refit without adjustment for race.       Narrative:      GFR Normal >60  Chronic Kidney Disease <60  Kidney Failure <15      CBC (No Diff) [[id number]]  (Abnormal) Collected: 06/16/22 0009    Specimen: Blood Updated: 06/16/22 0022     WBC 6.39 10*3/mm3      RBC 4.11 10*6/mm3      Hemoglobin 11.6 g/dL      Hematocrit 34.2 %      MCV 83.2 fL      MCH 28.2 pg      MCHC 33.9 g/dL      RDW 14.8 %      RDW-SD 44.9 fl      MPV 9.4 fL      Platelets 280 10*3/mm3     POC Activated Clotting Time [[id number]]  (Abnormal) Collected: 06/15/22 1313    Specimen: Blood Updated: 06/15/22 1332     Activated Clotting Time  242 Seconds      Comment: Serial Number: [id number]:  [id number]       CBC & Differential [[id number]]  (Abnormal) Collected: 06/14/22 0208    Specimen: Blood Updated: 06/14/22 0229    Narrative:      The following orders were created for panel order CBC & Differential.  Procedure                                Abnormality         Status                     ---------                               -----------         ------                     CBC Auto Differential[[id number]]        Abnormal            Final result                 Please view results for these tests on the individual orders.    CBC Auto Differential [[id number]]  (Abnormal) Collected: 06/14/22 0208    Specimen: Blood Updated: 06/14/22 0229     WBC 6.06 10*3/mm3      RBC 4.95 10*6/mm3      Hemoglobin 13.6 g/dL      Hematocrit 40.0 %      MCV 80.8 fL      MCH 27.5 pg      MCHC 34.0 g/dL      RDW 14.4 %      RDW-SD 42.0 fl      MPV 9.5 fL      Platelets 241 10*3/mm3      Neutrophil % 46.9 %      Lymphocyte % 39.3 %      Monocyte % 6.9 %      Eosinophil % 3.5 %      Basophil % 0.8 %      Immature Grans % 2.6 %      Neutrophils, Absolute 2.84 10*3/mm3      Lymphocytes, Absolute 2.38 10*3/mm3      Monocytes, Absolute 0.42 10*3/mm3      Eosinophils, Absolute 0.21 10*3/mm3      Basophils, Absolute 0.05 10*3/mm3      Immature Grans, Absolute 0.16 10*3/mm3      nRBC 0.0 /100 WBC     Comprehensive Metabolic Panel [[id number]]  (Abnormal) Collected: 06/13/22 1106    Specimen: Blood Updated: 06/13/22 1202     Glucose 134 mg/dL      BUN 7 mg/dL      Creatinine 0.57 mg/dL      Sodium 137 mmol/L      Potassium 3.5 mmol/L      Chloride 104 mmol/L      CO2 22.7 mmol/L      Calcium 9.0 mg/dL      Total Protein 6.6 g/dL      Albumin 3.79 g/dL      ALT (SGPT) 12 U/L      AST (SGOT) 15 U/L      Alkaline Phosphatase 88 U/L      Total Bilirubin 0.5 mg/dL      Globulin 2.8 gm/dL      A/G Ratio 1.3 g/dL      BUN/Creatinine Ratio 12.3     Anion Gap 10.3 mmol/L      eGFR 109.5 mL/min/1.73      Comment: National Kidney Foundation and American Society of Nephrology (ASN) Task Force recommended calculation based on the Chronic Kidney Disease Epidemiology Collaboration (CKD-EPI) equation refit without adjustment for race.       Narrative:      GFR Normal >60  Chronic  Kidney Disease <60  Kidney Failure <15      Lactic Acid, Plasma [[id number]]  (Normal) Collected: 06/13/22 1106    Specimen: Blood Updated: 06/13/22 1158     Lactate 0.9 mmol/L     Protime-INR [[id number]]  (Abnormal) Collected: 06/13/22 1106    Specimen: Blood Updated: 06/13/22 1152     Protime 17.3 Seconds      INR 1.38    Narrative:      Suggested INR therapeutic range for stable oral anticoagulant therapy:    Low Intensity therapy:   1.5-2.0  Moderate Intensity therapy:   2.0-3.0  High Intensity therapy:   2.5-4.0    aPTT [[id number]]  (Normal) Collected: 06/13/22 1106    Specimen: Blood Updated: 06/13/22 1151     PTT 32.4 seconds     Narrative:      PTT Heparin Therapeutic Range:  59 - 95 seconds      CBC & Differential [[id number]]  (Abnormal) Collected: 06/13/22 1107    Specimen: Blood Updated: 06/13/22 1137    Narrative:      The following orders were created for panel order CBC & Differential.  Procedure                               Abnormality         Status                     ---------                               -----------         ------                     CBC Auto Differential[[id number]]        Abnormal            Final result                 Please view results for these tests on the individual orders.    CBC Auto Differential [[id number]]  (Abnormal) Collected: 06/13/22 1107    Specimen: Blood Updated: 06/13/22 1137     WBC 6.18 10*3/mm3      RBC 4.63 10*6/mm3      Hemoglobin 12.8 g/dL      Hematocrit 38.6 %      MCV 83.4 fL      MCH 27.6 pg      MCHC 33.2 g/dL      RDW 14.5 %      RDW-SD 43.8 fl      MPV 9.4 fL      Platelets 241 10*3/mm3      Neutrophil % 54.2 %      Lymphocyte % 31.9 %      Monocyte % 8.4 %      Eosinophil % 4.4 %      Basophil % 0.3 %      Immature Grans % 0.8 %      Neutrophils, Absolute 3.35 10*3/mm3      Lymphocytes, Absolute 1.97 10*3/mm3      Monocytes, Absolute 0.52 10*3/mm3      Eosinophils, Absolute 0.27 10*3/mm3      Basophils, Absolute 0.02 10*3/mm3      Immature Grans,  Absolute 0.05 10*3/mm3      nRBC 0.0 /100 WBC     COVID PRE-OP / PRE-PROCEDURE SCREENING ORDER (NO ISOLATION) - Swab, Nasopharynx [[id number]]  (Normal) Collected: 06/12/22 1513    Specimen: Swab from Nasopharynx Updated: 06/12/22 3397    Narrative:      The following orders were created for panel order COVID PRE-OP / PRE-PROCEDURE SCREENING ORDER (NO ISOLATION) - Swab, Nasopharynx.  Procedure                               Abnormality         Status                     ---------                               -----------         ------                     COVID-19 and FLU A/B PCR...[[id number]]  Normal              Final result                 Please view results for these tests on the individual orders.    COVID-19 and FLU A/B PCR - Swab, Nasopharynx [[id number]]  (Normal) Collected: 06/12/22 1513    Specimen: Swab from Nasopharynx Updated: 06/12/22 1557     COVID19 Not Detected     Influenza A PCR Not Detected     Influenza B PCR Not Detected    Narrative:      Fact sheet for providers: https://www.fda.gov/media/965416/download    Fact sheet for patients: https://www.fda.gov/media/794381/download    Test performed by PCR.    [place] Draw [[id number]] Collected: 06/11/22 2332    Specimen: Blood Updated: 06/12/22 0047    Narrative:      The following orders were created for panel order [place] Draw.  Procedure                               Abnormality         Status                     ---------                               -----------         ------                     Green Top (Gel)[[id number]]                                  Final result               Lavender Top[[id number]]                                     Final result               Gold Top - SST[[id number]]                                   Final result               Light Blue Top[[id number]]                                   Final result                 Please view results for these tests on the individual orders.    Green Top (Gel) [[id number]] Collected:  06/11/22 2332    Specimen: Blood from Arm, Right Updated: 06/12/22 0047     Extra Tube Hold for add-ons.     Comment: Auto resulted.       Lavender Top [[id number]] Collected: 06/11/22 2332    Specimen: Blood from Arm, Right Updated: 06/12/22 0047     Extra Tube hold for add-on     Comment: Auto resulted       Gold Top - SST [[id number]] Collected: 06/11/22 2332    Specimen: Blood from Arm, Right Updated: 06/12/22 0047     Extra Tube Hold for add-ons.     Comment: Auto resulted.       Light Blue Top [[id number]] Collected: 06/11/22 2332    Specimen: Blood Updated: 06/12/22 0047     Extra Tube Hold for add-ons.     Comment: Auto resulted       BNP [[id number]]  (Normal) Collected: 06/11/22 2332    Specimen: Blood from Arm, Right Updated: 06/11/22 2359     proBNP 60.6 pg/mL     Narrative:      Among patients with dyspnea, NT-proBNP is highly sensitive for the detection of acute congestive heart failure. In addition NT-proBNP of <300 pg/ml effectively rules out acute congestive heart failure with 99% negative predictive value.    Results may be falsely decreased if patient taking Biotin.            Imaging Results (Most Recent)     Procedure Component Value Units Date/Time    XR Chest 1 View [[id number]] Collected: 06/11/22 2345     Updated: 06/11/22 2348    Narrative:      CR Chest 1 Vw    INDICATION:   Chest pain and shortness of air.     COMPARISON:    5/21/2022    FINDINGS:  Portable AP view(s) of the chest.  The heart and mediastinal contours are normal. The lungs are clear. No pneumothorax or pleural effusion. Patient is status post CABG.      Impression:      No acute cardiopulmonary findings.    Signer Name: Alexei Roman MD   Signed: 6/11/2022 11:45 PM   Workstation Name: [facility]    [facility]        Operative/Procedure Notes (most recent note)    No notes of this type exist for this encounter.            Physician Progress Notes (most recent note)      Maty Tubbs MD at  22 [facility]7              [facility]     Patient Identification:  Name:  Lissa Eisenberg  Age:  51 y.o.  Sex:  female  :  1970  MRN:  [id number]  Visit Number:  [id number]  Primary Care Provider:  Roxy Deng DO    Length of stay:  7    Subjective: Patient seen and examined assisted by Elissa Davila RN.  Patient in bed resting no complaints, he reports brief episode of chest pain yesterday EKG was unremarkable.  Patient with significant anxiety.  Chart reviewed patient is cleared for discharge but no place to go, she is homeless.  Currently I am in discussion with case management and  for options, vital signs stable.    Chief Complaint: Chest pain  ----------------------------------------------------------------------------------------------------------------------  Current Hospital Meds:  aspirin, 81 mg, Oral, Daily  atorvastatin, 80 mg, Oral, Nightly  cetirizine, 10 mg, Oral, Daily  docusate sodium, 100 mg, Oral, BID  DULoxetine, 60 mg, Oral, Daily  hydrALAZINE, 25 mg, Oral, Q8H  isosorbide mononitrate, 60 mg, Oral, Q24H  magnesium oxide, 400 mg, Oral, BID  metoprolol succinate XL, 50 mg, Oral, Q24H  pantoprazole, 40 mg, Oral, QAM  prenatal vitamin 27-0.8, 1 tablet, Oral, Daily  ranolazine, 1,000 mg, Oral, BID  Rivaroxaban, 2.5 mg, Oral, BID With Meals  sodium chloride, 10 mL, Intravenous, Q12H  sodium chloride, 10 mL, Intravenous, Q12H  ticagrelor, 90 mg, Oral, BID      Pharmacy Consult,       ----------------------------------------------------------------------------------------------------------------------  Vital Signs:  Temp:  [98 °F (36.7 °C)-98.4 °F (36.9 °C)] 98.3 °F (36.8 °C)  Heart Rate:  [] 100  Resp:  [18] 16  BP: ()/(44-85) 113/85       Tele: Sinus rhythm 90 bpm O2 saturation 98%      22  0503 22  0500 22  0402   Weight: 85 kg (187 lb 6.3 oz) 69.7 kg (153 lb 10.6 oz) 69.7 kg (153 lb 10.6 oz)     Body  mass index is 27.23 kg/m².    Intake/Output Summary (Last 24 hours) at 6/20/2022 1147  Last data filed at 6/19/2022 1705  Gross per 24 hour   Intake 840 ml   Output --   Net 840 ml     Diet Regular; Cardiac  ----------------------------------------------------------------------------------------------------------------------  Physical exam:  General: Comfortable,awake, alert, oriented to self, place, and time, well-developed and well-nourished.  No respiratory distress.    Skin:  Skin is warm and dry. No rash noted. No pallor.    HENT:  Head:  Normocephalic and atraumatic.  Mouth:  Moist mucous membranes.    Eyes:  Conjunctivae and EOM are normal.  Pupils are equal, round, and reactive to light.  No scleral icterus.    Neck:  Neck supple.  No JVD present.    Pulmonary/Chest:  No respiratory distress, no wheezes, no crackles, with normal breath sounds and good air movement.  Cardiovascular:  Normal rate, regular rhythm and normal heart sounds with no murmur.  Abdominal: Flabby and obese, soft.  Bowel sounds are normal.  No distension and no tenderness.   Extremities:  No edema, no tenderness, and no deformity.  No red or swollen joints anywhere.  Strong pulses in all 4 extremities with no clubbing, no cyanosis, no edema.  Neurological:  Motor strength equal no obvious deficit, sensory grossly intact.   No cranial nerve deficit.  No tongue deviation.  No facial droop.  No slurred speech.    Genitourinary: No Tidwell catheter  Back:  ----------------------------------------------------------------------------------------------------------------------  ----------------------------------------------------------------------------------------------------------------------  Results from last 7 days   Lab Units 06/19/22  1744 06/19/22  0026 06/16/22  2217   TROPONIN T ng/mL <0.010 <0.010 <0.010     Results from last 7 days   Lab Units 06/19/22  0026 06/17/22  0007 06/16/22  0009 06/14/22  0208   WBC 10*3/mm3  --  6.43 6.39  6.06   HEMOGLOBIN g/dL 13.5 11.7* 11.6* 13.6   HEMATOCRIT % 40.3 35.3 34.2 40.0   MCV fL  --  84.0 83.2 80.8   MCHC g/dL  --  33.1 33.9 34.0   PLATELETS 10*3/mm3  --  267 280 241   INR   --  1.09  --   --          Results from last 7 days   Lab Units 06/19/22  0026 06/17/22  0007 06/16/22  0009   SODIUM mmol/L 136 139 135*   POTASSIUM mmol/L 4.2 4.2 4.1   MAGNESIUM mg/dL 1.9 2.1 1.8   CHLORIDE mmol/L 104 108* 106   CO2 mmol/L 20.7* 20.5* 19.0*   BUN mg/dL 10 7 5*   CREATININE mg/dL 0.76 0.61 0.72   CALCIUM mg/dL 9.5 9.0 8.6   GLUCOSE mg/dL 115* 116* 176*   ALBUMIN g/dL  --  3.29*  --    BILIRUBIN mg/dL  --  0.3  --    ALK PHOS U/L  --  93  --    AST (SGOT) U/L  --  18  --    ALT (SGPT) U/L  --  16  --    Estimated Creatinine Clearance: 81.1 mL/min (by C-G formula based on SCr of 0.76 mg/dL).    No results found for: AMMONIA      No results found for: BLOODCX  No results found for: URINECX  No results found for: WOUNDCX  No results found for: STOOLCX    I have personally looked at the labs and they are summarized above.  ----------------------------------------------------------------------------------------------------------------------  Imaging Results (Last 24 Hours)     ** No results found for the last 24 hours. **        ----------------------------------------------------------------------------------------------------------------------  Assessment and Plan:  -Chest pain MI was ruled out  -Status post POB of right PDA, history of CABG three-vessel and JAYDA stent to mid RCA, arthrotomy of severe ISR and PCI of mid RCA in 2021  -Diabetes type 2 non-insulin-requiring  -Obesity with a BMI of 33  -Dyslipidemia  -History of hypertension-history of heart failure with preserved ejection fraction compensated  -Chronic left bundle branch block    Patient is medically stable and cleared from medical standpoint, patient is apparently homeless, /social service actively working on place to stay.  Once available  can be discharged.    Disposition discharge when patient has a place to go      Maty Tubbs MD  22  11:47 EDT    Electronically signed by Maty Tubbs MD at 22 [id number]          Consult Notes (most recent note)      Connor Chaney MD at 22 1007      Consult Orders    1. Inpatient Interventional Cardiology Consult [[id number]] ordered by Aliya Buckner PA-C               Consults    Patient Identification:    Name:  Lissa Eisenberg  Age:  51 y.o.  Sex:  female  :  1970  MRN:  [id number]  Visit Number:  [id number]  Primary care provider:  Roxy Deng DO    Chief complaint:   Chest pain    History of presenting illness:   Patient is a 51-year-old female with known history of CAD s/p CABG s/p PCI to in-stent restenosis of the mid RCA lesion, known to have ostial RPDA lesion which is jailed across the stents in the distal RCA, failure to deploy a stent in the ostial RPDA during the last intervention to the mid RCA, patient presented a couple of times post intervention with worsening chest pains, last admission showed discharged home on antianginal medications-he presents today again with worsening chest pain started on Saturday, substernal pressure-like radiating to his jaw and shoulders.  She also had some shortness of breath associate with chest pain.  She is currently being admitted for further evaluation of her anginal symptoms.  Her troponins have been negative, EKG did show diffuse ST-T changes which are unchanged from before.  ---------------------------------------------------------------------------------------------------------------------  Review of Systems   Constitutional: Negative.    HENT: Negative.    Eyes: Negative.    Respiratory: Positive for shortness of breath.    Cardiovascular: Positive for chest pain.   Gastrointestinal: Negative.    Endocrine: Negative.    Genitourinary: Negative.    Musculoskeletal: Negative.    Skin: Negative.     Allergic/Immunologic: Negative.    Neurological: Negative.    Hematological: Negative.    Psychiatric/Behavioral: Negative.       ---------------------------------------------------------------------------------------------------------------------   Past History:   Family History   Problem Relation Age of Onset   • Lung cancer Father    • Thyroid cancer Brother    • Heart attack Maternal Grandfather    • Lung cancer Maternal Grandfather    • Heart attack Paternal Grandmother    • Emphysema Paternal Grandfather      Past Medical History:   Diagnosis Date   • COVID-19    • Diabetes mellitus (HCC)    • Hyperlipidemia    • Hypertension    • Myocardial infarct (HCC)    • PCOS (polycystic ovarian syndrome)    • PTSD (post-traumatic stress disorder)      Past Surgical History:   Procedure Laterality Date   • CARDIAC CATHETERIZATION     • CARDIAC CATHETERIZATION N/A 2022    Procedure: Left Heart Cath;  Surgeon: Alexandr Marquez MD;  Location: [facility] CATH INVASIVE LOCATION;  Service: Cardiology;  Laterality: N/A;   •  SECTION     • CHOLECYSTECTOMY     • CORONARY ARTERY BYPASS GRAFT      x 2   • CORONARY STENT PLACEMENT      x 6 per patient   • DILATATION AND CURETTAGE     • KNEE SURGERY Left    • TONSILLECTOMY       Social History     Socioeconomic History   • Marital status:    Tobacco Use   • Smoking status: Never Smoker   • Smokeless tobacco: Never Used   Vaping Use   • Vaping Use: Never used   Substance and Sexual Activity   • Alcohol use: Not Currently   • Drug use: Never   • Sexual activity: Yes     Partners: Male     Birth control/protection: None     ---------------------------------------------------------------------------------------------------------------------   Allergies:  Contrast dye, Ciprofloxacin, Sulfa antibiotics, Drug [hydrocodone-acetaminophen], and Adhesive tape  ---------------------------------------------------------------------------------------------------------------------    Prior to Admission Medications     Prescriptions Last Dose Informant Patient Reported? Taking?    aspirin 81 MG EC tablet 6/11/2022 Self Yes Yes    Take 81 mg by mouth Daily.    atorvastatin (LIPITOR) 80 MG tablet Past Week Self No Yes    Take 1 tablet by mouth Every Night.    digoxin (LANOXIN) 125 MCG tablet 6/11/2022 Self No Yes    Take 1 tablet by mouth Daily for 30 days.    docusate sodium (COLACE) 100 MG capsule 6/11/2022 Self No Yes    Take 1 capsule by mouth 2 (Two) Times a Day for 30 days.    DULoxetine (CYMBALTA) 30 MG capsule 6/11/2022 Self Yes Yes    Take 60 mg by mouth Daily.    isosorbide mononitrate (IMDUR) 30 MG 24 hr tablet 6/11/2022 Self No Yes    Take 1 tablet by mouth Daily.    levocetirizine (XYZAL) 5 MG tablet Past Week Self No Yes    Take 1 tablet by mouth Every Evening.    linaclotide (Linzess) 72 MCG capsule capsule Past Week Self No Yes    Take 1 capsule by mouth Every Morning Before Breakfast.    lisinopril (PRINIVIL,ZESTRIL) 2.5 MG tablet 6/11/2022 Self Yes Yes    Take 2.5 mg by mouth Daily.    magnesium oxide (MAG-OX) 400 MG tablet Past Week Self No Yes    Take 1 tablet by mouth 2 (Two) Times a Day.    metFORMIN (GLUCOPHAGE) 1000 MG tablet 6/11/2022 Self No Yes    Take 1 tablet by mouth 2 (Two) Times a Day With Meals.    metoprolol tartrate (LOPRESSOR) 25 MG tablet 6/11/2022 Self No Yes    Take 1 tablet by mouth 2 (Two) Times a Day.    nitroglycerin (NITROSTAT) 0.4 MG SL tablet Unknown Self No No    Place 1 tablet under the tongue Every 5 minutes as Needed for Chest Pain until chest pain is relieved. If you have to take 3 tabs, take the 3rd and go to the hospital to be evaluated.    omeprazole (priLOSEC) 20 MG capsule 6/11/2022 Self No Yes    Take 1 capsule by mouth Daily.    ondansetron ODT (ZOFRAN-ODT) 4 MG disintegrating tablet Unknown Self No No    Place 1 tablet on the tongue Every 8 (Eight) Hours As Needed for Nausea or Vomiting.    Ozempic, 1 MG/DOSE, 4 MG/3ML solution  pen-injector Past Week Self No Yes    Inject 1 mg into the appropriate muscle as directed by prescriber 1 (One) Time Per Week.    Patient taking differently:  Inject 1 mg into the appropriate muscle as directed by prescriber 1 (One) Time Per Week. Thursdays.    Prenatal Vit-Fe Fumarate-FA (prenatal vitamin 27-0.8) 27-0.8 MG tablet tablet 6/11/2022 Self Yes Yes    Take 1 tablet by mouth Daily.    ranolazine (Ranexa) 1000 MG 12 hr tablet 6/11/2022 Self No Yes    Take 1 tablet by mouth 2 (Two) Times a Day for 30 days.    Rivaroxaban (Xarelto) 2.5 MG tablet 6/11/2022 Self No Yes    Take 1 tablet by mouth 2 (Two) Times a Day.    ticagrelor (BRILINTA) 90 MG tablet tablet 6/11/2022 Self No Yes    Take 1 tablet by mouth 2 (Two) Times a Day.        Hospital Meds:  aspirin, 81 mg, Oral, Daily  atorvastatin, 80 mg, Oral, Nightly  cetirizine, 10 mg, Oral, Daily  docusate sodium, 100 mg, Oral, BID  DULoxetine, 60 mg, Oral, Daily  Insulin Aspart, 0-7 Units, Subcutaneous, TID AC  magnesium oxide, 400 mg, Oral, BID  prenatal vitamin 27-0.8, 1 tablet, Oral, Daily  ranolazine, 1,000 mg, Oral, BID  ticagrelor, 90 mg, Oral, BID      sodium chloride, 125 mL/hr, Last Rate: 125 mL/hr (06/13/22 0954)      ---------------------------------------------------------------------------------------------------------------------   Vital Signs:  Temp:  [96.8 °F (36 °C)-98.3 °F (36.8 °C)] 96.8 °F (36 °C)  Heart Rate:  [] 95  Resp:  [12-24] 15  BP: ()/(49-81) 106/75      06/12/22  0351 06/12/22  0500 06/13/22  0600   Weight: 84 kg (185 lb 3 oz) 84 kg (185 lb 3 oz) 85.2 kg (187 lb 13.3 oz)     Body mass index is 33.28 kg/m².  ---------------------------------------------------------------------------------------------------------------------   Physical exam:   Constitutional:  Well-developed and well-nourished.  No respiratory distress.      HENT:  Head: Normocephalic and atraumatic.  Mouth:  Moist mucous membranes.    Eyes:  Conjunctivae  and EOM are normal.  Pupils are equal, round, and reactive to light.  No scleral icterus.  Neck:  Neck supple.  No JVD present.    Cardiovascular:  Normal rate, regular rhythm and normal heart sounds with no murmur.  Pulmonary/Chest:  No respiratory distress, no wheezes, no crackles, with normal breath sounds and good air movement.  Abdominal:  Soft.  Bowel sounds are normal.  No distension and no tenderness.   Musculoskeletal:  No edema, no tenderness, and no deformity.  No red or swollen joints anywhere.    Neurological:  Alert and oriented to person, place, and time.  No cranial nerve deficit.  No tongue deviation.  No facial droop.  No slurred speech.   Skin:  Skin is warm and dry.  No rash noted.  No pallor.   Psychiatric:  Normal mood and affect.  Behavior is normal.  Judgment and thought content normal.   Peripheral vascular:  No edema and strong pulses on all 4 extremities.    ---------------------------------------------------------------------------------------------------------------------     I have personally looked at both the EKG and the telemetry strips.  Echo:  Results for orders placed during the hospital encounter of 04/02/22    Adult Transthoracic Echo Complete w/ Color, Spectral and Contrast if necessary per protocol    Interpretation Summary  · Left ventricular ejection fraction appears to be 61 - 65%.  · Left ventricular diastolic function is consistent with (grade I) impaired relaxation.  · No significant valvular abnormality  · Moderate sclerosis of the aortic valve  · No pericardial effusion  · No prior study for comparison    ---------------------------------------------------------------------------------------------------------------------   Results from last 7 days   Lab Units 06/11/22  2332   WBC 10*3/mm3 7.64   HEMOGLOBIN g/dL 13.8   HEMATOCRIT % 41.1   MCV fL 81.9   MCHC g/dL 33.6   PLATELETS 10*3/mm3 309         Results from last 7 days   Lab Units 06/12/22  1000 06/11/22  2437    SODIUM mmol/L 136 129*   POTASSIUM mmol/L 3.6 3.9   CHLORIDE mmol/L 99 94*   CO2 mmol/L 21.6* 20.3*   BUN mg/dL 11 16   CREATININE mg/dL 0.62 0.87   CALCIUM mg/dL 9.3 9.6   GLUCOSE mg/dL 118* 110*   ALBUMIN g/dL 4.20 4.50   BILIRUBIN mg/dL 0.5 0.5   ALK PHOS U/L 96 93   AST (SGOT) U/L 17 16   ALT (SGPT) U/L 13 16   Estimated Creatinine Clearance: 109.8 mL/min (by C-G formula based on SCr of 0.62 mg/dL).  No results found for: AMMONIA  Results from last 7 days   Lab Units 06/12/22 2029 06/12/22  0959 06/12/22  0149   TROPONIN T ng/mL <0.010 <0.010 <0.010     Results from last 7 days   Lab Units 06/11/22  2332   PROBNP pg/mL 60.6     Lab Results   Component Value Date    HGBA1C 5.80 (H) 05/21/2022     Lab Results   Component Value Date    TSH 2.410 05/22/2022     No results found for: PREGTESTUR, PREGSERUM, HCG, HCGQUANT  Pain Management Panel     Pain Management Panel Latest Ref Rng & Units 5/22/2022    AMPHETAMINES SCREEN, URINE Negative Negative    BARBITURATES SCREEN Negative Negative    BENZODIAZEPINE SCREEN, URINE Negative Negative    BUPRENORPHINEUR Negative Negative    COCAINE SCREEN, URINE Negative Negative    METHADONE SCREEN, URINE Negative Negative    METHAMPHETAMINEUR Negative Negative        No results found for: BLOODCX  No results found for: URINECX  No results found for: WOUNDCX  No results found for: STOOLCX        ---------------------------------------------------------------------------------------------------------------------   Imaging Results (Last 7 Days)     Procedure Component Value Units Date/Time    XR Chest 1 View [[id number]] Collected: 06/11/22 2345     Updated: 06/11/22 2348    Narrative:      CR Chest 1 Vw    INDICATION:   Chest pain and shortness of air.     COMPARISON:    5/21/2022    FINDINGS:  Portable AP view(s) of the chest.  The heart and mediastinal contours are normal. The lungs are clear. No pneumothorax or pleural effusion. Patient is status post CABG.      Impression:       No acute cardiopulmonary findings.    Signer Name: Alexei Roman MD   Signed: 2022 11:45 PM   Workstation Name: [email or web address]    [facility]        ----------------------------------------------------------------------------------------------------------------------  Assessment:   Chest pain concerning for worsening angina.  CAD s/p CABG with a patent LIMA to LAD and occluded vein grafts, s/p PCI to mid RCA, known ostial RPDA 90% stenosis which is likely jailed and failed attempt at PCI during the last procedure.  Hypertension  Type 2 diabetes mellitus        Plan:   Patient was on triple therapy with Xarelto 2.5 twice daily along with aspirin and Brilinta, I will hold her Xarelto in anticipation for heart cath tomorrow and try to at least do balloon angioplasty to the ostial RPDA lesion.  She said that her chest pain is mild currently.  Her troponins have been negative.  She has been hypotensive overnight and she is getting IV fluid boluses.  Her beta-blocker and ARB has been held due to hypotension.  We will plan for coronary angiogram in a.m.  Keep her n.p.o. after midnight.  Thank you for the consult.          Connor Chaney MD, [facility]  Interventional Cardiology      22  10:07 EDT    Electronically signed by Connor Chaney MD at 22 1014       Nutrition Notes (most recent note)    No notes exist for this encounter.            Physical Therapy Notes (most recent note)      Carolann Oneill, JEROD at 22 7024  Version 1 of 1         Acute Care - Physical Therapy Initial Evaluation   [place]     Patient Name: Lissa Eisenberg  : 1970  MRN: [id number]  Today's Date: 2022   Onset of Illness/Injury or Date of Surgery: 22  Visit Dx:     ICD-10-CM ICD-9-CM   1. Chest pain, unspecified type  R07.9 786.50   2. Stable angina pectoris (HCC)  I20.8 413.9     Patient Active Problem List   Diagnosis   • NSTEMI (non-ST elevated myocardial infarction)  (HCC)   • Abnormal nuclear stress test   • Coronary artery disease involving native coronary artery of native heart   • Legally blind in right eye, as defined in [place]   • Primary hypertension   • Gastroesophageal reflux disease without esophagitis   • Type 2 diabetes mellitus with hyperglycemia (HCC)   • Dyslipidemia, goal LDL below 70   • Chest pain   • Dehydration   • Chest pain, unspecified type     Past Medical History:   Diagnosis Date   • COVID-19    • Diabetes mellitus (HCC)    • Hyperlipidemia    • Hypertension    • Myocardial infarct (HCC)    • PCOS (polycystic ovarian syndrome)    • PTSD (post-traumatic stress disorder)      Past Surgical History:   Procedure Laterality Date   • CARDIAC CATHETERIZATION     • CARDIAC CATHETERIZATION N/A 2022    Procedure: Left Heart Cath;  Surgeon: Alexandr Marquez MD;  Location:  [facility] CATH INVASIVE LOCATION;  Service: Cardiology;  Laterality: N/A;   • CARDIAC CATHETERIZATION N/A 6/15/2022    Procedure: Coronary angiography;  Surgeon: Connor Chaney MD;  Location:  [facility] CATH INVASIVE LOCATION;  Service: Cardiology;  Laterality: N/A;   •  SECTION     • CHOLECYSTECTOMY     • CORONARY ARTERY BYPASS GRAFT      x 2   • CORONARY STENT PLACEMENT      x 6 per patient   • DILATATION AND CURETTAGE     • KNEE SURGERY Left    • TONSILLECTOMY       PT Assessment (last 12 hours)     PT Evaluation and Treatment     Row Name 22 0987          Physical Therapy Time and Intention    Subjective Information complains of;weakness  -CT     Document Type evaluation  -CT     Mode of Treatment physical therapy  -CT     Patient Effort good  -CT     Symptoms Noted During/After Treatment fatigue  -CT     Comment Pt tolerated evaluation well. Pt reports she has required assist from family for the last 6 months d/t recent heart surgery. Pt reports decreased endurance and activity tolerance that are interfering with her quality of life. Pt is CGA for all mobility at time  of kathy. Pt ambualted 8 ft x 3 with 2 standing rest breaks. Anticipated d/c inpatient rehab vs home with assist and HHPT.  -CT     Row Name 06/21/22 0935          General Information    Patient Profile Reviewed yes  -CT     Onset of Illness/Injury or Date of Surgery 06/11/22  -CT     Referring Physician Oculam  -CT     Patient Observations alert;cooperative;agree to therapy  -CT     Prior Level of Function min assist:;gait;transfer  -CT     Equipment Currently Used at Home walker, rolling;commode, bedside  -CT     Existing Precautions/Restrictions fall  -CT     Equipment Issued to Patient gait belt  -CT     Risks Reviewed patient:;LOB;nausea/vomiting;dizziness;increased discomfort;change in vital signs;increased drainage;lines disloged  -CT     Benefits Reviewed patient:;improve function;increase independence;increase strength;increase balance;decrease pain;decrease risk of DVT;improve skin integrity;increase knowledge  -CT     Barriers to Rehab previous functional deficit;medically complex  -CT     Row Name 06/21/22 0935          Living Environment    Current Living Arrangements other (see comments)  [place]  -CT     People in Home child(risa), dependent;spouse  -CT     Row Name 06/21/22 0935          Cognition    Affect/Mental Status (Cognition) WFL;sad/depressed affect  -CT     Orientation Status (Cognition) oriented x 4  -CT     Follows Commands (Cognition) WFL  -CT     Row Name 06/21/22 0935          Range of Motion Comprehensive    Comment, General Range of Motion BLE grossly WFL  -CT     Row Name 06/21/22 0935          Strength Comprehensive (MMT)    Comment, General Manual Muscle Testing (MMT) Assessment BLE grossly 3/5  -CT     Row Name 06/21/22 0935          Bed Mobility    Bed Mobility bed mobility (all) activities  -CT     All Activities, [place] (Bed Mobility) contact guard  -CT     Bed Mobility, Safety Issues decreased use of arms for pushing/pulling;decreased use of legs  for bridging/pushing  -CT     Assistive Device (Bed Mobility) bed rails  -CT     Row Name 06/21/22 0935          Transfers    Transfers stand-sit transfer;sit-stand transfer  -CT     Sit-Stand [place] (Transfers) contact guard  -CT     Stand-Sit [place] (Transfers) contact guard  -CT     Row Name 06/21/22 0935          Sit-Stand Transfer    Assistive Device (Sit-Stand Transfers) walker, front-wheeled  -CT     Row Name 06/21/22 0935          Stand-Sit Transfer    Assistive Device (Stand-Sit Transfers) walker, front-wheeled  -CT     Row Name 06/21/22 0935          Gait/Stairs (Locomotion)    [place] Level (Gait) contact guard  -CT     Assistive Device (Gait) walker, front-wheeled  -CT     Distance in Feet (Gait) 8 ft x 3 with 2 standing rest breaks  -CT     Pattern (Gait) swing-to  -CT     Deviations/Abnormal Patterns (Gait) gait speed decreased  -CT     Bilateral Gait Deviations forward flexed posture  -CT     Row Name 06/21/22 0935          Balance    Balance Assessment sitting static balance;standing static balance;standing dynamic balance  -CT     Static Sitting Balance modified independence  -CT     Position, Sitting Balance sitting in chair  -CT     Static Standing Balance contact guard  -CT     Dynamic Standing Balance minimal assist  -CT     Position/Device Used, Standing Balance walker, front-wheeled  -CT     Row Name 06/21/22 0935          Coping    Observed Emotional State calm;cooperative  -CT     Verbalized Emotional State acceptance  -CT     Row Name 06/21/22 0935          Plan of Care Review    Plan of Care Reviewed With patient  -CT     Row Name 06/21/22 0935          Positioning and Restraints    Pre-Treatment Position in bed  -CT     Post Treatment Position chair  -CT     In Chair sitting;call light within reach;encouraged to call for assist;notified nsg  -CT     Row Name 06/21/22 0935          Therapy Assessment/Plan (PT)    Patient/Family Therapy Goals Statement (PT) Pt goals are to  increase activity and decrease assist needed from family  -CT     Functional Level at Time of Evaluation (PT) CGA  -CT     PT Diagnosis (PT) decreased functional mobility and activity tolerance  -CT     Rehab Potential (PT) good, to achieve stated therapy goals  -CT     Criteria for Skilled Interventions Met (PT) yes;skilled treatment is necessary  -CT     Therapy Frequency (PT) 2 times/wk  2-5 times/wk  -CT     Predicted Duration of Therapy Intervention (PT) length of stay  -CT     Row Name 06/21/22 0935          Therapy Plan Review/Discharge Plan (PT)    Therapy Plan Review (PT) evaluation/treatment results reviewed;care plan/treatment goals reviewed;risks/benefits reviewed;current/potential barriers reviewed;participants voiced agreement with care plan;participants included;patient  -CT     Row Name 06/21/22 0935          Physical Therapy Goals    Transfer Goal Selection (PT) transfer, PT goal 1  -CT     Gait Training Goal Selection (PT) gait training, PT goal 1  -CT     Row Name 06/21/22 0935          Transfer Goal 1 (PT)    Activity/Assistive Device (Transfer Goal 1, PT) sit-to-stand/stand-to-sit;bed-to-chair/chair-to-bed  -CT     [place] Level/Cues Needed (Transfer Goal 1, PT) supervision required  -CT     Time Frame (Transfer Goal 1, PT) by discharge  -CT     Row Name 06/21/22 0935          Gait Training Goal 1 (PT)    Activity/Assistive Device (Gait Training Goal 1, PT) gait (walking locomotion);assistive device use  -CT     [place] Level (Gait Training Goal 1, PT) supervision required  -CT     Distance (Gait Training Goal 1, PT) 50  -CT     Time Frame (Gait Training Goal 1, PT) by discharge  -CT           User Key  (r) = Recorded By, (t) = Taken By, (c) = Cosigned By    Initials Name Provider Type    CT Carolann Oneill, PT Physical Therapist                  PT Recommendation and Plan  Anticipated Discharge Disposition (PT): inpatient rehabilitation facility, home with assist, home with home  health  Planned Therapy Interventions (PT): balance training, bed mobility training, gait training, home exercise program, manual therapy techniques, motor coordination training, neuromuscular re-education, patient/family education, postural re-education, strengthening, transfer training  Therapy Frequency (PT): 2 times/wk (2-5 times/wk)  Plan of Care Reviewed With: patient       Time Calculation:    PT Charges     Row Name 22 0951             Time Calculation    PT Received On 22  -CT      PT Goal Re-Cert Due Date 22  -CT            User Key  (r) = Recorded By, (t) = Taken By, (c) = Cosigned By    Initials Name Provider Type    CT Carolann Oneill, PT Physical Therapist              Therapy Charges for Today     Code Description Service Date Service Provider Modifiers Qty    15831121126 HC PT EVAL MOD COMPLEXITY 4 2022 Carolann Oneill, PT GP 1               Carolann Oneill PT  2022      Electronically signed by Carolann Oneill PT at 22 0952          Occupational Therapy Notes (most recent note)      Beena Goldberg, OT at 22 0948          Patient Name: Lissa Eisenberg  : 1970    MRN: [id number]                              Today's Date: 2022       Admit Date: 2022    Visit Dx:     ICD-10-CM ICD-9-CM   1. Chest pain, unspecified type  R07.9 786.50   2. Stable angina pectoris (HCC)  I20.8 413.9     Patient Active Problem List   Diagnosis   • NSTEMI (non-ST elevated myocardial infarction) ([facility])   • Abnormal nuclear stress test   • Coronary artery disease involving native coronary artery of native heart   • Legally blind in right eye, as defined in [place]   • Primary hypertension   • Gastroesophageal reflux disease without esophagitis   • Type 2 diabetes mellitus with hyperglycemia ([facility])   • Dyslipidemia, goal LDL below 70   • Chest pain   • Dehydration   • Chest pain, unspecified type     Past Medical History:   Diagnosis Date   • COVID-19    • Diabetes mellitus  (HCC)    • Hyperlipidemia    • Hypertension    • Myocardial infarct (HCC)    • PCOS (polycystic ovarian syndrome)    • PTSD (post-traumatic stress disorder)      Past Surgical History:   Procedure Laterality Date   • CARDIAC CATHETERIZATION     • CARDIAC CATHETERIZATION N/A 2022    Procedure: Left Heart Cath;  Surgeon: Alexandr Marquez MD;  Location:  [facility] CATH INVASIVE LOCATION;  Service: Cardiology;  Laterality: N/A;   • CARDIAC CATHETERIZATION N/A 6/15/2022    Procedure: Coronary angiography;  Surgeon: Connor Chaney MD;  Location:  [facility] CATH INVASIVE LOCATION;  Service: Cardiology;  Laterality: N/A;   •  SECTION     • CHOLECYSTECTOMY     • CORONARY ARTERY BYPASS GRAFT      x 2   • CORONARY STENT PLACEMENT      x 6 per patient   • DILATATION AND CURETTAGE     • KNEE SURGERY Left    • TONSILLECTOMY        General Information     Row Name 22          OT Time and Intention    Document Type evaluation  -LM     Mode of Treatment occupational therapy  -LM     Row Name 22       General Information    Patient Profile Reviewed -- yes  -LM    Prior Level of Function ADL's;mod assist:;all household mobility;transfer  patient reports that spouse assists with bathing, dressing, and shower transfers.  uses rw, bsc  -LM ADL's;transfer;all household mobility  -LM    Existing Precautions/Restrictions -- fall  -LM    Barriers to Rehab -- previous functional deficit;medically complex  -LM    Row Name 22          Living Environment    People in Home child(risa), dependent;spouse  -LM     Row Name 22          Cognition    Orientation Status (Cognition) oriented x 4  -LM     Row Name 22          Safety Issues, Functional Mobility    Impairments Affecting Function (Mobility) balance;endurance/activity tolerance;strength;range of motion (ROM)  -LM           User Key  (r) = Recorded By, (t) = Taken By, (c) = Cosigned By    Initials Name  Provider Type    LM Beena Goldberg, OT Occupational Therapist                 Mobility/ADL's     Row Name 06/21/22 0943          Transfers    Transfers toilet transfer  -     [place] Level (Toilet Transfer) contact guard;minimum assist (75% patient effort)  -     Assistive Device (Toilet Transfer) commode, 3-in-1  -     Row Name 06/21/22 0943          Toilet Transfer    Type (Toilet Transfer) stand pivot/stand step  -     Row Name 06/21/22 0943          Activities of Daily Living    BADL Assessment/Intervention bathing;upper body dressing;lower body dressing;grooming;feeding;toileting  -     Row Name 06/21/22 0943          Bathing Assessment/Intervention    Elnora Level (Bathing) minimum assist (75% patient effort);moderate assist (50% patient effort)  -LM     Row Name 06/21/22 0943          Upper Body Dressing Assessment/Training    Eln[place] Level (Upper Body Dressing) minimum assist (75% patient effort)  -     Row Name 06/21/22 0943          Lower Body Dressing Assessment/Training    Elnora Level (Lower Body Dressing) minimum assist (75% patient effort);moderate assist (50% patient effort)  -LM     Row Name 06/21/22 0943          Grooming Assessment/Training    Elnora Level (Grooming) set up  -     Row Name 06/21/22 0943          Self-Feeding Assessment/Training    Elnora Level (Feeding) set up  -     Row Name 06/21/22 0943          Toileting Assessment/Training    [place] Level (Toileting) minimum assist (75% patient effort)  -           User Key  (r) = Recorded By, (t) = Taken By, (c) = Cosigned By    Initials Name Provider Type    LM Beena Goldberg, OT Occupational Therapist               Obj/Interventions     Row Name 06/21/22 0944          Sensory Assessment (Somatosensory)    Sensory Assessment (Somatosensory) sensation intact  -     Row Name 06/21/22 0944          Vision Assessment/Intervention    Visual Impairment/Limitations other (see comments)   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  -LM     Row Name 06/21/22 0944          Range of Motion Comprehensive    General Range of Motion upper extremity range of motion deficits identified  -LM     Comment, General Range of Motion bue shoulder arom 1/2, reports rotator cuff injuries  -LM     Row Name 06/21/22 0944          Strength Comprehensive (MMT)    General Manual Muscle Testing (MMT) Assessment upper extremity strength deficits identified  -LM     Comment, General Manual Muscle Testing (MMT) Assessment bue shoulders 3-/5  -LM     Row Name 06/21/22 0944          Motor Skills    Motor Skills functional endurance  -LM     Functional Endurance F-  -LM           User Key  (r) = Recorded By, (t) = Taken By, (c) = Cosigned By    Initials Name Provider Type    LM Beena Goldberg, OT Occupational Therapist               Goals/Plan     Row Name 06/21/22 0947          Transfer Goal 1 (OT)    Activity/Assistive Device (Transfer Goal 1, OT) transfers, all;commode, 3-in-1;walker, rolling  -LM     Josephine Level/Cues Needed (Transfer Goal 1, OT) supervision required;standby assist  -LM     Time Frame (Transfer Goal 1, OT) by discharge  -LM     Row Name 06/21/22 0947          Bathing Goal 1 (OT)    Activity/Device (Bathing Goal 1, OT) bathing skills, all  -LM     Josephine Level/Cues Needed (Bathing Goal 1, OT) supervision required;contact guard required  -LM     Time Frame (Bathing Goal 1, OT) by discharge  -LM     Row Name 06/21/22 0947          Therapy Assessment/Plan (OT)    Planned Therapy Interventions (OT) activity tolerance training;adaptive equipment training;BADL retraining;patient/caregiver education/training;transfer/mobility retraining;strengthening exercise;ROM/therapeutic exercise  -LM           User Key  (r) = Recorded By, (t) = Taken By, (c) = Cosigned By    Initials Name Provider Type    LM Beena Goldberg, OT Occupational Therapist               Clinical Impression     Row Name 06/21/22 0946          Plan of Care  Review    Plan of Care Reviewed With patient  -LM     Row Name 06/21/22 0946          Therapy Assessment/Plan (OT)    Patient/Family Therapy Goal Statement (OT) return to plof  -LM     Rehab Potential (OT) good, to achieve stated therapy goals  -LM     Criteria for Skilled Therapeutic Interventions Met (OT) yes;meets criteria;skilled treatment is necessary  -LM     Therapy Frequency (OT) other (see comments)  prn and/or to monitor fxl progress  -LM     Row Name 06/21/22 0946          Therapy Plan Review/Discharge Plan (OT)    Anticipated Discharge Disposition (OT) home with home health  patient requests inpatient rehab to continue cardiac rehab  -LM     Row Name 06/21/22 0946          Positioning and Restraints    Post Treatment Position chair  -LM     In Chair call light within reach;encouraged to call for assist  -LM           User Key  (r) = Recorded By, (t) = Taken By, (c) = Cosigned By    Initials Name Provider Type    LM Beena Goldberg OT Occupational Therapist               Outcome Measures    No documentation.                   OT Recommendation and Plan  Planned Therapy Interventions (OT): activity tolerance training, adaptive equipment training, BADL retraining, patient/caregiver education/training, transfer/mobility retraining, strengthening exercise, ROM/therapeutic exercise  Therapy Frequency (OT): other (see comments) (prn and/or to monitor fxl progress)  Plan of Care Review  Plan of Care Reviewed With: patient     Time Calculation:     Therapy Charges for Today     Code Description Service Date Service Provider Modifiers Qty    [id number]  OT EVAL MOD COMPLEXITY 4 6/21/2022 Beena Goldberg OT GO 1               Beena Goldberg OT  6/21/2022    Electronically signed by Beena Goldberg OT at 06/21/22 0948       ADL Documentation (most recent)    Flowsheet Row Most Recent Value   Transferring 0 - independent   Toileting 0 - independent   Bathing 0 - independent   Dressing 0 - independent   Eating 0 -  independent   Communication 0 - understands/communicates without difficulty   Swallowing 0 - swallows foods/liquids without difficulty   Equipment Currently Used at Home bath bench, walker, rolling

## 2022-06-21 NOTE — TELEPHONE ENCOUNTER
Caller: RAO    Relationship:     REPRESENTATIVE FROM HOME HEALTH    Best call back number:     [phone]    What is the best time to reach you:     ANY TIME DURING BUSINESS HOURS    Who are you requesting to speak with (clinical staff, provider,  specific staff member):     DR AU    What was the call regarding:     CALLER REQUESTED A VERBAL CONFIRMATION FROM DR AU REGARDING SIGNING OFF ON ORDERS FOR PATIENT TO RECEIVE HOME HEALTH FOR  OCCUPATIONAL THERAPY    Do you require a callback:     YES

## 2022-06-22 ENCOUNTER — TRANSITIONAL CARE MANAGEMENT TELEPHONE ENCOUNTER (OUTPATIENT)
Dept: CALL CENTER | Facility: HOSPITAL | Age: 52
End: 2022-06-22

## 2022-06-22 NOTE — TELEPHONE ENCOUNTER
Called home health agency and spoke with Evelyn. I explained that this was ordered at hospital discharge. Evelyn stated that it requires PCP sign off. I told Evelyn that the patient is suppose to be seen by Dr. Deng this week and it would be addressed at that time.

## 2022-06-22 NOTE — OUTREACH NOTE
Call Center TCM Note    Flowsheet Row Responses   [facility] patient discharged from? [facility]   Does the patient have one of the following disease processes/diagnoses(primary or secondary)? Other   TCM attempt successful? Yes   General alerts for this patient homeless-SW/CM facilitating placement   Discharge diagnosis chest pain s/p Heart cath with Angioplasty    Meds reviewed with patient/caregiver? Yes   Is the patient having any side effects they believe may be caused by any medication additions or changes? No   Does the patient have all medications ordered at discharge? Yes   Is the patient taking all medications as directed (includes completed medication regime)? Yes   Does the patient have a primary care provider?  Yes   Does the patient have an appointment with their PCP within 7 days of discharge? Yes   Comments regarding PCP TCM APPT with PCP Roxy Deng is 06/24/2022, pt also has Cardio follow up tomorrow 06/23/2022.   Has the patient kept scheduled appointments due by today? N/A   Has home health visited the patient within 72 hours of discharge? N/A   Psychosocial issues? No   Did the patient receive a copy of their discharge instructions? Yes   Nursing interventions Reviewed instructions with patient   What is the patient's perception of their health status since discharge? Improving   Is the patient/caregiver able to teach back signs and symptoms related to disease process for when to call PCP? Yes   Is the patient/caregiver able to teach back signs and symptoms related to disease process for when to call 911? Yes   Is the patient/caregiver able to teach back the hierarchy of who to call/visit for symptoms/problems? PCP, Specialist, Home health nurse, Urgent Care, ED, 911 Yes   If the patient is a current smoker, are they able to teach back resources for cessation? Not a smoker   TCM call completed? Yes   Wrap up additional comments This nice pt feeling better, very tired s/p D/C DX: chest pain  s/p Heart cath with Angioplasty. Fortunately  SW/CM able to place pt in shelter for now. Pt has all new meds and states understanding of all changes. TCM APPT with PCP Roxy Deng is 06/24/2022, pt also has Cardio follow up tomorrow 06/23/2022.          Josie Begum MA    6/22/2022, 13:45 EDT

## 2022-06-23 ENCOUNTER — APPOINTMENT (OUTPATIENT)
Dept: CARDIAC REHAB | Facility: HOSPITAL | Age: 52
End: 2022-06-23

## 2022-06-23 ENCOUNTER — OFFICE VISIT (OUTPATIENT)
Dept: CARDIOLOGY | Facility: CLINIC | Age: 52
End: 2022-06-23

## 2022-06-23 VITALS
HEART RATE: 97 BPM | WEIGHT: 188.2 LBS | BODY MASS INDEX: 33.35 KG/M2 | SYSTOLIC BLOOD PRESSURE: 104 MMHG | OXYGEN SATURATION: 98 % | HEIGHT: 63 IN | DIASTOLIC BLOOD PRESSURE: 71 MMHG

## 2022-06-23 DIAGNOSIS — I25.118 CORONARY ARTERY DISEASE OF NATIVE ARTERY OF NATIVE HEART WITH STABLE ANGINA PECTORIS: Primary | Chronic | ICD-10-CM

## 2022-06-23 DIAGNOSIS — E78.5 DYSLIPIDEMIA, GOAL LDL BELOW 70: Chronic | ICD-10-CM

## 2022-06-23 DIAGNOSIS — I10 PRIMARY HYPERTENSION: Chronic | ICD-10-CM

## 2022-06-23 PROCEDURE — 93000 ELECTROCARDIOGRAM COMPLETE: CPT | Performed by: NURSE PRACTITIONER

## 2022-06-23 PROCEDURE — 99214 OFFICE O/P EST MOD 30 MIN: CPT | Performed by: NURSE PRACTITIONER

## 2022-06-23 RX ORDER — LANOLIN ALCOHOL/MO/W.PET/CERES
800 CREAM (GRAM) TOPICAL 2 TIMES DAILY
Qty: 180 TABLET | Refills: 3 | Status: SHIPPED | OUTPATIENT
Start: 2022-06-23

## 2022-06-23 RX ORDER — ATORVASTATIN CALCIUM 80 MG/1
80 TABLET, FILM COATED ORAL NIGHTLY
Qty: 90 TABLET | Refills: 3 | Status: SHIPPED | OUTPATIENT
Start: 2022-06-23

## 2022-06-23 RX ORDER — RANOLAZINE 1000 MG/1
1000 TABLET, EXTENDED RELEASE ORAL 2 TIMES DAILY
Qty: 180 TABLET | Refills: 3 | Status: SHIPPED | OUTPATIENT
Start: 2022-06-23

## 2022-06-23 RX ORDER — ISOSORBIDE MONONITRATE 60 MG/1
60 TABLET, EXTENDED RELEASE ORAL
Qty: 90 TABLET | Refills: 3 | Status: SHIPPED | OUTPATIENT
Start: 2022-06-23

## 2022-06-23 RX ORDER — HYDRALAZINE HYDROCHLORIDE 25 MG/1
25 TABLET, FILM COATED ORAL EVERY 8 HOURS SCHEDULED
Qty: 270 TABLET | Refills: 3 | Status: SHIPPED | OUTPATIENT
Start: 2022-06-23 | End: 2022-07-06 | Stop reason: SDUPTHER

## 2022-06-23 RX ORDER — ASPIRIN 81 MG/1
81 TABLET ORAL DAILY
Qty: 90 TABLET | Refills: 3 | Status: SHIPPED | OUTPATIENT
Start: 2022-06-23

## 2022-06-23 RX ORDER — METOPROLOL SUCCINATE 50 MG/1
50 TABLET, EXTENDED RELEASE ORAL
Qty: 90 TABLET | Refills: 3 | Status: SHIPPED | OUTPATIENT
Start: 2022-06-23 | End: 2022-06-24

## 2022-06-23 NOTE — PROGRESS NOTES
"Chief Complaint  Shortness of Breath (Follow up from cath 06/15/22 patient states shortness of breath, appetite loss, dizziness, weakness, bruises easily )    Subjective          Lissa Eisenberg presents to [facility] for follow up.    History of Present Illness    Pt was last seen in the hospital on 6/16/2022.  During her hospitalization she underwent cardiac catheterization and balloon angioplasty of the ostial RPDA jailed stenosis.    At today's visit the patient reports that she has done well since she was discharged.  She does report sensation of occasional chest pain and also states that she is sometimes short of breath and has dyspnea on exertion.  Her breathing issues are chronic and not worsening.    The patient tells me that she is planning to move in about 1 month.    Objective     Vital Signs:   /71 (BP Location: Left arm, Patient Position: Sitting, Cuff Size: Adult)   Pulse 97   Ht 160 cm (63\")   Wt 85.4 kg (188 lb 3.2 oz)   SpO2 98%   BMI 33.34 kg/m²       Physical Exam  Vitals reviewed.   Constitutional:       Appearance: Normal appearance. She is well-developed.   Cardiovascular:      Rate and Rhythm: Normal rate and regular rhythm.      Heart sounds: No murmur heard.    No friction rub. No gallop.   Pulmonary:      Effort: Pulmonary effort is normal. No respiratory distress.      Breath sounds: Normal breath sounds. No wheezing or rales.   Skin:     General: Skin is warm and dry.   Neurological:      Mental Status: She is alert and oriented to person, place, and time.   Psychiatric:         Mood and Affect: Mood normal.         Behavior: Behavior normal.          Result Review :            ECG 12 Lead    Date/Time: 6/23/2022 10:14 AM  Performed by: Carlita Grider APRN  Authorized by: Carlita Grider APRN   Comparison: compared with previous ECG from 6/19/2022  Similar to previous ECG  Comparison to previous ECG: NSR 91 bpm  Rhythm: sinus rhythm  Rate: " normal  BPM: 96  Comments:              Current Outpatient Medications   Medication Sig Dispense Refill   • aspirin 81 MG EC tablet Take 1 tablet by mouth Daily. 90 tablet 3   • atorvastatin (LIPITOR) 80 MG tablet Take 1 tablet by mouth Every Night. 90 tablet 3   • DULoxetine (CYMBALTA) 30 MG capsule Take 30 mg by mouth 2 (Two) Times a Day.     • hydrALAZINE (APRESOLINE) 25 MG tablet Take 1 tablet by mouth Every 8 (Eight) Hours. 270 tablet 3   • isosorbide mononitrate (IMDUR) 60 MG 24 hr tablet Take 1 tablet by mouth Daily. 90 tablet 3   • levocetirizine (XYZAL) 5 MG tablet Take 1 tablet by mouth Every Evening. 30 tablet 5   • linaclotide (Linzess) 72 MCG capsule capsule Take 1 capsule by mouth Every Morning Before Breakfast. 30 capsule 2   • Magnesium Oxide 400 (240 Mg) MG tablet Take 2 tablets by mouth 2 (Two) Times a Day. 180 tablet 3   • nitroglycerin (NITROSTAT) 0.4 MG SL tablet Place 1 tablet under the tongue Every 5 minutes as Needed for Chest Pain until chest pain is relieved. If you have to take 3 tabs, take the 3rd and go to the hospital to be evaluated. 25 tablet 1   • Ozempic, 1 MG/DOSE, 4 MG/3ML solution pen-injector Inject 1 mg into the appropriate muscle as directed by prescriber 1 (One) Time Per Week. (Patient taking differently: Inject 1 mg into the appropriate muscle as directed by prescriber 1 (One) Time Per Week. Thursdays.) 9 mL 1   • Prenatal Vit-Fe Fumarate-FA (prenatal vitamin 27-0.8) 27-0.8 MG tablet tablet Take 1 tablet by mouth Daily.     • ranolazine (Ranexa) 1000 MG 12 hr tablet Take 1 tablet by mouth 2 (Two) Times a Day. 180 tablet 3   • Rivaroxaban (Xarelto) 2.5 MG tablet Take 1 tablet by mouth 2 (Two) Times a Day. 180 tablet 3   • ticagrelor (BRILINTA) 90 MG tablet tablet Take 1 tablet by mouth 2 (Two) Times a Day. 180 tablet 3   • busPIRone (BUSPAR) 7.5 MG tablet Take 1 tablet by mouth 2 (Two) Times a Day. 60 tablet 2   • metoprolol succinate XL (TOPROL-XL) 50 MG 24 hr tablet  Take 1.5 tabs po daily 135 tablet 3   • pantoprazole (PROTONIX) 40 MG EC tablet Take 1 tablet by mouth Daily. 90 tablet 3   • Rimegepant Sulfate (NURTEC) 75 MG tablet dispersible tablet Take 1 tablet by mouth Every Other Day As Needed (headache). 8 tablet 5     No current facility-administered medications for this visit.            Assessment and Plan    Problem List Items Addressed This Visit        Cardiac and Vasculature    Coronary artery disease involving native coronary artery of native heart - Primary (Chronic)    Overview     · Self reported first MI at age 35  · Approximately 2017 two-vessel bypass  · 8/12/2021 non-STEMI, PCI JAYDA to the RCA   · 4/5/2022 non-STEMI, arthrotomy of severe ISR, PCI of mid RCA, attempt to stent ostial RPDA unsuccessful secondary to stent scaffold protruding in overlapping fashion, and LIMA to LAD is widely patent  · 6/15/2022 [facility] for non-STEMI: Balloon angioplasty of ostial RPDA jailed stenosis             Relevant Medications    isosorbide mononitrate (IMDUR) 60 MG 24 hr tablet    ranolazine (Ranexa) 1000 MG 12 hr tablet    ticagrelor (BRILINTA) 90 MG tablet tablet    Primary hypertension (Chronic)    Relevant Medications    hydrALAZINE (APRESOLINE) 25 MG tablet    Dyslipidemia, goal LDL below 70 (Chronic)    Overview     · 4/3/2022 total cholesterol 85, triglycerides 175, HDL 32, and LDL 24           Relevant Medications    atorvastatin (LIPITOR) 80 MG tablet              Follow Up     Medications were reviewed with the patient.    ASCVD is stable.  Patient to continue aspirin, Xarelto, Brilinta, metoprolol succinate, and atorvastatin.  Continue Imdur and Ranexa.  I explained to the patient that her Imdur could be uptitrated for any chest discomfort that she had.  She can notify this office if her chest pain increases.  She had recent revascularization and does not have any further targets.    Hypertension is stable.  Continue current medications.  Continue Apresoline.      Continue atorvastatin for dyslipidemia.    Risk factor modification: Patient counseled regarding exercise.  Patient counseled to participate in physical activity 30 minutes a day most days of the week.    Risk factor modification:  Patient counseled regarding diet.      Risk factor modification: The patient was counseled regarding the correlation between uncontrolled diabetes and coronary artery disease.  The patient was urged to follow an appropriate diet with very limited simple carbohydrates and discuss with their primary care provider possible strategies to control blood sugar, including additional medications.    Return in about 3 weeks (around 7/14/2022).    Patient was given instructions and counseling regarding her condition or for health maintenance advice. Please see specific information pulled into the AVS if appropriate.

## 2022-06-24 ENCOUNTER — OFFICE VISIT (OUTPATIENT)
Dept: FAMILY MEDICINE CLINIC | Facility: CLINIC | Age: 52
End: 2022-06-24

## 2022-06-24 VITALS
SYSTOLIC BLOOD PRESSURE: 143 MMHG | BODY MASS INDEX: 33.42 KG/M2 | TEMPERATURE: 97.1 F | HEIGHT: 63 IN | WEIGHT: 188.6 LBS | OXYGEN SATURATION: 97 % | HEART RATE: 116 BPM | DIASTOLIC BLOOD PRESSURE: 89 MMHG

## 2022-06-24 DIAGNOSIS — I25.118 CORONARY ARTERY DISEASE OF NATIVE ARTERY OF NATIVE HEART WITH STABLE ANGINA PECTORIS: Primary | Chronic | ICD-10-CM

## 2022-06-24 DIAGNOSIS — I10 PRIMARY HYPERTENSION: Chronic | ICD-10-CM

## 2022-06-24 DIAGNOSIS — F41.9 ANXIETY: Chronic | ICD-10-CM

## 2022-06-24 DIAGNOSIS — G43.109 MIGRAINE WITH AURA AND WITHOUT STATUS MIGRAINOSUS, NOT INTRACTABLE: Chronic | ICD-10-CM

## 2022-06-24 DIAGNOSIS — F33.1 MAJOR DEPRESSIVE DISORDER, RECURRENT EPISODE, MODERATE DEGREE: Chronic | ICD-10-CM

## 2022-06-24 PROBLEM — K59.09 CHRONIC CONSTIPATION: Chronic | Status: ACTIVE | Noted: 2022-06-24

## 2022-06-24 PROBLEM — I21.9 MYOCARDIAL INFARCT: Status: ACTIVE | Noted: 2022-06-24

## 2022-06-24 PROCEDURE — 99214 OFFICE O/P EST MOD 30 MIN: CPT | Performed by: INTERNAL MEDICINE

## 2022-06-24 RX ORDER — BUSPIRONE HYDROCHLORIDE 7.5 MG/1
7.5 TABLET ORAL 2 TIMES DAILY
Qty: 60 TABLET | Refills: 2 | Status: SHIPPED | OUTPATIENT
Start: 2022-06-24 | End: 2022-07-06 | Stop reason: SDUPTHER

## 2022-06-24 RX ORDER — PANTOPRAZOLE SODIUM 40 MG/1
40 TABLET, DELAYED RELEASE ORAL DAILY
Qty: 90 TABLET | Refills: 3 | Status: SHIPPED | OUTPATIENT
Start: 2022-06-24

## 2022-06-24 RX ORDER — METOPROLOL SUCCINATE 50 MG/1
TABLET, EXTENDED RELEASE ORAL
Qty: 135 TABLET | Refills: 3 | Status: SHIPPED | OUTPATIENT
Start: 2022-06-24

## 2022-06-24 NOTE — PROGRESS NOTES
Transitional Care Follow Up Visit  Subjective     Lissa Eisenberg is a 51 y.o. female who presents for a transitional care management visit.    Within 48 business hours after discharge our office contacted her via telephone to coordinate her care and needs.      I reviewed and discussed the details of that call along with the discharge summary, hospital problems, inpatient lab results, inpatient diagnostic studies, and consultation reports with Lissa.     Current outpatient and discharge medications have been reconciled for the patient.  Reviewed by: Roxy Deng DO      Date of TCM Phone Call 6/21/2022   [facility]   Date of Admission 6/11/2022   Date of Discharge 6/21/2022   Discharge Disposition Home or Self Care     Risk for Readmission (LACE) Score: 13 (6/21/2022  6:01 AM)      History of Present Illness   Course During Hospital Stay:    Lissa presents today for hospital follow-up after being admitted to [facility] on June 11 and discharged on June 21, 2022 secondary to chest pain with history of atherosclerotic heart disease status post CABG x2 and PCI placement August 2021.  She underwent cardiac catheterization while inpatient by her interventional cardiologist, Dr. Smalls, who recommended aggressive guideline directed medical therapy for coronary artery disease.  She takes aspirin, Brilinta, and Xarelto along with Ranexa, nitroglycerin as needed, metoprolol, and atorvastatin.  Metoprolol was increased while she was in the hospital and she is now taken 50 mg daily.  Lisinopril was stopped while she was in the hospital and she was started on hydralazine 3 times daily for hypertension.  Ozempic was continued but metformin stopped while she was inpatient.  Her last A1c was 5.8, in the prediabetic range, in May 2022.    Today, Marva complains of headaches which she reports is chronic migraines and cluster headaches.  She believes she has tried Maxalt in the past but not  recently.  She reports these headaches are persistent, daily recently and Flexeril is not helping.    Lissa also complains of constipation.  She reports Linzess works great for her but this was held while she was in the hospital and this caused her to be constipated.  She would like samples of Linzess if available today as she is still waiting for prior authorization of Linzess to be completed.  Marva also complains of acid reflux.  She reports omeprazole is not working well for her.  She was taking Protonix while in the hospital and reports this worked much better for her.    Marva complains of anxiety.  She is currently living in a homeless shelter with her son.  She is planning to move to [place] with family next month and reports having a lot of anxiety due to her current life stressors.  She does note depression and frequent crying spells.  She feels that Cymbalta is not controlling her symptoms well.     The following portions of the patient's history were reviewed and updated as appropriate: allergies, current medications, past family history, past medical history, past social history, past surgical history and problem list.    Review of Systems   Constitutional: Positive for fatigue.   Respiratory: Positive for shortness of breath.    Cardiovascular: Negative for chest pain.   Gastrointestinal: Positive for constipation.   Neurological: Positive for dizziness and light-headedness.   Psychiatric/Behavioral: The patient is nervous/anxious.        Objective   Physical Exam  Vitals reviewed.   Constitutional:       General: She is not in acute distress.  HENT:      Head: Normocephalic and atraumatic.   Eyes:      General: No scleral icterus.  Cardiovascular:      Rate and Rhythm: Normal rate and regular rhythm.   Pulmonary:      Effort: Pulmonary effort is normal. No respiratory distress.      Breath sounds: Normal breath sounds. No wheezing or rhonchi.   Musculoskeletal:         General: No swelling.    Skin:     General: Skin is warm and dry.      Coloration: Skin is not jaundiced.   Neurological:      Mental Status: She is alert.   Psychiatric:      Comments: Patient appears anxious but is cooperative and nontearful throughout interview and exam.  She makes appropriate eye contact.         Assessment & Plan   Diagnoses and all orders for this visit:    1. Coronary artery disease of native artery of native heart with stable angina pectoris (HCC) (Primary)  -     Miscellaneous DME  -     Miscellaneous DME    2. Primary hypertension  -     Miscellaneous DME  -     Miscellaneous DME    3. Migraine with aura and without status migrainosus, not intractable    4. Major depressive disorder, recurrent episode, moderate degree (HCC)    5. Anxiety    Other orders  -     Rimegepant Sulfate (NURTEC) 75 MG tablet dispersible tablet; Take 1 tablet by mouth Every Other Day As Needed (headache).  Dispense: 8 tablet; Refill: 5  -     metoprolol succinate XL (TOPROL-XL) 50 MG 24 hr tablet; Take 1.5 tabs po daily  Dispense: 135 tablet; Refill: 3  -     busPIRone (BUSPAR) 7.5 MG tablet; Take 1 tablet by mouth 2 (Two) Times a Day.  Dispense: 60 tablet; Refill: 2  -     pantoprazole (PROTONIX) 40 MG EC tablet; Take 1 tablet by mouth Daily.  Dispense: 90 tablet; Refill: 3      I reviewed hospital records today.  Given patient's tachycardia I recommended an EKG.  EKG was done in the office showing sinus tachycardia with left bundle branch block.  I compared this to her most recent EKG done on June 19, 2022 and left bundle branch block was not present at that time.  She does have a history of a left bundle branch block present on EKG done May 22, 2022.  Patient denied chest pain but did report some shortness of air, fatigue, and dizziness.  I called her interventional cardiologist, Dr. Smalls, and discussed patient's case with him.  Dr. Moreno and I agreed that ER referral would be best at this time.  Patient declined ER evaluation today.   I explained the EKG findings and my discussion with Dr. Philip to her but she still continued to decline ER evaluation.  She agrees to go if her symptoms were to worsen.  I recommended increasing metoprolol to 75 mg daily and updated a prescription for this.  We will also change omeprazole to Protonix for better GERD control and start BuSpar twice daily for anxiety management.  I also discussed restarting Linzess.  We did not have samples available in the office today but patient has a prescription for this and is awaiting PA.  In regards to her headaches, I discussed Nurtec therapy.  Again, we did not have samples of this in the office today but we will send in a prescription and arrange PA for this if necessary.  I will plan to follow-up in the office with patient in 2 weeks.  In the interim, she reports being agreeable to going to the emergency department should her symptoms worsen.         This document has been electronically signed by Roxy Deng DO  June 24, 2022 14:44 EDT

## 2022-06-28 ENCOUNTER — APPOINTMENT (OUTPATIENT)
Dept: CARDIAC REHAB | Facility: HOSPITAL | Age: 52
End: 2022-06-28

## 2022-06-30 ENCOUNTER — APPOINTMENT (OUTPATIENT)
Dept: CARDIAC REHAB | Facility: HOSPITAL | Age: 52
End: 2022-06-30

## 2022-07-05 ENCOUNTER — APPOINTMENT (OUTPATIENT)
Dept: CARDIAC REHAB | Facility: HOSPITAL | Age: 52
End: 2022-07-05

## 2022-07-06 ENCOUNTER — OFFICE VISIT (OUTPATIENT)
Dept: FAMILY MEDICINE CLINIC | Facility: CLINIC | Age: 52
End: 2022-07-06

## 2022-07-06 VITALS
OXYGEN SATURATION: 95 % | WEIGHT: 190.6 LBS | SYSTOLIC BLOOD PRESSURE: 106 MMHG | HEART RATE: 96 BPM | HEIGHT: 63 IN | BODY MASS INDEX: 33.77 KG/M2 | DIASTOLIC BLOOD PRESSURE: 74 MMHG | TEMPERATURE: 97.3 F

## 2022-07-06 DIAGNOSIS — I10 PRIMARY HYPERTENSION: Chronic | ICD-10-CM

## 2022-07-06 DIAGNOSIS — J01.00 ACUTE NON-RECURRENT MAXILLARY SINUSITIS: Primary | ICD-10-CM

## 2022-07-06 DIAGNOSIS — H65.112 ACUTE MUCOID OTITIS MEDIA OF LEFT EAR: ICD-10-CM

## 2022-07-06 DIAGNOSIS — F41.9 ANXIETY: Chronic | ICD-10-CM

## 2022-07-06 PROCEDURE — 99214 OFFICE O/P EST MOD 30 MIN: CPT | Performed by: INTERNAL MEDICINE

## 2022-07-06 RX ORDER — BUSPIRONE HYDROCHLORIDE 7.5 MG/1
7.5 TABLET ORAL 2 TIMES DAILY
Qty: 180 TABLET | Refills: 1 | Status: SHIPPED | OUTPATIENT
Start: 2022-07-06 | End: 2022-07-12 | Stop reason: SDUPTHER

## 2022-07-06 RX ORDER — LEVOCETIRIZINE DIHYDROCHLORIDE 5 MG/1
5 TABLET, FILM COATED ORAL EVERY EVENING
Qty: 90 TABLET | Refills: 1 | Status: SHIPPED | OUTPATIENT
Start: 2022-07-06

## 2022-07-06 RX ORDER — HYDRALAZINE HYDROCHLORIDE 25 MG/1
25 TABLET, FILM COATED ORAL EVERY 8 HOURS SCHEDULED
Qty: 270 TABLET | Refills: 1 | Status: SHIPPED | OUTPATIENT
Start: 2022-07-06

## 2022-07-06 RX ORDER — AMOXICILLIN AND CLAVULANATE POTASSIUM 875; 125 MG/1; MG/1
1 TABLET, FILM COATED ORAL 2 TIMES DAILY
Qty: 20 TABLET | Refills: 0 | Status: SHIPPED | OUTPATIENT
Start: 2022-07-06 | End: 2022-07-16

## 2022-07-06 NOTE — PROGRESS NOTES
Patient Name: Lissa Eisenberg Today's Date: 2022   Patient MRN / CSN: [id number] / [id number] Date of Encounter: 2022   Patient Age / : 51 y.o. / 1970 Encounter Provider: Roxy Deng DO   Referring Physician: No ref. provider found          Lissa is a 51 y.o. female who is being seen today for left ear pain and Anxiety      Earache   There is pain in the left ear. This is a recurrent problem. The current episode started in the past 7 days. The problem occurs constantly. The problem has been waxing and waning. The maximum temperature recorded prior to her arrival was 101 - 101.9 F. The fever has been present for 1 to 2 days. The pain is at a severity of 6/10. Associated symptoms include ear discharge. Pertinent negatives include no diarrhea or hearing loss.   Anxiety  Presents for follow-up visit. Symptoms include nervous/anxious behavior and shortness of breath. Patient reports no chest pain. Primary symptoms comment: Patient reports that BuSpar has helped her with anxiety.  She reports tolerating it well and feeling much better.           Allergies include:Contrast dye, Ciprofloxacin, Sulfa antibiotics, Drug [hydrocodone-acetaminophen], Tape, and Adhesive tape  Current Outpatient Medications   Medication Sig Dispense Refill   • aspirin 81 MG EC tablet Take 1 tablet by mouth Daily. 90 tablet 3   • atorvastatin (LIPITOR) 80 MG tablet Take 1 tablet by mouth Every Night. 90 tablet 3   • busPIRone (BUSPAR) 7.5 MG tablet Take 1 tablet by mouth 2 (Two) Times a Day. 180 tablet 1   • DULoxetine (CYMBALTA) 30 MG capsule Take 30 mg by mouth 2 (Two) Times a Day.     • hydrALAZINE (APRESOLINE) 25 MG tablet Take 1 tablet by mouth Every 8 (Eight) Hours. 270 tablet 1   • isosorbide mononitrate (IMDUR) 60 MG 24 hr tablet Take 1 tablet by mouth Daily. 90 tablet 3   • levocetirizine (XYZAL) 5 MG tablet Take 1 tablet by mouth Every Evening. 90 tablet 1   • linaclotide (Linzess) 72 MCG capsule capsule Take 1  capsule by mouth Every Morning Before Breakfast. 30 capsule 2   • Magnesium Oxide 400 (240 Mg) MG tablet Take 2 tablets by mouth 2 (Two) Times a Day. 180 tablet 3   • metoprolol succinate XL (TOPROL-XL) 50 MG 24 hr tablet Take 1.5 tabs po daily 135 tablet 3   • nitroglycerin (NITROSTAT) 0.4 MG SL tablet Place 1 tablet under the tongue Every 5 minutes as Needed for Chest Pain until chest pain is relieved. If you have to take 3 tabs, take the 3rd and go to the hospital to be evaluated. 25 tablet 1   • Ozempic, 1 MG/DOSE, 4 MG/3ML solution pen-injector Inject 1 mg into the appropriate muscle as directed by prescriber 1 (One) Time Per Week. (Patient taking differently: Inject 1 mg into the appropriate muscle as directed by prescriber 1 (One) Time Per Week. Thursdays.) 9 mL 1   • pantoprazole (PROTONIX) 40 MG EC tablet Take 1 tablet by mouth Daily. 90 tablet 3   • Prenatal Vit-Fe Fumarate-FA (prenatal vitamin 27-0.8) 27-0.8 MG tablet tablet Take 1 tablet by mouth Daily.     • ranolazine (Ranexa) 1000 MG 12 hr tablet Take 1 tablet by mouth 2 (Two) Times a Day. 180 tablet 3   • Rimegepant Sulfate (NURTEC) 75 MG tablet dispersible tablet Take 1 tablet by mouth Every Other Day As Needed (headache). 8 tablet 5   • Rivaroxaban (Xarelto) 2.5 MG tablet Take 1 tablet by mouth 2 (Two) Times a Day. 180 tablet 3   • ticagrelor (BRILINTA) 90 MG tablet tablet Take 1 tablet by mouth 2 (Two) Times a Day. 180 tablet 3   • amoxicillin-clavulanate (Augmentin) 875-125 MG per tablet Take 1 tablet by mouth 2 (Two) Times a Day for 10 days. 20 tablet 0     No current facility-administered medications for this visit.     Past Medical History:   Diagnosis Date   • COVID-19    • Diabetes mellitus (HCC)    • Hyperlipidemia    • Hypertension    • Myocardial infarct (HCC)    • PCOS (polycystic ovarian syndrome)    • PTSD (post-traumatic stress disorder)      Family History   Problem Relation Age of Onset   • Lung cancer Father    • Thyroid cancer  "Brother    • Heart attack Maternal Grandfather    • Lung cancer Maternal Grandfather    • Heart attack Paternal Grandmother    • Emphysema Paternal Grandfather      Past Surgical History:   Procedure Laterality Date   • CARDIAC CATHETERIZATION     • CARDIAC CATHETERIZATION N/A 2022    Procedure: Left Heart Cath;  Surgeon: Alexandr Marquez MD;  Location:  [facility];  Service: Cardiology;  Laterality: N/A;   • CARDIAC CATHETERIZATION N/A 06/15/2022    Procedure: Coronary angiography;  Surgeon: Connor Chaney MD;  Location:  [facility];  Service: Cardiology;  Laterality: N/A;   •  SECTION     • CHOLECYSTECTOMY     • CORONARY ANGIOPLASTY     • CORONARY ARTERY BYPASS GRAFT      x 2   • CORONARY STENT PLACEMENT      x 6 per patient   • DILATATION AND CURETTAGE     • KNEE SURGERY Left    • TONSILLECTOMY       Social History     Substance and Sexual Activity   Alcohol Use Not Currently     Social History     Tobacco Use   Smoking Status Never Smoker   Smokeless Tobacco Never Used     Social History     Substance and Sexual Activity   Drug Use Never     Review of Systems   Constitutional: Positive for fever.   HENT: Positive for ear discharge. Negative for hearing loss.    Respiratory: Positive for shortness of breath.         Shortness of air is stable.   Cardiovascular: Negative for chest pain.        No current chest pain.   Gastrointestinal: Negative for diarrhea.   Psychiatric/Behavioral: The patient is nervous/anxious.         Depression Assessment Review:  PHQ-9 Total Score:    Vital Signs & Measurements Taken This Encounter  /74 (BP Location: Left arm, Patient Position: Sitting, Cuff Size: Adult)   Pulse 96   Temp 97.3 °F (36.3 °C) (Temporal)   Ht 160 cm (62.99\")   Wt 86.5 kg (190 lb 9.6 oz)   SpO2 95%   BMI 33.77 kg/m²    SpO2 Percentage    22 1320   SpO2: 95%            Physical Exam  Vitals reviewed.   Constitutional:       " General: She is not in acute distress.  HENT:      Head: Normocephalic and atraumatic.      Comments: Left maxillary sinus tenderness to palpation.     Right Ear: Tympanic membrane normal.      Ears:      Comments: Left TM with slight erythema, mucoid effusion, and bulge.  Eyes:      General: No scleral icterus.     Extraocular Movements: Extraocular movements intact.      Conjunctiva/sclera: Conjunctivae normal.      Pupils: Pupils are equal, round, and reactive to light.   Cardiovascular:      Rate and Rhythm: Normal rate and regular rhythm.   Pulmonary:      Effort: Pulmonary effort is normal. No respiratory distress.      Breath sounds: Normal breath sounds. No wheezing or rhonchi.   Musculoskeletal:         General: No swelling.      Cervical back: Neck supple. Tenderness present.   Lymphadenopathy:      Cervical: Cervical adenopathy present.   Skin:     General: Skin is warm and dry.      Coloration: Skin is not jaundiced.   Neurological:      Mental Status: She is alert.   Psychiatric:         Mood and Affect: Mood normal.         Behavior: Behavior normal.              Assessment & Plan  Patient Active Problem List   Diagnosis   • NSTEMI (non-ST elevated myocardial infarction) ([facility])   • Abnormal nuclear stress test   • Coronary artery disease involving native coronary artery of native heart   • Legally blind in right eye, as defined in [place]   • Primary hypertension   • Gastroesophageal reflux disease without esophagitis   • Type 2 diabetes mellitus with hyperglycemia ([facility])   • Dyslipidemia, goal LDL below 70   • Dehydration   • Myocardial infarct ([facility])   • Migraine with aura and without status migrainosus, not intractable   • Chronic constipation   • Major depressive disorder, recurrent episode, moderate degree ([facility])   • Anxiety       ICD-10-CM ICD-9-CM   1. Acute non-recurrent maxillary sinusitis  J01.00 461.0   2. Acute mucoid otitis media of left ear  H65.112 381.02   3. Primary hypertension  I10 401.9    4. Anxiety  F41.9 300.00     No orders of the defined types were placed in this encounter.      Meds Ordered During Visit:  New Medications Ordered This Visit   Medications   • levocetirizine (XYZAL) 5 MG tablet     Sig: Take 1 tablet by mouth Every Evening.     Dispense:  90 tablet     Refill:  1   • hydrALAZINE (APRESOLINE) 25 MG tablet     Sig: Take 1 tablet by mouth Every 8 (Eight) Hours.     Dispense:  270 tablet     Refill:  1   • busPIRone (BUSPAR) 7.5 MG tablet     Sig: Take 1 tablet by mouth 2 (Two) Times a Day.     Dispense:  180 tablet     Refill:  1   • amoxicillin-clavulanate (Augmentin) 875-125 MG per tablet     Sig: Take 1 tablet by mouth 2 (Two) Times a Day for 10 days.     Dispense:  20 tablet     Refill:  0       Augmentin discussed with patient today.  I encouraged her to take this with food.  She should continue her other medicines as previously prescribed.  Medicine refills were updated for her today as requested.  Patient reports plans of moving to [place] next week to be closer to family.  She may follow-up with us as needed.    Return if symptoms worsen or fail to improve.          Referring Provider (if known): No ref. provider found      This document has been electronically signed by Roxy Deng DO  July 6, 2022 14:45 EDT    Roxy Deng DO, FACOI  [address] [place] [address]  [phone] (office)    Part of this note may be an electronic transcription/translation of spoken language to printed text using the Dragon Dictation System.  Answers for HPI/ROS submitted by the patient on 7/4/2022  What is the primary reason for your visit?: Ear Pain

## 2022-07-07 ENCOUNTER — APPOINTMENT (OUTPATIENT)
Dept: CARDIAC REHAB | Facility: HOSPITAL | Age: 52
End: 2022-07-07

## 2022-07-08 ENCOUNTER — TELEPHONE (OUTPATIENT)
Dept: FAMILY MEDICINE CLINIC | Facility: CLINIC | Age: 52
End: 2022-07-08

## 2022-07-08 ENCOUNTER — HOSPITAL ENCOUNTER (OUTPATIENT)
Dept: MAMMOGRAPHY | Facility: HOSPITAL | Age: 52
Discharge: HOME | End: 2022-07-08
Admitting: INTERNAL MEDICINE

## 2022-07-08 DIAGNOSIS — Z12.31 ENCOUNTER FOR SCREENING MAMMOGRAM FOR MALIGNANT NEOPLASM OF BREAST: ICD-10-CM

## 2022-07-08 PROCEDURE — 77067 SCR MAMMO BI INCL CAD: CPT | Performed by: RADIOLOGY

## 2022-07-08 PROCEDURE — 77067 SCR MAMMO BI INCL CAD: CPT

## 2022-07-08 PROCEDURE — 77063 BREAST TOMOSYNTHESIS BI: CPT | Performed by: RADIOLOGY

## 2022-07-08 PROCEDURE — 77063 BREAST TOMOSYNTHESIS BI: CPT

## 2022-07-08 NOTE — TELEPHONE ENCOUNTER
Called pt and notified her that samples of Nurtec would be available for  per Dr. Deng order. Pt stated she or her  would pick them up Monday of next week

## 2022-07-08 NOTE — TELEPHONE ENCOUNTER
Patient's insurance did not cover Nurtec.  We will provide her with Nurtec samples today.  We will hold on PA process, given that patient is moving out of state next week.

## 2022-07-12 ENCOUNTER — TELEPHONE (OUTPATIENT)
Dept: FAMILY MEDICINE CLINIC | Facility: CLINIC | Age: 52
End: 2022-07-12

## 2022-07-12 ENCOUNTER — APPOINTMENT (OUTPATIENT)
Dept: CARDIAC REHAB | Facility: HOSPITAL | Age: 52
End: 2022-07-12

## 2022-07-12 RX ORDER — BUSPIRONE HYDROCHLORIDE 7.5 MG/1
7.5 TABLET ORAL 2 TIMES DAILY
Qty: 180 TABLET | Refills: 1 | Status: SHIPPED | OUTPATIENT
Start: 2022-07-12

## 2022-07-12 NOTE — TELEPHONE ENCOUNTER
I sent the two refills but augmentin was sent less than 10 days ago. She should still have some until the 16th. She should finish what she has and f/u if still feeling bad.

## 2022-07-12 NOTE — TELEPHONE ENCOUNTER
Called pt and left voicemail regarding these medications that pt requested as well as provider instructions. Encouraged to call back with any questions.

## 2022-07-12 NOTE — TELEPHONE ENCOUNTER
Caller: Lissa Eisenberg    Relationship: Self    Best call back number: [phone] (H)    What medication are you requesting: A ANTIBIOTIC STRONG THEN amoxicillin-clavulanate (Augmentin) 875-125 MG per tablet        ALSO DIFLUCAN     What are your current symptoms: LEFT EAR PAIN     How long have you been experiencing symptoms: 2 WEEKS     Have you had these symptoms before:    [x] Yes  [] No    Have you been treated for these symptoms before:   [x] Yes  [] No    If a prescription is needed, what is your preferred pharmacy and phone number: [place] [address]  [phone] [facility] [phone] FX     Additional notes: PATIENT STATED SHE FINISHED THE AMOXICILLIN AND HERE EAR IS STILL HURTING WANTING A STRONGER ANTIBIOTIC       Requested Prescriptions:   Requested Prescriptions     Pending Prescriptions Disp Refills   • busPIRone (BUSPAR) 7.5 MG tablet 180 tablet 1     Sig: Take 1 tablet by mouth 2 (Two) Times a Day.   • linaclotide (Linzess) 72 MCG capsule capsule 30 capsule 2     Sig: Take 1 capsule by mouth Every Morning Before Breakfast.        Pharmacy where request should be sent: [facility] [address] [place], [place] [address] [place] [address]  [phone] [facility] [phone] FX     Additional details provided by patient: PATIENT IS REQUESTING A 90 DAY SUPPLY OF BOTH OF THE MEDICATIONS     Does the patient have less than a 3 day supply:  [] Yes  [] No    Candi Hopper   07/12/22 12:18 EDT

## 2022-07-14 ENCOUNTER — APPOINTMENT (OUTPATIENT)
Dept: CARDIAC REHAB | Facility: HOSPITAL | Age: 52
End: 2022-07-14

## 2022-07-15 ENCOUNTER — TELEPHONE (OUTPATIENT)
Dept: FAMILY MEDICINE CLINIC | Facility: CLINIC | Age: 52
End: 2022-07-15

## 2022-07-15 NOTE — TELEPHONE ENCOUNTER
Pt  called regarding Lissa's ear infection again. They are requesting to try another abx due to continued pain.     Instructed pt to finish abx which ends tomorrow and follow up after, which was advised in first telephone call note this week on 7/12.     Explained that we do not have a provider in office today so she should go to urgent care to assess ear if no improvement.

## 2022-07-19 ENCOUNTER — APPOINTMENT (OUTPATIENT)
Dept: CARDIAC REHAB | Facility: HOSPITAL | Age: 52
End: 2022-07-19

## 2022-07-19 ENCOUNTER — TELEPHONE (OUTPATIENT)
Dept: FAMILY MEDICINE CLINIC | Facility: CLINIC | Age: 52
End: 2022-07-19

## 2022-07-19 RX ORDER — FLUCONAZOLE 150 MG/1
150 TABLET ORAL ONCE
Qty: 1 TABLET | Refills: 0 | Status: SHIPPED | OUTPATIENT
Start: 2022-07-19 | End: 2022-07-19

## 2022-07-19 RX ORDER — CEFDINIR 300 MG/1
300 CAPSULE ORAL 2 TIMES DAILY
Qty: 20 CAPSULE | Refills: 0 | Status: SHIPPED | OUTPATIENT
Start: 2022-07-19

## 2022-07-19 NOTE — TELEPHONE ENCOUNTER
Called pt in regards to picking up her nurtec samples.She states that she will not be able to pick them and asked if her  is able to do so?    The patient still complains of her ear throbbing but has completed all antibiotics.The office recommended she go to urgent care last week to get her ear reassessed but she did not have a ride. She also stated that she now also has a yeast infection from the antibiotics.

## 2022-07-19 NOTE — TELEPHONE ENCOUNTER
I am ok with her  picking up the samples.  I know he assists in her medical care.  I will send in Omnicef for her otitis media.  If her symptoms persist, she will need to come in or go to urgent care for further evaluation.  She will need to take the Omnicef twice daily with food for 10 days.

## 2022-07-19 NOTE — TELEPHONE ENCOUNTER
I will send in diflucan. She will need to hold lipitor for 3 days after taking diflucan due to drug interaction.

## 2022-07-21 ENCOUNTER — APPOINTMENT (OUTPATIENT)
Dept: CARDIAC REHAB | Facility: HOSPITAL | Age: 52
End: 2022-07-21

## 2022-07-26 ENCOUNTER — APPOINTMENT (OUTPATIENT)
Dept: CARDIAC REHAB | Facility: HOSPITAL | Age: 52
End: 2022-07-26

## 2022-07-27 ENCOUNTER — DOCUMENTATION (OUTPATIENT)
Dept: CARDIAC REHAB | Facility: HOSPITAL | Age: 52
End: 2022-07-27

## 2022-07-27 NOTE — PROGRESS NOTES
Spoke with Lissa and she stated she will not be returning to cardiac rehab here due to moving out of state. She stated she will be having open heart surgery at a later date and will attend cardiac rehab when released to do so.

## 2022-07-28 ENCOUNTER — APPOINTMENT (OUTPATIENT)
Dept: CARDIAC REHAB | Facility: HOSPITAL | Age: 52
End: 2022-07-28

## 2022-08-02 ENCOUNTER — APPOINTMENT (OUTPATIENT)
Dept: CARDIAC REHAB | Facility: HOSPITAL | Age: 52
End: 2022-08-02

## 2022-08-04 ENCOUNTER — APPOINTMENT (OUTPATIENT)
Dept: CARDIAC REHAB | Facility: HOSPITAL | Age: 52
End: 2022-08-04

## 2022-08-09 ENCOUNTER — APPOINTMENT (OUTPATIENT)
Dept: CARDIAC REHAB | Facility: HOSPITAL | Age: 52
End: 2022-08-09

## 2022-08-11 ENCOUNTER — APPOINTMENT (OUTPATIENT)
Dept: CARDIAC REHAB | Facility: HOSPITAL | Age: 52
End: 2022-08-11

## 2022-08-16 ENCOUNTER — APPOINTMENT (OUTPATIENT)
Dept: CARDIAC REHAB | Facility: HOSPITAL | Age: 52
End: 2022-08-16

## 2022-08-18 ENCOUNTER — APPOINTMENT (OUTPATIENT)
Dept: CARDIAC REHAB | Facility: HOSPITAL | Age: 52
End: 2022-08-18

## 2022-08-23 ENCOUNTER — APPOINTMENT (OUTPATIENT)
Dept: CARDIAC REHAB | Facility: HOSPITAL | Age: 52
End: 2022-08-23

## 2022-08-25 ENCOUNTER — APPOINTMENT (OUTPATIENT)
Dept: CARDIAC REHAB | Facility: HOSPITAL | Age: 52
End: 2022-08-25

## 2022-08-30 ENCOUNTER — APPOINTMENT (OUTPATIENT)
Dept: CARDIAC REHAB | Facility: HOSPITAL | Age: 52
End: 2022-08-30

## 2022-09-01 ENCOUNTER — APPOINTMENT (OUTPATIENT)
Dept: CARDIAC REHAB | Facility: HOSPITAL | Age: 52
End: 2022-09-01

## 2022-09-14 RX ORDER — DULOXETIN HYDROCHLORIDE 30 MG/1
30 CAPSULE, DELAYED RELEASE ORAL 2 TIMES DAILY
Qty: 90 CAPSULE | Refills: 1 | Status: SHIPPED | OUTPATIENT
Start: 2022-09-14

## 2022-09-14 NOTE — TELEPHONE ENCOUNTER
Caller: Lissa Eisenberg    Relationship: Self    Best call back number: [phone]    Requested Prescriptions:   Requested Prescriptions     Pending Prescriptions Disp Refills   • DULoxetine (CYMBALTA) 30 MG capsule       Sig: Take 1 capsule by mouth 2 (Two) Times a Day.        Pharmacy where request should be sent: [facility] [address] [place] [address] [phone] PH - [phone] FX     Additional details provided by patient: PT REQUEST A 90 DAY SUPPLY    Does the patient have less than a 3 day supply:  [x] Yes  [] No    Candi Guo Rep   09/14/22 15:22 EDT
